# Patient Record
Sex: MALE | Race: WHITE | NOT HISPANIC OR LATINO | Employment: OTHER | URBAN - METROPOLITAN AREA
[De-identification: names, ages, dates, MRNs, and addresses within clinical notes are randomized per-mention and may not be internally consistent; named-entity substitution may affect disease eponyms.]

---

## 2017-03-01 ENCOUNTER — GENERIC CONVERSION - ENCOUNTER (OUTPATIENT)
Dept: OTHER | Facility: OTHER | Age: 72
End: 2017-03-01

## 2017-03-09 ENCOUNTER — ALLSCRIPTS OFFICE VISIT (OUTPATIENT)
Dept: OTHER | Facility: OTHER | Age: 72
End: 2017-03-09

## 2017-03-09 DIAGNOSIS — I10 ESSENTIAL (PRIMARY) HYPERTENSION: ICD-10-CM

## 2017-03-09 DIAGNOSIS — Z12.5 ENCOUNTER FOR SCREENING FOR MALIGNANT NEOPLASM OF PROSTATE: ICD-10-CM

## 2017-04-05 ENCOUNTER — APPOINTMENT (OUTPATIENT)
Dept: LAB | Facility: HOSPITAL | Age: 72
End: 2017-04-05
Attending: INTERNAL MEDICINE
Payer: MEDICARE

## 2017-04-05 ENCOUNTER — TRANSCRIBE ORDERS (OUTPATIENT)
Dept: ADMINISTRATIVE | Facility: HOSPITAL | Age: 72
End: 2017-04-05

## 2017-04-05 ENCOUNTER — GENERIC CONVERSION - ENCOUNTER (OUTPATIENT)
Dept: OTHER | Facility: OTHER | Age: 72
End: 2017-04-05

## 2017-04-05 DIAGNOSIS — I10 ESSENTIAL (PRIMARY) HYPERTENSION: ICD-10-CM

## 2017-04-05 DIAGNOSIS — Z12.5 ENCOUNTER FOR SCREENING FOR MALIGNANT NEOPLASM OF PROSTATE: ICD-10-CM

## 2017-04-05 LAB
ALBUMIN SERPL BCP-MCNC: 3.6 G/DL (ref 3.5–5)
ALP SERPL-CCNC: 80 U/L (ref 46–116)
ALT SERPL W P-5'-P-CCNC: 10 U/L (ref 12–78)
ANION GAP SERPL CALCULATED.3IONS-SCNC: 6 MMOL/L (ref 4–13)
AST SERPL W P-5'-P-CCNC: 21 U/L (ref 5–45)
BILIRUB SERPL-MCNC: 0.8 MG/DL (ref 0.2–1)
BUN SERPL-MCNC: 16 MG/DL (ref 5–25)
CALCIUM SERPL-MCNC: 8.5 MG/DL (ref 8.3–10.1)
CHLORIDE SERPL-SCNC: 107 MMOL/L (ref 100–108)
CO2 SERPL-SCNC: 31 MMOL/L (ref 21–32)
CREAT SERPL-MCNC: 0.93 MG/DL (ref 0.6–1.3)
GFR SERPL CREATININE-BSD FRML MDRD: >60 ML/MIN/1.73SQ M
GLUCOSE P FAST SERPL-MCNC: 105 MG/DL (ref 65–99)
POTASSIUM SERPL-SCNC: 3.7 MMOL/L (ref 3.5–5.3)
PROT SERPL-MCNC: 7 G/DL (ref 6.4–8.2)
PSA SERPL-MCNC: 1.6 NG/ML (ref 0–4)
SODIUM SERPL-SCNC: 144 MMOL/L (ref 136–145)

## 2017-04-05 PROCEDURE — 36415 COLL VENOUS BLD VENIPUNCTURE: CPT

## 2017-04-05 PROCEDURE — 80053 COMPREHEN METABOLIC PANEL: CPT

## 2017-04-05 PROCEDURE — G0103 PSA SCREENING: HCPCS

## 2017-05-22 ENCOUNTER — ALLSCRIPTS OFFICE VISIT (OUTPATIENT)
Dept: OTHER | Facility: OTHER | Age: 72
End: 2017-05-22

## 2017-06-13 ENCOUNTER — ALLSCRIPTS OFFICE VISIT (OUTPATIENT)
Dept: OTHER | Facility: OTHER | Age: 72
End: 2017-06-13

## 2017-07-19 ENCOUNTER — ALLSCRIPTS OFFICE VISIT (OUTPATIENT)
Dept: OTHER | Facility: OTHER | Age: 72
End: 2017-07-19

## 2017-07-19 ENCOUNTER — TRANSCRIBE ORDERS (OUTPATIENT)
Dept: NON INVASIVE DIAGNOSTICS | Facility: CLINIC | Age: 72
End: 2017-07-19

## 2017-07-19 DIAGNOSIS — N20.0 CALCULUS OF KIDNEY: ICD-10-CM

## 2017-07-19 DIAGNOSIS — E78.5 HYPERLIPIDEMIA: ICD-10-CM

## 2017-07-19 DIAGNOSIS — I10 ESSENTIAL (PRIMARY) HYPERTENSION: ICD-10-CM

## 2017-07-24 ENCOUNTER — APPOINTMENT (OUTPATIENT)
Dept: LAB | Facility: HOSPITAL | Age: 72
DRG: 694 | End: 2017-07-24
Attending: INTERNAL MEDICINE
Payer: MEDICARE

## 2017-07-24 ENCOUNTER — TRANSCRIBE ORDERS (OUTPATIENT)
Dept: ADMINISTRATIVE | Facility: HOSPITAL | Age: 72
End: 2017-07-24

## 2017-07-24 DIAGNOSIS — E78.5 HYPERLIPIDEMIA: ICD-10-CM

## 2017-07-24 LAB
ALBUMIN SERPL BCP-MCNC: 3.7 G/DL (ref 3.5–5)
ALP SERPL-CCNC: 78 U/L (ref 46–116)
ALT SERPL W P-5'-P-CCNC: 20 U/L (ref 12–78)
AST SERPL W P-5'-P-CCNC: 28 U/L (ref 5–45)
BILIRUB DIRECT SERPL-MCNC: 0.14 MG/DL (ref 0–0.2)
BILIRUB SERPL-MCNC: 0.7 MG/DL (ref 0.2–1)
CHOLEST SERPL-MCNC: 145 MG/DL (ref 50–200)
HDLC SERPL-MCNC: 49 MG/DL (ref 40–60)
LDLC SERPL CALC-MCNC: 73 MG/DL (ref 0–100)
PROT SERPL-MCNC: 7.2 G/DL (ref 6.4–8.2)
TRIGL SERPL-MCNC: 113 MG/DL

## 2017-07-24 PROCEDURE — 80076 HEPATIC FUNCTION PANEL: CPT

## 2017-07-24 PROCEDURE — 36415 COLL VENOUS BLD VENIPUNCTURE: CPT

## 2017-07-24 PROCEDURE — 80061 LIPID PANEL: CPT

## 2017-07-25 ENCOUNTER — HOSPITAL ENCOUNTER (OUTPATIENT)
Dept: NON INVASIVE DIAGNOSTICS | Facility: CLINIC | Age: 72
Discharge: HOME | DRG: 694 | End: 2017-07-25
Payer: MEDICARE

## 2017-07-25 DIAGNOSIS — E78.5 HYPERLIPIDEMIA: ICD-10-CM

## 2017-07-25 PROCEDURE — 93017 CV STRESS TEST TRACING ONLY: CPT

## 2017-07-26 ENCOUNTER — APPOINTMENT (EMERGENCY)
Dept: CT IMAGING | Facility: HOSPITAL | Age: 72
DRG: 694 | End: 2017-07-26
Payer: MEDICARE

## 2017-07-26 ENCOUNTER — HOSPITAL ENCOUNTER (INPATIENT)
Facility: HOSPITAL | Age: 72
LOS: 2 days | Discharge: HOME HEALTH | DRG: 694 | End: 2017-07-28
Attending: EMERGENCY MEDICINE | Admitting: INTERNAL MEDICINE
Payer: MEDICARE

## 2017-07-26 DIAGNOSIS — G20 PARKINSON'S DISEASE (HCC): ICD-10-CM

## 2017-07-26 DIAGNOSIS — N23 RENAL COLIC ON LEFT SIDE: Primary | ICD-10-CM

## 2017-07-26 DIAGNOSIS — N21.1 URETHRAL STONE: ICD-10-CM

## 2017-07-26 DIAGNOSIS — I25.10 CAD (CORONARY ARTERY DISEASE): ICD-10-CM

## 2017-07-26 PROBLEM — G20.A1 PARKINSON'S DISEASE: Status: ACTIVE | Noted: 2017-07-26

## 2017-07-26 PROBLEM — R10.9 FLANK PAIN: Status: ACTIVE | Noted: 2017-07-26

## 2017-07-26 PROBLEM — I10 HYPERTENSION: Status: ACTIVE | Noted: 2017-07-26

## 2017-07-26 LAB
ALBUMIN SERPL BCP-MCNC: 3.9 G/DL (ref 3.5–5)
ALP SERPL-CCNC: 78 U/L (ref 46–116)
ALT SERPL W P-5'-P-CCNC: 10 U/L (ref 12–78)
ANION GAP SERPL CALCULATED.3IONS-SCNC: 10 MMOL/L (ref 4–13)
AST SERPL W P-5'-P-CCNC: 26 U/L (ref 5–45)
BASOPHILS # BLD AUTO: 0.01 THOUSANDS/ΜL (ref 0–0.1)
BASOPHILS NFR BLD AUTO: 0 % (ref 0–1)
BILIRUB SERPL-MCNC: 0.8 MG/DL (ref 0.2–1)
BILIRUB UR QL STRIP: NEGATIVE
BUN SERPL-MCNC: 22 MG/DL (ref 5–25)
CALCIUM SERPL-MCNC: 8.6 MG/DL (ref 8.3–10.1)
CHLORIDE SERPL-SCNC: 105 MMOL/L (ref 100–108)
CLARITY UR: CLEAR
CO2 SERPL-SCNC: 28 MMOL/L (ref 21–32)
COLOR UR: YELLOW
CREAT SERPL-MCNC: 1.13 MG/DL (ref 0.6–1.3)
EOSINOPHIL # BLD AUTO: 0.08 THOUSAND/ΜL (ref 0–0.61)
EOSINOPHIL NFR BLD AUTO: 1 % (ref 0–6)
ERYTHROCYTE [DISTWIDTH] IN BLOOD BY AUTOMATED COUNT: 13.3 % (ref 11.6–15.1)
GFR SERPL CREATININE-BSD FRML MDRD: 64 ML/MIN/1.73SQ M
GLUCOSE SERPL-MCNC: 132 MG/DL (ref 65–140)
GLUCOSE UR STRIP-MCNC: NEGATIVE MG/DL
HCT VFR BLD AUTO: 45.7 % (ref 36.5–49.3)
HGB BLD-MCNC: 15.6 G/DL (ref 12–17)
HGB UR QL STRIP.AUTO: NEGATIVE
KETONES UR STRIP-MCNC: NEGATIVE MG/DL
LEUKOCYTE ESTERASE UR QL STRIP: NEGATIVE
LYMPHOCYTES # BLD AUTO: 1.39 THOUSANDS/ΜL (ref 0.6–4.47)
LYMPHOCYTES NFR BLD AUTO: 23 % (ref 14–44)
MCH RBC QN AUTO: 31.8 PG (ref 26.8–34.3)
MCHC RBC AUTO-ENTMCNC: 34.1 G/DL (ref 31.4–37.4)
MCV RBC AUTO: 93 FL (ref 82–98)
MONOCYTES # BLD AUTO: 0.66 THOUSAND/ΜL (ref 0.17–1.22)
MONOCYTES NFR BLD AUTO: 11 % (ref 4–12)
NEUTROPHILS # BLD AUTO: 3.98 THOUSANDS/ΜL (ref 1.85–7.62)
NEUTS SEG NFR BLD AUTO: 65 % (ref 43–75)
NITRITE UR QL STRIP: NEGATIVE
PH UR STRIP.AUTO: 5.5 [PH] (ref 4.5–8)
PLATELET # BLD AUTO: 173 THOUSANDS/UL (ref 149–390)
PMV BLD AUTO: 9.5 FL (ref 8.9–12.7)
POTASSIUM SERPL-SCNC: 3.6 MMOL/L (ref 3.5–5.3)
PROT SERPL-MCNC: 7.4 G/DL (ref 6.4–8.2)
PROT UR STRIP-MCNC: NEGATIVE MG/DL
RBC # BLD AUTO: 4.91 MILLION/UL (ref 3.88–5.62)
SODIUM SERPL-SCNC: 143 MMOL/L (ref 136–145)
SP GR UR STRIP.AUTO: 1.02 (ref 1–1.03)
UROBILINOGEN UR QL STRIP.AUTO: 0.2 E.U./DL
WBC # BLD AUTO: 6.12 THOUSAND/UL (ref 4.31–10.16)

## 2017-07-26 PROCEDURE — 96361 HYDRATE IV INFUSION ADD-ON: CPT

## 2017-07-26 PROCEDURE — 80053 COMPREHEN METABOLIC PANEL: CPT | Performed by: EMERGENCY MEDICINE

## 2017-07-26 PROCEDURE — 99285 EMERGENCY DEPT VISIT HI MDM: CPT

## 2017-07-26 PROCEDURE — 96376 TX/PRO/DX INJ SAME DRUG ADON: CPT

## 2017-07-26 PROCEDURE — 96374 THER/PROPH/DIAG INJ IV PUSH: CPT

## 2017-07-26 PROCEDURE — 96375 TX/PRO/DX INJ NEW DRUG ADDON: CPT

## 2017-07-26 PROCEDURE — 85025 COMPLETE CBC W/AUTO DIFF WBC: CPT | Performed by: EMERGENCY MEDICINE

## 2017-07-26 PROCEDURE — 36415 COLL VENOUS BLD VENIPUNCTURE: CPT | Performed by: EMERGENCY MEDICINE

## 2017-07-26 PROCEDURE — 81003 URINALYSIS AUTO W/O SCOPE: CPT | Performed by: EMERGENCY MEDICINE

## 2017-07-26 PROCEDURE — 74176 CT ABD & PELVIS W/O CONTRAST: CPT

## 2017-07-26 RX ORDER — METOCLOPRAMIDE HYDROCHLORIDE 5 MG/ML
10 INJECTION INTRAMUSCULAR; INTRAVENOUS ONCE
Status: COMPLETED | OUTPATIENT
Start: 2017-07-26 | End: 2017-07-26

## 2017-07-26 RX ORDER — ONDANSETRON 2 MG/ML
4 INJECTION INTRAMUSCULAR; INTRAVENOUS ONCE
Status: COMPLETED | OUTPATIENT
Start: 2017-07-26 | End: 2017-07-26

## 2017-07-26 RX ORDER — BENAZEPRIL/HYDROCHLOROTHIAZIDE 20 MG-25MG
1 TABLET ORAL DAILY
COMMUNITY
End: 2018-07-09 | Stop reason: SDUPTHER

## 2017-07-26 RX ORDER — TAMSULOSIN HYDROCHLORIDE 0.4 MG/1
0.4 CAPSULE ORAL
Status: DISCONTINUED | OUTPATIENT
Start: 2017-07-26 | End: 2017-07-28 | Stop reason: HOSPADM

## 2017-07-26 RX ORDER — ONDANSETRON 2 MG/ML
4 INJECTION INTRAMUSCULAR; INTRAVENOUS EVERY 6 HOURS PRN
Status: DISCONTINUED | OUTPATIENT
Start: 2017-07-26 | End: 2017-07-28 | Stop reason: HOSPADM

## 2017-07-26 RX ORDER — METOPROLOL SUCCINATE 25 MG/1
25 TABLET, EXTENDED RELEASE ORAL DAILY
Status: DISCONTINUED | OUTPATIENT
Start: 2017-07-26 | End: 2017-07-28 | Stop reason: HOSPADM

## 2017-07-26 RX ORDER — MORPHINE SULFATE 2 MG/ML
2 INJECTION, SOLUTION INTRAMUSCULAR; INTRAVENOUS EVERY 4 HOURS PRN
Status: DISCONTINUED | OUTPATIENT
Start: 2017-07-26 | End: 2017-07-26

## 2017-07-26 RX ORDER — ZOLPIDEM TARTRATE 5 MG/1
5 TABLET ORAL
Status: DISCONTINUED | OUTPATIENT
Start: 2017-07-26 | End: 2017-07-28 | Stop reason: HOSPADM

## 2017-07-26 RX ORDER — ALLOPURINOL 300 MG/1
300 TABLET ORAL DAILY
Status: DISCONTINUED | OUTPATIENT
Start: 2017-07-26 | End: 2017-07-28 | Stop reason: HOSPADM

## 2017-07-26 RX ORDER — ATORVASTATIN CALCIUM 20 MG/1
20 TABLET, FILM COATED ORAL
Status: DISCONTINUED | OUTPATIENT
Start: 2017-07-26 | End: 2017-07-28 | Stop reason: HOSPADM

## 2017-07-26 RX ORDER — HEPARIN SODIUM 5000 [USP'U]/ML
5000 INJECTION, SOLUTION INTRAVENOUS; SUBCUTANEOUS EVERY 8 HOURS SCHEDULED
Status: DISCONTINUED | OUTPATIENT
Start: 2017-07-26 | End: 2017-07-28 | Stop reason: HOSPADM

## 2017-07-26 RX ORDER — BENAZEPRIL HYDROCHLORIDE 10 MG/1
20 TABLET ORAL DAILY
Status: DISCONTINUED | OUTPATIENT
Start: 2017-07-27 | End: 2017-07-27

## 2017-07-26 RX ORDER — LANOLIN ALCOHOL/MO/W.PET/CERES
1 CREAM (GRAM) TOPICAL DAILY
COMMUNITY

## 2017-07-26 RX ORDER — NITROGLYCERIN 0.4 MG/1
0.4 TABLET SUBLINGUAL
Status: DISCONTINUED | OUTPATIENT
Start: 2017-07-26 | End: 2017-07-28 | Stop reason: HOSPADM

## 2017-07-26 RX ORDER — HYDROCHLOROTHIAZIDE 25 MG/1
25 TABLET ORAL DAILY
Status: DISCONTINUED | OUTPATIENT
Start: 2017-07-27 | End: 2017-07-27

## 2017-07-26 RX ORDER — ACETAMINOPHEN 325 MG/1
650 TABLET ORAL EVERY 6 HOURS PRN
Status: DISCONTINUED | OUTPATIENT
Start: 2017-07-26 | End: 2017-07-28 | Stop reason: HOSPADM

## 2017-07-26 RX ORDER — DEXTROSE AND SODIUM CHLORIDE 5; .45 G/100ML; G/100ML
100 INJECTION, SOLUTION INTRAVENOUS CONTINUOUS
Status: DISCONTINUED | OUTPATIENT
Start: 2017-07-26 | End: 2017-07-28

## 2017-07-26 RX ADMIN — ATORVASTATIN CALCIUM 20 MG: 20 TABLET, FILM COATED ORAL at 16:50

## 2017-07-26 RX ADMIN — CARBIDOPA AND LEVODOPA 2 TABLET: 25; 100 TABLET ORAL at 16:50

## 2017-07-26 RX ADMIN — ONDANSETRON 4 MG: 2 INJECTION INTRAMUSCULAR; INTRAVENOUS at 10:37

## 2017-07-26 RX ADMIN — MORPHINE SULFATE 2 MG: 2 INJECTION, SOLUTION INTRAMUSCULAR; INTRAVENOUS at 20:57

## 2017-07-26 RX ADMIN — SODIUM CHLORIDE 1000 ML: 0.9 INJECTION, SOLUTION INTRAVENOUS at 12:05

## 2017-07-26 RX ADMIN — CEFTRIAXONE SODIUM 1000 MG: 1 INJECTION, POWDER, FOR SOLUTION INTRAMUSCULAR; INTRAVENOUS at 18:33

## 2017-07-26 RX ADMIN — ONDANSETRON 4 MG: 2 INJECTION INTRAMUSCULAR; INTRAVENOUS at 12:48

## 2017-07-26 RX ADMIN — DEXTROSE AND SODIUM CHLORIDE 100 ML/HR: 5; .45 INJECTION, SOLUTION INTRAVENOUS at 16:23

## 2017-07-26 RX ADMIN — METOCLOPRAMIDE 10 MG: 5 INJECTION, SOLUTION INTRAMUSCULAR; INTRAVENOUS at 13:59

## 2017-07-26 RX ADMIN — HYDROMORPHONE HYDROCHLORIDE 1 MG: 1 INJECTION, SOLUTION INTRAMUSCULAR; INTRAVENOUS; SUBCUTANEOUS at 10:31

## 2017-07-26 RX ADMIN — ZOLPIDEM TARTRATE 5 MG: 5 TABLET, FILM COATED ORAL at 22:57

## 2017-07-26 RX ADMIN — HEPARIN SODIUM 5000 UNITS: 5000 INJECTION, SOLUTION INTRAVENOUS; SUBCUTANEOUS at 21:01

## 2017-07-26 RX ADMIN — HYDROMORPHONE HYDROCHLORIDE 1 MG: 1 INJECTION, SOLUTION INTRAMUSCULAR; INTRAVENOUS; SUBCUTANEOUS at 12:07

## 2017-07-26 RX ADMIN — TAMSULOSIN HYDROCHLORIDE 0.4 MG: 0.4 CAPSULE ORAL at 16:50

## 2017-07-27 ENCOUNTER — APPOINTMENT (INPATIENT)
Dept: RADIOLOGY | Facility: HOSPITAL | Age: 72
DRG: 694 | End: 2017-07-27
Payer: MEDICARE

## 2017-07-27 ENCOUNTER — ANESTHESIA EVENT (INPATIENT)
Dept: PERIOP | Facility: HOSPITAL | Age: 72
DRG: 694 | End: 2017-07-27
Payer: MEDICARE

## 2017-07-27 ENCOUNTER — ANESTHESIA (INPATIENT)
Dept: PERIOP | Facility: HOSPITAL | Age: 72
DRG: 694 | End: 2017-07-27
Payer: MEDICARE

## 2017-07-27 PROBLEM — N17.9 AKI (ACUTE KIDNEY INJURY) (HCC): Status: ACTIVE | Noted: 2017-07-27

## 2017-07-27 LAB
ALBUMIN SERPL BCP-MCNC: 3.3 G/DL (ref 3.5–5)
ALP SERPL-CCNC: 74 U/L (ref 46–116)
ALT SERPL W P-5'-P-CCNC: 12 U/L (ref 12–78)
ANION GAP SERPL CALCULATED.3IONS-SCNC: 7 MMOL/L (ref 4–13)
AST SERPL W P-5'-P-CCNC: 24 U/L (ref 5–45)
BASOPHILS # BLD AUTO: 0 THOUSANDS/ΜL (ref 0–0.1)
BASOPHILS NFR BLD AUTO: 0 % (ref 0–1)
BILIRUB SERPL-MCNC: 0.9 MG/DL (ref 0.2–1)
BUN SERPL-MCNC: 22 MG/DL (ref 5–25)
CALCIUM SERPL-MCNC: 8.4 MG/DL (ref 8.3–10.1)
CHLORIDE SERPL-SCNC: 105 MMOL/L (ref 100–108)
CO2 SERPL-SCNC: 29 MMOL/L (ref 21–32)
CREAT SERPL-MCNC: 1.7 MG/DL (ref 0.6–1.3)
EOSINOPHIL # BLD AUTO: 0.03 THOUSAND/ΜL (ref 0–0.61)
EOSINOPHIL NFR BLD AUTO: 0 % (ref 0–6)
ERYTHROCYTE [DISTWIDTH] IN BLOOD BY AUTOMATED COUNT: 13.4 % (ref 11.6–15.1)
GFR SERPL CREATININE-BSD FRML MDRD: 39 ML/MIN/1.73SQ M
GLUCOSE SERPL-MCNC: 124 MG/DL (ref 65–140)
HCT VFR BLD AUTO: 42.3 % (ref 36.5–49.3)
HGB BLD-MCNC: 14.3 G/DL (ref 12–17)
INR PPP: 1.28 (ref 0.86–1.16)
LYMPHOCYTES # BLD AUTO: 0.65 THOUSANDS/ΜL (ref 0.6–4.47)
LYMPHOCYTES NFR BLD AUTO: 8 % (ref 14–44)
MAGNESIUM SERPL-MCNC: 2 MG/DL (ref 1.6–2.6)
MCH RBC QN AUTO: 31.9 PG (ref 26.8–34.3)
MCHC RBC AUTO-ENTMCNC: 33.8 G/DL (ref 31.4–37.4)
MCV RBC AUTO: 94 FL (ref 82–98)
MONOCYTES # BLD AUTO: 1.03 THOUSAND/ΜL (ref 0.17–1.22)
MONOCYTES NFR BLD AUTO: 12 % (ref 4–12)
NEUTROPHILS # BLD AUTO: 6.62 THOUSANDS/ΜL (ref 1.85–7.62)
NEUTS SEG NFR BLD AUTO: 80 % (ref 43–75)
PHOSPHATE SERPL-MCNC: 3.7 MG/DL (ref 2.3–4.1)
PLATELET # BLD AUTO: 141 THOUSANDS/UL (ref 149–390)
PMV BLD AUTO: 9.9 FL (ref 8.9–12.7)
POTASSIUM SERPL-SCNC: 4 MMOL/L (ref 3.5–5.3)
PROT SERPL-MCNC: 6.5 G/DL (ref 6.4–8.2)
PROTHROMBIN TIME: 15.8 SECONDS (ref 12.1–14.4)
RBC # BLD AUTO: 4.48 MILLION/UL (ref 3.88–5.62)
SODIUM SERPL-SCNC: 141 MMOL/L (ref 136–145)
TSH SERPL DL<=0.05 MIU/L-ACNC: 2.27 UIU/ML (ref 0.36–3.74)
WBC # BLD AUTO: 8.33 THOUSAND/UL (ref 4.31–10.16)

## 2017-07-27 PROCEDURE — 84100 ASSAY OF PHOSPHORUS: CPT | Performed by: INTERNAL MEDICINE

## 2017-07-27 PROCEDURE — 0T778DZ DILATION OF LEFT URETER WITH INTRALUMINAL DEVICE, VIA NATURAL OR ARTIFICIAL OPENING ENDOSCOPIC: ICD-10-PCS | Performed by: UROLOGY

## 2017-07-27 PROCEDURE — 85025 COMPLETE CBC W/AUTO DIFF WBC: CPT | Performed by: PHYSICIAN ASSISTANT

## 2017-07-27 PROCEDURE — 84443 ASSAY THYROID STIM HORMONE: CPT | Performed by: INTERNAL MEDICINE

## 2017-07-27 PROCEDURE — C1758 CATHETER, URETERAL: HCPCS | Performed by: UROLOGY

## 2017-07-27 PROCEDURE — C1769 GUIDE WIRE: HCPCS | Performed by: UROLOGY

## 2017-07-27 PROCEDURE — 74450 X-RAY URETHRA/BLADDER: CPT

## 2017-07-27 PROCEDURE — 83735 ASSAY OF MAGNESIUM: CPT | Performed by: INTERNAL MEDICINE

## 2017-07-27 PROCEDURE — 85610 PROTHROMBIN TIME: CPT | Performed by: INTERNAL MEDICINE

## 2017-07-27 PROCEDURE — 87086 URINE CULTURE/COLONY COUNT: CPT | Performed by: NURSE PRACTITIONER

## 2017-07-27 PROCEDURE — 74000 HB X-RAY EXAM OF ABDOMEN (SINGLE ANTEROPOSTERIOR VIEW): CPT

## 2017-07-27 PROCEDURE — C2617 STENT, NON-COR, TEM W/O DEL: HCPCS | Performed by: UROLOGY

## 2017-07-27 PROCEDURE — 80053 COMPREHEN METABOLIC PANEL: CPT | Performed by: INTERNAL MEDICINE

## 2017-07-27 DEVICE — URETERAL STENT 6 FR X 26 CM INLAY: Type: IMPLANTABLE DEVICE | Status: FUNCTIONAL

## 2017-07-27 RX ORDER — FENTANYL CITRATE/PF 50 MCG/ML
25 SYRINGE (ML) INJECTION
Status: DISCONTINUED | OUTPATIENT
Start: 2017-07-27 | End: 2017-07-27 | Stop reason: HOSPADM

## 2017-07-27 RX ORDER — FENTANYL CITRATE 50 UG/ML
INJECTION, SOLUTION INTRAMUSCULAR; INTRAVENOUS AS NEEDED
Status: DISCONTINUED | OUTPATIENT
Start: 2017-07-27 | End: 2017-07-27 | Stop reason: SURG

## 2017-07-27 RX ORDER — CIPROFLOXACIN 500 MG/1
500 TABLET, FILM COATED ORAL 2 TIMES DAILY
Status: DISCONTINUED | OUTPATIENT
Start: 2017-07-27 | End: 2017-07-28 | Stop reason: HOSPADM

## 2017-07-27 RX ORDER — PROPOFOL 10 MG/ML
INJECTION, EMULSION INTRAVENOUS AS NEEDED
Status: DISCONTINUED | OUTPATIENT
Start: 2017-07-27 | End: 2017-07-27 | Stop reason: SURG

## 2017-07-27 RX ORDER — PROMETHAZINE HYDROCHLORIDE 25 MG/ML
25 INJECTION, SOLUTION INTRAMUSCULAR; INTRAVENOUS EVERY 6 HOURS PRN
Status: DISCONTINUED | OUTPATIENT
Start: 2017-07-27 | End: 2017-07-27 | Stop reason: HOSPADM

## 2017-07-27 RX ORDER — METOPROLOL TARTRATE 5 MG/5ML
INJECTION INTRAVENOUS AS NEEDED
Status: DISCONTINUED | OUTPATIENT
Start: 2017-07-27 | End: 2017-07-27 | Stop reason: SURG

## 2017-07-27 RX ORDER — SODIUM CHLORIDE, SODIUM LACTATE, POTASSIUM CHLORIDE, CALCIUM CHLORIDE 600; 310; 30; 20 MG/100ML; MG/100ML; MG/100ML; MG/100ML
INJECTION, SOLUTION INTRAVENOUS CONTINUOUS PRN
Status: DISCONTINUED | OUTPATIENT
Start: 2017-07-27 | End: 2017-07-27 | Stop reason: SURG

## 2017-07-27 RX ORDER — MIDAZOLAM HYDROCHLORIDE 1 MG/ML
INJECTION INTRAMUSCULAR; INTRAVENOUS AS NEEDED
Status: DISCONTINUED | OUTPATIENT
Start: 2017-07-27 | End: 2017-07-27 | Stop reason: SURG

## 2017-07-27 RX ORDER — HYDROCODONE BITARTRATE AND ACETAMINOPHEN 5; 325 MG/1; MG/1
2 TABLET ORAL EVERY 6 HOURS PRN
Qty: 15 TABLET | Refills: 0 | Status: SHIPPED | OUTPATIENT
Start: 2017-07-27 | End: 2017-08-06

## 2017-07-27 RX ORDER — HYDROCODONE BITARTRATE AND ACETAMINOPHEN 5; 325 MG/1; MG/1
2 TABLET ORAL EVERY 4 HOURS PRN
Status: DISCONTINUED | OUTPATIENT
Start: 2017-07-27 | End: 2017-07-28 | Stop reason: HOSPADM

## 2017-07-27 RX ORDER — CIPROFLOXACIN 500 MG/1
500 TABLET, FILM COATED ORAL 2 TIMES DAILY
Qty: 10 TABLET | Refills: 0 | Status: SHIPPED | OUTPATIENT
Start: 2017-07-27 | End: 2017-08-01

## 2017-07-27 RX ORDER — MAGNESIUM HYDROXIDE 1200 MG/15ML
LIQUID ORAL AS NEEDED
Status: DISCONTINUED | OUTPATIENT
Start: 2017-07-27 | End: 2017-07-27 | Stop reason: HOSPADM

## 2017-07-27 RX ADMIN — CEFAZOLIN SODIUM 2000 MG: 2 SOLUTION INTRAVENOUS at 16:17

## 2017-07-27 RX ADMIN — CARBIDOPA AND LEVODOPA 2 TABLET: 25; 100 TABLET ORAL at 17:59

## 2017-07-27 RX ADMIN — CARBIDOPA AND LEVODOPA 2 TABLET: 25; 100 TABLET ORAL at 06:11

## 2017-07-27 RX ADMIN — FENTANYL CITRATE 25 MCG: 50 INJECTION, SOLUTION INTRAMUSCULAR; INTRAVENOUS at 16:51

## 2017-07-27 RX ADMIN — DEXTROSE AND SODIUM CHLORIDE 100 ML/HR: 5; .45 INJECTION, SOLUTION INTRAVENOUS at 03:26

## 2017-07-27 RX ADMIN — FENTANYL CITRATE 50 MCG: 50 INJECTION, SOLUTION INTRAMUSCULAR; INTRAVENOUS at 16:26

## 2017-07-27 RX ADMIN — BENAZEPRIL HYDROCHLORIDE 20 MG: 10 TABLET, FILM COATED ORAL at 08:14

## 2017-07-27 RX ADMIN — HYDROMORPHONE HYDROCHLORIDE 1 MG: 1 INJECTION, SOLUTION INTRAMUSCULAR; INTRAVENOUS; SUBCUTANEOUS at 13:30

## 2017-07-27 RX ADMIN — CIPROFLOXACIN 500 MG: 500 TABLET, FILM COATED ORAL at 17:59

## 2017-07-27 RX ADMIN — HEPARIN SODIUM 5000 UNITS: 5000 INJECTION, SOLUTION INTRAVENOUS; SUBCUTANEOUS at 21:31

## 2017-07-27 RX ADMIN — SODIUM CHLORIDE, SODIUM LACTATE, POTASSIUM CHLORIDE, AND CALCIUM CHLORIDE: .6; .31; .03; .02 INJECTION, SOLUTION INTRAVENOUS at 16:04

## 2017-07-27 RX ADMIN — PROPOFOL 150 MG: 10 INJECTION, EMULSION INTRAVENOUS at 16:13

## 2017-07-27 RX ADMIN — FENTANYL CITRATE 50 MCG: 50 INJECTION, SOLUTION INTRAMUSCULAR; INTRAVENOUS at 16:16

## 2017-07-27 RX ADMIN — ONDANSETRON 4 MG: 2 INJECTION INTRAMUSCULAR; INTRAVENOUS at 16:25

## 2017-07-27 RX ADMIN — HYDROMORPHONE HYDROCHLORIDE 1 MG: 1 INJECTION, SOLUTION INTRAMUSCULAR; INTRAVENOUS; SUBCUTANEOUS at 10:39

## 2017-07-27 RX ADMIN — FENTANYL CITRATE 25 MCG: 50 INJECTION, SOLUTION INTRAMUSCULAR; INTRAVENOUS at 16:47

## 2017-07-27 RX ADMIN — ZOLPIDEM TARTRATE 5 MG: 5 TABLET, FILM COATED ORAL at 23:25

## 2017-07-27 RX ADMIN — DEXAMETHASONE SODIUM PHOSPHATE 4 MG: 10 INJECTION INTRAMUSCULAR; INTRAVENOUS at 16:25

## 2017-07-27 RX ADMIN — ALLOPURINOL 300 MG: 300 TABLET ORAL at 08:14

## 2017-07-27 RX ADMIN — ATORVASTATIN CALCIUM 20 MG: 20 TABLET, FILM COATED ORAL at 17:58

## 2017-07-27 RX ADMIN — MIDAZOLAM HYDROCHLORIDE 2 MG: 1 INJECTION, SOLUTION INTRAMUSCULAR; INTRAVENOUS at 16:08

## 2017-07-27 RX ADMIN — TAMSULOSIN HYDROCHLORIDE 0.4 MG: 0.4 CAPSULE ORAL at 17:59

## 2017-07-27 RX ADMIN — HYDROMORPHONE HYDROCHLORIDE 1 MG: 1 INJECTION, SOLUTION INTRAMUSCULAR; INTRAVENOUS; SUBCUTANEOUS at 03:21

## 2017-07-27 RX ADMIN — HYDROCHLOROTHIAZIDE 25 MG: 25 TABLET ORAL at 08:14

## 2017-07-27 RX ADMIN — METOPROLOL TARTRATE 1 MG: 5 INJECTION INTRAVENOUS at 16:33

## 2017-07-27 RX ADMIN — METOPROLOL SUCCINATE 25 MG: 25 TABLET, EXTENDED RELEASE ORAL at 08:14

## 2017-07-27 RX ADMIN — FENTANYL CITRATE 50 MCG: 50 INJECTION, SOLUTION INTRAMUSCULAR; INTRAVENOUS at 16:30

## 2017-07-28 ENCOUNTER — APPOINTMENT (INPATIENT)
Dept: PHYSICAL THERAPY | Facility: HOSPITAL | Age: 72
DRG: 694 | End: 2017-07-28
Payer: MEDICARE

## 2017-07-28 VITALS
SYSTOLIC BLOOD PRESSURE: 136 MMHG | HEIGHT: 73 IN | OXYGEN SATURATION: 95 % | WEIGHT: 246.91 LBS | RESPIRATION RATE: 18 BRPM | TEMPERATURE: 97.7 F | HEART RATE: 90 BPM | BODY MASS INDEX: 32.72 KG/M2 | DIASTOLIC BLOOD PRESSURE: 75 MMHG

## 2017-07-28 PROBLEM — N21.1 URETHRAL STONE: Status: RESOLVED | Noted: 2017-07-26 | Resolved: 2017-07-28

## 2017-07-28 PROBLEM — R10.9 FLANK PAIN: Status: RESOLVED | Noted: 2017-07-26 | Resolved: 2017-07-28

## 2017-07-28 PROBLEM — N17.9 AKI (ACUTE KIDNEY INJURY) (HCC): Status: RESOLVED | Noted: 2017-07-27 | Resolved: 2017-07-28

## 2017-07-28 PROBLEM — N23 RENAL COLIC ON LEFT SIDE: Status: RESOLVED | Noted: 2017-07-26 | Resolved: 2017-07-28

## 2017-07-28 LAB
ANION GAP SERPL CALCULATED.3IONS-SCNC: 8 MMOL/L (ref 4–13)
BASOPHILS # BLD AUTO: 0 THOUSANDS/ΜL (ref 0–0.1)
BASOPHILS NFR BLD AUTO: 0 % (ref 0–1)
BUN SERPL-MCNC: 21 MG/DL (ref 5–25)
CALCIUM SERPL-MCNC: 8.2 MG/DL (ref 8.3–10.1)
CHLORIDE SERPL-SCNC: 104 MMOL/L (ref 100–108)
CO2 SERPL-SCNC: 28 MMOL/L (ref 21–32)
CREAT SERPL-MCNC: 1.32 MG/DL (ref 0.6–1.3)
EOSINOPHIL # BLD AUTO: 0 THOUSAND/ΜL (ref 0–0.61)
EOSINOPHIL NFR BLD AUTO: 0 % (ref 0–6)
ERYTHROCYTE [DISTWIDTH] IN BLOOD BY AUTOMATED COUNT: 13.1 % (ref 11.6–15.1)
GFR SERPL CREATININE-BSD FRML MDRD: 53 ML/MIN/1.73SQ M
GLUCOSE SERPL-MCNC: 133 MG/DL (ref 65–140)
HCT VFR BLD AUTO: 41.9 % (ref 36.5–49.3)
HGB BLD-MCNC: 14 G/DL (ref 12–17)
LYMPHOCYTES # BLD AUTO: 0.58 THOUSANDS/ΜL (ref 0.6–4.47)
LYMPHOCYTES NFR BLD AUTO: 7 % (ref 14–44)
MCH RBC QN AUTO: 31.6 PG (ref 26.8–34.3)
MCHC RBC AUTO-ENTMCNC: 33.4 G/DL (ref 31.4–37.4)
MCV RBC AUTO: 95 FL (ref 82–98)
MONOCYTES # BLD AUTO: 0.55 THOUSAND/ΜL (ref 0.17–1.22)
MONOCYTES NFR BLD AUTO: 7 % (ref 4–12)
NEUTROPHILS # BLD AUTO: 6.71 THOUSANDS/ΜL (ref 1.85–7.62)
NEUTS SEG NFR BLD AUTO: 86 % (ref 43–75)
PLATELET # BLD AUTO: 146 THOUSANDS/UL (ref 149–390)
PMV BLD AUTO: 10.3 FL (ref 8.9–12.7)
POTASSIUM SERPL-SCNC: 3.7 MMOL/L (ref 3.5–5.3)
RBC # BLD AUTO: 4.43 MILLION/UL (ref 3.88–5.62)
SODIUM SERPL-SCNC: 140 MMOL/L (ref 136–145)
WBC # BLD AUTO: 7.84 THOUSAND/UL (ref 4.31–10.16)

## 2017-07-28 PROCEDURE — G8987 SELF CARE CURRENT STATUS: HCPCS

## 2017-07-28 PROCEDURE — G8989 SELF CARE D/C STATUS: HCPCS

## 2017-07-28 PROCEDURE — G8979 MOBILITY GOAL STATUS: HCPCS

## 2017-07-28 PROCEDURE — G8980 MOBILITY D/C STATUS: HCPCS

## 2017-07-28 PROCEDURE — 97163 PT EVAL HIGH COMPLEX 45 MIN: CPT

## 2017-07-28 PROCEDURE — 85025 COMPLETE CBC W/AUTO DIFF WBC: CPT | Performed by: INTERNAL MEDICINE

## 2017-07-28 PROCEDURE — G8978 MOBILITY CURRENT STATUS: HCPCS

## 2017-07-28 PROCEDURE — G8988 SELF CARE GOAL STATUS: HCPCS

## 2017-07-28 PROCEDURE — 80048 BASIC METABOLIC PNL TOTAL CA: CPT | Performed by: INTERNAL MEDICINE

## 2017-07-28 PROCEDURE — 97165 OT EVAL LOW COMPLEX 30 MIN: CPT

## 2017-07-28 RX ADMIN — METOPROLOL SUCCINATE 25 MG: 25 TABLET, EXTENDED RELEASE ORAL at 09:06

## 2017-07-28 RX ADMIN — CIPROFLOXACIN 500 MG: 500 TABLET, FILM COATED ORAL at 09:06

## 2017-07-28 RX ADMIN — ALLOPURINOL 300 MG: 300 TABLET ORAL at 09:06

## 2017-07-28 RX ADMIN — HEPARIN SODIUM 5000 UNITS: 5000 INJECTION, SOLUTION INTRAVENOUS; SUBCUTANEOUS at 06:54

## 2017-07-28 RX ADMIN — CARBIDOPA AND LEVODOPA 2 TABLET: 25; 100 TABLET ORAL at 06:54

## 2017-07-28 RX ADMIN — DEXTROSE AND SODIUM CHLORIDE 100 ML/HR: 5; .45 INJECTION, SOLUTION INTRAVENOUS at 03:05

## 2017-07-29 LAB — BACTERIA UR CULT: NORMAL

## 2017-08-01 ENCOUNTER — ALLSCRIPTS OFFICE VISIT (OUTPATIENT)
Dept: OTHER | Facility: OTHER | Age: 72
End: 2017-08-01

## 2017-08-01 LAB
CHEST PAIN STATEMENT: NORMAL
MAX DIASTOLIC BP: 71 MMHG
MAX HEART RATE: 137 BPM
MAX PREDICTED HEART RATE: 147 BPM
MAX. SYSTOLIC BP: 192 MMHG
PROTOCOL NAME: NORMAL
REASON FOR TERMINATION: NORMAL
TARGET HR FORMULA: NORMAL
TEST INDICATION: NORMAL
TIME IN EXERCISE PHASE: 330 S

## 2017-08-03 ENCOUNTER — GENERIC CONVERSION - ENCOUNTER (OUTPATIENT)
Dept: OTHER | Facility: OTHER | Age: 72
End: 2017-08-03

## 2017-08-03 ENCOUNTER — APPOINTMENT (OUTPATIENT)
Dept: LAB | Facility: HOSPITAL | Age: 72
End: 2017-08-03
Attending: INTERNAL MEDICINE
Payer: MEDICARE

## 2017-08-03 DIAGNOSIS — I10 ESSENTIAL (PRIMARY) HYPERTENSION: ICD-10-CM

## 2017-08-03 LAB
ANION GAP SERPL CALCULATED.3IONS-SCNC: 10 MMOL/L (ref 4–13)
BUN SERPL-MCNC: 22 MG/DL (ref 5–25)
CALCIUM SERPL-MCNC: 9.1 MG/DL (ref 8.3–10.1)
CHLORIDE SERPL-SCNC: 103 MMOL/L (ref 100–108)
CO2 SERPL-SCNC: 30 MMOL/L (ref 21–32)
CREAT SERPL-MCNC: 1.22 MG/DL (ref 0.6–1.3)
GFR SERPL CREATININE-BSD FRML MDRD: 58 ML/MIN/1.73SQ M
GLUCOSE SERPL-MCNC: 116 MG/DL (ref 65–140)
POTASSIUM SERPL-SCNC: 3.8 MMOL/L (ref 3.5–5.3)
SODIUM SERPL-SCNC: 143 MMOL/L (ref 136–145)

## 2017-08-03 PROCEDURE — 80048 BASIC METABOLIC PNL TOTAL CA: CPT

## 2017-08-03 PROCEDURE — 36415 COLL VENOUS BLD VENIPUNCTURE: CPT

## 2017-08-16 ENCOUNTER — TRANSCRIBE ORDERS (OUTPATIENT)
Dept: ADMINISTRATIVE | Facility: HOSPITAL | Age: 72
End: 2017-08-16

## 2017-08-16 ENCOUNTER — ALLSCRIPTS OFFICE VISIT (OUTPATIENT)
Dept: OTHER | Facility: OTHER | Age: 72
End: 2017-08-16

## 2017-08-16 DIAGNOSIS — N20.0 CALCULUS OF KIDNEY: Primary | ICD-10-CM

## 2017-08-23 ENCOUNTER — GENERIC CONVERSION - ENCOUNTER (OUTPATIENT)
Dept: OTHER | Facility: OTHER | Age: 72
End: 2017-08-23

## 2017-08-24 ENCOUNTER — HOSPITAL ENCOUNTER (OUTPATIENT)
Dept: CT IMAGING | Facility: HOSPITAL | Age: 72
Discharge: HOME | End: 2017-08-24
Attending: UROLOGY
Payer: MEDICARE

## 2017-08-24 DIAGNOSIS — N20.0 CALCULUS OF KIDNEY: ICD-10-CM

## 2017-08-24 PROCEDURE — 74176 CT ABD & PELVIS W/O CONTRAST: CPT

## 2017-09-08 ENCOUNTER — HOSPITAL ENCOUNTER (OUTPATIENT)
Dept: RADIOLOGY | Facility: HOSPITAL | Age: 72
Discharge: HOME | End: 2017-09-08
Attending: UROLOGY
Payer: MEDICARE

## 2017-09-08 ENCOUNTER — APPOINTMENT (OUTPATIENT)
Dept: LAB | Facility: HOSPITAL | Age: 72
End: 2017-09-08
Attending: UROLOGY
Payer: MEDICARE

## 2017-09-08 ENCOUNTER — ALLSCRIPTS OFFICE VISIT (OUTPATIENT)
Dept: OTHER | Facility: OTHER | Age: 72
End: 2017-09-08

## 2017-09-08 ENCOUNTER — APPOINTMENT (OUTPATIENT)
Dept: PREADMISSION TESTING | Facility: HOSPITAL | Age: 72
End: 2017-09-08
Payer: MEDICARE

## 2017-09-08 DIAGNOSIS — N20.0 CALCULUS OF KIDNEY: ICD-10-CM

## 2017-09-08 LAB
ABO GROUP BLD: NORMAL
APTT PPP: 36 SECONDS (ref 23–35)
BASOPHILS # BLD AUTO: 0.02 THOUSANDS/ΜL (ref 0–0.1)
BASOPHILS NFR BLD AUTO: 0 % (ref 0–1)
BLD GP AB SCN SERPL QL: POSITIVE
BLOOD GROUP ANTIBODIES SERPL: NORMAL
E AG RBC QL: NEGATIVE
EOSINOPHIL # BLD AUTO: 0.08 THOUSAND/ΜL (ref 0–0.61)
EOSINOPHIL NFR BLD AUTO: 2 % (ref 0–6)
ERYTHROCYTE [DISTWIDTH] IN BLOOD BY AUTOMATED COUNT: 13 % (ref 11.6–15.1)
HCT VFR BLD AUTO: 43.5 % (ref 36.5–49.3)
HGB BLD-MCNC: 14.8 G/DL (ref 12–17)
INR PPP: 1.13 (ref 0.86–1.16)
LYMPHOCYTES # BLD AUTO: 1.03 THOUSANDS/ΜL (ref 0.6–4.47)
LYMPHOCYTES NFR BLD AUTO: 20 % (ref 14–44)
MCH RBC QN AUTO: 31.5 PG (ref 26.8–34.3)
MCHC RBC AUTO-ENTMCNC: 34 G/DL (ref 31.4–37.4)
MCV RBC AUTO: 93 FL (ref 82–98)
MONOCYTES # BLD AUTO: 0.6 THOUSAND/ΜL (ref 0.17–1.22)
MONOCYTES NFR BLD AUTO: 12 % (ref 4–12)
NEUTROPHILS # BLD AUTO: 3.42 THOUSANDS/ΜL (ref 1.85–7.62)
NEUTS SEG NFR BLD AUTO: 66 % (ref 43–75)
PLATELET # BLD AUTO: 169 THOUSANDS/UL (ref 149–390)
PMV BLD AUTO: 9.9 FL (ref 8.9–12.7)
PROTHROMBIN TIME: 14.3 SECONDS (ref 12.1–14.4)
RBC # BLD AUTO: 4.7 MILLION/UL (ref 3.88–5.62)
RH BLD: POSITIVE
SPECIMEN EXPIRATION DATE: NORMAL
WBC # BLD AUTO: 5.15 THOUSAND/UL (ref 4.31–10.16)

## 2017-09-08 PROCEDURE — 86921 COMPATIBILITY TEST INCUBATE: CPT

## 2017-09-08 PROCEDURE — 86850 RBC ANTIBODY SCREEN: CPT

## 2017-09-08 PROCEDURE — 85025 COMPLETE CBC W/AUTO DIFF WBC: CPT

## 2017-09-08 PROCEDURE — 86900 BLOOD TYPING SEROLOGIC ABO: CPT

## 2017-09-08 PROCEDURE — 85610 PROTHROMBIN TIME: CPT

## 2017-09-08 PROCEDURE — 36415 COLL VENOUS BLD VENIPUNCTURE: CPT

## 2017-09-08 PROCEDURE — 86905 BLOOD TYPING RBC ANTIGENS: CPT

## 2017-09-08 PROCEDURE — 85730 THROMBOPLASTIN TIME PARTIAL: CPT

## 2017-09-08 PROCEDURE — 86922 COMPATIBILITY TEST ANTIGLOB: CPT

## 2017-09-08 PROCEDURE — 86870 RBC ANTIBODY IDENTIFICATION: CPT

## 2017-09-08 PROCEDURE — 87086 URINE CULTURE/COLONY COUNT: CPT

## 2017-09-08 PROCEDURE — 71020 HB CHEST X-RAY 2VW FRONTAL&LATL: CPT

## 2017-09-08 PROCEDURE — 86901 BLOOD TYPING SEROLOGIC RH(D): CPT

## 2017-09-09 LAB — BACTERIA UR CULT: NORMAL

## 2017-09-11 RX ORDER — METOPROLOL SUCCINATE 50 MG/1
50 TABLET, EXTENDED RELEASE ORAL DAILY
COMMUNITY
End: 2018-06-15 | Stop reason: SDUPTHER

## 2017-09-13 ENCOUNTER — GENERIC CONVERSION - ENCOUNTER (OUTPATIENT)
Dept: OTHER | Facility: OTHER | Age: 72
End: 2017-09-13

## 2017-09-14 ENCOUNTER — ANESTHESIA EVENT (OUTPATIENT)
Dept: PERIOP | Facility: HOSPITAL | Age: 72
End: 2017-09-14
Payer: MEDICARE

## 2017-09-15 ENCOUNTER — ANESTHESIA (OUTPATIENT)
Dept: PERIOP | Facility: HOSPITAL | Age: 72
End: 2017-09-15
Payer: MEDICARE

## 2017-09-15 ENCOUNTER — HOSPITAL ENCOUNTER (OUTPATIENT)
Facility: HOSPITAL | Age: 72
Setting detail: OUTPATIENT SURGERY
Discharge: HOME | End: 2017-09-16
Attending: UROLOGY | Admitting: UROLOGY
Payer: MEDICARE

## 2017-09-15 DIAGNOSIS — N21.0 CALCULUS IN BLADDER: ICD-10-CM

## 2017-09-15 DIAGNOSIS — N40.0 ENLARGED PROSTATE WITHOUT LOWER URINARY TRACT SYMPTOMS (LUTS): ICD-10-CM

## 2017-09-15 LAB
ANION GAP SERPL CALCULATED.3IONS-SCNC: 6 MMOL/L (ref 4–13)
BUN SERPL-MCNC: 18 MG/DL (ref 5–25)
CALCIUM SERPL-MCNC: 8.3 MG/DL (ref 8.3–10.1)
CHLORIDE SERPL-SCNC: 108 MMOL/L (ref 100–108)
CO2 SERPL-SCNC: 28 MMOL/L (ref 21–32)
CREAT SERPL-MCNC: 0.91 MG/DL (ref 0.6–1.3)
ERYTHROCYTE [DISTWIDTH] IN BLOOD BY AUTOMATED COUNT: 13.3 % (ref 11.6–15.1)
GFR SERPL CREATININE-BSD FRML MDRD: 83 ML/MIN/1.73SQ M
GLUCOSE P FAST SERPL-MCNC: 105 MG/DL (ref 65–99)
GLUCOSE SERPL-MCNC: 105 MG/DL (ref 65–140)
HCT VFR BLD AUTO: 40.7 % (ref 36.5–49.3)
HGB BLD-MCNC: 13.8 G/DL (ref 12–17)
MCH RBC QN AUTO: 32.4 PG (ref 26.8–34.3)
MCHC RBC AUTO-ENTMCNC: 33.9 G/DL (ref 31.4–37.4)
MCV RBC AUTO: 96 FL (ref 82–98)
PLATELET # BLD AUTO: 151 THOUSANDS/UL (ref 149–390)
PMV BLD AUTO: 10.1 FL (ref 8.9–12.7)
POTASSIUM SERPL-SCNC: 4.5 MMOL/L (ref 3.5–5.3)
RBC # BLD AUTO: 4.26 MILLION/UL (ref 3.88–5.62)
SODIUM SERPL-SCNC: 142 MMOL/L (ref 136–145)
WBC # BLD AUTO: 9.33 THOUSAND/UL (ref 4.31–10.16)

## 2017-09-15 PROCEDURE — 88305 TISSUE EXAM BY PATHOLOGIST: CPT | Performed by: UROLOGY

## 2017-09-15 PROCEDURE — C1769 GUIDE WIRE: HCPCS | Performed by: UROLOGY

## 2017-09-15 PROCEDURE — 88300 SURGICAL PATH GROSS: CPT | Performed by: UROLOGY

## 2017-09-15 PROCEDURE — 80048 BASIC METABOLIC PNL TOTAL CA: CPT | Performed by: UROLOGY

## 2017-09-15 PROCEDURE — 82360 CALCULUS ASSAY QUANT: CPT | Performed by: UROLOGY

## 2017-09-15 PROCEDURE — 94760 N-INVAS EAR/PLS OXIMETRY 1: CPT

## 2017-09-15 PROCEDURE — 85027 COMPLETE CBC AUTOMATED: CPT | Performed by: UROLOGY

## 2017-09-15 RX ORDER — METOPROLOL SUCCINATE 50 MG/1
50 TABLET, EXTENDED RELEASE ORAL DAILY
Status: DISCONTINUED | OUTPATIENT
Start: 2017-09-15 | End: 2017-09-16 | Stop reason: HOSPADM

## 2017-09-15 RX ORDER — ZOLPIDEM TARTRATE 5 MG/1
10 TABLET ORAL
Status: DISCONTINUED | OUTPATIENT
Start: 2017-09-15 | End: 2017-09-16 | Stop reason: HOSPADM

## 2017-09-15 RX ORDER — ALLOPURINOL 300 MG/1
300 TABLET ORAL DAILY
Status: DISCONTINUED | OUTPATIENT
Start: 2017-09-15 | End: 2017-09-16 | Stop reason: HOSPADM

## 2017-09-15 RX ORDER — FENTANYL CITRATE 50 UG/ML
INJECTION, SOLUTION INTRAMUSCULAR; INTRAVENOUS AS NEEDED
Status: DISCONTINUED | OUTPATIENT
Start: 2017-09-15 | End: 2017-09-15 | Stop reason: SURG

## 2017-09-15 RX ORDER — HYDROCHLOROTHIAZIDE 25 MG/1
25 TABLET ORAL DAILY
Status: DISCONTINUED | OUTPATIENT
Start: 2017-09-15 | End: 2017-09-16 | Stop reason: HOSPADM

## 2017-09-15 RX ORDER — FENTANYL CITRATE/PF 50 MCG/ML
50 SYRINGE (ML) INJECTION
Status: DISCONTINUED | OUTPATIENT
Start: 2017-09-15 | End: 2017-09-15 | Stop reason: HOSPADM

## 2017-09-15 RX ORDER — DOCUSATE SODIUM 100 MG/1
100 CAPSULE, LIQUID FILLED ORAL 2 TIMES DAILY
Status: DISCONTINUED | OUTPATIENT
Start: 2017-09-15 | End: 2017-09-16 | Stop reason: HOSPADM

## 2017-09-15 RX ORDER — ATROPA BELLADONNA AND OPIUM 16.2; 6 MG/1; MG/1
1 SUPPOSITORY RECTAL ONCE
Status: COMPLETED | OUTPATIENT
Start: 2017-09-15 | End: 2017-09-15

## 2017-09-15 RX ORDER — MECLIZINE HCL 12.5 MG/1
25 TABLET ORAL 3 TIMES DAILY PRN
Status: DISCONTINUED | OUTPATIENT
Start: 2017-09-15 | End: 2017-09-16 | Stop reason: HOSPADM

## 2017-09-15 RX ORDER — TAMSULOSIN HYDROCHLORIDE 0.4 MG/1
0.4 CAPSULE ORAL
Status: DISCONTINUED | OUTPATIENT
Start: 2017-09-15 | End: 2017-09-16 | Stop reason: HOSPADM

## 2017-09-15 RX ORDER — BENAZEPRIL HYDROCHLORIDE 10 MG/1
10 TABLET ORAL DAILY
Status: DISCONTINUED | OUTPATIENT
Start: 2017-09-15 | End: 2017-09-16 | Stop reason: HOSPADM

## 2017-09-15 RX ORDER — SODIUM CHLORIDE 9 MG/ML
75 INJECTION, SOLUTION INTRAVENOUS CONTINUOUS
Status: DISCONTINUED | OUTPATIENT
Start: 2017-09-15 | End: 2017-09-16 | Stop reason: HOSPADM

## 2017-09-15 RX ORDER — HYDROCODONE BITARTRATE AND ACETAMINOPHEN 5; 325 MG/1; MG/1
1 TABLET ORAL EVERY 4 HOURS PRN
Qty: 30 TABLET | Refills: 0 | Status: SHIPPED | OUTPATIENT
Start: 2017-09-15 | End: 2017-09-25

## 2017-09-15 RX ORDER — ATROPA BELLADONNA AND OPIUM 16.2; 6 MG/1; MG/1
1 SUPPOSITORY RECTAL EVERY 6 HOURS PRN
Status: DISCONTINUED | OUTPATIENT
Start: 2017-09-15 | End: 2017-09-16 | Stop reason: HOSPADM

## 2017-09-15 RX ORDER — OXYBUTYNIN CHLORIDE 5 MG/1
5 TABLET ORAL 3 TIMES DAILY PRN
Status: DISCONTINUED | OUTPATIENT
Start: 2017-09-15 | End: 2017-09-16 | Stop reason: HOSPADM

## 2017-09-15 RX ORDER — SCOLOPAMINE TRANSDERMAL SYSTEM 1 MG/1
1 PATCH, EXTENDED RELEASE TRANSDERMAL
Status: DISCONTINUED | OUTPATIENT
Start: 2017-09-15 | End: 2017-09-15 | Stop reason: HOSPADM

## 2017-09-15 RX ORDER — ONDANSETRON 2 MG/ML
4 INJECTION INTRAMUSCULAR; INTRAVENOUS ONCE AS NEEDED
Status: DISCONTINUED | OUTPATIENT
Start: 2017-09-15 | End: 2017-09-15 | Stop reason: HOSPADM

## 2017-09-15 RX ORDER — BACITRACIN, NEOMYCIN, POLYMYXIN B 400; 3.5; 5 [USP'U]/G; MG/G; [USP'U]/G
1 OINTMENT TOPICAL 2 TIMES DAILY
Status: DISCONTINUED | OUTPATIENT
Start: 2017-09-15 | End: 2017-09-16 | Stop reason: HOSPADM

## 2017-09-15 RX ORDER — MORPHINE SULFATE 2 MG/ML
2 INJECTION, SOLUTION INTRAMUSCULAR; INTRAVENOUS
Status: DISCONTINUED | OUTPATIENT
Start: 2017-09-15 | End: 2017-09-16 | Stop reason: HOSPADM

## 2017-09-15 RX ORDER — ATORVASTATIN CALCIUM 20 MG/1
20 TABLET, FILM COATED ORAL
Status: DISCONTINUED | OUTPATIENT
Start: 2017-09-15 | End: 2017-09-16 | Stop reason: HOSPADM

## 2017-09-15 RX ORDER — CIPROFLOXACIN 500 MG/1
500 TABLET, FILM COATED ORAL 2 TIMES DAILY
Qty: 6 TABLET | Refills: 0 | Status: SHIPPED | OUTPATIENT
Start: 2017-09-15 | End: 2017-09-18

## 2017-09-15 RX ORDER — SODIUM CHLORIDE, SODIUM LACTATE, POTASSIUM CHLORIDE, CALCIUM CHLORIDE 600; 310; 30; 20 MG/100ML; MG/100ML; MG/100ML; MG/100ML
20 INJECTION, SOLUTION INTRAVENOUS CONTINUOUS
Status: DISCONTINUED | OUTPATIENT
Start: 2017-09-15 | End: 2017-09-15

## 2017-09-15 RX ORDER — ONDANSETRON 2 MG/ML
4 INJECTION INTRAMUSCULAR; INTRAVENOUS EVERY 6 HOURS PRN
Status: DISCONTINUED | OUTPATIENT
Start: 2017-09-15 | End: 2017-09-16 | Stop reason: HOSPADM

## 2017-09-15 RX ORDER — MAGNESIUM HYDROXIDE 1200 MG/15ML
LIQUID ORAL AS NEEDED
Status: DISCONTINUED | OUTPATIENT
Start: 2017-09-15 | End: 2017-09-15 | Stop reason: HOSPADM

## 2017-09-15 RX ORDER — MIDAZOLAM HYDROCHLORIDE 1 MG/ML
INJECTION INTRAMUSCULAR; INTRAVENOUS AS NEEDED
Status: DISCONTINUED | OUTPATIENT
Start: 2017-09-15 | End: 2017-09-15 | Stop reason: SURG

## 2017-09-15 RX ORDER — HYDROCODONE BITARTRATE AND ACETAMINOPHEN 5; 325 MG/1; MG/1
1-2 TABLET ORAL EVERY 4 HOURS PRN
Status: DISCONTINUED | OUTPATIENT
Start: 2017-09-15 | End: 2017-09-16 | Stop reason: HOSPADM

## 2017-09-15 RX ORDER — PROPOFOL 10 MG/ML
INJECTION, EMULSION INTRAVENOUS AS NEEDED
Status: DISCONTINUED | OUTPATIENT
Start: 2017-09-15 | End: 2017-09-15 | Stop reason: SURG

## 2017-09-15 RX ADMIN — BENAZEPRIL HYDROCHLORIDE 10 MG: 10 TABLET, FILM COATED ORAL at 16:27

## 2017-09-15 RX ADMIN — FENTANYL CITRATE 25 MCG: 50 INJECTION, SOLUTION INTRAMUSCULAR; INTRAVENOUS at 11:36

## 2017-09-15 RX ADMIN — BACITRACIN, NEOMYCIN, POLYMYXIN B 1 SMALL APPLICATION: 400; 3.5; 5 OINTMENT TOPICAL at 15:32

## 2017-09-15 RX ADMIN — FENTANYL CITRATE 25 MCG: 50 INJECTION, SOLUTION INTRAMUSCULAR; INTRAVENOUS at 12:12

## 2017-09-15 RX ADMIN — ONDANSETRON 4 MG: 2 INJECTION INTRAMUSCULAR; INTRAVENOUS at 12:38

## 2017-09-15 RX ADMIN — PROPOFOL 150 MG: 10 INJECTION, EMULSION INTRAVENOUS at 09:45

## 2017-09-15 RX ADMIN — FENTANYL CITRATE 25 MCG: 50 INJECTION, SOLUTION INTRAMUSCULAR; INTRAVENOUS at 11:10

## 2017-09-15 RX ADMIN — SODIUM CHLORIDE, SODIUM LACTATE, POTASSIUM CHLORIDE, AND CALCIUM CHLORIDE 20 ML/HR: .6; .31; .03; .02 INJECTION, SOLUTION INTRAVENOUS at 09:06

## 2017-09-15 RX ADMIN — FENTANYL CITRATE 25 MCG: 50 INJECTION, SOLUTION INTRAMUSCULAR; INTRAVENOUS at 09:53

## 2017-09-15 RX ADMIN — MIDAZOLAM HYDROCHLORIDE 2 MG: 1 INJECTION, SOLUTION INTRAMUSCULAR; INTRAVENOUS at 09:41

## 2017-09-15 RX ADMIN — CARBIDOPA AND LEVODOPA 2 TABLET: 25; 100 TABLET ORAL at 16:27

## 2017-09-15 RX ADMIN — FENTANYL CITRATE 25 MCG: 50 INJECTION, SOLUTION INTRAMUSCULAR; INTRAVENOUS at 11:48

## 2017-09-15 RX ADMIN — FENTANYL CITRATE 25 MCG: 50 INJECTION, SOLUTION INTRAMUSCULAR; INTRAVENOUS at 11:56

## 2017-09-15 RX ADMIN — CEFAZOLIN SODIUM 2000 MG: 2 SOLUTION INTRAVENOUS at 09:43

## 2017-09-15 RX ADMIN — ALLOPURINOL 300 MG: 300 TABLET ORAL at 15:32

## 2017-09-15 RX ADMIN — FENTANYL CITRATE 25 MCG: 50 INJECTION, SOLUTION INTRAMUSCULAR; INTRAVENOUS at 10:37

## 2017-09-15 RX ADMIN — FENTANYL CITRATE 25 MCG: 50 INJECTION, SOLUTION INTRAMUSCULAR; INTRAVENOUS at 10:01

## 2017-09-15 RX ADMIN — FENTANYL CITRATE 25 MCG: 50 INJECTION, SOLUTION INTRAMUSCULAR; INTRAVENOUS at 10:30

## 2017-09-15 RX ADMIN — DOCUSATE SODIUM 100 MG: 100 CAPSULE, LIQUID FILLED ORAL at 17:12

## 2017-09-15 RX ADMIN — ATORVASTATIN CALCIUM 20 MG: 20 TABLET, FILM COATED ORAL at 15:33

## 2017-09-15 RX ADMIN — HYDROCHLOROTHIAZIDE 25 MG: 25 TABLET ORAL at 15:33

## 2017-09-15 RX ADMIN — FENTANYL CITRATE 25 MCG: 50 INJECTION, SOLUTION INTRAMUSCULAR; INTRAVENOUS at 11:32

## 2017-09-15 RX ADMIN — TAMSULOSIN HYDROCHLORIDE 0.4 MG: 0.4 CAPSULE ORAL at 15:33

## 2017-09-15 RX ADMIN — CEFAZOLIN SODIUM 2000 MG: 2 SOLUTION INTRAVENOUS at 17:11

## 2017-09-15 RX ADMIN — FENTANYL CITRATE 25 MCG: 50 INJECTION, SOLUTION INTRAMUSCULAR; INTRAVENOUS at 12:40

## 2017-09-15 RX ADMIN — FENTANYL CITRATE 25 MCG: 50 INJECTION, SOLUTION INTRAMUSCULAR; INTRAVENOUS at 12:10

## 2017-09-15 RX ADMIN — SCOPALAMINE 1 PATCH: 1 PATCH, EXTENDED RELEASE TRANSDERMAL at 09:23

## 2017-09-15 RX ADMIN — FENTANYL CITRATE 50 MCG: 50 INJECTION, SOLUTION INTRAMUSCULAR; INTRAVENOUS at 09:42

## 2017-09-16 VITALS
WEIGHT: 248.02 LBS | HEART RATE: 88 BPM | HEIGHT: 74 IN | OXYGEN SATURATION: 96 % | BODY MASS INDEX: 31.83 KG/M2 | SYSTOLIC BLOOD PRESSURE: 130 MMHG | TEMPERATURE: 98.7 F | RESPIRATION RATE: 18 BRPM | DIASTOLIC BLOOD PRESSURE: 68 MMHG

## 2017-09-16 LAB
ANION GAP SERPL CALCULATED.3IONS-SCNC: 8 MMOL/L (ref 4–13)
BUN SERPL-MCNC: 15 MG/DL (ref 5–25)
CALCIUM SERPL-MCNC: 8.1 MG/DL (ref 8.3–10.1)
CHLORIDE SERPL-SCNC: 107 MMOL/L (ref 100–108)
CO2 SERPL-SCNC: 29 MMOL/L (ref 21–32)
CREAT SERPL-MCNC: 0.98 MG/DL (ref 0.6–1.3)
ERYTHROCYTE [DISTWIDTH] IN BLOOD BY AUTOMATED COUNT: 13.2 % (ref 11.6–15.1)
GFR SERPL CREATININE-BSD FRML MDRD: 76 ML/MIN/1.73SQ M
GLUCOSE P FAST SERPL-MCNC: 105 MG/DL (ref 65–99)
GLUCOSE SERPL-MCNC: 105 MG/DL (ref 65–140)
HCT VFR BLD AUTO: 39.9 % (ref 36.5–49.3)
HGB BLD-MCNC: 13.2 G/DL (ref 12–17)
MCH RBC QN AUTO: 31.7 PG (ref 26.8–34.3)
MCHC RBC AUTO-ENTMCNC: 33.1 G/DL (ref 31.4–37.4)
MCV RBC AUTO: 96 FL (ref 82–98)
PLATELET # BLD AUTO: 146 THOUSANDS/UL (ref 149–390)
PMV BLD AUTO: 10.3 FL (ref 8.9–12.7)
POTASSIUM SERPL-SCNC: 3.6 MMOL/L (ref 3.5–5.3)
RBC # BLD AUTO: 4.17 MILLION/UL (ref 3.88–5.62)
SODIUM SERPL-SCNC: 144 MMOL/L (ref 136–145)
WBC # BLD AUTO: 8.57 THOUSAND/UL (ref 4.31–10.16)

## 2017-09-16 PROCEDURE — 80048 BASIC METABOLIC PNL TOTAL CA: CPT | Performed by: UROLOGY

## 2017-09-16 PROCEDURE — 85027 COMPLETE CBC AUTOMATED: CPT | Performed by: UROLOGY

## 2017-09-16 RX ADMIN — DOCUSATE SODIUM 100 MG: 100 CAPSULE, LIQUID FILLED ORAL at 08:31

## 2017-09-16 RX ADMIN — HYDROCHLOROTHIAZIDE 25 MG: 25 TABLET ORAL at 08:31

## 2017-09-16 RX ADMIN — METOPROLOL SUCCINATE 50 MG: 50 TABLET, EXTENDED RELEASE ORAL at 08:31

## 2017-09-16 RX ADMIN — SODIUM CHLORIDE 75 ML/HR: 0.9 INJECTION, SOLUTION INTRAVENOUS at 05:04

## 2017-09-16 RX ADMIN — MORPHINE SULFATE 2 MG: 2 INJECTION, SOLUTION INTRAMUSCULAR; INTRAVENOUS at 13:51

## 2017-09-16 RX ADMIN — CARBIDOPA AND LEVODOPA 2 TABLET: 25; 100 TABLET ORAL at 07:39

## 2017-09-16 RX ADMIN — ALLOPURINOL 300 MG: 300 TABLET ORAL at 08:31

## 2017-09-16 RX ADMIN — BENAZEPRIL HYDROCHLORIDE 10 MG: 10 TABLET, FILM COATED ORAL at 08:31

## 2017-09-17 LAB
ABO GROUP BLD BPU: NORMAL
ABO GROUP BLD BPU: NORMAL
BPU ID: NORMAL
BPU ID: NORMAL
CROSSMATCH: NORMAL
CROSSMATCH: NORMAL
UNIT DISPENSE STATUS: NORMAL
UNIT DISPENSE STATUS: NORMAL
UNIT PRODUCT CODE: NORMAL
UNIT PRODUCT CODE: NORMAL
UNIT RH: NORMAL
UNIT RH: NORMAL

## 2017-09-18 ENCOUNTER — GENERIC CONVERSION - ENCOUNTER (OUTPATIENT)
Dept: OTHER | Facility: OTHER | Age: 72
End: 2017-09-18

## 2017-09-29 LAB
CA PHOS MFR STONE: 5 %
COLOR STONE: NORMAL
COM MFR STONE: 95 %
COMMENT-STONE3: NORMAL
COMPOSITION: NORMAL
LABORATORY COMMENT REPORT: NORMAL
NIDUS STONE QL: NORMAL
PHOTO: NORMAL
SIZE STONE: NORMAL MM
STONE ANALYSIS-IMP: NORMAL
STONE ANALYSIS-IMP: NORMAL
WT STONE: NORMAL MG

## 2017-10-03 ENCOUNTER — ALLSCRIPTS OFFICE VISIT (OUTPATIENT)
Dept: OTHER | Facility: OTHER | Age: 72
End: 2017-10-03

## 2017-10-03 LAB
CLARITY UR: NORMAL
COLOR UR: YELLOW
GLUCOSE (HISTORICAL): NORMAL
HGB UR QL STRIP.AUTO: NORMAL
KETONES UR STRIP-MCNC: NORMAL MG/DL
LEUKOCYTE ESTERASE UR QL STRIP: NORMAL
PH UR STRIP.AUTO: 6 [PH]
PROT UR STRIP-MCNC: 100 MG/DL
SP GR UR STRIP.AUTO: 1.02

## 2017-10-19 ENCOUNTER — ALLSCRIPTS OFFICE VISIT (OUTPATIENT)
Dept: OTHER | Facility: OTHER | Age: 72
End: 2017-10-19

## 2017-10-20 NOTE — PROGRESS NOTES
Assessment  1  Needs flu shot (V04 81) (Z23)   2  Parkinson's disease (332 0) (G20)   3  BPH (benign prostatic hyperplasia) (600 00) (N40 0)    Plan  Benign essential hypertension    · Carbidopa-Levodopa  MG Oral Tablet; take 2 tablet twice daily  Needs flu shot    · Fluzone High-Dose 0 5 ML Intramuscular Suspension Prefilled Syringe    Chief Complaint  Chief Complaint Free Text Note Form: pt here today for 1 m check up      History of Present Illness  Parkinsons Disease (Brief): The patient is being seen for a routine clinic follow-up of Parkinson's disease  Current treatment includes levodopa-carbidopa (Sinemet)  By report, there is good compliance with treatment, good tolerance of treatment and good symptom control  Benign Prostatic Hyperplasia (Brief): The patient is being seen for a routine clinic follow-up of and I feel 98% better benign prostatic hyperplasia  Symptoms:  no sensation of incomplete bladder emptying,-- no urinary frequency,-- no post-void dribbling-- and-- no suprapubic pain--    The patient presents with complaints of nocturia (once a night)  Associated symptoms:  no abdominal pain  The patient is not currently being treated for this problem  (flomax is d/c-d)      Review of Systems  Complete-Male:   Constitutional: no fever  Cardiovascular: no chest pain  Respiratory: no shortness of breath-- and-- no cough  Gastrointestinal: no abdominal pain-- and-- no constipation  Genitourinary: as noted in HPI  Preventive Quality 65 Older:   Preventive Quality 65 and Older: The patient currently has no urinary incontinence symptoms  Active Problems  1  Active advance directive (V49 89) (Z78 9)   2  Benign essential hypertension (401 1) (I10)   3  BPH (benign prostatic hyperplasia) (600 00) (N40 0)   4  CAD S/P percutaneous coronary angioplasty (414 01,V45 82) (I25 10,Z98 61)   5  Calculus of bladder (594 1) (N21 0)   6   Degeneration, intervertebral disc, lumbosacral (722 52) (M51 37)   7  GERD (gastroesophageal reflux disease) (530 81) (K21 9)   8  Gout (274 9) (M10 9)   9  Hyperlipidemia (272 4) (E78 5)   10  Idiopathic peripheral neuropathy (356 9) (G60 9)   11  Lumbar canal stenosis (724 02) (M48 061)   12  Nephrolithiasis (592 0) (N20 0)   13  Osteoarthritis of knee (715 36) (M17 10)   14  Parkinson's disease (332 0) (G20)   15  S/P TURP (V45 89) (Z90 79)    Past Medical History  1  History of Benign familial tremor (333 1) (G25 0)   2  History of Calculus of kidney (592 0) (N20 0)   3  History of Cataract (366 9) (H26 9)   4  History of Coronary arteriosclerosis in native artery (414 01) (I25 10)   5  History of Coronary Artery Disease (V12 59)   6  History of Dizziness and giddiness (780 4) (R42)   7  History of chest pain (V13 89) (Z87 898)   8  History of hypercholesterolemia (V12 29) (Z86 39)   9  History of hypertension (V12 59) (Z86 79)   10  History of renal calculi (V13 01) (Z87 442)   11  History of Impaired fasting glucose (790 21) (R73 01)   12  History of Malignant Melanoma Of The Skin (V10 82)   13  History of Meniscal injury (959 7) (S83 8X9A)   14  History of Nephrolithiasis (V13 01)   15  Pre-op examination (V72 84) (Z01 818)   16  History of Reported Prior Kidney Disease (V13 00)   17  Tinnitus (388 30) (H93 19)    Surgical History  1  History of Amputation Of Finger, With Neurectomy (Each)   2  History of Amputation Of Middle Finger, With Neurectomy (Each)   3  History of Cath Stent Placement   4  History of Hand Incision Tendon Sheath Of A Finger   5  History of Neuroplasty Decompression Median Nerve At Carpal Tunnel   6  History of Removal Of Lesion   7  History of Tonsillectomy   8  History of Total Knee Arthroplasty   9  History of Transurethral Resection Of Prostate (TURP)  Surgical History Reviewed: The surgical history was reviewed and updated today  Family History  Mother    1  Family history of Breast CA  Father    2   Family history of Colon cancer  Family History    3  Family history of Back Pain   4  Family history of Breast Cancer (V16 3)   5  Family history of Heart Disease (V17 49)   6  Family history of Hypertension (V17 49)   7  Family history of Stroke Syndrome (V17 1)    Social History   · Active advance directive (V49 89) (Z78 9)   · Alcohol Use (History)   · Caregiver: Spouse   · Cultural background   · Exercise habits   · Exercises daily (V49 89) (Z78 9)   · Former smoker (V15 82) (Z87 891)   · Good dental hygiene   · Lives with spouse   · Living Situation: Supportive and safe   ·    · Primary language is English   · Racial background   · Denied: History of Tobacco Use    Current Meds   1  Allopurinol 300 MG Oral Tablet; take one tablet by mouth one time daily; Therapy: 96AUA2577 to (Evaluate:08Jun2018)  Requested for: 96BPE2788; Last Rx:13Jun2017   Ordered   2  Aspirin EC 81 MG Oral Tablet Delayed Release; TAKE 1 TABLET DAILY AS DIRECTED; Therapy: 78TMC8598 to (97 018147) Recorded   3  Atorvastatin Calcium 20 MG Oral Tablet; TAKE ONE TABLET BY MOUTH ONCE DAILY; Therapy: 87FCR2994 to (Tl Bautista)  Requested for: 75Kji8783; Last Rx:76Mem1413;   Status: [address] to Pharmacy - Awaiting Verification Ordered   4  Benazepril-Hydrochlorothiazide 20-25 MG Oral Tablet; TAKE ONE TABLET BY MOUTH ONCE DAILY; Therapy: 70YMW5381 to (Evaluate:73Dfc7639)  Requested for: 31EHY1666; Last Rx:33Buj6117; Status:   ACTIVE - Transmit to Pharmacy - Awaiting Verification Ordered   5  Calcium 600+D 600-400 MG-UNIT Oral Tablet; take 1 tab daily; Therapy: (Recorded:24Lvi8444) to Recorded   6  Carbidopa-Levodopa  MG Oral Tablet; take 2 tablet twice daily; Therapy: 15QYC5009 to (Evaluate:06Apr2015); Last Rx:08Oct2014 Ordered   7  Glucosamine Chondr Complex 500-400 MG Oral Capsule; take 1 tab daily; Therapy: 84GPO5715 to Recorded   8  Metoprolol Succinate ER 50 MG Oral Tablet Extended Release 24 Hour;    Therapy: 51PEK9358 to Recorded   9  Multivitamins Oral Capsule; take 1 tab daily; Therapy: (Recorded:49Bck2646) to Recorded   10  Nitrostat 0 4 MG Sublingual Tablet Sublingual; TAKE AS DIRECTED; Therapy: (Recorded:79Sus5994) to Recorded   11  Vitamin D3 1000 UNIT Oral Capsule; take 1 capsule daily; Therapy: (Recorded:70Npr9928) to Recorded   12  Zolpidem Tartrate 10 MG Oral Tablet; TAKE 1 TABLET AT BEDTIME AS NEEDED; Therapy: 28UWH8872 to (Evaluate:72Bwx1541); Last Rx:13Jun2017 Ordered  Medication List Reviewed: The medication list was reviewed and updated today  Allergies  1  No Known Drug Allergies  2  No Known Environmental Allergies   3  No Known Food Allergies    Vitals  Vital Signs    Recorded: 28PJP5518 08:26AM   Temperature 96 9 F, Tympanic   Heart Rate 70   Respiration 18   Systolic 197, LUE, Sitting   Diastolic 80, LUE, Sitting   Height 6 ft 1 in   Weight 244 lb 5 oz   BMI Calculated 32 23   BSA Calculated 2 34     Physical Exam    Constitutional   General appearance: No acute distress, well appearing and well nourished  Pulmonary   Auscultation of lungs: Clear to auscultation, equal breath sounds bilaterally, no wheezes, no rales, no rhonci  Cardiovascular   Auscultation of heart: Normal rate and rhythm, normal S1 and S2, without murmurs  Abdomen   Abdomen: Non-tender, no masses  Health Management  History of Colon cancer screening   COLONOSCOPY; every 5 years; Last 52MUV0386; Next Due: 20UAR3815;  Active    Future Appointments    Date/Time Provider Specialty Site   01/03/2018 01:00 PM [facility] Luc[facility] Kaiden Mack     Signatures   Electronically signed by : MARIANA Pearce ; Oct 19 2017  9:01AM EST                       (Author)

## 2017-10-27 NOTE — PROGRESS NOTES
Assessment  1  S/P TURP (V45 89) (Z90 79)    Plan  BPH (benign prostatic hyperplasia)    · Stop: Tamsulosin HCl - 0 4 MG Oral Capsule  Rx By: Jeffrey Marroquin; Dispense: 90 Days ; #:90 Capsule; Refill: 3;For: BPH (benign   prostatic hyperplasia); QUINTON = N; Sent To: [employer] [id number]; Last Updated   By: Octaviano Lama; 10/3/2017 1:32:47 PM    Discussion/Summary  Discussion Summary:   50-year-old male status post cystolitholapaxy and TURP  reviewed the stone analysis and pathology with the patient  He is making calcium oxalate stones  We discussed that this is likely secondary to his incomplete bladder emptying and hopefully he will not have any more bladder calculi  He does have punctate stones in both kidneys but no surgical treatment is needed at this time given their small size  We will continue to monitor these yearly  Pathology showed that we removed over 30 g of benign tissue  We discussed that at his age he does not need any further prostate cancer screening  He will follow up in 3 months for a PVR check and then yearly thereafter to keep an eye on his stones and urinary symptoms  Chief Complaint  Chief Complaint Free Text Note Form: Patient presents for BPH; S/P TURP      History of Present Illness  HPI: 50-year-old male presents for follow-up after cystolitholapaxy and TURP  He has been doing very well  He has had some frequency, urinary urgency and mild urge incontinence but this has been steadily improving  He has also had some hematuria that has resolved in the past few days  Review of Systems  Complete-Male Urology:   Genitourinary: wears depends as precautionary,-- Empty sensation,-- feelings of urinary urgency-- and-- stream quality good, but-- no dysuria,-- no urinary hesitancy,-- no hematuria-- and-- no incontinence--    The patient presents with complaints of nocturia (1x)  Active Problems  1  Active advance directive (Z85 89) (Z78 9)  2   Benign essential hypertension (401 1) (I10)  3  BPH (benign prostatic hyperplasia) (600 00) (N40 0)  4  CAD S/P percutaneous coronary angioplasty (414 01,V45 82) (I25 10,Z98 61)  5  Calculus of bladder (594 1) (N21 0)  6  Degeneration, intervertebral disc, lumbosacral (722 52) (M51 37)  7  GERD (gastroesophageal reflux disease) (530 81) (K21 9)  8  Gout (274 9) (M10 9)  9  Hyperlipidemia (272 4) (E78 5)  10  Idiopathic peripheral neuropathy (356 9) (G60 9)  11  Lumbar canal stenosis (724 02) (M48 061)  12  Nephrolithiasis (592 0) (N20 0)  13  Osteoarthritis of knee (715 36) (M17 10)  14  Parkinson's disease (332 0) (G20)  15  S/P TURP (V45 89) (Z90 79)    Past Medical History  1  History of Benign familial tremor (333 1) (G25 0)  2  History of Calculus of kidney (592 0) (N20 0)  3  History of Cataract (366 9) (H26 9)  4  History of Coronary arteriosclerosis in native artery (414 01) (I25 10)  5  History of Coronary Artery Disease (V12 59)  6  History of Dizziness and giddiness (780 4) (R42)  7  History of chest pain (V13 89) (Z87 898)  8  History of hypercholesterolemia (V12 29) (Z86 39)  9  History of hypertension (V12 59) (Z86 79)  10  History of renal calculi (V13 01) (Z87 442)  11  History of Impaired fasting glucose (790 21) (R73 01)  12  History of Malignant Melanoma Of The Skin (V10 82)  13  History of Meniscal injury (959 7) (S83 8X9A)  14  History of Nephrolithiasis (V13 01)  15  Pre-op examination (V72 84) (Z01 818)  16  History of Reported Prior Kidney Disease (V13 00)  17  Tinnitus (388 30) (H93 19)    Surgical History  1  History of Amputation Of Finger, With Neurectomy (Each)  2  History of Amputation Of Middle Finger, With Neurectomy (Each)  3  History of Cath Stent Placement  4  History of Hand Incision Tendon Sheath Of A Finger  5  History of Neuroplasty Decompression Median Nerve At Carpal Tunnel  6  History of Removal Of Lesion  7  History of Tonsillectomy  8  History of Total Knee Arthroplasty  9   History of Transurethral Resection Of Prostate (TURP)    Family History  Mother   1  Family history of Breast CA  Father   2  Family history of Colon cancer  Family History   3  Family history of Back Pain  4  Family history of Breast Cancer (V16 3)  5  Family history of Heart Disease (V17 49)  6  Family history of Hypertension (V17 49)  7  Family history of Stroke Syndrome (V17 1)    Social History   · Active advance directive (V49 89) (Z78 9)   · Alcohol Use (History)   · Caregiver: Spouse   · Cultural background   · Exercise habits   · Exercises daily (V49 89) (Z78 9)   · Former smoker (V15 82) (Z87 891)   · Good dental hygiene   · Lives with spouse   · Living Situation: Supportive and safe   ·    · Primary language is English   · Racial background   · Denied: History of Tobacco Use    Current Meds  1  Allopurinol 300 MG Oral Tablet; take one tablet by mouth one time daily; Therapy: 49YIC4647 to (Evaluate:08Jun2018)  Requested for: 91JIM0608; Last   Rx:13Jun2017 Ordered  2  Aspirin EC 81 MG Oral Tablet Delayed Release; TAKE 1 TABLET DAILY AS DIRECTED; Therapy: 65GDZ7872 to () Recorded  3  Atorvastatin Calcium 20 MG Oral Tablet; TAKE ONE TABLET BY MOUTH ONCE DAILY; Therapy: 45GYE6341 to (Yaniv Alicea)  Requested for: 68Atc0342; Last   Rx:25Rzz3285; Status: [address] to Pharmacy - Awaiting Verification Ordered  4  Benazepril-Hydrochlorothiazide 20-25 MG Oral Tablet; TAKE ONE TABLET BY MOUTH   ONCE DAILY; Therapy: 80OXC6546 to (Evaluate:23Xbj5774)  Requested for: 54DDC7092; Last   Rx:29Dvs8040; Status: ACTIVE - Transmit to Pharmacy - Awaiting Verification Ordered  5  Calcium 600+D 600-400 MG-UNIT Oral Tablet; take 1 tab daily; Therapy: (Recorded:37Zpm6685) to Recorded  6  Carbidopa-Levodopa  MG Oral Tablet; take 2 tablet twice daily; Therapy: 42GQR3542 to (Evaluate:06Apr2015); Last Rx:08Oct2014 Ordered  7  Glucosamine Chondr Complex 500-400 MG Oral Capsule; take 1 tab daily;    Therapy: 98BPS5269 to Recorded  8  Metoprolol Succinate ER 50 MG Oral Tablet Extended Release 24 Hour; Therapy: 53KSQ0861 to Recorded  9  Multivitamins Oral Capsule; take 1 tab daily; Therapy: (Recorded:90Xvr5476) to Recorded  10  Nitrostat 0 4 MG Sublingual Tablet Sublingual; TAKE AS DIRECTED; Therapy: (Recorded:37Wdy9980) to Recorded  11  Tamsulosin HCl - 0 4 MG Oral Capsule; TAKE ONE CAPSULE BY MOUTH ONE TIME    DAILY; Therapy: 19JWZ7176 to (Evaluate:15Mar2018)  Requested for: 20Mar2017; Last    Rx:20Mar2017 Ordered  12  Vitamin D3 1000 UNIT Oral Capsule; take 1 capsule daily; Therapy: (Recorded:15Ucd6881) to Recorded  13  Zolpidem Tartrate 10 MG Oral Tablet; TAKE 1 TABLET AT BEDTIME AS NEEDED; Therapy: 80UCM6660 to (Evaluate:42Ynb3110); Last Rx:13Jun2017 Ordered    Allergies  1  No Known Drug Allergies  2  No Known Environmental Allergies  3  No Known Food Allergies    Vitals  Vital Signs    Recorded: 35UMP4076 01:06PM   Heart Rate 84   Systolic 005   Diastolic 72   Height 6 ft 1 in   Weight 243 lb 2 oz   BMI Calculated 32 08   BSA Calculated 2 34     Physical Exam    Constitutional   General appearance: No acute distress, well appearing and well nourished  Pulmonary   Respiratory effort: No increased work of breathing or signs of respiratory distress  Auscultation of lungs: Clear to auscultation  Cardiovascular   Auscultation of heart: Normal rate and rhythm, normal S1 and S2, without murmurs  Examination of extremities for edema and/or varicosities: Normal     Abdomen   Abdomen: Non-tender, no masses  Liver and spleen: No hepatomegaly or splenomegaly  Skin   Skin and subcutaneous tissue: Normal without rashes or lesions      Additional Exam:  Neuro exam nonfocal       Results/Data  Urine Dip Non-Automated- POC 41OTL4065 01:06PM [employer]     Test Name Result Flag Reference   Color Yellow     Clarity Hazy     Leukocytes small     Blood large     Protein 100     Ph 6 0     Specific Gravity 1 020     Ketone -     Glucose -       (1) CALCULI, RENAL 56Gwt6994 02:24PM Rose Oconnell     Test Name Result Flag Reference   COLOR Brown     SIZE Comment mm     Specimens received as a mixture of whole stones and fragments  STONE WEIGHT 98759  mg     COMPOSITION Comment     Percentage (Represents the % composition)   CA OXALATE,MONOHYDR  95 %     CALCIUM PHOSPHATE 05 %     NIDUS No Nidus visualized     COMMENT-STONE2 Note:     Please do not submit specimens on Q-Tips, in tape, on filters, or in  liquids such as blood, urine or formalin  This may cause unnecessary  biohazards, erroneous results and/or delay in the processing of the  specimen  PLEASE NOTE Comment     Calculi report with photograph will follow via computer, mail or   delivery  COMMENT-STONE3 Comment     Physician questions regarding Calculi Analysis contact [facility] at:  [phone]  PHOTO Comment     Photograph will follow under separate cover  Performed at:  [address]  [id number]  : Celeste Martin MD, Phone:  [phone]   RENAL STONE DISCLAIMER Comment     This test was developed and its performance characteristics  determined by [facility]  It has not been cleared or approved  by the Food and Drug Administration  (1) TISSUE EXAM 10Ukm7383 12:43PM Rose Oconnell     Test Name Result Flag Reference   LAB AP CASE REPORT (Report)     Surgical Pathology Report             Case: [id number]                   Authorizing Provider: Saritha Mercado MD     Collected:      09/15/2017 1243        Ordering Location:   [facility]    Received:      09/15/2017 [facility]                           Pathologist:      Keenan Baer MD                                 Specimens:  A) - Urinary Bladder                                          B) - Prostate   LAB AP FINAL DIAGNOSIS (Report)     A   Bladder calculi (removal):    - Calculi fragments forwarded to [address] [place] [address] for chemical analysis  - Gross examination only at this institution  B  Prostate chips (transurethral resection):    - Prostate tissue with small focus of high-grade prostatic   intraepithelial neoplasia (HG-PIN)  - Background benign prostatic hyperplasia  - No invasive carcinoma identified  Electronically signed by Edgar Varner MD on 9/19/2017 at 11:31 AM   LAB AP NOTE      Interpretation performed at [facility], [address] [facility], [address] [address]  LAB AP SURGICAL ADDITIONAL INFORMATION (Report)     All controls performed with the immunohistochemical stains reported above   reacted appropriately  These tests were developed and their performance   characteristics determined by [facility] or   [facility]  They may not be cleared or approved by the [place]  Food and Drug Administration  The FDA has determined that such clearance   or approval is not necessary  These tests are used for clinical purposes  They should not be regarded as investigational or for research  This   laboratory has been approved by [place] [address], designated as a high-complexity   laboratory and is qualified to perform these tests  LAB AP GROSS DESCRIPTION (Report)     A  The specimen is received fresh, labeled with the patient's name and   hospital number, and is designated Urinary bladder calculi  The   specimen consists of multiple brown stony fragments measuring in loose   aggregate 4 7 x 4 5 x 1 5 cm  The specimen is sent out for chemical   analysis to:    [facility], [address] [place] [address]    B  The specimen is received in formalin, labeled with the patient's name   and hospital number, and is designated Prostate chips (see protocol)  The specimen consists of multiple tan-pink irregularly shaped tissue   fragments measuring in loose aggregate 9 0 x 6 5 x 3 0 cm and collectively   weighing 30 g   Representative sections are submitted in 15 cassettes  Note: The estimated total formalin fixation time based upon information   provided by the submitting clinician and the standard processing schedule   is less than 72 hours  AEK   LAB AP CLINICAL INFORMATION      Calculus in bladder  Enlarged prostate without lower urinary tract symptoms (luts)  Procedure    Procedure:1  Bladder Ultrasound Post Void Residual1   Equipment And Procedure: The patient  voided1  1   U/S Findings:1  PVR 86 mL1         1 Amended By: Catheline Soulier; Oct 03 2017 1:43 PM EST    Future Appointments    Date/Time Provider Specialty Site   10/19/2017 08:45 AM MARIANA Brown   Internal Medicine [address]     Signatures   Electronically signed by : MARIANA Kumar ; Oct  3 2017  1:42PM EST                       (Author)    Electronically signed by : MARIANA Kumar ; Oct  3 2017  1:44PM EST                       (Author)

## 2018-01-03 ENCOUNTER — ALLSCRIPTS OFFICE VISIT (OUTPATIENT)
Dept: OTHER | Facility: OTHER | Age: 73
End: 2018-01-03

## 2018-01-06 NOTE — PROGRESS NOTES
Assessment   1  BPH (benign prostatic hyperplasia) (600 00) (N40 0)   2  Nephrolithiasis (592 0) (N20 0)    Plan   BPH (benign prostatic hyperplasia)    · Follow-up visit in 6 months Evaluation and Treatment  Follow-up  Status: Hold For -    Scheduling  Requested for: 60VMV7456   Ordered; For: BPH (benign prostatic hyperplasia); Ordered By: Mikayla Estes Performed:  Due: 32PCY4320   · Measure Post Void Residual - POC; Status:Active - Perform Order; Requested    AGF:21UNZ7157; Perform: In Office; HBA:47GHS5456;BWZMFES; For:BPH (benign prostatic hyperplasia); Ordered By:Deny Vazquez;  Nephrolithiasis    · * XR ABDOMEN 1 VIEW KUB; Status:Hold For - Exact Date; Requested for:Approx    [id number]; Perform:[facility]; BBE:18VQK5835;IXUMNMO;THU:ZIKSVFTCOBMZCDU; Ordered By:Deny Vazquez; Discussion/Summary   Discussion Summary:    BPH, bladder stones s/p TURP and cystolitholapaxy (9/2017) and nephrolithiasis   is a 68 y/o male being managed by Dr Vasquez Manzanares  He is doing well and is pleased with the improvement of urinary symptoms  A bladder ultrasound was obtained to the office and PVR was 37 mL  He will return in 6 months for follow-up with PVR  KUB will be obtained prior to evaluate his stone burden  He was instructed to call with any issues  All questions were answered  Chief Complaint   Chief Complaint Free Text Note Form: Patient presents for PVR; BPH s/p TURP (9/15/17)      History of Present Illness   HPI: 76-year-old male with BPH and bladder stones status post TURP and cystolitholapaxy ( 09/15/2017) presents today for follow up  He denies any lower urinary tract symptoms except for occasional urinary urgency  He states he has a strong stream and complete bladder emptying  He is very pleased with the improvement of urinary symptoms since surgery  He denies any issues at this time and is doing well        Review of Systems   Complete-Male Urology:      Constitutional: No fever or chills, feels well, no tiredness, no recent weight gain or weight loss  Respiratory: No complaints of shortness of breath, no wheezing, no cough, no SOB on exertion, no orthopnea or PND  Cardiovascular: No complaints of slow heart rate, no fast heart rate, no chest pain, no palpitations, no leg claudication, no lower extremity  Gastrointestinal: No complaints of abdominal pain, no constipation, no nausea or vomiting, no diarrhea or bloody stools  Genitourinary: Empty sensation,-- incontinence-- (occasional ),-- feelings of urinary urgency-- (occasional )-- and-- stream quality good, but-- no dysuria,-- no urinary hesitancy,-- no hematuria-- and-- no nocturia  Musculoskeletal: No complaints of arthralgia, no myalgias, no joint swelling or stiffness, no limb pain or swelling  Integumentary: No complaints of skin rash or skin lesions, no itching, no skin wound, no dry skin  Hematologic/Lymphatic: No complaints of swollen glands, no swollen glands in the neck, does not bleed easily, no easy bruising  Neurological: No compliants of headache, no confusion, no convulsions, no numbness or tingling, no dizziness or fainting, no limb weakness, no difficulty walking  ROS Reviewed:    ROS reviewed  Active Problems   1  Active advance directive (V49 89) (Z78 9)   2  Benign essential hypertension (401 1) (I10)   3  BPH (benign prostatic hyperplasia) (600 00) (N40 0)   4  CAD S/P percutaneous coronary angioplasty (414 01,V45 82) (I25 10,Z98 61)   5  Calculus of bladder (594 1) (N21 0)   6  Degeneration, intervertebral disc, lumbosacral (722 52) (M51 37)   7  GERD (gastroesophageal reflux disease) (530 81) (K21 9)   8  Gout (274 9) (M10 9)   9  Hyperlipidemia (272 4) (E78 5)   10  Idiopathic peripheral neuropathy (356 9) (G60 9)   11  Lumbar canal stenosis (724 02) (M48 061)   12  Needs flu shot (V04 81) (Z23)   13  Nephrolithiasis (592 0) (N20 0)   14   Osteoarthritis of knee (715 36) (M17 10) 15  Parkinson's disease (332 0) (Melissa Harris)   16  S/P TURP (V45 89) (Z90 79)    Past Medical History   1  History of Benign familial tremor (333 1) (G25 0)   2  History of Calculus of kidney (592 0) (N20 0)   3  History of Cataract (366 9) (H26 9)   4  History of Coronary arteriosclerosis in native artery (414 01) (I25 10)   5  History of Coronary Artery Disease (V12 59)   6  History of Dizziness and giddiness (780 4) (R42)   7  History of chest pain (V13 89) (Z87 898)   8  History of hypercholesterolemia (V12 29) (Z86 39)   9  History of hypertension (V12 59) (Z86 79)   10  History of renal calculi (V13 01) (Z87 442)   11  History of Impaired fasting glucose (790 21) (R73 01)   12  History of Malignant Melanoma Of The Skin (V10 82)   13  History of Meniscal injury (959 7) (S83 8X9A)   14  History of Nephrolithiasis (V13 01)   15  Pre-op examination (V72 84) (Z01 818)   16  History of Reported Prior Kidney Disease (V13 00)   17  Tinnitus (388 30) (H93 19)  Active Problems And Past Medical History Reviewed: The active problems and past medical history were reviewed and updated today  Surgical History   1  History of Amputation Of Finger, With Neurectomy (Each)   2  History of Amputation Of Middle Finger, With Neurectomy (Each)   3  History of Cath Stent Placement   4  History of Hand Incision Tendon Sheath Of A Finger   5  History of Neuroplasty Decompression Median Nerve At Carpal Tunnel   6  History of Removal Of Lesion   7  History of Tonsillectomy   8  History of Total Knee Arthroplasty   9  History of Transurethral Resection Of Prostate (TURP)  Surgical History Reviewed: The surgical history was reviewed and updated today  Family History   Mother    1  Family history of Breast CA  Father    2  Family history of Colon cancer  Family History    3  Family history of Back Pain   4  Family history of Breast Cancer (V16 3)   5  Family history of Heart Disease (V17 49)   6   Family history of Hypertension (V17 49)   7  Family history of Stroke Syndrome (V17 1)  Family History Reviewed: The family history was reviewed and updated today  Social History    · Active advance directive (V49 89) (Z78 9)   · Alcohol Use (History)   · Caregiver: Spouse   · Cultural background   · Exercise habits   · Exercises daily (V49 89) (Z78 9)   · Former smoker (V15 82) (Z87 891)   · Good dental hygiene   · Lives with spouse   · Living Situation: Supportive and safe   ·    · Primary language is English   · Racial background   · Denied: History of Tobacco Use  Social History Reviewed: The social history was reviewed and updated today  The social history was reviewed and is unchanged  Current Meds    1  Allopurinol 300 MG Oral Tablet; take one tablet by mouth one time daily; Therapy: 03MFP8900 to (Evaluate:08Jun2018)  Requested for: 32FZP0302; Last     Rx:13Jun2017 Ordered   2  Aspirin EC 81 MG Oral Tablet Delayed Release; TAKE 1 TABLET DAILY AS DIRECTED; Therapy: 20ZVJ6012 to [phone]) Recorded   3  Atorvastatin Calcium 20 MG Oral Tablet; TAKE ONE TABLET BY MOUTH ONCE DAILY; Therapy: 47PIS5345 to (Evaluate:55Eke4144)  Requested for: 09IIR1981; Last     Rx:20Nov2017 Ordered   4  Benazepril-Hydrochlorothiazide 20-25 MG Oral Tablet; TAKE ONE TABLET BY MOUTH     ONCE DAILY; Therapy: 57ILU7390 to (Evaluate:83Qbp9172)  Requested for: 69MSJ9962; Last     Rx:77Rru2567; Status: ACTIVE - Transmit to Pharmacy - Awaiting Verification Ordered   5  Calcium 600+D 600-400 MG-UNIT Oral Tablet; take 1 tab daily; Therapy: (Recorded:47Bgg3604) to Recorded   6  Carbidopa-Levodopa  MG Oral Tablet; take 2 tablet twice daily; Therapy: 71TUN8065 to (Evaluate:60Yub9666); Last Rx:55Vua6104 Ordered   7  Clindamycin HCl - 150 MG Oral Capsule; Therapy: 34MMI3878 to Recorded   8  Glucosamine Chondr Complex 500-400 MG Oral Capsule; take 1 tab daily; Therapy: 14LRL0716 to Recorded   9   Metoprolol Succinate ER 50 MG Oral Tablet Extended Release 24 Hour; Therapy: 52PAL0281 to Recorded   10  Multivitamins Oral Capsule; take 1 tab daily; Therapy: (Recorded:07Cin7383) to Recorded   11  Nitrostat 0 4 MG Sublingual Tablet Sublingual; TAKE AS DIRECTED; Therapy: (Recorded:62Luq9044) to Recorded   12  Vitamin D3 1000 UNIT Oral Capsule; take 1 capsule daily; Therapy: (Recorded:30Srz5237) to Recorded   13  Zolpidem Tartrate 10 MG Oral Tablet; take one tablet by mouth at bedtime as needed; Therapy: 42LBT8352 to (Evaluate:48Bxp0881)  Requested for: 44MMV2675; Last      Rx:13Zpb8414 Ordered  Medication List Reviewed: The medication list was reviewed and updated today  Allergies   1  No Known Drug Allergies  2  No Known Environmental Allergies   3  No Known Food Allergies    Vitals   Vital Signs    Recorded: 14FXR8344 12:53PM   Heart Rate 68   Systolic 499   Diastolic 72   Height 6 ft 1 in   Weight 248 lb    BMI Calculated 32 72   BSA Calculated 2 36     Physical Exam        Constitutional      General appearance: No acute distress, well appearing and well nourished  Pulmonary      Respiratory effort: No increased work of breathing or signs of respiratory distress  Cardiovascular      Palpation of heart: Normal PMI, no thrills  Examination of extremities for edema and/or varicosities: Normal        Abdomen      Abdomen: Non-tender, no masses  Musculoskeletal      Gait and station: Normal        Skin      Skin and subcutaneous tissue: Normal without rashes or lesions         Results/Data   AUA Symptom Score 00XHS0652 12:55PM User, Ahs      Test Name Result Flag Reference   AUA Symptom Score (for prostate disease) 2     Incomplete emptying: Not at all (0)     Frequency: Not at all (0)     Intermittency: Not at all (0)     Urgency: Less than 1 time in 5 (1)     Weak-stream: Not at all (0)     Straining: Not at all (0)     Nocturia: Less than 1 time in 5 (1)   AUA Symptom Score (for prostate disease) - Quality of Life Due to Urinary Symptoms Pleased     AUA Symptom Score (for prostate disease) - Score Category Mild          Procedure      Procedure:    Equipment And Procedure:      U/S Findings: Bladder Scan PVR = 37 ml        Future Appointments      Date/Time Provider Specialty Site   07/03/2018 09:00 AM [facility] [address]     Signatures    Electronically signed by : Twila Ramsey, [address] [place] [address]; [place]  3 2018  1:12PM EST                       (Author)     Electronically signed by : MARIANA Emmanuel ; Jan 5 2018  4:24PM EST

## 2018-01-12 VITALS
RESPIRATION RATE: 16 BRPM | DIASTOLIC BLOOD PRESSURE: 72 MMHG | BODY MASS INDEX: 31.81 KG/M2 | WEIGHT: 240 LBS | TEMPERATURE: 98.2 F | SYSTOLIC BLOOD PRESSURE: 138 MMHG | HEIGHT: 73 IN

## 2018-01-13 VITALS
BODY MASS INDEX: 32.38 KG/M2 | TEMPERATURE: 96.9 F | RESPIRATION RATE: 18 BRPM | SYSTOLIC BLOOD PRESSURE: 122 MMHG | HEIGHT: 73 IN | DIASTOLIC BLOOD PRESSURE: 80 MMHG | HEART RATE: 70 BPM | WEIGHT: 244.31 LBS

## 2018-01-13 VITALS
HEIGHT: 73 IN | DIASTOLIC BLOOD PRESSURE: 72 MMHG | SYSTOLIC BLOOD PRESSURE: 138 MMHG | WEIGHT: 243.13 LBS | BODY MASS INDEX: 32.22 KG/M2 | HEART RATE: 84 BPM

## 2018-01-13 NOTE — PROGRESS NOTES
Chief Complaint  morrow cath removal and stent removal      Active Problems    1  Active advance directive (V49 89) (Z78 9)   2  Benign essential hypertension (401 1) (I10)   3  BPH (benign prostatic hyperplasia) (600 00) (N40 0)   4  CAD S/P percutaneous coronary angioplasty (414 01,V45 82) (I25 10,Z98 61)   5  Colon cancer screening (V76 51) (Z12 11)   6  Degeneration, intervertebral disc, lumbosacral (722 52) (M51 37)   7  GERD (gastroesophageal reflux disease) (530 81) (K21 9)   8  Gout (274 9) (M10 9)   9  Hyperlipidemia (272 4) (E78 5)   10  Idiopathic peripheral neuropathy (356 9) (G60 9)   11  Lipoma of abdominal wall (214 1) (D17 1)   12  Lumbar canal stenosis (724 02) (M48 06)   13  Need for immunization against influenza (V04 81) (Z23)   14  Need for pneumococcal vaccination (V03 82) (Z23)   15  Need for shingles vaccine (V04 89) (Z23)   16  Nephrolithiasis (592 0) (N20 0)   17  Osteoarthritis of knee (715 36) (M17 10)   18  Other insomnia (780 52) (G47 09)   19  Parkinson's disease (332 0) (G20)   20  Prostate cancer screening (V76 44) (Z12 5)   21  Screening for genitourinary condition (V81 6) (Z13 89)   22  Urinary retention (788 20) (R33 9)    Current Meds   1  Allopurinol 300 MG Oral Tablet; take one tablet by mouth one time daily; Therapy: 27SOI3888 to (Evaluate:08Jun2018)  Requested for: 06XEW2378; Last   Rx:13Jun2017 Ordered   2  Aspirin EC 81 MG Oral Tablet Delayed Release; TAKE 1 TABLET DAILY AS DIRECTED; Therapy: 04WQR6951 to (([phone]) Recorded   3  Atorvastatin Calcium 20 MG Oral Tablet; TAKE ONE TABLET BY MOUTH ONCE DAILY; Therapy: 58YRY4507 to (Evaluate:17Aug2017)  Requested for: 54WAM5196; Last   Rx:28Bxe4268 Ordered   4  Benazepril-Hydrochlorothiazide 20-25 MG Oral Tablet; TAKE ONE TABLET BY MOUTH   ONCE DAILY;    Therapy: 43TEC8752 to (Evaluate:84Icb6775)  Requested for: 48DQM9931; Last   Rx:20Aup5994; Status: 15[address] Dr edouard Mccarthy Ordered   5  Calcium 600+D 600-400 MG-UNIT Oral Tablet; take 1 tab daily; Therapy: (Recorded:16Nfe6198) to Recorded   6  Carbidopa-Levodopa  MG Oral Tablet; take 2 tablet twice daily; Therapy: 15SMN7055 to (Evaluate:06Apr2015); Last Rx:08Oct2014 Ordered   7  Glucosamine Chondr Complex 500-400 MG Oral Capsule; take 1 tab daily; Therapy: 08ALW6996 to Recorded   8  Meclizine HCl - 25 MG Oral Tablet; TAKE 1 TABLET 3 TIMES DAILY AS NEEDED; Therapy: 22UKL6485 to (Evaluate:37Zdy0111) Recorded   9  Metoprolol Succinate ER 25 MG Oral Tablet Extended Release 24 Hour; Take 1 tablet   daily; Therapy: 34FGI6534 to  Requested for: 49Npi8537 Recorded   10  Multivitamins Oral Capsule; take 1 tab daily; Therapy: (Recorded:25Ico5723) to Recorded   11  Nitrostat 0 4 MG Sublingual Tablet Sublingual; TAKE AS DIRECTED; Therapy: (Recorded:71Isy1089) to Recorded   12  Tamsulosin HCl - 0 4 MG Oral Capsule; TAKE ONE CAPSULE BY MOUTH ONE TIME    DAILY; Therapy: 68JTF8358 to (Evaluate:15Mar2018)  Requested for: 20Mar2017; Last    Rx:20Mar2017 Ordered   13  Vitamin D3 1000 UNIT Oral Capsule; take 1 capsule daily; Therapy: (Recorded:24Xaw3089) to Recorded   14  Zolpidem Tartrate 10 MG Oral Tablet; TAKE 1 TABLET AT BEDTIME AS NEEDED; Therapy: 06GKM5645 to (Evaluate:33Gic1013); Last Rx:13Jun2017 Ordered    Allergies    1  No Known Drug Allergies    2  No Known Environmental Allergies   3  No Known Food Allergies    Procedure    Procedure:   Equipment And Procedure:   U/S Findings: pvr= 38 61ml  Pt come in today for have morrow cath removed and stent with string removed  Morrow and stent both removed without problem  Pt come back later and has been able to void without problem  He was having some leakage  Assessment    1  Nephrolithiasis (592 0) (N20 0)   2  Urinary retention (788 20) (R33 9)    Future Appointments    Date/Time Provider Specialty Site   09/08/2017 09:00 AM MARIANA Ocasio   Internal Medicine [facility] INTERNAL MED   08/03/2017 09:00 AM [facility]   08/16/2017 10:00 AM MARIANA Cr  Urology [facility] FOR UROLOG[facility]     Signatures   Electronically signed by : Serge Pate, ; Aug  1 2017  2:40PM EST                       (Author)    Electronically signed by : Adamaris Deras, [facility];  Aug  2 2017  8:08AM EST                       (Author)    Electronically signed by : MARIANA Newton ; Aug  2 2017  4:31PM EST

## 2018-01-14 VITALS
RESPIRATION RATE: 16 BRPM | WEIGHT: 243 LBS | DIASTOLIC BLOOD PRESSURE: 68 MMHG | SYSTOLIC BLOOD PRESSURE: 110 MMHG | BODY MASS INDEX: 32.06 KG/M2 | HEART RATE: 70 BPM

## 2018-01-14 VITALS
HEART RATE: 86 BPM | SYSTOLIC BLOOD PRESSURE: 110 MMHG | DIASTOLIC BLOOD PRESSURE: 70 MMHG | WEIGHT: 247 LBS | BODY MASS INDEX: 32.74 KG/M2 | HEIGHT: 73 IN

## 2018-01-14 VITALS
SYSTOLIC BLOOD PRESSURE: 114 MMHG | DIASTOLIC BLOOD PRESSURE: 76 MMHG | HEIGHT: 73 IN | WEIGHT: 242 LBS | HEART RATE: 88 BPM | BODY MASS INDEX: 32.07 KG/M2

## 2018-01-14 VITALS
HEIGHT: 73 IN | DIASTOLIC BLOOD PRESSURE: 68 MMHG | BODY MASS INDEX: 32.34 KG/M2 | WEIGHT: 244 LBS | SYSTOLIC BLOOD PRESSURE: 116 MMHG | HEART RATE: 78 BPM

## 2018-01-14 NOTE — PROGRESS NOTES
Assessment    1  Benign essential hypertension (401 1) (I10)   2  Encounter for preventive health examination (V70 0) (Z00 00)   3  Prostate cancer screening (V76 44) (Z12 5)   4  Parkinson's disease (332 0) (G20)    Plan  Benign essential hypertension    · (1) COMPREHENSIVE METABOLIC PANEL; Status:Active; Requested for:09Mar2017;   Prostate cancer screening    · (1) PSA (SCREEN) (Dx V76 44 Screen for Prostate Cancer); Status:Active; Requested  for:09Mar2017;     Discussion/Summary  Impression: Initial Annual Wellness Visit, with preventive exam as well as age and risk appropriate counseling completed  Cardiovascular screening and counseling: the risks and benefits of screening were discussed, screening is current and counseling was given on maintaining a healthy diet  Diabetes screening and counseling: the risks and benefits of screening were discussed, counseling was given on maintaining a healthy diet and due for blood glucose  Colorectal cancer screening and counseling: the risks and benefits of screening were discussed and screening is current  Prostate cancer screening and counseling: the risks and benefits of screening were discussed and due for PSA  Osteoporosis screening and counseling: screening not indicated  Abdominal aortic aneurysm screening and counseling: screening not indicated  Glaucoma screening and counseling: the risks and benefits of screening were discussed and screening is current  HIV screening and counseling: screening not indicated  Immunizations: influenza vaccine is up to date this year and Zostavax vaccination up to date  Advance Directive Planning: complete and up to date  Patient Discussion: plan discussed with the patient, follow-up visit needed in one year  History of Present Illness  Welcome to Medicare and Wellness Visits: The patient is being seen for the initial annual wellness visit     Medicare Screening and Risk Factors   Hospitalizations: he has been previously hospitalizied and he has been hospitalized 1 times  Medicare Screening Tests Risk Questions   Abdominal aortic aneurysm risk assessment: over 72years of age  Osteoporosis risk assessment: over 48years of age  HIV risk assessment: none indicated  Drug and Alcohol Use: The patient is a former cigarette smoker  The patient reports never drinking alcohol  He has never used illicit drugs  Diet and Physical Activity: Current diet includes well balanced meals  He exercises daily  Exercise: walking  Mood Disorder and Cognitive Impairment Screening: He denies feeling down, depressed, or hopeless over the past two weeks  He denies feeling little interest or pleasure in doing things over the past two weeks  Functional Ability/Level of Safety: Hearing is slightly decreased  The patient is currently able to do activities of daily living without limitations  Activities of daily living details: does not need help managing medications and does not need help managing money  Advance Directives: Advance directives: living will, advance directives and level 3 DNR  end of life decisions were reviewed with the patient and I agree with the patient's decisions  Co-Managers and Medical Equipment/Suppliers: See Patient Care Team      Patient Care Team    Care Team Member Role Specialty Office Number   Lukas Solorio DO  Pain Management [phone]   Anastacio Eduardo MD  Orthopedic Surgery [phone]   Anup Ch MD  Neurology [phone]   [facility]  Cardiology [phone]   Emily Gupta  Pain Management [phone]   Kelly TEJEDA  Attending Internal Medicine [phone]   Stefani Martinez   [phone]     Active Problems    1  Active advance directive (V49 89) (Z78 9)   2  Benign essential hypertension (401 1) (I10)   3  BPH (benign prostatic hyperplasia) (600 00) (N40 0)   4  CAD S/P percutaneous coronary angioplasty (414 01,V45 82) (I25 10,Z98 61)   5   Colon cancer screening (V76 51) (Z12 11)   6  Degeneration, intervertebral disc, lumbosacral (722 52) (M51 37)   7  GERD (gastroesophageal reflux disease) (530 81) (K21 9)   8  Gout (274 9) (M10 9)   9  Hyperlipidemia (272 4) (E78 5)   10  Idiopathic peripheral neuropathy (356 9) (G60 9)   11  Lipoma of abdominal wall (214 1) (D17 1)   12  Lumbar canal stenosis (724 02) (M48 06)   13  Need for immunization against influenza (V04 81) (Z23)   14  Need for pneumococcal vaccination (V03 82) (Z23)   15  Need for shingles vaccine (V04 89) (Z23)   16  Osteoarthritis of knee (715 36) (M17 9)   17  Other insomnia (780 52) (G47 09)   18  Parkinson's disease (332 0) (G20)   19  Prostate cancer screening (V76 44) (Z12 5)   20   Screening for genitourinary condition (V81 6) (Z13 89)    Past Medical History    · History of Benign familial tremor (333 1) (G25 0)   · History of Calculus of bladder (594 1) (N21 0)   · History of Calculus of kidney (592 0) (N20 0)   · History of Cataract (366 9) (H26 9)   · History of Coronary arteriosclerosis in native artery (414 01) (I25 10)   · History of Coronary Artery Disease (V12 59)   · History of Dizziness and giddiness (780 4) (R42)   · History of chest pain (V13 89) (R08 911)   · History of hypercholesterolemia (V12 29) (Z86 39)   · History of hypertension (V12 59) (Z86 79)   · History of Impaired fasting glucose (790 21) (R73 01)   · History of Malignant Melanoma Of The Skin (V10 82)   · History of Meniscal injury (959 7) (S83 90XA)   · History of Nephrolithiasis (V13 01)   · History of Reported Prior Kidney Disease (V13 00)   · Tinnitus (388 30) (H93 19)    Surgical History    · History of Amputation Of Finger, With Neurectomy (Each)   · History of Amputation Of Middle Finger, With Neurectomy (Each)   · History of Cath Stent Placement   · History of Hand Incision Tendon Sheath Of A Finger   · History of Neuroplasty Decompression Median Nerve At Carpal Tunnel   · History of Removal Of Lesion · History of Tonsillectomy   · History of Total Knee Arthroplasty    Family History  Mother    · Family history of Breast CA  Father    · Family history of Colon cancer  Family History    · Family history of Back Pain   · Family history of Breast Cancer (V16 3)   · Family history of Heart Disease (V17 49)   · Family history of Hypertension (V17 49)   · Family history of Stroke Syndrome (V17 1)    Social History    · Active advance directive (V49 89) (Z78 9)   · Alcohol Use (History)   · Caregiver: Spouse   · Cultural background   · non-   · Exercise habits   · Exercises daily (V49 89) (Z78 9)   · Former smoker (V15 82) (Z87 891)   · Good dental hygiene   · Lives with spouse   · Living Situation: Supportive and safe   ·    · Primary language is English   · Racial background   · white   · Denied: History of Tobacco Use    Current Meds   1  Allopurinol 300 MG Oral Tablet; take one tablet by mouth one time daily; Therapy: 88EWW9581 to (Evaluate:08Jun2017)  Requested for: 95EBV8760; Last   Rx:13Jun2016 Ordered   2  Aspirin 81 MG CAPS; Take 1 daily; Therapy: (Recorded:01Apr2015) to Recorded   3  Atorvastatin Calcium 20 MG Oral Tablet (Lipitor); take one tablet by mouth one time   daily; Therapy: 60SDQ9456 to (Evaluate:29Gqv9843)  Requested for: 25ADE8999; Last   Rx:17Nov2016 Ordered   4  Benazepril-Hydrochlorothiazide 20-25 MG Oral Tablet; TAKE 1 TABLET DAILY; Therapy: 78SPP1514 to (Evaluate:30Jun2017)  Requested for: 52PSF0804; Last   Rx:03Oct2016 Ordered   5  Caltrate 600+D 600-400 MG-UNIT TABS; Take 1 tablet daily; Therapy: (51 196 19 99) to Recorded   6  Carbidopa-Levodopa  MG Oral Tablet; take 2 tablet twice daily; Therapy: 68SZY1130 to (Evaluate:06Apr2015); Last Rx:08Oct2014 Ordered   7  Fish Oil 1000 MG Oral Capsule; take 1 capsule daily; Last Rx:08Oct2014 Ordered   8  Metoprolol Succinate ER 50 MG Oral Tablet Extended Release 24 Hour; take 1 tablet   every day;    Therapy: 63LED4947 to (Evaluate:13Mar2017)  Requested for: 44Ssy9701; Last   Rx:39Stf0341 Ordered   9  Multivitamins Oral Capsule; Therapy: (Ayan Collins) to Recorded   10  Nitrostat 0 4 MG Sublingual Tablet Sublingual (Nitroglycerin); TAKE AS DIRECTED; Therapy: (Recorded:46Lrl3693) to Recorded   11  Prevnar 13 Intramuscular Suspension (Prevnar 13 Intramuscular Suspension); INJECT    0 5 ML Once one IM x 1; Therapy: 97UNH7075 to (Last Rx:30Umc7674)  Requested for: 08ZZT1201 Ordered   12  Tamsulosin HCl - 0 4 MG Oral Capsule; TAKE ONE CAPSULE BY MOUTH ONE TIME    DAILY; Therapy: 10VWG6551 to (Evaluate:80Jkg2835)  Requested for: 95AQF8687; Last    Rx:82Hzu7887 Ordered   13  Vitamin D3 1000 UNIT Oral Capsule; Therapy: (Ayan Collins) to Recorded   14  Zolpidem Tartrate 10 MG Oral Tablet; TAKE 1 TABLET AT BEDTIME AS NEEDED; Therapy: 49KNW0146 to (Evaluate:38Bur6973); Last Rx:38Syq0431 Ordered    Allergies    1  No Known Drug Allergies    2  No Known Environmental Allergies   3  No Known Food Allergies    Immunizations   1 2 3    Influenza  03-Oct-2013 28-Sep-2015 14-Sep-2016    PCV  08-Oct-2015      PPSV  13-Aug-2012       Vitals  Signs    Heart Rate: 82  Respiration: 16  Systolic: 587  Diastolic: 72  Height: 6 ft 1 in  Weight: 242 lb   BMI Calculated: 31 93  BSA Calculated: 2 33    Health Management  Colon cancer screening   COLONOSCOPY; every 5 years; Last 64VOO0145; Next Due: 89KZO2558;  Active    Signatures   Electronically signed by : MARIANA Aguillon ; Mar  9 2017 11:28AM EST                       (Author)

## 2018-01-14 NOTE — PROGRESS NOTES
Assessment    1  Benign essential hypertension (401 1) (I10)    Plan  Benign essential hypertension    · (1) COMPREHENSIVE METABOLIC PANEL; Status:Active; Requested for:09Mar2017;     Discussion/Summary  Self Referrals:   Self Referrals: No      Chief Complaint  Chief Complaint Free Text Note Form: Pt here for f/u today  No refills needed  No recent labs  dk    nsr since last here  History of Present Illness  Hypertension (Follow-Up): The patient presents for follow-up of essential hypertension  Symptoms: denies dyspnea and denies chest pain  Medications: the patient is adherent with his medication regimen  He denies medication side effects  Review of Systems  Complete-Male:   Constitutional: no fever and no chills  Eyes: no eyesight problems  ENT: hearing loss  Cardiovascular: as noted in HPI  Respiratory: as noted in HPI  Gastrointestinal: no abdominal pain and no constipation  Genitourinary: no urinary retention and no incomplete emptying of bladder  Musculoskeletal: no myalgias  Integumentary: no rashes  Neurological: parkinson's is stable on sinement, but no headache  Psychiatric: no anxiety and no depression  Active Problems    1  Active advance directive (V49 89) (Z78 9)   2  Benign essential hypertension (401 1) (I10)   3  BPH (benign prostatic hyperplasia) (600 00) (N40 0)   4  CAD S/P percutaneous coronary angioplasty (414 01,V45 82) (I25 10,Z98 61)   5  Colon cancer screening (V76 51) (Z12 11)   6  Degeneration, intervertebral disc, lumbosacral (722 52) (M51 37)   7  GERD (gastroesophageal reflux disease) (530 81) (K21 9)   8  Gout (274 9) (M10 9)   9  Hyperlipidemia (272 4) (E78 5)   10  Idiopathic peripheral neuropathy (356 9) (G60 9)   11  Lipoma of abdominal wall (214 1) (D17 1)   12  Lumbar canal stenosis (724 02) (M48 06)   13  Need for immunization against influenza (V04 81) (Z23)   14  Need for pneumococcal vaccination (V03 82) (Z23)   15   Need for shingles vaccine (V04 89) (Z23)   16  Osteoarthritis of knee (715 36) (M17 9)   17  Other insomnia (780 52) (G47 09)   18  Parkinson's disease (332 0) (G20)   19  Prostate cancer screening (V76 44) (Z12 5)   20  Screening for genitourinary condition (V81 6) (Z13 89)    Past Medical History    1  History of Benign familial tremor (333 1) (G25 0)   2  History of Calculus of bladder (594 1) (N21 0)   3  History of Calculus of kidney (592 0) (N20 0)   4  History of Cataract (366 9) (H26 9)   5  History of Coronary arteriosclerosis in native artery (414 01) (I25 10)   6  History of Coronary Artery Disease (V12 59)   7  History of Dizziness and giddiness (780 4) (R42)   8  History of chest pain (V13 89) (Z87 898)   9  History of hypercholesterolemia (V12 29) (Z86 39)   10  History of hypertension (V12 59) (Z86 79)   11  History of Impaired fasting glucose (790 21) (R73 01)   12  History of Malignant Melanoma Of The Skin (V10 82)   13  History of Meniscal injury (959 7) (S83 90XA)   14  History of Nephrolithiasis (V13 01)   15  History of Reported Prior Kidney Disease (V13 00)   16  Tinnitus (388 30) (H93 19)  Active Problems And Past Medical History Reviewed: The active problems and past medical history were reviewed and updated today  Surgical History    1  History of Amputation Of Finger, With Neurectomy (Each)   2  History of Amputation Of Middle Finger, With Neurectomy (Each)   3  History of Cath Stent Placement   4  History of Hand Incision Tendon Sheath Of A Finger   5  History of Neuroplasty Decompression Median Nerve At Carpal Tunnel   6  History of Removal Of Lesion   7  History of Tonsillectomy   8  History of Total Knee Arthroplasty  Surgical History Reviewed: The surgical history was reviewed and updated today  Family History  Mother    1  Family history of Breast CA  Father    2  Family history of Colon cancer  Family History    3  Family history of Back Pain   4   Family history of Breast Cancer (V16 3)   5  Family history of Heart Disease (V17 49)   6  Family history of Hypertension (V17 49)   7  Family history of Stroke Syndrome (V17 1)  Family History Reviewed: The family history was reviewed and updated today  Social History    · Active advance directive (V49 89) (Z78 9)   · Alcohol Use (History)   · Caregiver: Spouse   · Cultural background   · Exercise habits   · Exercises daily (V49 89) (Z78 9)   · Former smoker (V15 82) (Z87 891)   · Good dental hygiene   · Lives with spouse   · Living Situation: Supportive and safe   ·    · Primary language is English   · Racial background   · Denied: History of Tobacco Use  Social History Reviewed: The social history was reviewed and updated today  Current Meds   1  Allopurinol 300 MG Oral Tablet; take one tablet by mouth one time daily; Therapy: 27PBN0030 to (Evaluate:08Jun2017)  Requested for: 74OAW9528; Last Rx:13Jun2016   Ordered   2  Aspirin 81 MG CAPS; Take 1 daily; Therapy: (Recorded:01Apr2015) to Recorded   3  Atorvastatin Calcium 20 MG Oral Tablet; take one tablet by mouth one time daily; Therapy: 30GHO1999 to (Evaluate:87Jgr4729)  Requested for: 39XJE5637; Last Rx:17Nov2016   Ordered   4  Benazepril-Hydrochlorothiazide 20-25 MG Oral Tablet; TAKE 1 TABLET DAILY; Therapy: 01EDZ1014 to (Evaluate:30Jun2017)  Requested for: 24DHG5690; Last Rx:03Oct2016   Ordered   5  Caltrate 600+D 600-400 MG-UNIT TABS; Take 1 tablet daily; Therapy: (51 196 19 99) to Recorded   6  Carbidopa-Levodopa  MG Oral Tablet; take 2 tablet twice daily; Therapy: 39KWW2218 to (Evaluate:06Apr2015); Last Rx:08Oct2014 Ordered   7  Fish Oil 1000 MG Oral Capsule; take 1 capsule daily; Last Rx:08Oct2014 Ordered   8  Metoprolol Succinate ER 50 MG Oral Tablet Extended Release 24 Hour; take 1 tablet every day; Therapy: 82ULG6840 to (Evaluate:13Mar2017)  Requested for: 57Jww3958; Last Rx:10Onh9300   Ordered   9  Multivitamins Oral Capsule;    Therapy: ([id number]) to Recorded   10  Nitrostat 0 4 MG Sublingual Tablet Sublingual; TAKE AS DIRECTED; Therapy: (Recorded:72Hdn3035) to Recorded   11  Prevnar 13 Intramuscular Suspension; INJECT 0 5 ML Once one IM x 1; Therapy: 47AKE3639 to (Last Rx:62Mmp5756)  Requested for: 40RSQ0559 Ordered   12  Tamsulosin HCl - 0 4 MG Oral Capsule; TAKE ONE CAPSULE BY MOUTH ONE TIME DAILY; Therapy: 85AZK5713 to (Evaluate:81Mbh7653)  Requested for: 77KOV0705; Last Rx:75Szd9808    Ordered   13  Vitamin D3 1000 UNIT Oral Capsule; Therapy: (Katelynn Rollins) to Recorded   14  Zolpidem Tartrate 10 MG Oral Tablet; TAKE 1 TABLET AT BEDTIME AS NEEDED; Therapy: 95KYK7415 to (Evaluate:98Jkx8194); Last Rx:69Srq4611 Ordered  Medication List Reviewed: The medication list was reviewed and updated today  Allergies    1  No Known Drug Allergies    2  No Known Environmental Allergies   3  No Known Food Allergies    Vitals  Vital Signs    Recorded: 59NME4432 10:53AM   Heart Rate 82   Respiration 16   Systolic 079   Diastolic 72   Height 6 ft 1 in   Weight 242 lb    BMI Calculated 31 93   BSA Calculated 2 33     Physical Exam    Constitutional   General appearance: No acute distress, well appearing and well nourished  Ears, Nose, Mouth, and Throat   Otoscopic examination: Tympanic membrance translucent with normal light reflex  Canals patent without erythema  Oropharynx: Normal with no erythema, edema, exudate or lesions  Pulmonary   Auscultation of lungs: Clear to auscultation, equal breath sounds bilaterally, no wheezes, no rales, no rhonci  Cardiovascular   Auscultation of heart: Normal rate and rhythm, normal S1 and S2, without murmurs  Abdomen   Abdomen: Non-tender, no masses  Neurologic   Reflexes: Abnormal   resting tremor of left hand     Psychiatric   Orientation to person, place and time: Normal     Mood and affect: Normal          Health Management  Colon cancer screening   COLONOSCOPY; every 5 years; Last 74KLI8045; Next Due: 32MNI8249;  Active    Signatures   Electronically signed by : MARIANA Wallace ; Mar  9 2017 11:22AM EST                       (Author)

## 2018-01-18 NOTE — RESULT NOTES
Verified Results  (1) COMPREHENSIVE METABOLIC PANEL [id number] 71:11NY Rodrigo Daub   TW Order Number: [de-identified]     Test Name Result Flag Reference   SODIUM 144 mmol/L  136-145   POTASSIUM 3 7 mmol/L  3 5-5 3   CHLORIDE 107 mmol/L  100-108   CARBON DIOXIDE 31 mmol/L  21-32   ANION GAP (CALC) 6 mmol/L  4-13   BLOOD UREA NITROGEN 16 mg/dL  5-25   CREATININE 0 93 mg/dL  0 60-1 30   Standardized to IDMS reference method   CALCIUM 8 5 mg/dL  8 3-10 1   BILI, TOTAL 0 80 mg/dL  0 20-1 00   ALK PHOSPHATAS 80 U/L     ALT (SGPT) 10 U/L L 12-78   AST(SGOT) 21 U/L  5-45   ALBUMIN 3 6 g/dL  3 5-5 0   TOTAL PROTEIN 7 0 g/dL  6 4-8 2   eGFR Non-African American      >60 0 ml/min/1 73sq [facility] Disease Education Program recommendations are as follows:  GFR calculation is accurate only with a steady state creatinine  Chronic Kidney disease less than 60 ml/min/1 73 sq  meters  Kidney failure less than 15 ml/min/1 73 sq  meters  GLUCOSE FASTING 105 mg/dL H 65-99     (1) PSA (SCREEN) (Dx V76 44 Screen for Prostate Cancer) 05Apr2017 07:50AM Rodrigo Joseph   TW Order Number: [de-identified]     Test Name Result Flag Reference   PROSTATE SPECIFIC ANTIGEN 1 6 ng/mL  0 0-4 0   American Urological Association Guidelines define biochemical recurrence of prostate cancer as a detectable or rising PSA value post-radical prostatectomy that is greater than or equal to 0 2 ng/mL with a second confirmatory level of greater than or equal to 0 2 ng/mL  - Patient Instructions: This test is non-fasting  Please drink two glasses of water morning of bloodwork  - Patient Instructions: This test is non-fasting  Please drink two glasses of water morning of bloodwork

## 2018-01-18 NOTE — RESULT NOTES
Verified Results  (1) COMPREHENSIVE METABOLIC PANEL [id number] 33:93OZ Usman Mckinnon Order Number: [de-identified]     Test Name Result Flag Reference   GLUCOSE,RANDM 102 mg/dL     If the patient is fasting, the ADA then defines impaired fasting glucose as > 100 mg/dL and diabetes as > or equal to 123 mg/dL  SODIUM 144 mmol/L  136-145   POTASSIUM 4 1 mmol/L  3 5-5 3   CHLORIDE 105 mmol/L  100-108   CARBON DIOXIDE 30 mmol/L  21-32   ANION GAP (CALC) 9 mmol/L  4-13   BLOOD UREA NITROGEN 19 mg/dL  5-25   CREATININE 0 88 mg/dL  0 60-1 30   Standardized to IDMS reference method   CALCIUM 8 6 mg/dL  8 3-10 1   BILI, TOTAL 1 10 mg/dL H 0 20-1 00   ALK PHOSPHATAS 96 U/L     ALT (SGPT) 24 U/L  12-78   AST(SGOT) 24 U/L  5-45   ALBUMIN 3 7 g/dL  3 5-5 0   TOTAL PROTEIN 7 3 g/dL  6 4-8 2   eGFR Non-African American      >60 0 ml/min/1 73sq m   - Patient Instructions: This is a fasting blood test  Water,black tea or black  coffee only after 9:00pm the night before test Drink 2 glasses of water the morning of test - Patient Instructions: This bloodwork is non-fasting  Please drink two glasses of   water morning of bloodwork  National Kidney Disease Education Program recommendations are as follows:  GFR calculation is accurate only with a steady state creatinine  Chronic Kidney disease less than 60 ml/min/1 73 sq  meters  Kidney failure less than 15 ml/min/1 73 sq  meters  (1) LIPID PANEL, FASTING [id number] 06:48AM Usman Mckinnon Order Number: [de-identified]     Test Name Result Flag Reference   CHOLESTEROL 128 mg/dL     HDL,DIRECT 48 mg/dL  40-60   Specimen collection should occur prior to Metamizole administration due to the potential for falsely depressed results  LDL CHOLESTEROL CALCULATED 66 mg/dL  0-100   - Patient Instructions:  This is a fasting blood test  Water,black tea or black  coffee only after 9:00pm the night before test   Drink 2 glasses of water the morning of test     - Patient Instructions: This is a fasting blood test  Water,black tea or black  coffee only after 9:00pm the night before test Drink 2 glasses of water the morning of test - Patient Instructions: This bloodwork is non-fasting  Please drink two glasses of   water morning of bloodwork  Triglyceride:         Normal              <150 mg/dl       Borderline High    150-199 mg/dl       High               200-499 mg/dl       Very High          >499 mg/dl  Cholesterol:         Desirable        <200 mg/dl      Borderline High  200-239 mg/dl      High             >239 mg/dl  HDL Cholesterol:        High    >59 mg/dL      Low     <41 mg/dL  LDL CALCULATED:    This screening LDL is a calculated result  It does not have the accuracy of the Direct Measured LDL in the monitoring of patients with hyperlipidemia and/or statin therapy  Direct Measure LDL (SJH326) must be ordered separately in these patients  TRIGLYCERIDES 71 mg/dL  <=150   Specimen collection should occur prior to N-Acetylcysteine or Metamizole administration due to the potential for falsely depressed results  (1) CBC/ PLT (NO DIFF) [id number] 06:48AM Sherren Dates Order Number: [de-identified]     Test Name Result Flag Reference   HEMATOCRIT 46 9 %  36 5-49 3   HEMOGLOBIN 16 0 g/dL  12 0-17 0   MCHC 34 1 g/dL  31 4-37 4   MCH 31 6 pg  26 8-34 3   MCV 93 fL  82-98   PLATELET COUNT 195 Thousands/uL  149-390   RBC COUNT 5 07 Million/uL  3 88-5 62   RDW 13 1 %  11 6-15 1   WBC COUNT 5 39 Thousand/uL  4 31-10 16   MPV 10 3 fL  8 9-12 7     (1) TSH 39Hqo6612 06:48AM Sherren Dates Order Number: [de-identified]     Test Name Result Flag Reference   TSH 2 439 uIU/mL  0 358-3 740   - Patient Instructions: This bloodwork is non-fasting  Please drink two glasses of water morning of bloodwork  - Patient Instructions:  This is a fasting blood test  Water,black tea or black  coffee only after 9:00pm the night before test Drink 2 glasses of water the morning of test - Patient Instructions: This bloodwork is non-fasting  Please drink two glasses of   water morning of bloodwork  Patients undergoing fluorescein dye angiography may retain small amounts of fluorescein in the body for 48-72 hours post procedure  Samples containing fluorescein can produce falsely depressed TSH values  If the patient had this procedure,a specimen should be resubmitted post fluorescein clearance

## 2018-01-22 VITALS
HEIGHT: 73 IN | SYSTOLIC BLOOD PRESSURE: 122 MMHG | HEART RATE: 82 BPM | WEIGHT: 242 LBS | BODY MASS INDEX: 32.07 KG/M2 | RESPIRATION RATE: 16 BRPM | DIASTOLIC BLOOD PRESSURE: 72 MMHG

## 2018-01-22 VITALS
WEIGHT: 243 LBS | HEIGHT: 73 IN | DIASTOLIC BLOOD PRESSURE: 71 MMHG | SYSTOLIC BLOOD PRESSURE: 130 MMHG | BODY MASS INDEX: 32.2 KG/M2

## 2018-01-22 VITALS
BODY MASS INDEX: 31.41 KG/M2 | SYSTOLIC BLOOD PRESSURE: 110 MMHG | WEIGHT: 237 LBS | DIASTOLIC BLOOD PRESSURE: 60 MMHG | HEART RATE: 70 BPM | TEMPERATURE: 98 F | HEIGHT: 73 IN

## 2018-01-22 VITALS
BODY MASS INDEX: 32.87 KG/M2 | HEIGHT: 73 IN | HEART RATE: 68 BPM | WEIGHT: 248 LBS | SYSTOLIC BLOOD PRESSURE: 124 MMHG | DIASTOLIC BLOOD PRESSURE: 72 MMHG

## 2018-02-20 PROBLEM — N20.0 NEPHROLITHIASIS: Status: ACTIVE | Noted: 2017-08-03

## 2018-02-21 ENCOUNTER — OFFICE VISIT (OUTPATIENT)
Dept: FAMILY MEDICINE CLINIC | Facility: HOSPITAL | Age: 73
End: 2018-02-21
Payer: MEDICARE

## 2018-02-21 VITALS
WEIGHT: 240 LBS | SYSTOLIC BLOOD PRESSURE: 106 MMHG | BODY MASS INDEX: 30.8 KG/M2 | HEART RATE: 81 BPM | RESPIRATION RATE: 16 BRPM | HEIGHT: 74 IN | DIASTOLIC BLOOD PRESSURE: 60 MMHG

## 2018-02-21 DIAGNOSIS — K80.20 CALCULUS OF GALLBLADDER WITHOUT CHOLECYSTITIS WITHOUT OBSTRUCTION: Primary | ICD-10-CM

## 2018-02-21 PROCEDURE — 99214 OFFICE O/P EST MOD 30 MIN: CPT | Performed by: INTERNAL MEDICINE

## 2018-02-21 NOTE — PROGRESS NOTES
Assessment/Plan:    No problem-specific Assessment & Plan notes found for this encounter  Diagnoses and all orders for this visit:    Calculus of gallbladder without cholecystitis without obstruction  -     Ambulatory referral to General Surgery; Future          Subjective:      Patient ID: Elizabeth Vázquez is a 75 y o  male  Abdominal Pain   This is a recurrent problem  The current episode started 1 to 4 weeks ago  The onset quality is sudden  The problem occurs intermittently  The problem has been gradually worsening  The pain is located in the RUQ  The pain is severe  The quality of the pain is sharp  The abdominal pain radiates to the back  Pertinent negatives include no diarrhea, fever, hematuria, nausea or vomiting  The pain is aggravated by eating  The pain is relieved by recumbency  The following portions of the patient's history were reviewed and updated as appropriate: current medications, past family history, past medical history, past social history, past surgical history and problem list     Review of Systems   Constitutional: Negative for fever  Respiratory: Negative for cough and shortness of breath  Cardiovascular: Negative for chest pain  Gastrointestinal: Positive for abdominal pain  Negative for diarrhea, nausea and vomiting  Genitourinary: Negative for difficulty urinating and hematuria  Objective:    /60   Pulse 81   Resp 16   Ht 6' 2" (1 88 m)   Wt 109 kg (240 lb)   BMI 30 81 kg/m²      Physical Exam   Constitutional: He appears well-developed  No distress  HENT:   Head: Normocephalic  Eyes: Conjunctivae are normal    Neck: Neck supple  Cardiovascular: Normal rate  Pulmonary/Chest: Effort normal  No respiratory distress  He has no wheezes  He has no rales  Abdominal: Soft  Bowel sounds are normal  He exhibits no distension  There is no tenderness  Musculoskeletal: He exhibits no tenderness  Neurological: He is alert     Left hand resting tremor   Skin: Skin is warm and dry  No rash noted  Psychiatric: He has a normal mood and affect             Lucía Rachel MD

## 2018-02-23 ENCOUNTER — OFFICE VISIT (OUTPATIENT)
Dept: SURGERY | Facility: HOSPITAL | Age: 73
End: 2018-02-23
Payer: MEDICARE

## 2018-02-23 VITALS
DIASTOLIC BLOOD PRESSURE: 82 MMHG | HEIGHT: 74 IN | SYSTOLIC BLOOD PRESSURE: 110 MMHG | HEART RATE: 80 BPM | WEIGHT: 242 LBS | BODY MASS INDEX: 31.06 KG/M2 | RESPIRATION RATE: 16 BRPM

## 2018-02-23 DIAGNOSIS — E66.9 OBESITY (BMI 30-39.9): ICD-10-CM

## 2018-02-23 DIAGNOSIS — L21.9 SEBORRHEA: ICD-10-CM

## 2018-02-23 DIAGNOSIS — D18.01 HEMANGIOMA OF SKIN: ICD-10-CM

## 2018-02-23 DIAGNOSIS — M62.08 RECTUS DIASTASIS: ICD-10-CM

## 2018-02-23 DIAGNOSIS — K80.10 CALCULUS OF GALLBLADDER WITH CHRONIC CHOLECYSTITIS WITHOUT OBSTRUCTION: Primary | ICD-10-CM

## 2018-02-23 DIAGNOSIS — D22.9 MULTIPLE PIGMENTED NEVI: ICD-10-CM

## 2018-02-23 DIAGNOSIS — K80.20 CALCULUS OF GALLBLADDER WITHOUT CHOLECYSTITIS WITHOUT OBSTRUCTION: ICD-10-CM

## 2018-02-23 PROCEDURE — 99204 OFFICE O/P NEW MOD 45 MIN: CPT | Performed by: SURGERY

## 2018-02-23 NOTE — PROGRESS NOTES
Assessment/Plan: Nupur Reyes is a 75year old male who presents today, per referral by Oswaldo Gabriel, for consultation regarding his gallbladder  Physical exam revealed the abdomen is non-tender at this time  Explained the anatomy of the gallbladder, as well as the etiology and usual symptoms of cholecystitis  His symptoms indicate that he would benefit from laparoscopic cholecystectomy  Explained the surgery, as well as pre- and post-operative protocol, diet, and restrictions  No heavy lifting greater than 15 pounds for weeks 1-2 with no strenuous activity, and no heavy lifting greater than 25 pounds for weeks 3-4 with light cardio permissible  He will schedule surgery for his earliest convenience  He knows to call if any questions or concerns arise  Rectus diastasis- Explained the etiology  He understands this is not a hernia  Skin- Encouraged him to have his seborrhea of face, multiple pigmented nevi, scattered hemangiomas, seborrhea of the back and abdomen monitored by a dermatologist or his PCP  Obesity- Diet and exercise discussed in the context of weight loss  No problem-specific Assessment & Plan notes found for this encounter  Diagnoses and all orders for this visit:    Calculus of gallbladder without cholecystitis without obstruction  -     Ambulatory referral to General Surgery          Subjective:      Patient ID: Rafa Reynoso is a 75 y o  male  Nupur Reyes is a 75year old male who presents today, per referral by Oswaldo Gabriel, for consultation regarding his gallbladder  About 4-5 months ago he experienced pain underneath the ribs "in a band"  He thought he had pulled something  He associated it with sitting for a long time playing cards with friends and eating greasy potato chips  Then the pain wrapped around his back, as well as in the lower abdomen  He felt a lot of pain in the RUQ that radiates to the back   When he was in [place] he ate fried calamari, cheese pizza, and iqra  He then experienced "crippling" abdominal pain for which he was went to the Emergency Room  Kidney stones- He has had kidney stones blasted in the last year  He currently feels a pain in the same area he is used to feeling pain from stones  He is not sure if it is stones or if he pulled a muscle  He is obese with a BMI of 31 5  The following portions of the patient's history were reviewed and updated as appropriate: allergies, current medications, past family history, past medical history, past social history, past surgical history and problem list     Review of Systems   Constitutional: Negative  HENT: Negative  Eyes: Negative  Respiratory: Negative  Cardiovascular: Negative  Gastrointestinal: Positive for abdominal pain and constipation  Endocrine: Negative  Genitourinary: Negative  Musculoskeletal: Negative  Skin: Negative  Allergic/Immunologic: Negative  Neurological: Negative  Hematological: Negative  Psychiatric/Behavioral: Negative  All other systems reviewed and are negative  Objective:      /82 (BP Location: Left arm, Patient Position: Sitting, Cuff Size: Standard)   Pulse 80   Resp 16   Ht 6' 1 5" (1 867 m)   Wt 110 kg (242 lb)   BMI 31 50 kg/m²          Physical Exam   Constitutional: He is oriented to person, place, and time  He appears well-developed and well-nourished  No distress  HENT:   Head: Normocephalic and atraumatic  Right Ear: External ear normal    Left Ear: External ear normal    Nose: Nose normal    Mouth/Throat: Oropharynx is clear and moist  No oropharyngeal exudate  Eyes: Conjunctivae and EOM are normal  Right eye exhibits no discharge  Left eye exhibits no discharge  No scleral icterus  Neck: Normal range of motion  Neck supple  No JVD present  No tracheal deviation present  No thyromegaly present  Cardiovascular: Normal rate, regular rhythm, normal heart sounds and intact distal pulses    Exam reveals no gallop and no friction rub  No murmur heard  Pulmonary/Chest: Effort normal and breath sounds normal  No stridor  No respiratory distress  He has no wheezes  He has no rales  He exhibits no tenderness  Abdominal: Soft  Bowel sounds are normal  He exhibits no distension and no mass  There is no tenderness  There is no rebound and no guarding  Rectus diastasis  Musculoskeletal: Normal range of motion  He exhibits no edema, tenderness or deformity  Lymphadenopathy:     He has no cervical adenopathy  Neurological: He is alert and oriented to person, place, and time  No cranial nerve deficit  Coordination normal    Skin: Skin is dry  No rash noted  He is not diaphoretic  No erythema  No pallor  Seborrhea of face, multiple pigmented nevi, scattered hemangiomas, seborrhea of the back and abdomen  Psychiatric: He has a normal mood and affect  His behavior is normal  Thought content normal    Nursing note and vitals reviewed  By signing my name below, Rosetta Smith, attest that this documentation has been prepared under the direction and in the presence of Dr Maikol Bravo MD  Electronically signed: Dominique Barker Medical Scribe  2/23/18  10:57  Juju Leyva, personally performed the services described in this documentation  All medical record entries made by the scribe were at my direction and in my presence  I have reviewed the chart and agree that the record reflects my personal performance and is accurate and complete  Dr Maikol Bravo MD  2/23/18  10:57

## 2018-02-24 DIAGNOSIS — I25.10 CORONARY ARTERY DISEASE INVOLVING NATIVE CORONARY ARTERY OF NATIVE HEART WITHOUT ANGINA PECTORIS: Primary | ICD-10-CM

## 2018-02-25 RX ORDER — ATORVASTATIN CALCIUM 20 MG/1
TABLET, FILM COATED ORAL
Qty: 90 TABLET | Refills: 0 | Status: SHIPPED | OUTPATIENT
Start: 2018-02-25 | End: 2018-05-13 | Stop reason: SDUPTHER

## 2018-02-26 PROBLEM — K80.20 CALCULUS OF GALLBLADDER WITHOUT CHOLECYSTITIS WITHOUT OBSTRUCTION: Status: ACTIVE | Noted: 2018-02-26

## 2018-02-26 RX ORDER — SODIUM CHLORIDE, SODIUM LACTATE, POTASSIUM CHLORIDE, CALCIUM CHLORIDE 600; 310; 30; 20 MG/100ML; MG/100ML; MG/100ML; MG/100ML
125 INJECTION, SOLUTION INTRAVENOUS CONTINUOUS
Status: CANCELLED | OUTPATIENT
Start: 2018-02-28

## 2018-02-26 NOTE — PRE-PROCEDURE INSTRUCTIONS
Pre-Surgery Instructions:   Medication Instructions    allopurinol (ZYLOPRIM) 300 mg tablet Instructed patient per Anesthesia Guidelines   atorvastatin (LIPITOR) 20 mg tablet Instructed patient per Anesthesia Guidelines   benazepril-hydrochlorthiazide (LOTENSIN HCT) 20-25 MG per tablet Instructed patient per Anesthesia Guidelines   Calcium Carbonate-Vitamin D (CALTRATE 600+D) 600-400 MG-UNIT per tablet Instructed patient per Anesthesia Guidelines   carbidopa-levodopa (SINEMET)  mg per tablet Instructed patient per Anesthesia Guidelines   Cholecalciferol (VITAMIN D-3) 1000 UNITS CAPS Instructed patient per Anesthesia Guidelines   glucosamine-chondroitin 500-400 MG tablet Instructed patient per Anesthesia Guidelines   metoprolol succinate (TOPROL-XL) 50 mg 24 hr tablet Instructed patient per Anesthesia Guidelines   Multiple Vitamin (MULTIVITAMINS PO) Instructed patient per Anesthesia Guidelines   nitroglycerin (NITROSTAT) 0 4 mg SL tablet Instructed patient per Anesthesia Guidelines   zolpidem (AMBIEN) 10 mg tablet Instructed patient per Anesthesia Guidelines  Before your operation, you play an important role in decreasing your risk for infection by washing with special antiseptic soap  This is an effective way to reduce bacteria on the skin which may help to prevent infections at the surgical site  Please read the following directions in advance  1  In the week before your operation purchase a 4 ounce bottle of antiseptic soap containing chlorhexidine gluconate 4%  Some brand names include: Aplicare, Endure, and Hibiclens  The cost is usually less than $5 00  · For your convenience, the [address] [place] [address] carries the soap  · It may also be available at your doctor's office or pre-admission testing center, and at most retail pharmacies    · If you are allergic or sensitive to soaps containing chlorhexidine gluconate (CHG), please let your doctor know so another antiseptic soap can be suggested  · CHG antiseptic soap is for external use only  2      The day before your operation follow these directions carefully to get ready  · Place clean lines (sheets) on your bed; you should sleep on clean sheets after your evening shower  · Get clean towels and washcloths ready - you need enough for 2 showers  · Set aside clean underwear, pajamas, and clothing to wear after the shower  Reminders:  · DO NOT use any other soap or body rinse on your skin during or after the antiseptic showers  · DO NOT use lotion , powder, deodorant, or perfume/aftershave of any kind on your skin after your antiseptic shower  · DO NOT shave any body parts in the 24 hours/the day before your operation  · DO NOT get the antiseptic soap in your eyes, ears, nose, mouth, or vaginal area  3      You will need to shower the night before AND the morning of your Surgery  Shower 1:  · The evening before your operation, take the fist shower  · First, shampoo your hair with regular shampoo and rinse it completely before you use the anitseptic soap  After washing and rinsing your hair, rinse your body  · Next, use a clean wash cloth to apply the antiseptic soap and wash your body from the neck down to your toes using 1/2 bottle of the antiseptic soap  You will use the other 1/2 bottle for the second shower  · Clean the area where your incision will be; later this area well for about 2 minutes  · If you ar having head or neck surgery, wash areas with the antiseptic soap  · Rinse yourself completely with running water  · Use a clean towel to dry off  · Wear clean underwear and clothing/pajamas  Shower 2:  · The Morning of your operation, take the second shower following the same steps as Shower 1 using the second 1/2 of the bottle of antiseptic soap  · Use clean cloths and towels to was and dry yourself off  · Wear clean underwear and clothing

## 2018-02-28 ENCOUNTER — ANESTHESIA (OUTPATIENT)
Dept: PERIOP | Facility: HOSPITAL | Age: 73
End: 2018-02-28
Payer: MEDICARE

## 2018-02-28 ENCOUNTER — ANESTHESIA EVENT (OUTPATIENT)
Dept: PERIOP | Facility: HOSPITAL | Age: 73
End: 2018-02-28
Payer: MEDICARE

## 2018-02-28 ENCOUNTER — HOSPITAL ENCOUNTER (OUTPATIENT)
Facility: HOSPITAL | Age: 73
Setting detail: OUTPATIENT SURGERY
Discharge: HOME | End: 2018-02-28
Attending: SURGERY | Admitting: SURGERY
Payer: MEDICARE

## 2018-02-28 VITALS
HEART RATE: 82 BPM | TEMPERATURE: 98.4 F | DIASTOLIC BLOOD PRESSURE: 59 MMHG | HEIGHT: 74 IN | OXYGEN SATURATION: 96 % | RESPIRATION RATE: 19 BRPM | WEIGHT: 240 LBS | SYSTOLIC BLOOD PRESSURE: 131 MMHG | BODY MASS INDEX: 30.8 KG/M2

## 2018-02-28 DIAGNOSIS — K80.20 CALCULUS OF GALLBLADDER WITHOUT CHOLECYSTITIS WITHOUT OBSTRUCTION: ICD-10-CM

## 2018-02-28 DIAGNOSIS — Z90.49 S/P LAPAROSCOPIC CHOLECYSTECTOMY: Primary | ICD-10-CM

## 2018-02-28 PROCEDURE — 47562 LAPAROSCOPIC CHOLECYSTECTOMY: CPT | Performed by: PHYSICIAN ASSISTANT

## 2018-02-28 PROCEDURE — 88304 TISSUE EXAM BY PATHOLOGIST: CPT | Performed by: SURGERY

## 2018-02-28 PROCEDURE — 47562 LAPAROSCOPIC CHOLECYSTECTOMY: CPT | Performed by: SURGERY

## 2018-02-28 PROCEDURE — 88304 TISSUE EXAM BY PATHOLOGIST: CPT | Performed by: PATHOLOGY

## 2018-02-28 RX ORDER — FENTANYL CITRATE/PF 50 MCG/ML
50 SYRINGE (ML) INJECTION
Status: DISCONTINUED | OUTPATIENT
Start: 2018-02-28 | End: 2018-02-28 | Stop reason: HOSPADM

## 2018-02-28 RX ORDER — MAGNESIUM HYDROXIDE 1200 MG/15ML
LIQUID ORAL AS NEEDED
Status: DISCONTINUED | OUTPATIENT
Start: 2018-02-28 | End: 2018-02-28 | Stop reason: HOSPADM

## 2018-02-28 RX ORDER — ONDANSETRON 2 MG/ML
INJECTION INTRAMUSCULAR; INTRAVENOUS AS NEEDED
Status: DISCONTINUED | OUTPATIENT
Start: 2018-02-28 | End: 2018-02-28 | Stop reason: SURG

## 2018-02-28 RX ORDER — FENTANYL CITRATE 50 UG/ML
INJECTION, SOLUTION INTRAMUSCULAR; INTRAVENOUS AS NEEDED
Status: DISCONTINUED | OUTPATIENT
Start: 2018-02-28 | End: 2018-02-28 | Stop reason: SURG

## 2018-02-28 RX ORDER — ACETAMINOPHEN 325 MG/1
650 TABLET ORAL EVERY 6 HOURS PRN
Status: DISCONTINUED | OUTPATIENT
Start: 2018-02-28 | End: 2018-02-28 | Stop reason: HOSPADM

## 2018-02-28 RX ORDER — PROPOFOL 10 MG/ML
INJECTION, EMULSION INTRAVENOUS AS NEEDED
Status: DISCONTINUED | OUTPATIENT
Start: 2018-02-28 | End: 2018-02-28 | Stop reason: SURG

## 2018-02-28 RX ORDER — HYDROCODONE BITARTRATE AND ACETAMINOPHEN 5; 325 MG/1; MG/1
2 TABLET ORAL EVERY 4 HOURS PRN
Status: DISCONTINUED | OUTPATIENT
Start: 2018-02-28 | End: 2018-02-28 | Stop reason: HOSPADM

## 2018-02-28 RX ORDER — ONDANSETRON 2 MG/ML
4 INJECTION INTRAMUSCULAR; INTRAVENOUS EVERY 6 HOURS PRN
Status: DISCONTINUED | OUTPATIENT
Start: 2018-02-28 | End: 2018-02-28 | Stop reason: HOSPADM

## 2018-02-28 RX ORDER — SCOLOPAMINE TRANSDERMAL SYSTEM 1 MG/1
1 PATCH, EXTENDED RELEASE TRANSDERMAL
Status: DISCONTINUED | OUTPATIENT
Start: 2018-02-28 | End: 2018-02-28 | Stop reason: HOSPADM

## 2018-02-28 RX ORDER — MIDAZOLAM HYDROCHLORIDE 1 MG/ML
INJECTION INTRAMUSCULAR; INTRAVENOUS AS NEEDED
Status: DISCONTINUED | OUTPATIENT
Start: 2018-02-28 | End: 2018-02-28 | Stop reason: SURG

## 2018-02-28 RX ORDER — ONDANSETRON 2 MG/ML
4 INJECTION INTRAMUSCULAR; INTRAVENOUS ONCE AS NEEDED
Status: DISCONTINUED | OUTPATIENT
Start: 2018-02-28 | End: 2018-02-28 | Stop reason: HOSPADM

## 2018-02-28 RX ORDER — GLYCOPYRROLATE 0.2 MG/ML
INJECTION INTRAMUSCULAR; INTRAVENOUS AS NEEDED
Status: DISCONTINUED | OUTPATIENT
Start: 2018-02-28 | End: 2018-02-28 | Stop reason: SURG

## 2018-02-28 RX ORDER — KETOROLAC TROMETHAMINE 30 MG/ML
INJECTION, SOLUTION INTRAMUSCULAR; INTRAVENOUS AS NEEDED
Status: DISCONTINUED | OUTPATIENT
Start: 2018-02-28 | End: 2018-02-28 | Stop reason: SURG

## 2018-02-28 RX ORDER — ROCURONIUM BROMIDE 10 MG/ML
INJECTION, SOLUTION INTRAVENOUS AS NEEDED
Status: DISCONTINUED | OUTPATIENT
Start: 2018-02-28 | End: 2018-02-28 | Stop reason: SURG

## 2018-02-28 RX ORDER — SODIUM CHLORIDE, SODIUM LACTATE, POTASSIUM CHLORIDE, CALCIUM CHLORIDE 600; 310; 30; 20 MG/100ML; MG/100ML; MG/100ML; MG/100ML
125 INJECTION, SOLUTION INTRAVENOUS CONTINUOUS
Status: DISCONTINUED | OUTPATIENT
Start: 2018-02-28 | End: 2018-02-28 | Stop reason: HOSPADM

## 2018-02-28 RX ORDER — HYDROCODONE BITARTRATE AND ACETAMINOPHEN 5; 325 MG/1; MG/1
1-2 TABLET ORAL EVERY 4 HOURS PRN
Qty: 30 TABLET | Refills: 0 | Status: SHIPPED | OUTPATIENT
Start: 2018-02-28 | End: 2018-03-10

## 2018-02-28 RX ADMIN — FENTANYL CITRATE 50 MCG: 50 INJECTION, SOLUTION INTRAMUSCULAR; INTRAVENOUS at 07:59

## 2018-02-28 RX ADMIN — GLYCOPYRROLATE 0.6 MG: 0.2 INJECTION, SOLUTION INTRAMUSCULAR; INTRAVENOUS at 08:17

## 2018-02-28 RX ADMIN — FENTANYL CITRATE 50 MCG: 50 INJECTION, SOLUTION INTRAMUSCULAR; INTRAVENOUS at 08:01

## 2018-02-28 RX ADMIN — SCOPALAMINE 1 PATCH: 1 PATCH, EXTENDED RELEASE TRANSDERMAL at 07:19

## 2018-02-28 RX ADMIN — MIDAZOLAM HYDROCHLORIDE 2 MG: 1 INJECTION, SOLUTION INTRAMUSCULAR; INTRAVENOUS at 07:31

## 2018-02-28 RX ADMIN — CEFAZOLIN SODIUM 2000 MG: 2 SOLUTION INTRAVENOUS at 07:50

## 2018-02-28 RX ADMIN — SODIUM CHLORIDE, SODIUM LACTATE, POTASSIUM CHLORIDE, AND CALCIUM CHLORIDE 125 ML/HR: .6; .31; .03; .02 INJECTION, SOLUTION INTRAVENOUS at 06:41

## 2018-02-28 RX ADMIN — FENTANYL CITRATE 50 MCG: 50 INJECTION, SOLUTION INTRAMUSCULAR; INTRAVENOUS at 07:55

## 2018-02-28 RX ADMIN — PROPOFOL 150 MG: 10 INJECTION, EMULSION INTRAVENOUS at 07:38

## 2018-02-28 RX ADMIN — DEXAMETHASONE SODIUM PHOSPHATE 4 MG: 10 INJECTION INTRAMUSCULAR; INTRAVENOUS at 07:50

## 2018-02-28 RX ADMIN — ROCURONIUM BROMIDE 40 MG: 10 INJECTION INTRAVENOUS at 07:38

## 2018-02-28 RX ADMIN — NEOSTIGMINE METHYLSULFATE 3 MG: 1 INJECTION, SOLUTION INTRAMUSCULAR; INTRAVENOUS; SUBCUTANEOUS at 08:17

## 2018-02-28 RX ADMIN — KETOROLAC TROMETHAMINE 15 MG: 30 INJECTION, SOLUTION INTRAMUSCULAR at 08:11

## 2018-02-28 RX ADMIN — ONDANSETRON 4 MG: 2 INJECTION INTRAMUSCULAR; INTRAVENOUS at 08:12

## 2018-02-28 RX ADMIN — METRONIDAZOLE 500 MG: 500 INJECTION, SOLUTION INTRAVENOUS at 07:40

## 2018-02-28 NOTE — DISCHARGE INSTRUCTIONS
Prakash Gallardo Instructions      Dr Randa TEJEDA     1  General:  Asa will feel pulling sensations around the wound or funny aches and pains around the incisions  This is normal  Even minor surgery is a change in your body and this is your bodys way of reaction to it  If you have had abdominal surgery, it may help to support the incision with a small pillow or blanket for comfort when moving or coughing  2  Wound care:     Glue - Leave glue alone, it will fall off on its own, no need for an additional dressings    3  Water: You may shower over the wound, unless there are drain tubes left in place  Do not bathe or use a pool or hot tub until cleared by the physician  You may shower right over the staples, glue or Steri-Strips and rinse wound with soapy water but do not scrub incision pat dry when you are done  4  Activity: You may go up and down stairs, walk as much as you are comfortable, but walk at least 3 times each day  If you have had abdominal surgery, do not lift anything heavier than 15 pounds for at least 2 weeks, unless cleared by the doctor  5  Diet: You may resume a regular diet  If you had a same-day surgery or overnight stay surgery, you may wish to eat lightly for a few days: soups, crackers, and sandwiches  You may resume a regular diet when ready  6  Medications: Resume all of your previous medications, unless told otherwise by the doctor  Avoid aspirin or ibuprofen (Advil, Motrin, etc ) products for 2-3 days after the date of surgery  You may, at that time, began to take them again  Tylenol is always fine, unless you are taking any narcotic pain medication containing Tylenol (such as Percocet, Darvocet, Vicodin, or anything containing acetaminophen)  Do not take Tylenol if you're taking these medications  You do not need to take the narcotic pain medications unless you are having significant pain and discomfort      7  Driving: He will need someone to drive you home on the day of surgery  Do not drive or make any important decisions while on narcotic pain medication or 24 hours and after anesthesia or sedation for surgery  Generally, you may drive when your off all narcotic pain medications  8  Upset Stomach: You may take Maalox, Tums, or similar items for an upset stomach  If your narcotic pain medication causes an upset stomach, do not take it on an empty stomach  Try taking it with at least some crackers or toast      9  Constipation: Patients often experienced constipation after surgery  You may take over-the-counter medication for this, such as Metamucil, Senokot, Dulcolax, milk of magnesia, etc  You may take a suppository unless you have had anorectal surgery such as a procedure on your hemorrhoids  If you experience significant nausea or vomiting after abdominal surgery, call the office before trying any of these medications  10  Call the office: If you are experiencing any of the following, fevers above 101 5°, significant nausea or vomiting, if the wound develops drainage and/or is excessive redness around the wound, or if you have significant diarrhea or other worsening symptoms  11  Pain: You may be given a prescription for pain  This will be given to the hospital, the day of surgery  12  Sexual Activity: You may resume sexual activity when you feel ready and comfortable and your incision is sealed and healed without apparent infection risk  13  Follow-up in 2 weeks       [facility]  Phone: [phone]

## 2018-02-28 NOTE — DISCHARGE SUMMARY
Discharge Summary - Salas Cortes 75 y o  male MRN: [id number]    Unit/Bed#: OR [place] Encounter: [id number]      Pre-Op Diagnosis Codes:     * Calculus of gallbladder without cholecystitis without obstruction [K80 20]  Post-Op Diagnosis Codes:     * Calculus of gallbladder without cholecystitis without obstruction [K80 20]      Procedures Performed: Lap les        See H and P for full details of admission and op note  Patient recovered well and was discharged to home  Patient was seen and examined prior to discharge  Provisions for Follow-Up Care:  See after visit summary for information related to follow-up care and any pertinent home health orders  Disposition: Home, in stable condition  Planned Readmission: No    Discharge Medications:  See after visit summary for reconciled discharge medications provided to patient and family  Post op instructions reviewed with patient and/or family  Rx given for discharge  Pt  discharge in improved and good condition      Signature:   Patricia Sethi PA-C  Date: 2/28/2018 Time: 8:30 AM

## 2018-02-28 NOTE — OP NOTE
CHOLECYSTECTOMY LAPAROSCOPIC  Postoperative Note  PATIENT NAME: Sarah Contreras  : 1945  MRN: [id number]  [facility] OR ROOM 02    Surgery Date: 2018    Pre-op Dx:  Calculus of gallbladder without cholecystitis without obstruction [K80 20]    Post-Op Diagnosis Codes:     * Calculus of gallbladder without cholecystitis without obstruction [K80 20]    Procedure(s):  CHOLECYSTECTOMY LAPAROSCOPIC    Surgeon(s) and Role:     * Polo Astudillo MD - Primary    Assistant  Jose Antonio Blanc PA-C    The Physician Assistant was medically necessary for surgical safety the case including suturing, retraction, and hemostasis  Specimens:  ID Type Source Tests Collected by Time Destination   1 :  Tissue Gallbladder TISSUE EXAM Amber Castro RN 2018 [id number]        Estimated Blood Loss:   Minimal    Anesthesia Type:   General     Procedure: The patient was seen again in the Holding Room  The risks, benefits, complications, treatment options, and expected outcomes were discussed with the patient  The possibilities of reaction to medication, pulmonary aspiration, perforation of viscus, bleeding, recurrent infection, finding a normal gallbladder, the need for additional procedures, failure to diagnose a condition, the possible need to convert to an open procedure, and creating a complication requiring transfusion or operation were discussed with the patient  The patient and/or family concurred with the proposed plan, giving informed consent  The site of surgery properly noted/marked  The patient was taken to Operating Room, identified as Sarah Contreras  and the procedure verified as Laparoscopic Cholecystectomy with possible Intraoperative Cholangiogram  A Time Out was held after prepping and draping in sterile fashion  The above information was confirmed  Prior to the induction of general anesthesia, antibiotic prophylaxis was administered  General endotracheal anesthesia was then administered and tolerated well  After the induction, the abdomen was prepped in the usual sterile fashion  The patient was positioned in the supine position, along with some reverse Trendelenburg  Local anesthetic agent was injected into the skin near the umbilicus and an incision made  The midline fascia was incised and the open technique was used to introduce a  port under direct vision  Pneumoperitoneum was then created with CO2 and tolerated well without any adverse changes in the patient's vital signs  Additional trocars were introduced under direct vision  All skin incisions were infiltrated with a local anesthetic agent before making the incision and placing the trocars  The patient was placed in the head up, left side down position  The gallbladder was identified, the fundus grasped and retracted cephalad and laterally  Adhesions were lysed bluntly and with the electrocautery or harmonic scapel  where indicated, taking care not to injure any adjacent organs or viscus  The infundibulum was grasped and retracted laterally, exposing the peritoneum overlying the triangle of Calot  This was then dissected anteriorily and posteriorly from the gallbladder,  and exposed in a blunt fashion or using cautery or harmonic scapel where appropriate  The cystic duct was clearly identified and  dissected circumferentially, as was the cystic artery, as the only two tubular structures leading into the gallbladder   The critical view of the [address] was identified and opened  The posterior aspect of the gallbladder was lifted off the cystic plate, to insure that there were no posterior structres behind the gallbladder or leading into the liver  Once this was all clearly identified, the cystic duct was then doubly ligated with surgical clips and/or Endoloop suture on the patient side and singly clipped on the gallbladder side and divided  The cystic artery was re-identified,  ligated with clips and divided as well   If necessary, the cystic duct was "miked" back of any stones felt in the cystic duct, prior to clipping the patient side of the duct  The gallbladder was dissected from the liver bed in retrograde fashion with the electrocautery and/or harmonic scalpel where appropriate  The gallbladder was removed, via an endopouch, through the umbilical port  The liver bed was irrigated and inspected  Hemostasis was achieved with the electrocautery  Copious irrigation was utilized and was repeatedly aspirated until clear  Pneumoperitoneum was completely reduced after viewing removal of the trocars under direct vision  The wound was thoroughly irrigated and any fascia defect was then closed with a figure of eight suture 0-vicryl; the skin was then closed with 4-0 monocryl and a sterile dressings were applied  Instrument, sponge, and needle counts were correct at closure and at the conclusion of the case  This text is generated with voice recognition software  There may be translation, syntax,  or grammatical errors  If you have any questions, please contact the dictating provider       Complications: None    Condition: Stable to PACU    SIGNATURE: Randa Zapata MD   DATE: February 28, 2018   TIME: 8:15 AM

## 2018-02-28 NOTE — INTERIM OP NOTE
CHOLECYSTECTOMY LAPAROSCOPIC  Postoperative Note  PATIENT NAME: Salas Cortes  : 1945  MRN: [id number]  [facility] OR ROOM 02    Surgery Date: 2018    Preop Diagnosis:  Calculus of gallbladder without cholecystitis without obstruction [K80 20]    Post-Op Diagnosis Codes:     * Calculus of gallbladder without cholecystitis without obstruction [K80 20]    Procedure(s) (LRB):  CHOLECYSTECTOMY LAPAROSCOPIC (N/A)    Surgeon(s) and Role:     * Thai Gaytan MD - Primary    Specimens:  ID Type Source Tests Collected by Time Destination   1 :  Tissue Gallbladder TISSUE EXAM Adriel Chacon RN 2018 [id number]        Estimated Blood Loss:   Minimal    Anesthesia Type:   General ETA    Findings:    as above  Duct of [place]    Complications:   None    SIGNATURE: Patricia Sethi PA-C   DATE: 2018   TIME: 8:20 AM

## 2018-02-28 NOTE — ANESTHESIA PREPROCEDURE EVALUATION
Review of Systems/Medical History      History of anesthetic complications PONV    Cardiovascular  Hypertension controlled, CAD, ,    Pulmonary  Smoker ex-smoker  ,        GI/Hepatic    GERD well controlled,        Kidney stones,        Endo/Other  Negative endo/other ROS      GYN  Negative gynecology ROS          Hematology  Negative hematology ROS      Musculoskeletal    Arthritis     Neurology    Neuromuscular disease ,    Psychology   Negative psychology ROS              Physical Exam    Airway    Mallampati score: II  TM Distance: >3 FB  Neck ROM: limited     Dental       Cardiovascular  Cardiovascular exam normal    Pulmonary  Pulmonary exam normal     Other Findings        Anesthesia Plan  ASA Score- 3     Anesthesia Type- general with ASA Monitors  Additional Monitors:   Airway Plan: ETT  Plan Factors-    Induction- intravenous  Postoperative Plan- Plan for postoperative opioid use  Informed Consent- Anesthetic plan and risks discussed with patient

## 2018-02-28 NOTE — ANESTHESIA POSTPROCEDURE EVALUATION
Post-Op Assessment Note      CV Status:  Stable    Mental Status:  Alert and awake    Hydration Status:  Euvolemic    PONV Controlled:  Controlled    Airway Patency:  Patent    Post Op Vitals Reviewed: Yes          Staff: Anesthesiologist, CRNA           BP     Temp (P) 98 5 °F (36 9 °C) (02/28/18 0834)    Pulse (P) 93 (02/28/18 0834)   Resp (P) 18 (02/28/18 0834)    SpO2 (P) 97 % (02/28/18 0834)

## 2018-02-28 NOTE — H&P (VIEW-ONLY)
Assessment/Plan: Carolina Arrieta is a 75year old male who presents today, per referral by Sakshi Eugene, for consultation regarding his gallbladder  Physical exam revealed the abdomen is non-tender at this time  Explained the anatomy of the gallbladder, as well as the etiology and usual symptoms of cholecystitis  His symptoms indicate that he would benefit from laparoscopic cholecystectomy  Explained the surgery, as well as pre- and post-operative protocol, diet, and restrictions  No heavy lifting greater than 15 pounds for weeks 1-2 with no strenuous activity, and no heavy lifting greater than 25 pounds for weeks 3-4 with light cardio permissible  He will schedule surgery for his earliest convenience  He knows to call if any questions or concerns arise  Rectus diastasis- Explained the etiology  He understands this is not a hernia  Skin- Encouraged him to have his seborrhea of face, multiple pigmented nevi, scattered hemangiomas, seborrhea of the back and abdomen monitored by a dermatologist or his PCP  Obesity- Diet and exercise discussed in the context of weight loss  No problem-specific Assessment & Plan notes found for this encounter  Diagnoses and all orders for this visit:    Calculus of gallbladder without cholecystitis without obstruction  -     Ambulatory referral to General Surgery          Subjective:      Patient ID: Alissa Danielle is a 75 y o  male  Carolina Arrieta is a 75year old male who presents today, per referral by Sakshi Eugene, for consultation regarding his gallbladder  About 4-5 months ago he experienced pain underneath the ribs "in a band"  He thought he had pulled something  He associated it with sitting for a long time playing cards with friends and eating greasy potato chips  Then the pain wrapped around his back, as well as in the lower abdomen  He felt a lot of pain in the RUQ that radiates to the back   When he was in [place] he ate fried calamari, cheese pizza, and iqra  He then experienced "crippling" abdominal pain for which he was went to the Emergency Room  Kidney stones- He has had kidney stones blasted in the last year  He currently feels a pain in the same area he is used to feeling pain from stones  He is not sure if it is stones or if he pulled a muscle  He is obese with a BMI of 31 5  The following portions of the patient's history were reviewed and updated as appropriate: allergies, current medications, past family history, past medical history, past social history, past surgical history and problem list     Review of Systems   Constitutional: Negative  HENT: Negative  Eyes: Negative  Respiratory: Negative  Cardiovascular: Negative  Gastrointestinal: Positive for abdominal pain and constipation  Endocrine: Negative  Genitourinary: Negative  Musculoskeletal: Negative  Skin: Negative  Allergic/Immunologic: Negative  Neurological: Negative  Hematological: Negative  Psychiatric/Behavioral: Negative  All other systems reviewed and are negative  Objective:      /82 (BP Location: Left arm, Patient Position: Sitting, Cuff Size: Standard)   Pulse 80   Resp 16   Ht 6' 1 5" (1 867 m)   Wt 110 kg (242 lb)   BMI 31 50 kg/m²          Physical Exam   Constitutional: He is oriented to person, place, and time  He appears well-developed and well-nourished  No distress  HENT:   Head: Normocephalic and atraumatic  Right Ear: External ear normal    Left Ear: External ear normal    Nose: Nose normal    Mouth/Throat: Oropharynx is clear and moist  No oropharyngeal exudate  Eyes: Conjunctivae and EOM are normal  Right eye exhibits no discharge  Left eye exhibits no discharge  No scleral icterus  Neck: Normal range of motion  Neck supple  No JVD present  No tracheal deviation present  No thyromegaly present  Cardiovascular: Normal rate, regular rhythm, normal heart sounds and intact distal pulses    Exam reveals no gallop and no friction rub  No murmur heard  Pulmonary/Chest: Effort normal and breath sounds normal  No stridor  No respiratory distress  He has no wheezes  He has no rales  He exhibits no tenderness  Abdominal: Soft  Bowel sounds are normal  He exhibits no distension and no mass  There is no tenderness  There is no rebound and no guarding  Rectus diastasis  Musculoskeletal: Normal range of motion  He exhibits no edema, tenderness or deformity  Lymphadenopathy:     He has no cervical adenopathy  Neurological: He is alert and oriented to person, place, and time  No cranial nerve deficit  Coordination normal    Skin: Skin is dry  No rash noted  He is not diaphoretic  No erythema  No pallor  Seborrhea of face, multiple pigmented nevi, scattered hemangiomas, seborrhea of the back and abdomen  Psychiatric: He has a normal mood and affect  His behavior is normal  Thought content normal    Nursing note and vitals reviewed  By signing my name below, Quincy Pruitt, attest that this documentation has been prepared under the direction and in the presence of Dr Ze Shane MD  Electronically signed: Gustavo Santacruz Medical Scribe  2/23/18  10:57  Eneida Norton, personally performed the services described in this documentation  All medical record entries made by the scribe were at my direction and in my presence  I have reviewed the chart and agree that the record reflects my personal performance and is accurate and complete  Dr Ze Shane MD  2/23/18  10:57

## 2018-02-28 NOTE — ANESTHESIA POSTPROCEDURE EVALUATION
Post-Op Assessment Note      CV Status:  Stable    Mental Status:  Alert and awake    Hydration Status:  Euvolemic    PONV Controlled:  Controlled    Airway Patency:  Patent    Post Op Vitals Reviewed: Yes          Staff: CRNA, Anesthesiologist       Comments: Awake on 3L Nc to PACU          BP     Temp (P) 98 5 °F (36 9 °C) (02/28/18 0834)    Pulse (P) 93 (02/28/18 0834)   Resp (P) 18 (02/28/18 0834)    SpO2 (P) 97 % (02/28/18 0834)

## 2018-03-15 ENCOUNTER — OFFICE VISIT (OUTPATIENT)
Dept: SURGERY | Facility: HOSPITAL | Age: 73
End: 2018-03-15

## 2018-03-15 VITALS — WEIGHT: 239.6 LBS | HEIGHT: 74 IN | BODY MASS INDEX: 30.75 KG/M2 | TEMPERATURE: 99.7 F

## 2018-03-15 DIAGNOSIS — Z09 POSTOP CHECK: Primary | ICD-10-CM

## 2018-03-15 PROCEDURE — 99024 POSTOP FOLLOW-UP VISIT: CPT | Performed by: SURGERY

## 2018-03-15 NOTE — PROGRESS NOTES
Assessment/Plan: Nimisha Rocha is a 75year old male who presents today in post-operative state status post laparoscoic cholecystectomy done on 2/28/18  Physical exam revealed his incisions are healing well  Lifting restrictions will remain in place until a full month after surgery  He may follow up as needed  He knows to call if any questions or concerns arise  Obesity- Diet and exercise were discussed in the context of weight loss  No problem-specific Assessment & Plan notes found for this encounter  There are no diagnoses linked to this encounter  Subjective:      Patient ID: Claudetta Duel is a 75 y o  male  Nimisha Rocha is a 75year old male who presents today in post-operative state status post laparoscoic cholecystectomy done on 2/28/18  He reports he is doing very well  His appetite is good  Normal bathroom and bowel habits  He is obese with a BMI of 31 18  The following portions of the patient's history were reviewed and updated as appropriate: allergies, current medications, past family history, past medical history, past social history, past surgical history and problem list     Review of Systems   Constitutional: Negative  HENT: Negative  Eyes: Negative  Respiratory: Negative  Cardiovascular: Negative  Gastrointestinal: Negative  Endocrine: Negative  Genitourinary: Negative  Musculoskeletal: Negative  Skin: Negative  Allergic/Immunologic: Negative  Neurological: Negative  Hematological: Negative  Psychiatric/Behavioral: Negative  All other systems reviewed and are negative  Objective:      Temp 99 7 °F (37 6 °C) (Tympanic)   Ht 6' 1 5" (1 867 m)   Wt 109 kg (239 lb 9 6 oz)   BMI 31 18 kg/m²          Physical Exam   Constitutional: He is oriented to person, place, and time  He appears well-developed and well-nourished  No distress  Obese  HENT:   Head: Normocephalic and atraumatic     Right Ear: External ear normal    Left Ear: External ear normal    Nose: Nose normal    Mouth/Throat: Oropharynx is clear and moist  No oropharyngeal exudate  Eyes: Conjunctivae and EOM are normal  Right eye exhibits no discharge  Left eye exhibits no discharge  No scleral icterus  Neck: Normal range of motion  Neck supple  No JVD present  No tracheal deviation present  No thyromegaly present  Cardiovascular: Normal rate, regular rhythm, normal heart sounds and intact distal pulses  Exam reveals no gallop and no friction rub  No murmur heard  Pulmonary/Chest: Effort normal and breath sounds normal  No stridor  No respiratory distress  He has no wheezes  He has no rales  He exhibits no tenderness  Abdominal: Soft  Bowel sounds are normal  He exhibits no distension and no mass  There is no tenderness  There is no rebound and no guarding  Incisions are healing well  Musculoskeletal: Normal range of motion  He exhibits no edema, tenderness or deformity  Lymphadenopathy:     He has no cervical adenopathy  Neurological: He is alert and oriented to person, place, and time  No cranial nerve deficit  Coordination normal    Skin: Skin is dry  No rash noted  He is not diaphoretic  No erythema  No pallor  Psychiatric: He has a normal mood and affect  His behavior is normal  Thought content normal    Nursing note and vitals reviewed  By signing my name below, Quincy Pruitt, attest that this documentation has been prepared under the direction and in the presence of Dr Ze Shane MD  Electronically signed: Gustavo Santacruz Medical Scribe  2/15/18  9:22  Eneida Norton, personally performed the services described in this documentation  All medical record entries made by the scribe were at my direction and in my presence  I have reviewed the chart and agree that the record reflects my personal performance and is accurate and complete  Dr Ze Shane MD  3/15/18  9:22

## 2018-03-18 NOTE — PROGRESS NOTES
I have reviewed the notes, assessments, and/or procedures , I CONCUR with her/his documentation of Marcelino Woodall

## 2018-05-13 DIAGNOSIS — I25.10 CORONARY ARTERY DISEASE INVOLVING NATIVE CORONARY ARTERY OF NATIVE HEART WITHOUT ANGINA PECTORIS: ICD-10-CM

## 2018-05-13 RX ORDER — ATORVASTATIN CALCIUM 20 MG/1
TABLET, FILM COATED ORAL
Qty: 90 TABLET | Refills: 0 | Status: SHIPPED | OUTPATIENT
Start: 2018-05-13 | End: 2018-05-21 | Stop reason: SDUPTHER

## 2018-05-16 DIAGNOSIS — I25.10 CORONARY ARTERY DISEASE INVOLVING NATIVE CORONARY ARTERY OF NATIVE HEART WITHOUT ANGINA PECTORIS: ICD-10-CM

## 2018-05-16 RX ORDER — ATORVASTATIN CALCIUM 20 MG/1
TABLET, FILM COATED ORAL
Qty: 90 TABLET | Refills: 0 | Status: SHIPPED | OUTPATIENT
Start: 2018-05-16 | End: 2018-05-21 | Stop reason: SDUPTHER

## 2018-05-18 DIAGNOSIS — I25.10 CORONARY ARTERY DISEASE INVOLVING NATIVE CORONARY ARTERY OF NATIVE HEART WITHOUT ANGINA PECTORIS: ICD-10-CM

## 2018-05-18 RX ORDER — ATORVASTATIN CALCIUM 20 MG/1
20 TABLET, FILM COATED ORAL DAILY
Qty: 90 TABLET | Refills: 0 | Status: CANCELLED | OUTPATIENT
Start: 2018-05-18

## 2018-05-21 ENCOUNTER — TRANSITIONAL CARE MANAGEMENT (OUTPATIENT)
Dept: FAMILY MEDICINE CLINIC | Facility: HOSPITAL | Age: 73
End: 2018-05-21

## 2018-05-21 DIAGNOSIS — I25.10 CORONARY ARTERY DISEASE INVOLVING NATIVE CORONARY ARTERY OF NATIVE HEART WITHOUT ANGINA PECTORIS: ICD-10-CM

## 2018-05-21 RX ORDER — ATORVASTATIN CALCIUM 20 MG/1
20 TABLET, FILM COATED ORAL DAILY
Qty: 90 TABLET | Refills: 2 | Status: SHIPPED | OUTPATIENT
Start: 2018-05-21 | End: 2018-06-20 | Stop reason: SDUPTHER

## 2018-05-21 RX ORDER — ATORVASTATIN CALCIUM 20 MG/1
20 TABLET, FILM COATED ORAL DAILY
Qty: 90 TABLET | Refills: 1 | Status: SHIPPED | OUTPATIENT
Start: 2018-05-21 | End: 2019-05-10 | Stop reason: SDUPTHER

## 2018-06-12 DIAGNOSIS — M1A.9XX0 CHRONIC GOUT WITHOUT TOPHUS, UNSPECIFIED CAUSE, UNSPECIFIED SITE: Primary | ICD-10-CM

## 2018-06-12 RX ORDER — ALLOPURINOL 300 MG/1
TABLET ORAL
Qty: 90 TABLET | Refills: 3 | Status: SHIPPED | OUTPATIENT
Start: 2018-06-12 | End: 2019-05-19 | Stop reason: SDUPTHER

## 2018-06-15 DIAGNOSIS — I10 ESSENTIAL HYPERTENSION: Primary | ICD-10-CM

## 2018-06-15 RX ORDER — METOPROLOL SUCCINATE 50 MG/1
TABLET, EXTENDED RELEASE ORAL
Qty: 90 TABLET | Refills: 3 | Status: SHIPPED | OUTPATIENT
Start: 2018-06-15 | End: 2019-05-28 | Stop reason: SDUPTHER

## 2018-06-19 ENCOUNTER — HOSPITAL ENCOUNTER (OUTPATIENT)
Dept: RADIOLOGY | Facility: HOSPITAL | Age: 73
Discharge: HOME | End: 2018-06-19
Payer: MEDICARE

## 2018-06-19 DIAGNOSIS — N20.0 CALCULUS OF KIDNEY: ICD-10-CM

## 2018-06-19 PROCEDURE — 74018 RADEX ABDOMEN 1 VIEW: CPT

## 2018-06-20 ENCOUNTER — OFFICE VISIT (OUTPATIENT)
Dept: CARDIOLOGY CLINIC | Facility: CLINIC | Age: 73
End: 2018-06-20
Payer: MEDICARE

## 2018-06-20 VITALS
DIASTOLIC BLOOD PRESSURE: 70 MMHG | WEIGHT: 241 LBS | BODY MASS INDEX: 31.94 KG/M2 | HEIGHT: 73 IN | HEART RATE: 88 BPM | SYSTOLIC BLOOD PRESSURE: 110 MMHG

## 2018-06-20 DIAGNOSIS — E78.00 HYPERCHOLESTEROLEMIA: ICD-10-CM

## 2018-06-20 DIAGNOSIS — I10 ESSENTIAL HYPERTENSION: Primary | ICD-10-CM

## 2018-06-20 DIAGNOSIS — I25.10 CORONARY ARTERY DISEASE INVOLVING NATIVE CORONARY ARTERY OF NATIVE HEART WITHOUT ANGINA PECTORIS: ICD-10-CM

## 2018-06-20 PROCEDURE — 99214 OFFICE O/P EST MOD 30 MIN: CPT | Performed by: INTERNAL MEDICINE

## 2018-06-20 PROCEDURE — 93000 ELECTROCARDIOGRAM COMPLETE: CPT | Performed by: INTERNAL MEDICINE

## 2018-06-20 NOTE — PROGRESS NOTES
Cardiology Follow Up    Baylee Gonzalez  1945  [id number]  HEART & VASCULAR  [facility] Anita Blade  [address]  [address] [address] [address]    1  Essential hypertension  POCT ECG   2  Coronary artery disease involving native coronary artery of native heart without angina pectoris  POCT ECG   3  Hypercholesterolemia         Interval History:    Cardiology follow-up  Patient continues to do well  Denies any chest pain or dyspnea, he is walking regularly and vigorously  No exertional symptoms  Compliant with low-cholesterol diet  His last LDL was 66 on medium intensity statin therapy  Compliant with low-sodium diet  Blood pressures been well control as well  Patient Active Problem List   Diagnosis    Essential hypertension    Parkinson's disease ([address] )    Coronary artery disease involving native coronary artery of native heart without angina pectoris    Benign prostatic hyperplasia without lower urinary tract symptoms    Degeneration, intervertebral disc, lumbosacral    Gastroesophageal reflux disease without esophagitis    Gout    Idiopathic peripheral neuropathy    Nephrolithiasis    Osteoarthritis of knee    Hypercholesterolemia     Past Medical History:   Diagnosis Date    Arthritis     Benign familial tremor     Cardiac disease     two cardiac stents    Chest pain     Coronary artery disease     Hyperlipidemia     Hypertension     Kidney disease     Kidney stone     Malignant melanoma of skin of chest ([address] )     Removed 2 years ago   Meniscal injury     Nephrolithiasis     Parkinson's disease ([address] )     PONV (postoperative nausea and vomiting)     Tinnitus     [id number] RESOLVED     Social History     Social History    Marital status: /Civil Union     Spouse name: N/A    Number of children: N/A    Years of education: N/A     Occupational History    Not on file       Social History Main Topics    Smoking status: Former Smoker     Types: Cigarettes    Smokeless tobacco: Never Used      Comment: back in college in 1966    Alcohol use 2 4 oz/week     4 Cans of beer per week    Drug use: No    Sexual activity: Not on file     Other Topics Concern    Not on file     Social History Narrative    Lives with wife  Sees dentist reg  Has living will  Feels safe at home  No mental or sub in self  ACTIVE ADVANCE DIRECTIVE    CAREGIVER:  SPOUSE    EXERCISE HABITS  -  DAILY    GOOD DENTAL HYGIENE      Family History   Problem Relation Age of Onset    Heart disease Mother     Hypertension Mother     Stroke Mother     Breast cancer Mother     Heart disease Father     Hypertension Father     Stroke Father     Colon cancer Father     Diabetes Sister     Heart disease Brother     Other Family         BACK PAIN    Breast cancer Family     Heart disease Family     Hypertension Family     Stroke Family     Mental illness Neg Hx     Substance Abuse Neg Hx      Past Surgical History:   Procedure Laterality Date    AMPUTATION Left     AMPUTATION OF FINGER WITH NEURECTOMY(EACH) DISTAL LONG  RING AND SMALL FINGER    CARPAL TUNNEL RELEASE Right     CATARACT EXTRACTION      CORONARY STENT PLACEMENT      two cardiac stents    FINGER AMPUTATION      AMPUTATION OF MIDDLE FINGER WITH NEURECTOMY(EACH)    HAND SURGERY Left     Traumatic amputation of fingers left hand      JOINT REPLACEMENT Bilateral     knees    KNEE ARTHROPLASTY      TOTAL    MALIGNANT SKIN LESION EXCISION      ANTERIOR CHEST FOR MELAMONIA    DE CYSTO/URETERO W/LITHOTRIPSY &INDWELL STENT INSRT Left 7/27/2017    Procedure: CYSTOSCOPY URETEROSCOPY , RETROGRADE PYELOGRAM AND INSERTION STENT URETERAL;  Surgeon: Lindell Pallas, MD;  [facility];  Service: Urology    DE LAP,CHOLECYSTECTOMY N/A 2/28/2018    Procedure: Anthony Henriquez;  Surgeon: Eric Alvarez MD;  [facility];  Service: General    DE REMOVE BLADDER STONE,<2 5 CM N/A 9/15/2017    Procedure: Jenita Shallow;  Surgeon: Manoj Durham MD;  [facility];  Service: Urology    NC TRANSURETHRAL ELEC-SURG PROSTATECTOM N/A 9/15/2017    Procedure: BIPOLAR TRANSURETHRAL RESECTION OF PROSTATE (TURP); Surgeon: Manoj Durham MD;  [facility];  Service: Urology    TONSILLECTOMY      TRIGGER FINGER RELEASE Right     TUMOR REMOVAL Left     Left foot 20 years ago   WISDOM TOOTH EXTRACTION Bilateral        Current Outpatient Prescriptions:     allopurinol (ZYLOPRIM) 300 mg tablet, TAKE ONE TABLET BY MOUTH ONCE DAILY, Disp: 90 tablet, Rfl: 3    atorvastatin (LIPITOR) 20 mg tablet, Take 1 tablet (20 mg total) by mouth daily, Disp: 90 tablet, Rfl: 1    benazepril-hydrochlorthiazide (LOTENSIN HCT) 20-25 MG per tablet, Take 1 tablet by mouth daily, Disp: , Rfl:     Calcium Carbonate-Vitamin D (CALTRATE 600+D) 600-400 MG-UNIT per tablet, Caltrate 600+D 600-400 MG-UNIT TABS Take 1 tablet daily  Refills: 0   , M D ; Active, Disp: , Rfl:     carbidopa-levodopa (SINEMET)  mg per tablet, Carbidopa-Levodopa  MG Oral Tablet  take 2 tablets two times a day  Quantity: 120;  Refills: 5    Ramsey Doyne M D ;  Started 7-Nov-2013 Active, Disp: , Rfl:     Cholecalciferol (VITAMIN D-3) 1000 UNITS CAPS, Vitamin D3 1000 UNIT Oral Capsule  once a day Refills: 0   , M D ; Active, Disp: , Rfl:     glucosamine-chondroitin 500-400 MG tablet, Take 1 tablet by mouth daily, Disp: , Rfl:     metoprolol succinate (TOPROL-XL) 50 mg 24 hr tablet, TAKE ONE TABLET BY MOUTH ONCE DAILY, Disp: 90 tablet, Rfl: 3    Multiple Vitamin (MULTIVITAMINS PO), Multivitamins Oral once a day Capsule  Refills: 0   , M D ; Active, Disp: , Rfl:     zolpidem (AMBIEN) 10 mg tablet, Zolpidem Tartrate 10 MG Oral Tablet TAKE 1 TABLET AT BEDTIME AS NEEDED    Quantity: 30;  Refills: 1    Ramsey Doyne M D ;  Started 7-Oct-2015 Active, Disp: , Rfl:   No Known Allergies    Labs:  Admission on 02/28/2018, Discharged on 02/28/2018   Component Date Value    Case Report 02/28/2018                      Value:Surgical Pathology Report                         Case: [id number]                                   Authorizing Provider:  Radames Gibbons MD       Collected:           02/28/2018 0757              Ordering Location:     [facility]        Received:            02/28/2018 [address] Operating Room                                                    Pathologist:           Carlos Zimmer MD                                                        Specimen:    Gallbladder                                                                                Final Diagnosis 02/28/2018                      Value: This result contains rich text formatting which cannot be displayed here   Additional Information 02/28/2018                      Value: This result contains rich text formatting which cannot be displayed here  Xiomara Sicks Gross Description 02/28/2018                      Value: This result contains rich text formatting which cannot be displayed here  Imaging: No results found  Review of Systems:  Review of Systems   Constitutional: Negative for activity change, fatigue and unexpected weight change  HENT: Negative for nosebleeds  Eyes: Negative for visual disturbance  Respiratory: Negative for shortness of breath, wheezing and stridor  Cardiovascular: Negative for chest pain, palpitations and leg swelling  Gastrointestinal: Negative for abdominal pain and blood in stool  Endocrine: Negative for cold intolerance  Genitourinary: Negative for hematuria  Musculoskeletal: Negative for arthralgias, gait problem and myalgias  Skin: Negative for pallor and rash  Allergic/Immunologic: Negative for immunocompromised state  Neurological: Positive for tremors  Negative for dizziness, syncope and weakness  Hematological: Does not bruise/bleed easily  Psychiatric/Behavioral: Negative for confusion and sleep disturbance  Physical Exam:  Physical Exam   Constitutional: He is oriented to person, place, and time  He appears well-developed  No distress  Eyes: No scleral icterus  Neck: No JVD present  Cardiovascular: Normal rate and intact distal pulses  Exam reveals no gallop and no friction rub  No murmur heard  Pulmonary/Chest: Effort normal and breath sounds normal  No respiratory distress  He has no wheezes  He has no rales  He exhibits no tenderness  Neurological: He is alert and oriented to person, place, and time  Skin: Skin is warm  He is not diaphoretic  Psychiatric: He has a normal mood and affect  Discussion/Summary:  Coronary artery disease cardiac catheterization 2009 revealed chronically occluded LAD, underwent PTCA/drug stent  Echocardiogram 2016 revealed normal left systolic function with mild tricuspid insufficiency and estimated normal pulmonary artery pressures suggested by Doppler criteria  Top-normal ascending aorta at 39 mm  Stress test that year also revealed no ischemia he did 5 min on a Devon protocol  Continue clinical regimen

## 2018-07-02 NOTE — PROGRESS NOTES
7/3/2018      Chief Complaint   Patient presents with    Nephrolithiasis    Benign Prostatic Hypertrophy       Assessment and Plan    75 y o  male managed by Dr Cody Modi    1  BPH s/p TURP 9/2017  - PVR 0mL  - no LUTs    2  Nephrolithiasis   - No current stone burden   - Stressed proper hydration with 60 oz of water daily  - Due for KUB and U/S in 1 year    3  History of bladder stones s/p cystolitholapaxy (9/2017)       The patient will follow up in 1 year with a KUB and U/S prior to his visit and a PVR at his visit      History of Present Illness  Julia Couch is a 75 y o  male here for follow up evaluation of BPH and bladder stones s/p TURP and cystolitholapaxy (9/15/2017) as well as nephrolithiasis  Had a KUB obtained prior to his visit which revealed no stone burden  Is doing well from a lower urinary tract symptom and denies any urinary complaints at his visit today  Has been doing very well since the time of his surgery  Review of Systems   Constitutional: Negative for activity change, chills and fever  Gastrointestinal: Negative for abdominal distention and abdominal pain  Musculoskeletal: Negative for back pain and gait problem  Psychiatric/Behavioral: Negative for behavioral problems and confusion  Urinary Incontinence Screening      Most Recent Value   Urinary Incontinence   Urinary Incontinence? No   Incomplete emptying? No   Urinary frequency? No   Urinary urgency? No   Urinary hesitancy? No   Dysuria (painful difficult urination)? No   Nocturia (waking up to use the bathroom)? Yes [1 times]   Straining (having to push to go)? No   Weak stream?  No   Intermittent stream?  No   Post void dribbling? No        AUA SYMPTOM SCORE      Most Recent Value   AUA SYMPTOM SCORE   How often have you had a sensation of not emptying your bladder completely after you finished urinating? 0   How often have you had to urinate again less than two hours after you finished urinating?   1 How often have you found you stopped and started again several times when you urinate?  0   How often have you found it difficult to postpone urination? 0   How often have you had a weak urinary stream?  1   How often have you had to push or strain to begin urination? 0   How many times did you most typically get up to urinate from the time you went to bed at night until the time you got up in the morning? 1   Quality of Life: If you were to spend the rest of your life with your urinary condition just the way it is now, how would you feel about that?  0   AUA SYMPTOM SCORE  3          Past Medical History  Past Medical History:   Diagnosis Date    Arthritis     Benign familial tremor     Cardiac disease     two cardiac stents    Chest pain     Coronary artery disease     Hyperlipidemia     Hypertension     Kidney disease     Kidney stone     Malignant melanoma of skin of chest ([address] )     Removed 2 years ago   Meniscal injury     Nephrolithiasis     Parkinson's disease ([address] )     PONV (postoperative nausea and vomiting)     Tinnitus     [id number] RESOLVED       Past Social History  Past Surgical History:   Procedure Laterality Date    AMPUTATION Left     AMPUTATION OF FINGER WITH NEURECTOMY(EACH) DISTAL LONG  RING AND SMALL FINGER    CARPAL TUNNEL RELEASE Right     CATARACT EXTRACTION      CORONARY STENT PLACEMENT      two cardiac stents    FINGER AMPUTATION      AMPUTATION OF MIDDLE FINGER WITH NEURECTOMY(EACH)    HAND SURGERY Left     Traumatic amputation of fingers left hand      JOINT REPLACEMENT Bilateral     knees    KNEE ARTHROPLASTY      TOTAL    MALIGNANT SKIN LESION EXCISION      ANTERIOR CHEST FOR MELAMONIA    WI CYSTO/URETERO W/LITHOTRIPSY &INDWELL STENT INSRT Left 7/27/2017    Procedure: CYSTOSCOPY URETEROSCOPY , RETROGRADE PYELOGRAM AND INSERTION STENT URETERAL;  Surgeon: Thao Montano MD;  [facility];  Service: Urology    WI LAP,CHOLECYSTECTOMY N/A 2/28/2018 Procedure: CHOLECYSTECTOMY LAPAROSCOPIC;  Surgeon: Eric Alvarez MD;  [facility];  Service: General    MA REMOVE BLADDER STONE,<2 5 CM N/A 9/15/2017    Procedure: Ethel Fieldsnegro;  Surgeon: Cory Cortes MD;  [facility];  Service: Urology    MA TRANSURETHRAL ELEC-SURG PROSTATECTOM N/A 9/15/2017    Procedure: BIPOLAR TRANSURETHRAL RESECTION OF PROSTATE (TURP); Surgeon: Cory Cortes MD;  [facility];  Service: Urology    TONSILLECTOMY      TRIGGER FINGER RELEASE Right     TUMOR REMOVAL Left     Left foot 20 years ago   WISDOM TOOTH EXTRACTION Bilateral      History   Smoking Status    Former Smoker    Types: Cigarettes   Smokeless Tobacco    Never Used     Comment: back in college in 1966       Past Family History  Family History   Problem Relation Age of Onset    Heart disease Mother     Hypertension Mother     Stroke Mother     Breast cancer Mother     Heart disease Father     Hypertension Father     Stroke Father     Colon cancer Father     Diabetes Sister     Heart disease Brother     Other Family         BACK PAIN    Breast cancer Family     Heart disease Family     Hypertension Family     Stroke Family     Mental illness Neg Hx     Substance Abuse Neg Hx        Past Social history  Social History     Social History    Marital status: /Civil Union     Spouse name: N/A    Number of children: N/A    Years of education: N/A     Occupational History    Not on file  Social History Main Topics    Smoking status: Former Smoker     Types: Cigarettes    Smokeless tobacco: Never Used      Comment: back in college in 1966    Alcohol use 2 4 oz/week     4 Cans of beer per week    Drug use: No    Sexual activity: Not on file     Other Topics Concern    Not on file     Social History Narrative    Lives with wife  Sees dentist reg  Has living will  Feels safe at home  No mental or sub in self       ACTIVE ADVANCE DIRECTIVE    CAREGIVER:  SPOUSE    EXERCISE HABITS  -  DAILY    GOOD DENTAL HYGIENE       Current Medications  Current Outpatient Prescriptions   Medication Sig Dispense Refill    allopurinol (ZYLOPRIM) 300 mg tablet TAKE ONE TABLET BY MOUTH ONCE DAILY 90 tablet 3    atorvastatin (LIPITOR) 20 mg tablet Take 1 tablet (20 mg total) by mouth daily 90 tablet 1    benazepril-hydrochlorthiazide (LOTENSIN HCT) 20-25 MG per tablet Take 1 tablet by mouth daily      Calcium Carbonate-Vitamin D (CALTRATE 600+D) 600-400 MG-UNIT per tablet Caltrate 600+D 600-400 MG-UNIT TABS  Take 1 tablet daily   Refills: 0      , M D ; Active      carbidopa-levodopa (SINEMET)  mg per tablet Carbidopa-Levodopa  MG Oral Tablet   take 2 tablets two times a day   Quantity: 120;  Refills: 5       Tonie Ribeiro M D ;  Started 7-Nov-2013  Active      Cholecalciferol (VITAMIN D-3) 1000 UNITS CAPS Vitamin D3 1000 UNIT Oral Capsule   once a day Refills: 0      , M D ; Active      glucosamine-chondroitin 500-400 MG tablet Take 1 tablet by mouth daily      metoprolol succinate (TOPROL-XL) 50 mg 24 hr tablet TAKE ONE TABLET BY MOUTH ONCE DAILY 90 tablet 3    Multiple Vitamin (MULTIVITAMINS PO) Multivitamins Oral once a day Capsule   Refills: 0      , M D ; Active      zolpidem (AMBIEN) 10 mg tablet Zolpidem Tartrate 10 MG Oral Tablet  TAKE 1 TABLET AT BEDTIME AS NEEDED  Quantity: 30;  Refills: 1       Tonie Ribeiro M D ;  Started 7-Oct-2015  Active       No current facility-administered medications for this visit          Allergies  No Known Allergies      The following portions of the patient's history were reviewed and updated as appropriate: allergies, current medications, past medical history, past social history, past surgical history and problem list       Vitals  Vitals:    07/03/18 0856   BP: 130/80   Pulse: 88   Weight: 110 kg (242 lb)   Height: 6' 1" (1 854 m)           Physical Exam  Constitutional   General appearance: Patient is seated and in no acute distress, well appearing and well nourished  Head and Face   Head and face: Normal     Eyes   Conjunctiva and lids: No erythema, swelling or discharge  Ears, Nose, Mouth, and Throat   Hearing: Normal     Pulmonary   Respiratory effort: No increased work of breathing or signs of respiratory distress  Cardiovascular   Examination of extremities for edema and/or varicosities: Normal     Abdomen   Abdomen: Non-tender, no masses  Musculoskeletal   Gait and station: Shuffling gait  Skin   Skin and subcutaneous tissue: Warm, dry, and intact  No visible lesions or rashes  Psychiatric   Judgment and insight: Normal  Recent and remote memory:  Normal  Mood and affect: Normal        Results  No results found for this or any previous visit (from the past 1 hour(s)) ]  Lab Results   Component Value Date    PSA 1 6 04/05/2017    PSA 1 4 10/06/2015     Lab Results   Component Value Date    GLUCOSE 105 09/16/2017    CALCIUM 8 1 (L) 09/16/2017     09/16/2017    K 3 6 09/16/2017    CO2 29 09/16/2017     09/16/2017    BUN 15 09/16/2017    CREATININE 0 98 09/16/2017     Lab Results   Component Value Date    WBC 8 57 09/16/2017    HGB 13 2 09/16/2017    HCT 39 9 09/16/2017    MCV 96 09/16/2017     (L) 09/16/2017       KUB ABDOMEN  INDICATION:   N20 0: Calculus of right kidneyand bladder  COMPARISON:  CT scan 8/24/2017  VIEWS:  AP supine     FINDINGS:     No radiopaque renal calculi seen      No radiopaque ureteral calculi identified      Nonobstructive bowel gas pattern      Cholecystectomy clips      No acute osseous abnormality is seen      IMPRESSION:  1  No radiopaque urinary tract calculi        Orders  Orders Placed This Encounter   Procedures    POCT Measure PVR

## 2018-07-03 ENCOUNTER — OFFICE VISIT (OUTPATIENT)
Dept: UROLOGY | Facility: HOSPITAL | Age: 73
End: 2018-07-03
Payer: MEDICARE

## 2018-07-03 VITALS
BODY MASS INDEX: 32.07 KG/M2 | HEART RATE: 88 BPM | DIASTOLIC BLOOD PRESSURE: 80 MMHG | WEIGHT: 242 LBS | HEIGHT: 73 IN | SYSTOLIC BLOOD PRESSURE: 130 MMHG

## 2018-07-03 DIAGNOSIS — N20.0 NEPHROLITHIASIS: Primary | ICD-10-CM

## 2018-07-03 DIAGNOSIS — N20.0 CALCULUS OF KIDNEY: ICD-10-CM

## 2018-07-03 DIAGNOSIS — N40.0 BPH WITHOUT OBSTRUCTION/LOWER URINARY TRACT SYMPTOMS: ICD-10-CM

## 2018-07-03 LAB — POST-VOID RESIDUAL VOLUME, ML POC: 0 ML

## 2018-07-03 PROCEDURE — 51798 US URINE CAPACITY MEASURE: CPT | Performed by: PHYSICIAN ASSISTANT

## 2018-07-03 PROCEDURE — 99213 OFFICE O/P EST LOW 20 MIN: CPT | Performed by: PHYSICIAN ASSISTANT

## 2018-07-09 DIAGNOSIS — I10 ESSENTIAL HYPERTENSION: Primary | ICD-10-CM

## 2018-07-10 RX ORDER — BENAZEPRIL/HYDROCHLOROTHIAZIDE 20 MG-25MG
TABLET ORAL
Qty: 90 TABLET | Refills: 3 | Status: SHIPPED | OUTPATIENT
Start: 2018-07-10 | End: 2019-04-03 | Stop reason: SDUPTHER

## 2018-07-26 DIAGNOSIS — G47.09 OTHER INSOMNIA: Primary | ICD-10-CM

## 2018-07-26 RX ORDER — ZOLPIDEM TARTRATE 10 MG/1
TABLET ORAL
Qty: 30 TABLET | Refills: 1 | Status: SHIPPED | OUTPATIENT
Start: 2018-07-26 | End: 2019-04-24 | Stop reason: SDUPTHER

## 2018-09-28 ENCOUNTER — TRANSCRIBE ORDERS (OUTPATIENT)
Dept: ADMINISTRATIVE | Facility: HOSPITAL | Age: 73
End: 2018-09-28

## 2018-09-28 DIAGNOSIS — F06.0 PSYCHOTIC DISORDER WITH HALLUCINATIONS DUE TO KNOWN PHYSIOLOGICAL CONDITION: Primary | ICD-10-CM

## 2018-10-01 ENCOUNTER — LAB (OUTPATIENT)
Dept: LAB | Facility: HOSPITAL | Age: 73
End: 2018-10-01
Payer: MEDICARE

## 2018-10-01 ENCOUNTER — TRANSCRIBE ORDERS (OUTPATIENT)
Dept: ADMINISTRATIVE | Facility: HOSPITAL | Age: 73
End: 2018-10-01

## 2018-10-01 DIAGNOSIS — E87.6 LOW BLOOD POTASSIUM: Primary | ICD-10-CM

## 2018-10-01 DIAGNOSIS — F06.0 ORGANIC HALLUCINOSIS SYNDROME: Primary | ICD-10-CM

## 2018-10-01 DIAGNOSIS — F06.0 ORGANIC HALLUCINOSIS SYNDROME: ICD-10-CM

## 2018-10-01 LAB
ANION GAP SERPL CALCULATED.3IONS-SCNC: 8 MMOL/L (ref 4–13)
BUN SERPL-MCNC: 23 MG/DL (ref 5–25)
CALCIUM SERPL-MCNC: 8.6 MG/DL (ref 8.3–10.1)
CHLORIDE SERPL-SCNC: 106 MMOL/L (ref 100–108)
CO2 SERPL-SCNC: 30 MMOL/L (ref 21–32)
CREAT SERPL-MCNC: 1.08 MG/DL (ref 0.6–1.3)
GFR SERPL CREATININE-BSD FRML MDRD: 67 ML/MIN/1.73SQ M
GLUCOSE P FAST SERPL-MCNC: 95 MG/DL (ref 65–99)
POTASSIUM SERPL-SCNC: 3.3 MMOL/L (ref 3.5–5.3)
SODIUM SERPL-SCNC: 144 MMOL/L (ref 136–145)

## 2018-10-01 PROCEDURE — 36415 COLL VENOUS BLD VENIPUNCTURE: CPT

## 2018-10-01 PROCEDURE — 80048 BASIC METABOLIC PNL TOTAL CA: CPT

## 2018-10-01 NOTE — PROGRESS NOTES
Call patient: Labs Show  Potassium of 3 3 which is slightly low    He would benefit from taking potassium chloride 10 mEq daily for 3 days

## 2018-10-02 RX ORDER — POTASSIUM CHLORIDE 750 MG/1
10 CAPSULE, EXTENDED RELEASE ORAL DAILY
Qty: 3 CAPSULE | Refills: 0 | Status: SHIPPED | OUTPATIENT
Start: 2018-10-02 | End: 2019-03-05 | Stop reason: ALTCHOICE

## 2018-10-10 ENCOUNTER — APPOINTMENT (OUTPATIENT)
Dept: RADIOLOGY | Facility: CLINIC | Age: 73
End: 2018-10-10
Payer: MEDICARE

## 2018-10-10 ENCOUNTER — CONSULT (OUTPATIENT)
Dept: PAIN MEDICINE | Facility: CLINIC | Age: 73
End: 2018-10-10
Payer: MEDICARE

## 2018-10-10 VITALS
WEIGHT: 247 LBS | BODY MASS INDEX: 32.74 KG/M2 | HEIGHT: 73 IN | SYSTOLIC BLOOD PRESSURE: 110 MMHG | DIASTOLIC BLOOD PRESSURE: 68 MMHG | HEART RATE: 82 BPM

## 2018-10-10 DIAGNOSIS — M54.5 ACUTE BILATERAL LOW BACK PAIN, WITH SCIATICA PRESENCE UNSPECIFIED: ICD-10-CM

## 2018-10-10 DIAGNOSIS — M54.5 ACUTE BILATERAL LOW BACK PAIN, WITH SCIATICA PRESENCE UNSPECIFIED: Primary | ICD-10-CM

## 2018-10-10 PROCEDURE — 99204 OFFICE O/P NEW MOD 45 MIN: CPT | Performed by: ANESTHESIOLOGY

## 2018-10-10 PROCEDURE — 72110 X-RAY EXAM L-2 SPINE 4/>VWS: CPT

## 2018-10-10 RX ORDER — A/SINGAPORE/GP1908/2015 IVR-180 (H1N1) (AN A/MICHIGAN/45/2015 (H1N1)PDM09-LIKE VIRUS), A/HONG KONG/4801/2014, NYMC X-263B (H3N2) (AN A/HONG KONG/4801/2014-LIKE VIRUS), AND B/BRISBANE/60/2008, WILD TYPE (A B/BRISBANE/60/2008-LIKE VIRUS) 15; 15; 15 UG/.5ML; UG/.5ML; UG/.5ML
INJECTION, SUSPENSION INTRAMUSCULAR
Refills: 0 | COMMUNITY
Start: 2018-09-13 | End: 2019-09-05 | Stop reason: ALTCHOICE

## 2018-10-10 RX ORDER — METHYLPREDNISOLONE 4 MG/1
TABLET ORAL
Qty: 21 TABLET | Refills: 0 | Status: SHIPPED | OUTPATIENT
Start: 2018-10-10 | End: 2019-03-05 | Stop reason: ALTCHOICE

## 2018-10-10 NOTE — PROGRESS NOTES
Assessment:  1  Acute bilateral low back pain, with sciatica presence unspecified        Plan: At this point the patient's pain persists despite time, relative rest, activity modification, and nonsteroidal anti-inflammatories  His pain is significantly interfering with his daily living activities  I believe is appropriate to order a radiograph of the lumbar spine to rule out any significant etiology of his symptoms  I will start him on a titrating dose of oral methylprednisolone to address any inflammatory component of the patient's pain  He understands he should not take nonsteroidal anti-inflammatories until he is finished with this steroid  If he has any problems or questions he will give us a call  Will also start him on a course of physical therapy, secondary to his acute pain starting with ultrasound and TENS  Will follow up in 1 week's time, we will re-evaluate depending on the results of the radiographs, physical therapy and oral steroids    My impressions and treatment recommendations were discussed in detail with the patient who verbalized understanding and had no further questions  Discharge instructions were provided  I personally saw and examined the patient and I agree with the above discussed plan of care  Orders Placed This Encounter   Procedures    X-ray lumbar spine complete 4+ views     Standing Status:   Future     Standing Expiration Date:   10/10/2022     Scheduling Instructions:      Bring along any outside films relating to this procedure             Order Specific Question:   Reason for Exam:     Answer:   acute low back pain    Ambulatory referral to Physical Therapy     Standing Status:   Future     Standing Expiration Date:   4/10/2019     Referral Priority:   Routine     Referral Type:   Physical Therapy     Referral Reason:   Specialty Services Required     Requested Specialty:   Physical Therapy     Number of Visits Requested:   1     Expiration Date:   10/10/2019 New Medications Ordered This Visit   Medications    FLUAD 0 5 ML ISHA     Sig: inject 0 5 milliliter intramuscularly     Refill:  0    Methylprednisolone 4 MG TBPK     Sig: Use as directed on package     Dispense:  21 tablet     Refill:  0     Self referred       History of Present Illness:    Julia Olivier is a 75 y o  male who presents with approximately 1 and half week history of acute low back pain  He is unaware of any clear precipitating event denies any trauma or injury however this has occurred over the years but usually resolves on its own  Currently describes pain as moderate to severe at times 10/10 on the visual analog scale completely interfering with daily living activities  His pain is intermittent but worse in the afternoon describes shooting and sharp  He denies weakness of upper lower limbs  Lying down, sitting, relaxation decreases symptoms will standing, bending, walking, exercise all aggravate it  Exercise heat nice have provided minimal relief  Has been taking Advil as well as Tylenol which provides minimal relief  I have personally reviewed and/or updated the patient's past medical history, past surgical history, family history, social history, current medications, allergies, and vital signs today  Review of Systems:    Review of Systems   Constitutional: Negative for fever and unexpected weight change  HENT: Negative for trouble swallowing  Eyes: Negative for visual disturbance  Respiratory: Negative for shortness of breath and wheezing  Cardiovascular: Negative for chest pain and palpitations  Gastrointestinal: Negative for constipation, diarrhea, nausea and vomiting  Endocrine: Negative for cold intolerance, heat intolerance and polydipsia  Genitourinary: Negative for difficulty urinating and frequency  Musculoskeletal: Positive for gait problem (Decreased ROM)  Negative for arthralgias, joint swelling and myalgias  Skin: Negative for rash  Neurological: Negative for dizziness, seizures, syncope, weakness and headaches  Hematological: Does not bruise/bleed easily  Psychiatric/Behavioral: Negative for dysphoric mood  All other systems reviewed and are negative  Patient Active Problem List   Diagnosis    Essential hypertension    Parkinson's disease ([address] )    Coronary artery disease involving native coronary artery of native heart without angina pectoris    BPH without obstruction/lower urinary tract symptoms    Degeneration, intervertebral disc, lumbosacral    Gastroesophageal reflux disease without esophagitis    Gout    Idiopathic peripheral neuropathy    Nephrolithiasis    Osteoarthritis of knee    Hypercholesterolemia       Past Medical History:   Diagnosis Date    Arthritis     Benign familial tremor     Cardiac disease     two cardiac stents    Chest pain     Coronary artery disease     Hyperlipidemia     Hypertension     Kidney disease     Kidney stone     Malignant melanoma of skin of chest ([address] )     Removed 2 years ago   Meniscal injury     Nephrolithiasis     Parkinson's disease ([address] )     PONV (postoperative nausea and vomiting)     Tinnitus     [id number] RESOLVED       Past Surgical History:   Procedure Laterality Date    AMPUTATION Left     AMPUTATION OF FINGER WITH NEURECTOMY(EACH) DISTAL LONG  RING AND SMALL FINGER    CARPAL TUNNEL RELEASE Right     CATARACT EXTRACTION      CORONARY STENT PLACEMENT      two cardiac stents    FINGER AMPUTATION      AMPUTATION OF MIDDLE FINGER WITH NEURECTOMY(EACH)    HAND SURGERY Left     Traumatic amputation of fingers left hand      JOINT REPLACEMENT Bilateral     knees    KNEE ARTHROPLASTY      TOTAL    MALIGNANT SKIN LESION EXCISION      ANTERIOR CHEST FOR MELAMONIA    AK CYSTO/URETERO W/LITHOTRIPSY &INDWELL STENT INSRT Left 7/27/2017    Procedure: CYSTOSCOPY URETEROSCOPY , RETROGRADE PYELOGRAM AND INSERTION STENT URETERAL;  Surgeon: Donald Roach MD; Location: [facility];  Service: Urology    WI LAP,CHOLECYSTECTOMY N/A 2/28/2018    Procedure: CHOLECYSTECTOMY LAPAROSCOPIC;  Surgeon: Nunu Jiménez MD;  [facility];  Service: General    WI REMOVE BLADDER STONE,<2 5 CM N/A 9/15/2017    Procedure: Matt Artist;  Surgeon: Geeta Gama MD;  [facility];  Service: Urology    WI TRANSURETHRAL ELEC-SURG PROSTATECTOM N/A 9/15/2017    Procedure: BIPOLAR TRANSURETHRAL RESECTION OF PROSTATE (TURP); Surgeon: Geeta Gama MD;  [facility];  Service: Urology    TONSILLECTOMY      TRIGGER FINGER RELEASE Right     TUMOR REMOVAL Left     Left foot 20 years ago   WISDOM TOOTH EXTRACTION Bilateral        Family History   Problem Relation Age of Onset    Heart disease Mother     Hypertension Mother     Stroke Mother     Breast cancer Mother     Heart disease Father     Hypertension Father     Stroke Father     Colon cancer Father     Diabetes Sister     Heart disease Brother     Other Family         BACK PAIN    Breast cancer Family     Heart disease Family     Hypertension Family     Stroke Family     Mental illness Neg Hx     Substance Abuse Neg Hx        Social History     Occupational History    Not on file       Social History Main Topics    Smoking status: Former Smoker     Types: Cigarettes    Smokeless tobacco: Never Used      Comment: back in college in 1966    Alcohol use 2 4 oz/week     4 Cans of beer per week    Drug use: No    Sexual activity: Not on file       Current Outpatient Prescriptions on File Prior to Visit   Medication Sig    allopurinol (ZYLOPRIM) 300 mg tablet TAKE ONE TABLET BY MOUTH ONCE DAILY    atorvastatin (LIPITOR) 20 mg tablet Take 1 tablet (20 mg total) by mouth daily    benazepril-hydrochlorthiazide (LOTENSIN HCT) 20-25 MG per tablet TAKE ONE TABLET BY MOUTH ONCE DAILY    Calcium Carbonate-Vitamin D (CALTRATE 600+D) 600-400 MG-UNIT per tablet Caltrate 600+D 600-400 MG-UNIT TABS  Take 1 tablet daily   Refills: 0      , M D ; Active    carbidopa-levodopa (SINEMET)  mg per tablet Carbidopa-Levodopa  MG Oral Tablet   take 2 tablets two times a day   Quantity: 120;  Refills: 5       Alidae Pert M D ;  Started 7-Nov-2013  Active    Cholecalciferol (VITAMIN D-3) 1000 UNITS CAPS Vitamin D3 1000 UNIT Oral Capsule   once a day Refills: 0      , M D ; Active    glucosamine-chondroitin 500-400 MG tablet Take 1 tablet by mouth daily    metoprolol succinate (TOPROL-XL) 50 mg 24 hr tablet TAKE ONE TABLET BY MOUTH ONCE DAILY    Multiple Vitamin (MULTIVITAMINS PO) Multivitamins Oral once a day Capsule   Refills: 0      , M D ; Active    potassium chloride (MICRO-K) 10 MEQ CR capsule Take 1 capsule (10 mEq total) by mouth daily    zolpidem (AMBIEN) 10 mg tablet TAKE ONE TABLET BY MOUTH ONCE DAILY AT BEDTIME AS NEEDED     No current facility-administered medications on file prior to visit  No Known Allergies    Physical Exam:    /68   Pulse 82   Ht 6' 1" (1 854 m)   Wt 112 kg (247 lb)   BMI 32 59 kg/m²     Constitutional: normal, well developed, well nourished, alert, in no distress and non-toxic and no overt pain behavior   and overweight  Eyes: anicteric  HEENT: grossly intact  Neck: supple, symmetric, trachea midline and no masses   Pulmonary:even and unlabored  Cardiovascular:No edema or pitting edema present  Skin:Normal without rashes or lesions and well hydrated  Psychiatric:Mood and affect appropriate  Neurologic:Cranial Nerves II-XII grossly intact  Musculoskeletal:  Dyskinesia of the right upper and lower limbs, no tenderness to palpation lumbar sacral spine spinous process or sacroiliac joint difficult to assess but could not appreciate any focal motor deficits, no sensory deficits in the lower limbs deep tendon reflexes diminished but symmetrical bilateral lower limbs, and straight leg raising reproduces back pain without radicular symptoms negative Russell's maneuver

## 2018-10-11 ENCOUNTER — TELEPHONE (OUTPATIENT)
Dept: PAIN MEDICINE | Facility: CLINIC | Age: 73
End: 2018-10-11

## 2018-10-11 ENCOUNTER — EVALUATION (OUTPATIENT)
Dept: PHYSICAL THERAPY | Facility: CLINIC | Age: 73
End: 2018-10-11
Payer: MEDICARE

## 2018-10-11 DIAGNOSIS — M54.5 ACUTE BILATERAL LOW BACK PAIN, WITH SCIATICA PRESENCE UNSPECIFIED: Primary | ICD-10-CM

## 2018-10-11 PROCEDURE — G8979 MOBILITY GOAL STATUS: HCPCS | Performed by: PHYSICAL THERAPIST

## 2018-10-11 PROCEDURE — G8978 MOBILITY CURRENT STATUS: HCPCS | Performed by: PHYSICAL THERAPIST

## 2018-10-11 PROCEDURE — 97162 PT EVAL MOD COMPLEX 30 MIN: CPT | Performed by: PHYSICAL THERAPIST

## 2018-10-11 NOTE — TELEPHONE ENCOUNTER
I called and LMOM to cb in 1 week to give Dr Minh Yoder an update on how the pt is feeling  I also left in the message that we will be canceling his 10/17/18 appt  with Dr Minh Yoder

## 2018-10-11 NOTE — PROGRESS NOTES
PT Evaluation     Today's date: 10/11/2018  Patient name: Zofia Lopez  : 1945  MRN: [id number]  Referring provider: Lili Fields DO  Dx:   Encounter Diagnosis     ICD-10-CM    1  Acute bilateral low back pain, with sciatica presence unspecified M54 5 Ambulatory referral to Physical Therapy                  Assessment  Impairments: abnormal coordination, abnormal gait, abnormal or restricted ROM, abnormal movement, activity intolerance, impaired physical strength, lacks appropriate home exercise program, pain with function, weight-bearing intolerance and poor posture     Assessment details: Pt is a 75year old male presenting to outpatient Physical Therapy with primary complaints of low back pain  Pt presents with decreased lumbar AROM in all planes of motion, with pain going into right side bending and left rotation, hypomobility of T12-L5 with pain at L4, visible guarding of the shoulders and spine resulting in high levels of trunk stiffness, core weakness, and overall decreased functional mobility  These physical deficits are preventing the patient from participating in usual activities such as putting on his shoes and socks, going on walks for exercise, and getting out of bed pain free  Pt would benefit from skilled PT services in order to address these deficits and reach maximum level of function  Thank you kindly for the referral!  Barriers to therapy: None  Understanding of Dx/Px/POC: good   Prognosis: good    Goals  ST  The patient will be fully compliant with HEP within two weeks    2  Pt will report decrease in pain on VAS at worst by at least two points in three weeks  3  Pt will increase L/S extension AROM by at least 10 degrees in order to demonstrate decreased L/S stiffness and increased functional mobility in three weeks  LT  The patient will increase FOTO score to 71 within eight weeks  2  The patient will report full return to taking a mile walk 6 days a week indicating return to functional baseline within 8 weeks  3 Pt will be able to demonstrate good posture and form when performing a functional squat to lift a heavy object off the floor in order to prevent future exacerbations of L/S pain within eight weeks  Plan  Patient would benefit from: skilled physical therapy  Planned modality interventions: thermotherapy: hydrocollator packs, cryotherapy, ultrasound and traction  Planned therapy interventions: therapeutic activities, therapeutic exercise, home exercise program, manual therapy, joint mobilization, balance, patient education, neuromuscular re-education, strengthening, stretching, graded exercise, gait training, functional ROM exercises and flexibility  Frequency: 2x week  Duration in weeks: 8  Treatment plan discussed with: patient        Subjective Evaluation    History of Present Illness  Date of onset: 10/1/2018  Mechanism of injury: Pt reports that he has experienced "attacks" of low back pain from time to time ever since he "popped a disc" 50 years ago  He notes that it usually starts with a mild ache in his low back, and then it spikes up  The spike is usually caused by a twist, bend, or quick movement  He typically treats it with Advil and heat on his back  When he has flare-ups he is tentative of his movements and afraid to make any big movements  Bending over to dry his left foot after a shower is often the cause of an increase in pain  His last "attack" was 5 years ago for which he received an injection  He reports the injection to be helpful in abolishing his pain  Recurrent probem    Pain  Current pain ratin  At best pain ratin  At worst pain rating: 10  Location: Localized to central L/S  Quality: radiating, dull ache and sharp  Relieving factors: medications and heat (Wearing a "lifting belt" from [address])  Exacerbated by: Bending, twisting, quick movements    Progression: worsening    Treatments  Treatments tried: Double knees to chest exercise  Current treatment: medication  Patient Goals  Patient goals for therapy: decreased pain and increased motion  Patient goal: to be able to dry himself off after a shower, to put on shoes and socks independently, to get out of bed without pain, to return to walking one hour per day  Objective     Static Posture     Head  Forward  Shoulders  Rounded  Knee   Genu varus  Knee (Left): Flexed  Knee (Right): Flexed  Postural Observations  Seated posture: fair  Standing posture: fair        Tenderness     Lumbar Spine  No tenderness in the spinous process  Neurological Testing     Sensation     Lumbar   Left   Intact: light touch    Right   Intact: light touch    Reflexes   Left   Patellar (L4): trace (1+)    Right   Patellar (L4): trace (1+)    Active Range of Motion     Lumbar   Flexion: 20 degrees with pain  Extension: 7 degrees   Left lateral flexion: 10 degrees   Right lateral flexion: 15 degrees with pain  Left rotation: 10 degrees with pain  Right rotation: Zeer    Joint Play   Comments: Pt demonstrates hypomobility of T1-L5 vertebral joints with pain at the L4 level  Strength/Myotome Testing     Left Hip   Planes of Motion   Flexion: 5    Right Hip   Planes of Motion   Flexion: 5  Abduction: 5    Left Knee   Flexion: 5  Extension: 4+    Right Knee   Flexion: 5  Extension: 4+    Left Ankle/Foot   Dorsiflexion: 4+  Plantar flexion: 4+    Right Ankle/Foot   Dorsiflexion: 4+  Plantar flexion: 4+    Tests     Lumbar   Negative repeated extension  Ambulation     Observational Gait   Decreased walking speed and stride length     Base of support: decreased          Precautions: Parkinson's Disease, CAD, Hypertension, B TKA, peripheral neuropathies     Daily Treatment Diary     Manual              PA Grade III-IV mobilizations T1-L5                                                                     Exercise Diary              Treadmill             Lat Stretch             Scapular Squeezes             Cervical Retraction in standing with Half Foam             Seated Thoracic Extension             Prone on Elbows             Abdominal Bracing             Clamshells             Standing Hip 4-way             Standing Abdominal Bracing with TB             Sitting Marches on PB                                                                                                                                      Modalities              HP

## 2018-10-11 NOTE — TELEPHONE ENCOUNTER
Calling pt to let him know that Dr Josselin Xiong would like for him to call in 1 week for an update and let us know how he is feeling after medication and if not better we would send him for an MRI

## 2018-10-15 ENCOUNTER — OFFICE VISIT (OUTPATIENT)
Dept: PHYSICAL THERAPY | Facility: CLINIC | Age: 73
End: 2018-10-15
Payer: MEDICARE

## 2018-10-15 DIAGNOSIS — M54.5 ACUTE BILATERAL LOW BACK PAIN, WITH SCIATICA PRESENCE UNSPECIFIED: Primary | ICD-10-CM

## 2018-10-15 PROCEDURE — 97110 THERAPEUTIC EXERCISES: CPT | Performed by: PHYSICAL THERAPIST

## 2018-10-15 PROCEDURE — 97140 MANUAL THERAPY 1/> REGIONS: CPT | Performed by: PHYSICAL THERAPIST

## 2018-10-15 NOTE — PROGRESS NOTES
Daily Note     Today's date: 10/15/2018  Patient name: Paige Bergeron  : 1945  MRN: [id number]  Referring provider: Winsome Dhillon DO  Dx:   Encounter Diagnosis     ICD-10-CM    1  Acute bilateral low back pain, with sciatica presence unspecified M54 5                   Subjective: Pt reports that he felt good on Saturday, but starting yesterday his low back pain seems to have increased, and he notes that it seems to go across his low back and up the spine a bit higher than before  Objective: See treatment diary below      Assessment: Tolerated treatment fair  Patient was unable to full retract his C/S during TE with half foam in standing, secondary to moderate AROM restrictions and cervial musculature weakness  He was able to better perform cervical retractions in supine with pressure cuff as a visual cue  Additionally, pt as unable to complete T/S extensions in sitting secondary to L/S pain upon sitting back up  He was able to perform seated T/S rotation without pain and demonstrated increased T/S active mobility following the exercise  Pt demonstrated high levels of hypomobility of T1-L5 joints, which all improved mildly following manual PA  Pt demonstrated improved standing posture and reported a decreased in L/S pain following manual therapy  Pt would benefit from continued skilled therapy to progress spinal mobility, motion, core strength, and posture  Plan: Continue per plan of care           Precautions: Parkinson's Disease, CAD, Hypertension, B TKA, peripheral neuropathies     Daily Treatment Diary     Manual  10/15            PA Grade III-IV mobilizations T1-L5 8'                                                                    Exercise Diary  10/15            Stationary Bike 7'            Treadmill             Lat Stretch 3 x30"            Scapular Squeezes 3" x20            Cervical Retraction in standing with Half Foam x15            Seated Thoracic Rotation x15            Seated Thoracic Extension x5            Cervical Retraction in Supine with Pressure Cuff  3" x15            Bridges x10            Prone on Elbows             Abdominal Bracing 5" x10            Clamshells             Standing Hip 4-way             Standing Abdominal Bracing with TB             Sitting Marches on PB                                                                                                                                      Modalities              HP

## 2018-10-17 ENCOUNTER — TELEPHONE (OUTPATIENT)
Dept: OBGYN CLINIC | Facility: HOSPITAL | Age: 73
End: 2018-10-17

## 2018-10-17 NOTE — TELEPHONE ENCOUNTER
Patient sees Dr Larisa Jamison     [phone]    Patient was asked by Dr Ida Hernandez to call us with an update of how he is doing  Patient is stating that he is doing very much better  Patient is stating that he is feeling almost back to normal   Patient completed the medication a couple days ago  He is also stating that physical therapy seems to be making a positive difference

## 2018-10-18 ENCOUNTER — OFFICE VISIT (OUTPATIENT)
Dept: PHYSICAL THERAPY | Facility: CLINIC | Age: 73
End: 2018-10-18
Payer: MEDICARE

## 2018-10-18 DIAGNOSIS — M54.5 ACUTE BILATERAL LOW BACK PAIN, WITH SCIATICA PRESENCE UNSPECIFIED: Primary | ICD-10-CM

## 2018-10-18 PROCEDURE — 97110 THERAPEUTIC EXERCISES: CPT | Performed by: PHYSICAL THERAPIST

## 2018-10-18 PROCEDURE — 97140 MANUAL THERAPY 1/> REGIONS: CPT | Performed by: PHYSICAL THERAPIST

## 2018-10-18 NOTE — PROGRESS NOTES
Daily Note     Today's date: 10/18/2018  Patient name: Sarah Contreras  : 1945  MRN: [id number]  Referring provider: Juanita Zimmerman DO  Dx:   Encounter Diagnosis     ICD-10-CM    1  Acute bilateral low back pain, with sciatica presence unspecified M54 5                   Subjective: Pt reports that his L/S back pain was abolished for the rest of the day following his last therapy session  He believes that the mobilizations in particular in relieving him of symptoms  He notes that after a day of getting in and out of his car often yesterday, the pain returned mildly to the low back, but is no longer sharp  He also returned to walking up to a mile for exercise yesterday and that it went well  Objective: See treatment diary below      Assessment: Tolerated treatment well  Patient continues to demonstrate high levels of stiffness in hs T/S and L/S that is likely contributing to his low back pain  His T/S demonstrates greater hypomobility than the L/S, but each improved mildly following manual PA, with pt reporting feeling "more limber" afterwards  Pt was given thoracic rotations and scapular squeezes to perform at home to further progress mobility and strength between PT sessions  Plan: Continue per plan of care         Precautions: Parkinson's Disease, CAD, Hypertension, B TKA, peripheral neuropathies     Daily Treatment Diary     Manual  10/15 10/18           PA Grade III-IV mobilizations T1-L5 8'                                                                    Exercise Diary  10/15 10/18           Stationary Bike 7' 7'           Treadmill             Lat Stretch 3 x30" 3 x30"           Scapular Squeezes 3" x20 3" x20           Cervical Retraction in standing with Half Foam x15 When ready           Seated Thoracic Rotation x15 x20           Seated Thoracic Extension x5 When ready           Cervical Retraction in Supine with Pressure Cuff  3" x15 3" x20           Bridges x10 x20           Prone on Elbows             Abdominal Bracing 5" x10 With Ball squeeze  3" x20           Clamshells             Standing Hip 4-way             Standing Abdominal Bracing with TB             Sitting Marches on PB                                                                                                                                      Modalities              HP

## 2018-10-22 ENCOUNTER — OFFICE VISIT (OUTPATIENT)
Dept: PHYSICAL THERAPY | Facility: CLINIC | Age: 73
End: 2018-10-22
Payer: MEDICARE

## 2018-10-22 DIAGNOSIS — M54.5 ACUTE BILATERAL LOW BACK PAIN, WITH SCIATICA PRESENCE UNSPECIFIED: Primary | ICD-10-CM

## 2018-10-22 PROCEDURE — 97140 MANUAL THERAPY 1/> REGIONS: CPT | Performed by: PHYSICAL THERAPIST

## 2018-10-22 PROCEDURE — 97110 THERAPEUTIC EXERCISES: CPT | Performed by: PHYSICAL THERAPIST

## 2018-10-22 NOTE — PROGRESS NOTES
Daily Note     Today's date: 10/22/2018  Patient name: Salas Cortes  : 1945  MRN: [id number]  Referring provider: Tamanna Louis DO  Dx:   Encounter Diagnosis     ICD-10-CM    1  Acute bilateral low back pain, with sciatica presence unspecified M54 5                   Subjective: Pt reports that over all this low back is much improved, but he continues to feel a mild pain right in the center of his L/S  He notes that he continues to be cautious with quick movements and turning his body  Objective: See treatment diary below      Assessment:Pt is demonstrating improved scapular strength and posture in sitting and standing  He tolerated additional hip and abdominal TE well, and was given bridges and clamshells to take home as part of HEP in order to continue to build strength  Pt continues to demonstrate high levels of hypomobility in his T/S and L/S that improves mildly following manual therapy, with pt reports of abolished pain  Pt would benefit from skilled therapy to progress mobility, flexibility, core strength, and posture  Plan: Continue per plan of care           Precautions: Parkinson's Disease, CAD, Hypertension, B TKA, peripheral neuropathies     Daily Treatment Diary     Manual  10/15 10/18 10/22          PA Grade III-IV mobilizations T1-L5 8'  10'                                                                  Exercise Diary  10/15 10/18 10/22          Stationary Bike 7' 7' 7'          Treadmill             Lat Stretch 3 x30" 3 x30" 3 x30"          Scapular Squeezes 3" x20 3" x20 3" x20          Cervical Extension   3" x10          Cervical Retraction in standing with Half Foam x15 When ready x10          Seated Thoracic Rotation x15 x20 3" x20          Seated Thoracic Extension x5 When ready --          Cervical Retraction in Supine with Pressure Cuff  3" x15 3" x20           Bridges x10 x20 x20          Prone on Elbows             Abdominal Bracing 5" x10 With Ball squeeze  3" x20 With Ball squeeze  3" x20          Clamshells   Green TB  x20          Standing Hip 4-way             Standing Abdominal Bracing with TB             Sitting Marches on PB                                                                                                                                      Modalities              HP                                         HEP: Lat stretch, prone one elbows, standing L/S extension, T/S rotation, scapular squeezes, bridges, and clamshells with TB

## 2018-10-23 ENCOUNTER — HOSPITAL ENCOUNTER (OUTPATIENT)
Dept: NEUROLOGY | Facility: HOSPITAL | Age: 73
Discharge: HOME | End: 2018-10-23
Payer: MEDICARE

## 2018-10-23 DIAGNOSIS — F06.0 PSYCHOTIC DISORDER WITH HALLUCINATIONS DUE TO KNOWN PHYSIOLOGICAL CONDITION: ICD-10-CM

## 2018-10-23 PROCEDURE — 95816 EEG AWAKE AND DROWSY: CPT | Performed by: PSYCHIATRY & NEUROLOGY

## 2018-10-23 PROCEDURE — 95816 EEG AWAKE AND DROWSY: CPT

## 2018-10-25 ENCOUNTER — OFFICE VISIT (OUTPATIENT)
Dept: PHYSICAL THERAPY | Facility: CLINIC | Age: 73
End: 2018-10-25
Payer: MEDICARE

## 2018-10-25 DIAGNOSIS — M54.5 ACUTE BILATERAL LOW BACK PAIN, WITH SCIATICA PRESENCE UNSPECIFIED: Primary | ICD-10-CM

## 2018-10-25 PROCEDURE — 97110 THERAPEUTIC EXERCISES: CPT | Performed by: PHYSICAL THERAPIST

## 2018-10-25 PROCEDURE — 97140 MANUAL THERAPY 1/> REGIONS: CPT | Performed by: PHYSICAL THERAPIST

## 2018-10-25 NOTE — PROGRESS NOTES
Daily Note     Today's date: 10/25/2018  Patient name: Elizabeth Vázquez  : 1945  MRN: [id number]  Referring provider: Leonardo Thakkar DO  Dx:   Encounter Diagnosis     ICD-10-CM    1  Acute bilateral low back pain, with sciatica presence unspecified M54 5                   Subjective: Pt reports that he feels that we are slowly chipping away at his low back pain  He notes that there  Is still a small dull ache in the center of his low back  He also says that he is feeling more limber, which hr attributes to feeling less afraid of moving and "tweaking" his back  He performs his HEP twice a day on days when he is not in therapy  Objective: See treatment diary below      Assessment: Tolerated treatment well  Patient continues to demonstrate increased trunk mobility into rotation and extension  He tolerated additional abdominal TE well, demonstrating fatigue at the end of each sets  Plan: Continue per plan of care           Precautions: Parkinson's Disease, CAD, Hypertension, B TKA, peripheral neuropathies     Daily Treatment Diary     Manual  10/15 10/18 10/22 10/25         PA Grade III-IV mobilizations T1-L5 8'  10' 8'                                                                 Exercise Diary  10/15 10/18 10/22 10/25         Stationary Bike 7' 7' 7' 7'         Treadmill             Lat Stretch 3 x30" 3 x30" 3 x30" 3 x30"         Scapular Squeezes 3" x20 3" x20 3" x20 HEP         Shoulder extension TB    Blue  x20         Abdominal Bracing with TB    Plum  2x15         Abdominal twists with TB    Blue  2 x10         Cervical Extension   3" x10 NP         Cervical Retraction in standing with Half Foam x15 When ready x10 NP         Seated Thoracic Rotation x15 x20 3" x20 3" x20         Seated Thoracic Extension x5 When ready -- --         Cervical Retraction in Supine with Pressure Cuff  3" x15 3" x20 D/C --         Bridges x10 x20 x20 x20         Prone on Elbows             Abdominal Bracing 5" x10 With [place] Belts squeeze  3" x20 With [place] Belts squeeze  3" x20 With [place] Belts squeeze  3" x20         Clamshells   Green TB  x20 Green  x20         Standing Hip 4-way             Standing Abdominal Bracing with TB             Sitting Marches on PB                                                                                                                                      Modalities              HP                                         HEP: Lat stretch, prone one elbows, standing L/S extension, T/S rotation, scapular squeezes, bridges, and clamshells with TB

## 2018-10-29 ENCOUNTER — OFFICE VISIT (OUTPATIENT)
Dept: PHYSICAL THERAPY | Facility: CLINIC | Age: 73
End: 2018-10-29
Payer: MEDICARE

## 2018-10-29 DIAGNOSIS — M54.5 ACUTE BILATERAL LOW BACK PAIN, WITH SCIATICA PRESENCE UNSPECIFIED: Primary | ICD-10-CM

## 2018-10-29 PROCEDURE — 97110 THERAPEUTIC EXERCISES: CPT | Performed by: PHYSICAL THERAPIST

## 2018-10-29 PROCEDURE — 97140 MANUAL THERAPY 1/> REGIONS: CPT | Performed by: PHYSICAL THERAPIST

## 2018-10-29 NOTE — PROGRESS NOTES
Daily Note     Today's date: 10/29/2018  Patient name: Damian Negro  : 1945  MRN: [id number]  Referring provider: Nima Dumont DO  Dx:   Encounter Diagnosis     ICD-10-CM    1  Acute bilateral low back pain, with sciatica presence unspecified M54 5                   Subjective: Pt reports that he continues to have a mild ache at the base of his low back, but her no longer has a sharp pain  He notes that the lat stretch and bridging seem to be his most challenging exercises in his HEP  Objective: See treatment diary below      Assessment: Tolerated treatment well  Patient was able to tolerate seated T/S extension, demonstrating an increase in mobility and activity tolerance  He demonstrated moderate fatigue during SLR, indicating hip flexor and abdominal weakness  He was unable to progress Prone on elbows to prone-press ups due to hypomobility of L/S  Pt was encouraged to continue to perform standing L/S extension in order to progress his mobility  Plan: Progress treatment as tolerated          Precautions: Parkinson's Disease, CAD, Hypertension, B TKA, peripheral neuropathies     Daily Treatment Diary     Manual  10/15 10/18 10/22 10/25 10/29        PA Grade III-IV mobilizations T1-L5 8'  10' 8' 8'                                                                Exercise Diary  10/15 10/18 10/22 10/25 10/29        Stationary Bike 7' 7' 7' 7' 6'        Treadmill             Lat Stretch 3 x30" 3 x30" 3 x30" 3 x30" 3 x30"        Scapular Squeezes 3" x20 3" x20 3" x20 HEP --        Shoulder extension TB    Blue  x20 Blue  x20        Abdominal Bracing with TB    Plum  2x15 Blue  2 x15        Abdominal twists with TB    Blue  2 x10 Blue 2x15        Cervical Extension   3" x10 NP NP        Cervical Retraction in standing with Half Foam x15 When ready x10 NP NP        Seated Thoracic Rotation x15 x20 3" x20 3" x20 HEP        Seated Thoracic Extension x5 When ready -- -- x10        Bridges x10 x20 x20 x20 HEP        Prone on Elbows     x10        Abdominal Bracing 5" x10 With Ball squeeze  3" x20 With Ball squeeze  3" x20 With [employer] squeeze  3" x20 With [employer] squeeze  3" x20        Clamshells   Green TB  x20 Green  x20 HEP        SLR: flexion     2 x10        Sitting Marches on PB                                                                                                                                      Modalities              HP                                         HEP: Lat stretch, prone one elbows, standing L/S extension, T/S rotation, scapular squeezes, bridges, and clamshells with TB

## 2018-11-01 ENCOUNTER — OFFICE VISIT (OUTPATIENT)
Dept: PHYSICAL THERAPY | Facility: CLINIC | Age: 73
End: 2018-11-01
Payer: MEDICARE

## 2018-11-01 DIAGNOSIS — M54.5 ACUTE BILATERAL LOW BACK PAIN, WITH SCIATICA PRESENCE UNSPECIFIED: Primary | ICD-10-CM

## 2018-11-01 PROCEDURE — 97140 MANUAL THERAPY 1/> REGIONS: CPT | Performed by: PHYSICAL THERAPIST

## 2018-11-01 PROCEDURE — 97110 THERAPEUTIC EXERCISES: CPT | Performed by: PHYSICAL THERAPIST

## 2018-11-01 NOTE — PROGRESS NOTES
Daily Note     Today's date: 2018  Patient name: Claudetta Duel  : 1945  MRN: [id number]  Referring provider: Andrea Julio DO  Dx:   Encounter Diagnosis     ICD-10-CM    1  Acute bilateral low back pain, with sciatica presence unspecified M54 5                   Subjective: Pt reports that he continues to notice an improvement in his back motion  He says that he is now able to bend over to put his shoes and socks on  He notes that he is still unable to perform prone press-ups all the way, but that he noticed an increase in movement with prone on elbows  Objective: See treatment diary below      Assessment: Pt continues to demonstrate moderate T/S hypomobility during manual PA mobilizations, especially at the T10 level  He also dmeonstrated T/S hypomobility as he was unable to fully turn during supine T/S rotation  Pt was given a Plum TB and additional HEP per his request in order to progress strength and mobility at home  Pt would benefit from continued skilled therapy in order to progress spine mobility and core strength  Plan: Progress treatment as tolerated          Precautions: Parkinson's Disease, CAD, Hypertension, B TKA, peripheral neuropathies     Daily Treatment Diary     Manual  10/15 10/18 10/22 10/25 10/29 11/1       PA Grade III-IV mobilizations T1-L5 8'  10' 8' 8' 12'                                                    12'           Exercise Diary  10/15 10/18 10/22 10/25 10/29 11/1       Stationary Bike 7' 7' 7' 7' L5  6' L6  6'       Lat Stretch 3 x30" 3 x30" 3 x30" 3 x30" 3 x30" 3 x30"       Shoulder extension TB    Blue  x20 Blue  x20 Plum  x20       Abdominal Bracing with TB    Plum  2x15 Blue  2 x15 Plum  2 x15       Abdominal twists with TB    Blue  2 x10 Blue 2x15 Blue  2 x15       Cervical Extension   3" x10 NP NP NP       Cervical Retraction in standing with Half Foam x15 When ready x10 NP NP NP       Seated Thoracic Rotation x15 x20 3" x20 3" x20 HEP --       Seated Thoracic Extension x5 When ready -- -- x10 Over Wedg ein long sit  x10       Bridges x10 x20 x20 x20 HEP --       Prone on Elbows     x10 HEP       Abdominal Bracing 5" x10 With Ball squeeze  3" x20 With [place] squeeze  3" x20 With [place] squeeze  3" x20 With [place] squeeze  3" x20 HEP       Clamshells   Green TB  x20 Green  x20 HEP --       SLR: flexion     2 x10 2 x10       Thoracic Rotation in Supine      2 x5                                                                                                                                Modalities              HP                                         HEP: Lat stretch, prone one elbows, standing L/S extension, Seated T/S rotation, scapular squeezes, bridges, and clamshells with TB, SLR B, Shoulder rows with TB, and supine T/S rotation

## 2018-11-05 ENCOUNTER — OFFICE VISIT (OUTPATIENT)
Dept: PHYSICAL THERAPY | Facility: CLINIC | Age: 73
End: 2018-11-05
Payer: MEDICARE

## 2018-11-05 DIAGNOSIS — M54.5 ACUTE BILATERAL LOW BACK PAIN, WITH SCIATICA PRESENCE UNSPECIFIED: Primary | ICD-10-CM

## 2018-11-05 PROCEDURE — 97112 NEUROMUSCULAR REEDUCATION: CPT | Performed by: PHYSICAL THERAPIST

## 2018-11-05 PROCEDURE — 97110 THERAPEUTIC EXERCISES: CPT | Performed by: PHYSICAL THERAPIST

## 2018-11-05 NOTE — PROGRESS NOTES
Daily Note     Today's date: 2018  Patient name: Salas Cortes  : 1945  MRN: [id number]  Referring provider: Tamanna Louis DO  Dx:   Encounter Diagnosis     ICD-10-CM    1  Acute bilateral low back pain, with sciatica presence unspecified M54 5                   Subjective: Pt says that he is feeling much better over all, that he is now able to twist from side to side further than before his back pain started, and is able to bend down and tie his shoes  He no longer has any pain in his L/S  Objective: See treatment diary below      Assessment: Pt is demonstrating increase lumbar AROM into B lateral flexion, rotation, and extension  He demonstrated good form during squats with flexed knees and hips, but an upright trunk  Pt is highly compliant with HEP and may be appropriate to discharge next visit in the event that his LBP continues to remain under control  Plan: Potential discharge next visit          Precautions: Parkinson's Disease, CAD, Hypertension, B TKA, peripheral neuropathies     Daily Treatment Diary     Manual  10/15 10/18 10/22 10/25 10/29 11/1 11/5      PA Grade III-IV mobilizations T1-L5 8'  10' 8' 8' 12'                                                    12'           Exercise Diary  10/15 10/18 10/22 10/25 10/29 11/1 11      Stationary Bike 7' 7' 7' 7' L5  6' L6  6' L6  7'      Lat Stretch 3 x30" 3 x30" 3 x30" 3 x30" 3 x30" 3 x30" 2 x30"      Shoulder extension TB    Blue  x20 Blue  x20 Plum  x20 Plum  x20      Abdominal Bracing with TB    Plum  2x15 Blue  2 x15 Plum  2 x15 Plum  2 x15      Abdominal twists with TB    Blue  2 x10 Blue 2x15 Blue  2 x15 Plum  2 x15      Seated Thoracic Extension x5 When ready -- -- x10 Over Wedg ein long sit  x10 On Chair  x15      SLR: flexion     2 x10 2 x10 2 x10      Thoracic Rotation in Supine      2 x5 x20 each      Squats       x12 Modalities              HP                                         HEP: Lat stretch, prone one elbows, standing L/S extension, Seated T/S rotation, scapular squeezes, bridges, and clamshells with TB, SLR B, Shoulder rows with TB, and supine T/S rotation

## 2018-11-08 ENCOUNTER — OFFICE VISIT (OUTPATIENT)
Dept: PHYSICAL THERAPY | Facility: CLINIC | Age: 73
End: 2018-11-08
Payer: MEDICARE

## 2018-11-08 DIAGNOSIS — M54.5 ACUTE BILATERAL LOW BACK PAIN, WITH SCIATICA PRESENCE UNSPECIFIED: Primary | ICD-10-CM

## 2018-11-08 PROCEDURE — G8979 MOBILITY GOAL STATUS: HCPCS | Performed by: PHYSICAL THERAPIST

## 2018-11-08 PROCEDURE — 97110 THERAPEUTIC EXERCISES: CPT | Performed by: PHYSICAL THERAPIST

## 2018-11-08 PROCEDURE — G8978 MOBILITY CURRENT STATUS: HCPCS | Performed by: PHYSICAL THERAPIST

## 2018-11-08 NOTE — PROGRESS NOTES
Daily Note/Dischagre Summary     Today's date: 2018  Patient name: Denzel Pascal  : 1945  MRN: [id number]  Referring provider: Asad Arechiga DO  Dx:   Encounter Diagnosis     ICD-10-CM    1  Acute bilateral low back pain, with sciatica presence unspecified M54 5                   Subjective: Pt reports that his back feels very good and that he has been highly compliant with his HEP  He feels ready to make today his last visit and to continue on independently with his HEP  He notes that he can now put on his shoes and socks independently, get out of bed without pain, and has returned to walking for exercise every day  Objective: See treatment diary below      Assessment: Pt has been attending outpatient PT for low back pain  Pt has made steady progress in PT and has met all impairment and functional goals at this time  Pt is independent with HEP  Pt is D/C from PT to HEP continue to progress towards personal goals  Please feel free to call PT in the event of a change in status  Thank you!       Plan: Discharge       Precautions: Parkinson's Disease, CAD, Hypertension, B TKA, peripheral neuropathies     Daily Treatment Diary     Manual  10/15 10/18 10/22 10/25 10/29 11/1 11/5      PA Grade III-IV mobilizations T1-L5 8'  10' 8' 8' 12'                                                    12'           Exercise Diary  10/15 10/18 10/22 10/25 10/29 11/1 11/5 11/8     Stationary Bike 7' 7' 7' 7' L5  6' L6  6' L6  7' NP     Treadmill         5'     Lat Stretch 3 x30" 3 x30" 3 x30" 3 x30" 3 x30" 3 x30" 2 x30" HEP     Shoulder extension TB    Blue  x20 Blue  x20 Plum  x20 Plum  x20 Plum   x20     Abdominal Bracing with TB    Plum  2x15 Blue  2 x15 Plum  2 x15 Plum  2 x15 Plum x20     Abdominal twists with TB    Blue  2 x10 Blue 2x15 Blue  2 x15 Plum  2 x15 Plum  2 x15     Seated Thoracic Extension x5 When ready -- -- x10 Over Wedg ein long sit  x10 On Chair  x15 On Chair  x15     SLR: flexion     2 x10 2 x10 2 x10 2 x10     Thoracic Rotation in Supine      2 x5 x20 each HEP     Squats       x12 x10                                                                                                                 Modalities              HP                                         HEP: Lat stretch, prone one elbows, standing L/S extension, Seated T/S rotation, scapular squeezes, bridges, and clamshells with TB, SLR B, Shoulder rows with TB, and supine T/S rotation, SLR,T/S extension, mini squats

## 2019-03-05 ENCOUNTER — OFFICE VISIT (OUTPATIENT)
Dept: FAMILY MEDICINE CLINIC | Facility: HOSPITAL | Age: 74
End: 2019-03-05
Payer: MEDICARE

## 2019-03-05 VITALS
BODY MASS INDEX: 32.34 KG/M2 | WEIGHT: 244 LBS | DIASTOLIC BLOOD PRESSURE: 74 MMHG | SYSTOLIC BLOOD PRESSURE: 122 MMHG | HEART RATE: 83 BPM | HEIGHT: 73 IN | RESPIRATION RATE: 16 BRPM

## 2019-03-05 DIAGNOSIS — I10 ESSENTIAL HYPERTENSION: Primary | ICD-10-CM

## 2019-03-05 DIAGNOSIS — G20 PARKINSON'S DISEASE (HCC): ICD-10-CM

## 2019-03-05 DIAGNOSIS — E66.9 OBESITY (BMI 30.0-34.9): ICD-10-CM

## 2019-03-05 DIAGNOSIS — Z13.1 SCREENING FOR DIABETES MELLITUS: ICD-10-CM

## 2019-03-05 DIAGNOSIS — Z00.00 MEDICARE ANNUAL WELLNESS VISIT, SUBSEQUENT: ICD-10-CM

## 2019-03-05 DIAGNOSIS — Z13.6 SCREENING FOR CARDIOVASCULAR CONDITION: ICD-10-CM

## 2019-03-05 PROCEDURE — 99213 OFFICE O/P EST LOW 20 MIN: CPT | Performed by: INTERNAL MEDICINE

## 2019-03-05 PROCEDURE — G0439 PPPS, SUBSEQ VISIT: HCPCS | Performed by: INTERNAL MEDICINE

## 2019-03-05 NOTE — ASSESSMENT & PLAN NOTE
Pt gets coughing episodes at times when eating and makes pt gag  Pt gets coughing episodes every day now and first noticed it maybe a year ago  Will refer to Speech therapy    Sinemet was increased to 6 pills a day to better control tremors  Has some gait dysfunction, no falls, is considering getting a cane  Walks a mile a day!   Denies constipation

## 2019-03-05 NOTE — PATIENT INSTRUCTIONS
Check with your insurance company whether shigrix is covered if given by your PCP! Obesity   AMBULATORY CARE:   Obesity  is when your body mass index (BMI) is greater than 30  Your healthcare provider will use your height and weight to measure your BMI  The risks of obesity include  many health problems, such as injuries or physical disability  You may need tests to check for the following:  · Diabetes     · High blood pressure or high cholesterol     · Heart disease     · Gallbladder or liver disease     · Cancer of the colon, breast, prostate, liver, or kidney     · Sleep apnea     · Arthritis or gout  Seek care immediately if:   · You have a severe headache, confusion, or difficulty speaking  · You have weakness on one side of your body  · You have chest pain, sweating, or shortness of breath  Contact your healthcare provider if:   · You have symptoms of gallbladder or liver disease, such as pain in your upper abdomen  · You have knee or hip pain and discomfort while walking  · You have symptoms of diabetes, such as intense hunger and thirst, and frequent urination  · You have symptoms of sleep apnea, such as snoring or daytime sleepiness  · You have questions or concerns about your condition or care  Treatment for obesity  focuses on helping you lose weight to improve your health  Even a small decrease in BMI can reduce the risk for many health problems  Your healthcare provider will help you set a weight-loss goal   · Lifestyle changes  are the first step in treating obesity  These include making healthy food choices and getting regular physical activity  Your healthcare provider may suggest a weight-loss program that involves coaching, education, and therapy  · Medicine  may help you lose weight when it is used with a healthy diet and physical activity  · Surgery  can help you lose weight if you are very obese and have other health problems   There are several types of weight-loss surgery  Ask your healthcare provider for more information  Be successful losing weight:   · Set small, realistic goals  An example of a small goal is to walk for 20 minutes 5 days a week  Anther goal is to lose 5% of your body weight  · Tell friends, family members, and coworkers about your goals  and ask for their support  Ask a friend to lose weight with you, or join a weight-loss support group  · Identify foods or triggers that may cause you to overeat , and find ways to avoid them  Remove tempting high-calorie foods from your home and workplace  Place a bowl of fresh fruit on your kitchen counter  If stress causes you to eat, then find other ways to cope with stress  · Keep a diary to track what you eat and drink  Also write down how many minutes of physical activity you do each day  Weigh yourself once a week and record it in your diary  Eating changes: You will need to eat 500 to 1,000 fewer calories each day than you currently eat to lose 1 to 2 pounds a week  The following changes will help you cut calories:  · Eat smaller portions  Use small plates, no larger than 9 inches in diameter  Fill your plate half full of fruits and vegetables  Measure your food using measuring cups until you know what a serving size looks like  · Eat 3 meals and 1 or 2 snacks each day  Plan your meals in advance  Charles Reyna and eat at home most of the time  Eat slowly  · Eat fruits and vegetables at every meal   They are low in calories and high in fiber, which makes you feel full  Do not add butter, margarine, or cream sauce to vegetables  Use herbs to season steamed vegetables  · Eat less fat and fewer fried foods  Eat more baked or grilled chicken and fish  These protein sources are lower in calories and fat than red meat  Limit fast food  Dress your salads with olive oil and vinegar instead of bottled dressing  · Limit the amount of sugar you eat  Do not drink sugary beverages   Limit alcohol  Activity changes:  Physical activity is good for your body in many ways  It helps you burn calories and build strong muscles  It decreases stress and depression, and improves your mood  It can also help you sleep better  Talk to your healthcare provider before you begin an exercise program   · Exercise for at least 30 minutes 5 days a week  Start slowly  Set aside time each day for physical activity that you enjoy and that is convenient for you  It is best to do both weight training and an activity that increases your heart rate, such as walking, bicycling, or swimming  · Find ways to be more active  Do yard work and housecleaning  Walk up the stairs instead of using elevators  Spend your leisure time going to events that require walking, such as outdoor festivals or fairs  This extra physical activity can help you lose weight and keep it off  Follow up with your healthcare provider as directed: You may need to meet with a dietitian  Write down your questions so you remember to ask them during your visits  © 2017 [address] Information is for End User's use only and may not be sold, redistributed or otherwise used for commercial purposes  All illustrations and images included in CareNotes® are the copyrighted property of [employer]  or Paulie Person  The above information is an  only  It is not intended as medical advice for individual conditions or treatments  Talk to your doctor, nurse or pharmacist before following any medical regimen to see if it is safe and effective for you

## 2019-03-05 NOTE — PROGRESS NOTES
Assessment and Plan:  Problem List Items Addressed This Visit     None      Visit Diagnoses     Medicare annual wellness visit, subsequent        Screening for cardiovascular condition        Relevant Orders    Lipid panel    Screening for diabetes mellitus            Health Maintenance Due   Topic Date Due    Medicare Annual Wellness Visit (AWV)  1945    BMI: Followup Plan  01/22/1962    DTaP,Tdap,and Td Vaccines (1 - Tdap) 01/22/1965    CRC Screening: Colonoscopy  04/23/2018    PT PLAN OF CARE  11/10/2018         HPI:  Patient Active Problem List   Diagnosis    Essential hypertension    Parkinson's disease ([address] )    Coronary artery disease involving native coronary artery of native heart without angina pectoris    BPH without obstruction/lower urinary tract symptoms    Degeneration, intervertebral disc, lumbosacral    Gastroesophageal reflux disease without esophagitis    Gout    Idiopathic peripheral neuropathy    Nephrolithiasis    Osteoarthritis of knee    Hypercholesterolemia    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     Past Medical History:   Diagnosis Date    Arthritis     Benign familial tremor     Cardiac disease     two cardiac stents    Chest pain     Coronary artery disease     Hyperlipidemia     Hypertension     Kidney disease     Kidney stone     Malignant melanoma of skin of chest [facility]     Removed 2 years ago      Meniscal injury     Nephrolithiasis     Parkinson's disease ([address] )     PONV (postoperative nausea and vomiting)     Tinnitus     [id number] RESOLVED     Past Surgical History:   Procedure Laterality Date    AMPUTATION Left     AMPUTATION OF FINGER WITH NEURECTOMY(EACH) DISTAL LONG  RING AND SMALL FINGER    CARPAL TUNNEL RELEASE Right     CATARACT EXTRACTION      CORONARY STENT PLACEMENT      two cardiac stents    FINGER AMPUTATION      AMPUTATION OF MIDDLE FINGER WITH NEURECTOMY(EACH)    HAND SURGERY Left Traumatic amputation of fingers left hand   JOINT REPLACEMENT Bilateral     knees    KNEE ARTHROPLASTY      TOTAL    MALIGNANT SKIN LESION EXCISION      ANTERIOR CHEST FOR MELAMONIA    RI CYSTO/URETERO W/LITHOTRIPSY &INDWELL STENT INSRT Left 7/27/2017    Procedure: CYSTOSCOPY URETEROSCOPY , RETROGRADE PYELOGRAM AND INSERTION STENT URETERAL;  Surgeon: Tom Iverson MD;  [facility];  Service: Urology    RI LAP,CHOLECYSTECTOMY N/A 2/28/2018    Procedure: CHOLECYSTECTOMY LAPAROSCOPIC;  Surgeon: Blayne Vergara MD;  [facility];  Service: General    RI REMOVE BLADDER STONE,<2 5 CM N/A 9/15/2017    Procedure: Jag Michelle;  Surgeon: Grey Cuenca MD;  [facility];  Service: Urology    RI TRANSURETHRAL ELEC-SURG PROSTATECTOM N/A 9/15/2017    Procedure: BIPOLAR TRANSURETHRAL RESECTION OF PROSTATE (TURP); Surgeon: Grey Cuenca MD;  [facility];  Service: Urology    TONSILLECTOMY      TRIGGER FINGER RELEASE Right     TUMOR REMOVAL Left     Left foot 20 years ago      WISDOM TOOTH EXTRACTION Bilateral      Family History   Problem Relation Age of Onset    Heart disease Mother     Hypertension Mother     Stroke Mother     Breast cancer Mother     Heart disease Father     Hypertension Father     Stroke Father     Colon cancer Father     Diabetes Sister     Heart disease Brother     Other Family         BACK PAIN    Breast cancer Family     Heart disease Family     Hypertension Family     Stroke Family     Mental illness Neg Hx     Substance Abuse Neg Hx      Social History     Tobacco Use   Smoking Status Former Smoker    Types: Cigarettes   Smokeless Tobacco Never Used   Tobacco Comment    back in college in 1966     Social History     Substance and Sexual Activity   Alcohol Use Yes    Alcohol/week: 2 4 oz    Types: 4 Cans of beer per week      Social History     Substance and Sexual Activity   Drug Use No         Current Outpatient Medications   Medication Sig Dispense Refill    allopurinol (ZYLOPRIM) 300 mg tablet TAKE ONE TABLET BY MOUTH ONCE DAILY 90 tablet 3    atorvastatin (LIPITOR) 20 mg tablet Take 1 tablet (20 mg total) by mouth daily 90 tablet 1    benazepril-hydrochlorthiazide (LOTENSIN HCT) 20-25 MG per tablet TAKE ONE TABLET BY MOUTH ONCE DAILY 90 tablet 3    Calcium Carbonate-Vitamin D (CALTRATE 600+D) 600-400 MG-UNIT per tablet Caltrate 600+D 600-400 MG-UNIT TABS  Take 1 tablet daily   Refills: 0      , M D ; Active      carbidopa-levodopa (SINEMET)  mg per tablet 2 tabs---three times daily      Cholecalciferol (VITAMIN D-3) 1000 UNITS CAPS Vitamin D3 1000 UNIT Oral Capsule   once a day Refills: 0      , M D ; Active      FLUAD 0 5 ML ISHA inject 0 5 milliliter intramuscularly  0    glucosamine-chondroitin 500-400 MG tablet Take 1 tablet by mouth daily      metoprolol succinate (TOPROL-XL) 50 mg 24 hr tablet TAKE ONE TABLET BY MOUTH ONCE DAILY 90 tablet 3    Multiple Vitamin (MULTIVITAMINS PO) Multivitamins Oral once a day Capsule   Refills: 0      , M D ; Active      zolpidem (AMBIEN) 10 mg tablet TAKE ONE TABLET BY MOUTH ONCE DAILY AT BEDTIME AS NEEDED 30 tablet 1     No current facility-administered medications for this visit  No Known Allergies  Immunization History   Administered Date(s) Administered    Influenza Split High Dose Preservative Free IM 09/28/2015, 09/14/2016, 10/19/2017    Influenza TIV (IM) 10/03/2013    Pneumococcal Conjugate PCV 7 10/08/2015    Pneumococcal Polysaccharide PPV23 08/13/2012       Patient Care Team:  Jonna White MD as PCP - MD Alfredito Kevin MD  [employer], [place]  MD Sari Wallace CRNP    Medicare Screening Tests and Risk Assessments:  Khushi Rubio is here for his Subsequent Wellness visit  Health Risk Assessment:  Patient rates overall health as fair  Patient feels that their physical health rating is Slightly worse   Eyesight was rated as Slightly worse  Hearing was rated as Slightly worse  Patient feels that their emotional and mental health rating is Same  Pain experienced by patient in the last 7 days has been Some  Patient's pain rating has been 3/10  Emotional/Mental Health:  Patient has been feeling nervous/anxious  PHQ-9 Depression Screening:    Frequency of the following problems over the past two weeks:      1  Little interest or pleasure in doing things: 0 - not at all      2  Feeling down, depressed, or hopeless: 0 - not at all  PHQ-2 Score: 0          Broken Bones/Falls: Fall Risk Assessment:    In the past year, patient has experienced: History of falling in past year     Number of falls: 1  visual disturbance    Bladder/Bowel:  Patient has not leaked urine accidently in the last six months  Patient reports no loss of bowel control  Immunizations:  Patient has had a flu vaccination within the last year  Patient has received a pneumonia shot  Patient has received a shingles shot  Home Safety:  Patient does not have trouble with stairs inside or outside of their home  Patient currently reports that there are no safety hazards present in home, working smoke alarms, working carbon monoxide detectors  Preventative Screenings:   no prostate cancer screen performed, no colon cancer screen completed, cholesterol screen completed, glaucoma eye exam completed,     Nutrition:  Current diet: Regular and Limited junk food with servings of the following:    Medications:  Patient is currently taking over-the-counter supplements  Patient is able to manage medications  Lifestyle Choices:  Patient reports no tobacco use  Patient has smoked or used tobacco in the past   Patient has stopped his tobacco use  Patient reports alcohol use  Alcohol use per week: 2-3 per week  Patient drives a vehicle  Patient wears seat belt      Current level of exercise of physical activity described by patient as: walks 1 mile --5 days a week  Activities of Daily Living:  Can get out of bed by his or her self, able to dress self, able to make own meals, able to do own shopping, able to bathe self, can do own laundry/housekeeping, can manage own money, pay bills and track expenses    Previous Hospitalizations:  Hospitalization or ED visit in past 12 months  Number of hospitalizations within the last year: 3-4  Additional Comments: Kidney stone surg, bladder surg, gallbladder surg    Advanced Directives:  Patient has decided on a power of   Patient has spoken to designated power of   Patient has completed advanced directive  Preventative Screening/Counseling:      Cardiovascular:      General: Risks and Benefits Discussed      Counseling: Healthy Diet     Due for Labs/Analytes/Optional EKG: Lipid Panel          Diabetes:      General: Risks and Benefits Discussed      Counseling: Healthy Diet      Due for labs: Blood Glucose          Colorectal Cancer:      General: Risks and Benefits Discussed and Screening Current          Prostate Cancer:      General: Risks and Benefits Discussed and Screening Not Indicated          Osteoporosis:      General: Screening Not Indicated          AAA:      General: Screening Not Indicated          Glaucoma:      General: Risks and Benefits Discussed and Screening Current          HIV:      General: Screening Not Indicated          Hepatitis C:      General: Screening Not Indicated        Advanced Directives:   Patient has living will for healthcare, Information on ACP and/or AD provided  End of life assessment reviewed with patient  Provider agrees with end of life decisions    Additional Comments: Level 3 dnr, doesn't want to be resuscitated    Immunizations:  Patient reviewed and up to date      Influenza: Risks & Benefits Discussed and Influenza UTD This Year      Pneumococcal: Risks & Benefits Discussed and Lifetime Vaccine Completed      Shingrix: Risks & Benefits Discussed      Other Preventative Counseling (Non-Medicare): Fall Prevention          No exam data present    Physical Exam:  Review of Systems   Constitutional: Negative for fever  HENT: Negative for congestion  Eyes: Negative for visual disturbance  Respiratory: Positive for cough  Negative for shortness of breath  Cardiovascular: Negative for chest pain, palpitations and leg swelling  Gastrointestinal: Negative for abdominal pain, blood in stool, bowel incontinence and diarrhea  Endocrine: Negative for polydipsia and polyphagia  Genitourinary: Negative for difficulty urinating and dysuria  Musculoskeletal: Negative for joint swelling, myalgias and neck stiffness  Skin: Negative for rash  Neurological: Positive for tremors  Negative for weakness, numbness and headaches  Hematological: Negative for adenopathy  Psychiatric/Behavioral: Negative for dysphoric mood  The patient is not nervous/anxious  All other systems reviewed and are negative  Vitals:    03/05/19 0953   BP: 122/74   Pulse: 83   Resp: 16   Weight: 111 kg (244 lb)   Height: 6' 1" (1 854 m)   Body mass index is 32 19 kg/m²  Physical Exam   Constitutional: He is oriented to person, place, and time  He appears well-developed and well-nourished  HENT:   Head: Normocephalic  Right Ear: External ear normal    Left Ear: External ear normal    Eyes: Conjunctivae are normal    Neck: Neck supple  Cardiovascular: Normal rate and regular rhythm  Pulmonary/Chest: No respiratory distress  He has no wheezes  He has no rales  Abdominal: There is no tenderness  Musculoskeletal: He exhibits no tenderness  Lymphadenopathy:     He has no cervical adenopathy  Neurological: He is alert and oriented to person, place, and time  No cranial nerve deficit  Left hand resting tremor   Skin: Skin is warm and dry  No erythema  Psychiatric: He has a normal mood and affect

## 2019-03-05 NOTE — PROGRESS NOTES
Assessment/Plan:    Park[facility]'s [facility] ([facility] )  Pt gets coughing episodes at times when eating and makes pt gag  Pt gets coughing episodes every day now and first noticed it maybe a year ago  Will refer to Speech therapy    Sinemet was increased to 6 pills a day to better control tremors  Has some gait dysfunction, no falls, is considering getting a cane  Walks a mile a day! Denies constipation      Essential hypertension  BP is well controlled, continue benazepril       Diagnoses and all orders for this visit:    Essential hypertension  -     CBC; Future  -     Comprehensive metabolic panel; Future  -     TSH, 3rd generation with Free T4 reflex; Future    Medicare annual wellness visit, subsequent    Screening for cardiovascular condition  -     Lipid panel    Screening for diabetes mellitus    Parkinson's disease ([facility] )  -     Ambulatory referral to Speech Therapy; Future          Subjective:      Patient ID: Paige Bergeron is a 75 y o  male  Hypertension   This is a chronic problem  The current episode started more than 1 year ago  The problem is unchanged  The problem is controlled  Pertinent negatives include no chest pain, headaches, palpitations or shortness of breath  The following portions of the patient's history were reviewed and updated as appropriate: current medications, past family history, past medical history, past social history, past surgical history and problem list     Review of Systems   Constitutional: Negative for fever  HENT: Negative for congestion  Eyes: Negative for visual disturbance  Respiratory: Positive for cough  Negative for shortness of breath  Cardiovascular: Negative for chest pain, palpitations and leg swelling  Gastrointestinal: Negative for abdominal pain, blood in stool and diarrhea  Endocrine: Negative for polydipsia and polyphagia  Genitourinary: Negative for difficulty urinating and dysuria     Musculoskeletal: Negative for joint swelling, myalgias and neck stiffness  Skin: Negative for rash  Neurological: Positive for tremors  Negative for weakness, numbness and headaches  Hematological: Negative for adenopathy  Psychiatric/Behavioral: Negative for dysphoric mood  All other systems reviewed and are negative  Objective:    /74   Pulse 83   Resp 16   Ht 6' 1" (1 854 m)   Wt 111 kg (244 lb)   BMI 32 19 kg/m²      Physical Exam   Constitutional: He is oriented to person, place, and time  He appears well-developed  No distress  HENT:   Head: Normocephalic  Mouth/Throat: Oropharynx is clear and moist    Eyes: Conjunctivae are normal    Neck: Neck supple  Cardiovascular: Normal rate and regular rhythm  Pulmonary/Chest: Effort normal  No respiratory distress  He has no wheezes  He has no rales  Abdominal: Soft  Bowel sounds are normal  He exhibits no distension  There is no tenderness  Musculoskeletal: He exhibits no tenderness  Lymphadenopathy:     He has no cervical adenopathy  Neurological: He is alert and oriented to person, place, and time  No cranial nerve deficit  Left hand resting tremor     Skin: Skin is warm and dry  No rash noted  Psychiatric: He has a normal mood and affect  Vitals reviewed  Jamey Olmedo MD  BMI Counseling: Body mass index is 32 19 kg/m²  Discussed the patient's BMI with him  The BMI is above average  BMI counseling and education was provided to the patient  Nutrition recommendations include reducing portion sizes and decreasing overall calorie intake

## 2019-03-08 ENCOUNTER — LAB (OUTPATIENT)
Dept: LAB | Facility: HOSPITAL | Age: 74
End: 2019-03-08
Attending: INTERNAL MEDICINE
Payer: MEDICARE

## 2019-03-08 DIAGNOSIS — I10 ESSENTIAL HYPERTENSION: ICD-10-CM

## 2019-03-08 LAB
ALBUMIN SERPL BCP-MCNC: 4 G/DL (ref 3.5–5)
ALP SERPL-CCNC: 88 U/L (ref 46–116)
ALT SERPL W P-5'-P-CCNC: 9 U/L (ref 12–78)
ANION GAP SERPL CALCULATED.3IONS-SCNC: 7 MMOL/L (ref 4–13)
AST SERPL W P-5'-P-CCNC: 22 U/L (ref 5–45)
BILIRUB SERPL-MCNC: 1 MG/DL (ref 0.2–1)
BUN SERPL-MCNC: 18 MG/DL (ref 5–25)
CALCIUM SERPL-MCNC: 8.4 MG/DL (ref 8.3–10.1)
CHLORIDE SERPL-SCNC: 106 MMOL/L (ref 100–108)
CHOLEST SERPL-MCNC: 115 MG/DL (ref 50–200)
CO2 SERPL-SCNC: 30 MMOL/L (ref 21–32)
CREAT SERPL-MCNC: 1 MG/DL (ref 0.6–1.3)
ERYTHROCYTE [DISTWIDTH] IN BLOOD BY AUTOMATED COUNT: 12.2 % (ref 11.6–15.1)
GFR SERPL CREATININE-BSD FRML MDRD: 73 ML/MIN/1.73SQ M
GLUCOSE P FAST SERPL-MCNC: 102 MG/DL (ref 65–99)
HCT VFR BLD AUTO: 46.5 % (ref 36.5–49.3)
HDLC SERPL-MCNC: 46 MG/DL (ref 40–60)
HGB BLD-MCNC: 15.7 G/DL (ref 12–17)
LDLC SERPL CALC-MCNC: 50 MG/DL (ref 0–100)
MCH RBC QN AUTO: 32.3 PG (ref 26.8–34.3)
MCHC RBC AUTO-ENTMCNC: 33.8 G/DL (ref 31.4–37.4)
MCV RBC AUTO: 96 FL (ref 82–98)
NONHDLC SERPL-MCNC: 69 MG/DL
PLATELET # BLD AUTO: 165 THOUSANDS/UL (ref 149–390)
PMV BLD AUTO: 10 FL (ref 8.9–12.7)
POTASSIUM SERPL-SCNC: 3.7 MMOL/L (ref 3.5–5.3)
PROT SERPL-MCNC: 7.3 G/DL (ref 6.4–8.2)
RBC # BLD AUTO: 4.86 MILLION/UL (ref 3.88–5.62)
SODIUM SERPL-SCNC: 143 MMOL/L (ref 136–145)
TRIGL SERPL-MCNC: 93 MG/DL
TSH SERPL DL<=0.05 MIU/L-ACNC: 1.76 UIU/ML (ref 0.36–3.74)
WBC # BLD AUTO: 5.88 THOUSAND/UL (ref 4.31–10.16)

## 2019-03-08 PROCEDURE — 84443 ASSAY THYROID STIM HORMONE: CPT

## 2019-03-08 PROCEDURE — 85027 COMPLETE CBC AUTOMATED: CPT

## 2019-03-08 PROCEDURE — 80061 LIPID PANEL: CPT | Performed by: INTERNAL MEDICINE

## 2019-03-08 PROCEDURE — 36415 COLL VENOUS BLD VENIPUNCTURE: CPT

## 2019-03-08 PROCEDURE — 80053 COMPREHEN METABOLIC PANEL: CPT

## 2019-03-11 NOTE — PROGRESS NOTES
Speech-Language Pathology Initial Evaluation    Today's date: 3/11/2019  Patients name: Francis Briones  : 1945  MRN: [id number]  Safety measures: Parkinsons disease  Referring provider: James Monge MD    Subjective comments:   "Nice to meet you "  -Pt arrived to facility independently  How did the patient hear about us? Physician    Patient's goal(s): "I don't want to gag while I'm eating "    Reason for referral: Difficulty swallowing solids or liquids  Prior functional status: Swallowing WNL  Clinically complex situations: N/A    History: Mr Nikunj Santos is a 74 y/o male who was referred for a dysphagia evaluation secondary to difficulty swallowing  Per MD report, "Pt gets coughing episodes at times when eating and makes pt gag  Pt gets coughing episodes every day now and first noticed it maybe a year ago " Patient MH is significant for Parkinson's disease (diagnosed 6 years ago), Gastroesophageal reflux disease without esophagitis, Coronary artery disease, BPH without obstruction/lower urinary tract symptoms, Degeneration, intervertebral disc, Gout, Idiopathic peripheral neuropathy, Nephrolithiasis, Nephrolithiasis, Osteoarthritis of knee, Hypercholesterolemia and 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  Patient reports  experiencing gagging which turns into coughing spells with food  He reports no specific food triggers his gag, it happens with food and even with saliva  He does not experience coughing with a particular food stating "it happens with all foods, sometimes when the timing of breathing and swallowing seem to conflict I'll start gagging and then cough " He stated gagging and coughing with food began 6-9 months ago  He denies feeling food sticking in throat but indicated he feels as if phlegm or something small is always in his throat  He denies recent pna and/or weight loss   His typical breakfast consist of cereal or oatmeal and juice, Lunch: sandwich and water and eats a wide variety of food for dinner  He reports he does not experience difficulty swallowing during breakfast time or lunch, coughing usually happens during the evening hours while eating dinner  He communicated his gag reflex is triggered by his own saliva throughout the day (ie , while sitting back in his couch while watching tv stating "My own saliva makes me gag and I start to cough  ") He denies any difficulty swallowing liquids or pills  He reports he used to be a fast eater but now "runs out of breath and needs to take a break" when eating and drinking  He reports he does not run out of breath when speaking  Clinician tested patient's MPT as well as digits per breath, results were WNL (17-20 seconds MPT with normal loudness level, little raspiness in voice; counted 1-25 in one breath) pt did not express concerns re voice  His goal is to eat without gagging and coughing  Hearing: Hearing is deteriorating, he is not interested in seeing an audiologist yet  Vision: [facility] with glassess, had cataract surgery in the past, had myopia     Home environment/lifestyle: Pt lives at home with his wife  Highest level of education: Masters degree  Vocational status: , management, ran a large [employer]    Mental status: Alert  Behavior status: Cooperative  Communication modalities: Verbal      Assessments    DYSPHAGIA EVALUATION:    -Reason for referral: Signs/symptoms of dysphagia    -Subjective report of swallowing difficulty: Coughing and Globus sensation     -Difficulty swallowing: Solids and Liquids    -Current diet (solids): Regular  -Current diet (liquids): Thin      -Facial appearance Symmetrical   -Dentition Adequate   -Labial function WFL   -Lingual function WFL   -Velar function Symmetrical       LIQUID CONSISTENCY TESTIN   Liquid Consistency (Thin)   Administered by: Cup   Strategies, attempts, and responses: None    CLINICAL FINDINGS:   Oral phase impairments: WFL   Pharyngeal Phase Impairments: WFL    *Laryngeal excursion upon palpation: Appeared WFL      SOLID CONSISTENCY TESTIN  Solid Consistency (Regular, Mechanical Soft, Mixed and Puree, chewy bar, nutrition bar, fruit with liquid, apple sauce)    Administered by: Silvia Cobian and Self-fed   Strategies, attempts, and responses: Chin Tuck with mechanical soft (nutrition bar) due to complaints of food sticking in throat; was not effective, pt then took sips of water and was able to clear/wash down  CLINICAL FINDINGS:   Oral phase impairments: Stasis/coating/ pocketing with mechanical soft consistency, pt able to clear with tongue   Pharyngeal Phase Impairments: Throat clear and Other (feeling of food sticking in throat with mechanical soft consistency, patient was able to clear with sips of water )    *Laryngeal excursion upon palpation: Appeared [facility]      SWALLOWING DIAGNOSTIC IMPRESSION:  -Swallowing diagnosis/severity: Mild suspected pharyngeal phase dysphagia    -Factors affecting performance: None    -Safety concerns: Risk for aspiration and Risk for choking    -Risk factors: Progressive neurological disease    SAFETY PRECAUTIONS:  -Supervision: Independent     -Strategies: Small sips and bites when eating, Slow rate, swallow between bites, Alternate liquids and solids and Clear pocketing     -Positioning: Upright position at least 30 minutes after meal, Upright position during meals and Chin tuck    -Compensatory strategies: Effortful swallow    -Recommend (solids): Regular    -Recommend (liquids): Thin    -Recommend (medications): take whole with water    REFERRALS: Videofluroscopic Swallow Study      Goals  Short-term goals:  1  Further goals TBD based on results of VFSS  Long-term goals:  1  Patient will receive a videofluoroscopic swallow study (VFSS) to fully assess physiology and anatomy of the swallow and to determine the appropriate diet and/or rehabilitation exercises  Impressions/Recommendations    Impressions: Patient presents with suspected mild pharyngeal dysphagia characterized by food sticking in throat, coughing and choking events  Coughing/choking events were not observed during today's evaluation however, pt reports it happens occasionally during meals at home  It is recommended pt participate in videofluoroscopic swallow study (VFSS) to fully assess physiology and anatomy of the swallow to determine the appropriate diet and/or rehabilitation exercises  Further goals to be determined following results of VFSS  Recommendations:  -Patient would benefit from outpatient skilled Speech Therapy services : VFSS      -Intervention certification from: 3/82/9827  -Intervention certification to: 4/00/0492        Visit Tracking:  -Referring provider: Epic  -Billing guidelines: CMS  -Visit #1/10   -Medicare  -RE due 4/11/2019

## 2019-03-13 ENCOUNTER — EVALUATION (OUTPATIENT)
Dept: SPEECH THERAPY | Facility: REHABILITATION | Age: 74
End: 2019-03-13
Payer: MEDICARE

## 2019-03-13 ENCOUNTER — TRANSCRIBE ORDERS (OUTPATIENT)
Dept: ADMINISTRATIVE | Facility: HOSPITAL | Age: 74
End: 2019-03-13

## 2019-03-13 DIAGNOSIS — K21.9 GASTROESOPHAGEAL REFLUX DISEASE WITHOUT ESOPHAGITIS: ICD-10-CM

## 2019-03-13 DIAGNOSIS — G20 PARKINSON'S DISEASE (HCC): ICD-10-CM

## 2019-03-13 DIAGNOSIS — R13.10 DYSPHAGIA, UNSPECIFIED TYPE: Primary | ICD-10-CM

## 2019-03-13 PROCEDURE — 92610 EVALUATE SWALLOWING FUNCTION: CPT

## 2019-03-20 ENCOUNTER — HOSPITAL ENCOUNTER (OUTPATIENT)
Dept: RADIOLOGY | Facility: HOSPITAL | Age: 74
Discharge: HOME | End: 2019-03-20
Attending: INTERNAL MEDICINE
Payer: MEDICARE

## 2019-03-20 DIAGNOSIS — R13.10 DYSPHAGIA, UNSPECIFIED TYPE: ICD-10-CM

## 2019-03-20 DIAGNOSIS — K21.9 GASTROESOPHAGEAL REFLUX DISEASE WITHOUT ESOPHAGITIS: ICD-10-CM

## 2019-03-20 DIAGNOSIS — G20 PARKINSON'S DISEASE (HCC): ICD-10-CM

## 2019-03-20 PROCEDURE — 92611 MOTION FLUOROSCOPY/SWALLOW: CPT

## 2019-03-20 PROCEDURE — 74230 X-RAY XM SWLNG FUNCJ C+: CPT

## 2019-03-20 NOTE — PROCEDURES
Video Swallow Study      Patient Name: Reji Elias  [facility] Date: 3/20/2019        Past Medical History  Past Medical History:   Diagnosis Date    Arthritis     Benign familial tremor     Cardiac disease     two cardiac stents    Chest pain     Coronary artery disease     Hyperlipidemia     Hypertension     Kidney disease     Kidney stone     Malignant melanoma of skin of chest ([address] )     Removed 2 years ago   Meniscal injury     Nephrolithiasis     Parkinson's disease ([address] )     PONV (postoperative nausea and vomiting)     Tinnitus     [id number] RESOLVED        Past Surgical History  Past Surgical History:   Procedure Laterality Date    AMPUTATION Left     AMPUTATION OF FINGER WITH NEURECTOMY(EACH) DISTAL LONG  RING AND SMALL FINGER    CARPAL TUNNEL RELEASE Right     CATARACT EXTRACTION      CORONARY STENT PLACEMENT      two cardiac stents    FINGER AMPUTATION      AMPUTATION OF MIDDLE FINGER WITH NEURECTOMY(EACH)    HAND SURGERY Left     Traumatic amputation of fingers left hand   JOINT REPLACEMENT Bilateral     knees    KNEE ARTHROPLASTY      TOTAL    MALIGNANT SKIN LESION EXCISION      ANTERIOR CHEST FOR MELAMONIA    PA CYSTO/URETERO W/LITHOTRIPSY &INDWELL STENT INSRT Left 7/27/2017    Procedure: CYSTOSCOPY URETEROSCOPY , RETROGRADE PYELOGRAM AND INSERTION STENT URETERAL;  Surgeon: Kosta Birmingham MD;  [facility];  Service: Urology    PA LAP,CHOLECYSTECTOMY N/A 2/28/2018    Procedure: CHOLECYSTECTOMY LAPAROSCOPIC;  Surgeon: Sagar Peralta MD;  [facility];  Service: General    PA REMOVE BLADDER STONE,<2 5 CM N/A 9/15/2017    Procedure: Douglas Si;  Surgeon: Michael Martino MD;  [facility];  Service: Urology    PA TRANSURETHRAL ELEC-SURG PROSTATECTOM N/A 9/15/2017    Procedure: BIPOLAR TRANSURETHRAL RESECTION OF PROSTATE (TURP);   Surgeon: Michael Martino MD;  [facility];  Service: Urology    TONSILLECTOMY      TRIGGER FINGER RELEASE Right     TUMOR REMOVAL Left     Left foot 20 years ago   WISDOM TOOTH EXTRACTION Bilateral        Video Barium Swallow Study    Summary: Pt presents w functional oral, & mild pharyngeal dysphagia characterized by mildly decreased epiglottic inversion, mildly reduced superior laryngeal rise, and mildly reduced pharyngeal constriction  Pt has Parkinson's disease and GERD, primarily c/o gagging w both PO and saliva, often leading to coughing spells  Pt had transient penetration w nectar and thin, did not aspirate during the study  Osteophyte noted at C4-C5, pt had mild regurgitation and small amt of retention noted w all consistencies  Retention of solids near the osteophyte & in CP at times was regurgitated towards the PS  Brief screening of the esophagus was completed, pt had moderate dysmotility and mild reflux  Recommendations:  Diet: Regular  Liquids: Thin  Meds: as tolerated  Strategies: mult swallows,   Upright position  F/u ST tx:  F/u for education as able, diet recs, support  Therapy Prognosis: favorable  Prognosis considerations: pt w/ adequate support system, understanding of results  Intermittent Supervision  Aspiration Precautions  Reflux Precautions    Results reviewed with: pt    *If a dedicated assessment of the esophagus is desired, consider esophagram/barium swallow or EGD  Goals:  Pt will tolerate least restrictive diet w/out s/s aspiration or oral/pharyngeal difficulties  Pt is a 76 y/o male who was referred for a dysphagia evaluation secondary to difficulty swallowing  Per MD report, "Pt gets coughing episodes at times when eating and makes pt gag   Pt gets coughing episodes every day now and first noticed it maybe a year ago " Patient MH is significant for Parkinson's disease (diagnosed 6 years ago), Gastroesophageal reflux disease without esophagitis, Coronary artery disease, BPH without obstruction/lower urinary tract symptoms, Degeneration, intervertebral disc, Gout, Idiopathic peripheral neuropathy, Nephrolithiasis, Nephrolithiasis, Osteoarthritis of knee, Hypercholesterolemia and 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  Patient reports  experiencing gagging which turns into coughing spells with food  He reports no specific food triggers his gag, it happens with food and even with saliva  He does not experience coughing with a particular food stating "it happens with all foods, sometimes when the timing of breathing and swallowing seem to conflict I'll start gagging and then cough " He stated gagging and coughing with food began 6-9 months ago  He denies feeling food sticking in throat but indicated he feels as if phlegm or something small is always in his throat  He denies recent pna and/or weight loss  His typical breakfast consist of cereal or oatmeal and juice, Lunch: sandwich and water and eats a wide variety of food for dinner  He reports he does not experience difficulty swallowing during breakfast time or lunch, coughing usually happens during the evening hours while eating dinner  He communicated his gag reflex is triggered by his own saliva throughout the day (ie , while sitting back in his couch while watching tv stating "My own saliva makes me gag and I start to cough  ") He denies any difficulty swallowing liquids or pills  He reports he used to be a fast eater but now "runs out of breath and needs to take a break" when eating and drinking  He reports he does not run out of breath when speaking  Clinician tested patient's MPT as well as digits per breath, results were WNL (17-20 seconds MPT with normal loudness level, little raspiness in voice; counted 1-25 in one breath) pt did not express concerns re voice  His goal is to eat without gagging and coughing      PMH: see above      Previous VBS:  -  Current Diet:  Regular, thin   Premorbid diet:  Regular, thin  Dentition:  Natural  O2 requirement:  RA  Oral mech:  Strength and ROM: WFL    Vocal Quality/Speech:  Clear, intelligible - pt notices voice change sometimes during the day, noted hoarse-ilnda  Cognitive status:  Alert, oriented    Consistencies administered: Barium laden applesauce, banana, chicken salad, cookie, bagel, rice krispie, honey thick, nectar thick, thin liquids, 13mm barium pill  Liquids were administered by tsp, cup and straw  Pt was seated laterally at 90 degrees  Pt viewed in AP position    Oral stage:  WNL  Lip closure: wfl  Mastication: adequate  Bolus formation: mild piecemeal w chicken salad, pt clears; otherwise wfl  Bolus control: wfl  Transfer: timely  Residue: none    Pharyngeal stage:  Mild impairment  Swallow promptness: prompt w all consistencies  Spill to valleculae: w/ chicken salad, cookie  Spill to pyriforms: none  Epiglottic inversion: mild reduced  Laryngeal rise: mild decreased superiorly  Pharyngeal constriction: mild reduced  Vallecular retention: trace to mild retention noted w all consistencies  Pyriform retention: small retention noted at osteophyte w all consistencies, mild pooling noted  Pt needs 2* swallow to clear  PPW coating: -   Osteophytes: yes, C4-C5  CP prominence: reduced relaxation in the UES/CP area with bolus compression & some regurgitation w/ the solids      Transient penetration: w straw sip nectar during the swallow, trace w thin during the swallow  Epiglottic undercoat: -   Penetration: only transient w straw sip nectar, thin  Aspiration: none    Screening of Esophageal stage:  Mild-mod impairment  Dysmotility: mod dysmotility  Retropulsion: noted w all consistencies  Tertiary contractions: -  Reflux: mild reflux  Retention: mild-mod  Stricture: -  LES: -

## 2019-04-03 DIAGNOSIS — I10 ESSENTIAL HYPERTENSION: ICD-10-CM

## 2019-04-03 RX ORDER — BENAZEPRIL/HYDROCHLOROTHIAZIDE 20 MG-25MG
TABLET ORAL
Qty: 90 TABLET | Refills: 1 | Status: SHIPPED | OUTPATIENT
Start: 2019-04-03 | End: 2019-12-16 | Stop reason: SDUPTHER

## 2019-04-17 ENCOUNTER — OFFICE VISIT (OUTPATIENT)
Dept: SPEECH THERAPY | Facility: REHABILITATION | Age: 74
End: 2019-04-17
Payer: MEDICARE

## 2019-04-17 DIAGNOSIS — R13.11 DYSPHAGIA, ORAL PHASE: ICD-10-CM

## 2019-04-17 DIAGNOSIS — R13.12 DYSPHAGIA, OROPHARYNGEAL PHASE: Primary | ICD-10-CM

## 2019-04-17 DIAGNOSIS — G20 PARKINSON'S DISEASE (HCC): ICD-10-CM

## 2019-04-17 PROCEDURE — 92507 TX SP LANG VOICE COMM INDIV: CPT

## 2019-04-24 ENCOUNTER — APPOINTMENT (OUTPATIENT)
Dept: SPEECH THERAPY | Facility: REHABILITATION | Age: 74
End: 2019-04-24
Payer: MEDICARE

## 2019-04-24 DIAGNOSIS — G47.09 OTHER INSOMNIA: ICD-10-CM

## 2019-04-24 RX ORDER — ZOLPIDEM TARTRATE 10 MG/1
10 TABLET ORAL
Qty: 30 TABLET | Refills: 0 | Status: SHIPPED | OUTPATIENT
Start: 2019-04-24 | End: 2020-02-24

## 2019-04-26 ENCOUNTER — OFFICE VISIT (OUTPATIENT)
Dept: SPEECH THERAPY | Facility: REHABILITATION | Age: 74
End: 2019-04-26
Payer: MEDICARE

## 2019-04-26 DIAGNOSIS — R13.11 DYSPHAGIA, ORAL PHASE: ICD-10-CM

## 2019-04-26 DIAGNOSIS — G20 PARKINSON'S DISEASE (HCC): ICD-10-CM

## 2019-04-26 DIAGNOSIS — R13.12 DYSPHAGIA, OROPHARYNGEAL PHASE: Primary | ICD-10-CM

## 2019-04-26 PROCEDURE — 92526 ORAL FUNCTION THERAPY: CPT

## 2019-05-10 DIAGNOSIS — I25.10 CORONARY ARTERY DISEASE INVOLVING NATIVE CORONARY ARTERY OF NATIVE HEART WITHOUT ANGINA PECTORIS: ICD-10-CM

## 2019-05-10 RX ORDER — ATORVASTATIN CALCIUM 20 MG/1
TABLET, FILM COATED ORAL
Qty: 90 TABLET | Refills: 0 | Status: SHIPPED | OUTPATIENT
Start: 2019-05-10 | End: 2019-08-13 | Stop reason: SDUPTHER

## 2019-05-19 DIAGNOSIS — M1A.9XX0 CHRONIC GOUT WITHOUT TOPHUS, UNSPECIFIED CAUSE, UNSPECIFIED SITE: ICD-10-CM

## 2019-05-19 RX ORDER — ALLOPURINOL 300 MG/1
TABLET ORAL
Qty: 90 TABLET | Refills: 0 | Status: SHIPPED | OUTPATIENT
Start: 2019-05-19 | End: 2019-08-13 | Stop reason: SDUPTHER

## 2019-05-28 DIAGNOSIS — I10 ESSENTIAL HYPERTENSION: ICD-10-CM

## 2019-05-28 RX ORDER — METOPROLOL SUCCINATE 50 MG/1
TABLET, EXTENDED RELEASE ORAL
Qty: 90 TABLET | Refills: 0 | Status: SHIPPED | OUTPATIENT
Start: 2019-05-28 | End: 2019-08-24 | Stop reason: SDUPTHER

## 2019-06-12 ENCOUNTER — OFFICE VISIT (OUTPATIENT)
Dept: CARDIOLOGY CLINIC | Facility: CLINIC | Age: 74
End: 2019-06-12
Payer: MEDICARE

## 2019-06-12 VITALS
BODY MASS INDEX: 32.47 KG/M2 | HEIGHT: 73 IN | HEART RATE: 80 BPM | WEIGHT: 245 LBS | DIASTOLIC BLOOD PRESSURE: 60 MMHG | SYSTOLIC BLOOD PRESSURE: 112 MMHG

## 2019-06-12 DIAGNOSIS — I25.10 CORONARY ARTERY DISEASE INVOLVING NATIVE CORONARY ARTERY OF NATIVE HEART WITHOUT ANGINA PECTORIS: Primary | ICD-10-CM

## 2019-06-12 DIAGNOSIS — E78.00 HYPERCHOLESTEROLEMIA: ICD-10-CM

## 2019-06-12 DIAGNOSIS — I10 ESSENTIAL HYPERTENSION: ICD-10-CM

## 2019-06-12 PROCEDURE — 93000 ELECTROCARDIOGRAM COMPLETE: CPT | Performed by: INTERNAL MEDICINE

## 2019-06-12 PROCEDURE — 99214 OFFICE O/P EST MOD 30 MIN: CPT | Performed by: INTERNAL MEDICINE

## 2019-06-12 RX ORDER — NITROGLYCERIN 0.4 MG/1
TABLET SUBLINGUAL
COMMUNITY

## 2019-07-03 ENCOUNTER — HOSPITAL ENCOUNTER (OUTPATIENT)
Dept: RADIOLOGY | Facility: HOSPITAL | Age: 74
Discharge: HOME | End: 2019-07-03
Payer: MEDICARE

## 2019-07-03 ENCOUNTER — HOSPITAL ENCOUNTER (OUTPATIENT)
Dept: ULTRASOUND IMAGING | Facility: HOSPITAL | Age: 74
Discharge: HOME | End: 2019-07-03
Payer: MEDICARE

## 2019-07-03 ENCOUNTER — TELEPHONE (OUTPATIENT)
Dept: UROLOGY | Facility: MEDICAL CENTER | Age: 74
End: 2019-07-03

## 2019-07-03 DIAGNOSIS — N20.0 NEPHROLITHIASIS: ICD-10-CM

## 2019-07-03 PROCEDURE — 74018 RADEX ABDOMEN 1 VIEW: CPT

## 2019-07-03 PROCEDURE — 76770 US EXAM ABDO BACK WALL COMP: CPT

## 2019-07-03 NOTE — TELEPHONE ENCOUNTER
Patient last seen by Bubba Drake managed by Dr Phillip Gonzalez in [facility]  Returning call to reschedule Follow up Appointment with [facility]      Can be reached at [phone]

## 2019-07-03 NOTE — TELEPHONE ENCOUNTER
Returned Justen's call  He is going for u/s and KUB today  Scheduled next Wednesday with Niharika Zheng  He states that besides his stones he is having some urologic problems also

## 2019-07-10 ENCOUNTER — OFFICE VISIT (OUTPATIENT)
Dept: UROLOGY | Facility: HOSPITAL | Age: 74
End: 2019-07-10
Payer: MEDICARE

## 2019-07-10 VITALS
HEIGHT: 73 IN | BODY MASS INDEX: 32.74 KG/M2 | WEIGHT: 247 LBS | DIASTOLIC BLOOD PRESSURE: 82 MMHG | SYSTOLIC BLOOD PRESSURE: 152 MMHG | HEART RATE: 82 BPM

## 2019-07-10 DIAGNOSIS — N40.0 BPH WITHOUT OBSTRUCTION/LOWER URINARY TRACT SYMPTOMS: Primary | ICD-10-CM

## 2019-07-10 DIAGNOSIS — N20.0 NEPHROLITHIASIS: ICD-10-CM

## 2019-07-10 LAB
SL AMB  POCT GLUCOSE, UA: NORMAL
SL AMB LEUKOCYTE ESTERASE,UA: NORMAL
SL AMB POCT BILIRUBIN,UA: NORMAL
SL AMB POCT BLOOD,UA: NORMAL
SL AMB POCT CLARITY,UA: CLEAR
SL AMB POCT COLOR,UA: YELLOW
SL AMB POCT KETONES,UA: NORMAL
SL AMB POCT NITRITE,UA: NORMAL
SL AMB POCT PH,UA: 5
SL AMB POCT SPECIFIC GRAVITY,UA: 1.01
SL AMB POCT URINE PROTEIN: NORMAL
SL AMB POCT UROBILINOGEN: 0.2

## 2019-07-10 PROCEDURE — 81002 URINALYSIS NONAUTO W/O SCOPE: CPT | Performed by: NURSE PRACTITIONER

## 2019-07-10 PROCEDURE — 99213 OFFICE O/P EST LOW 20 MIN: CPT | Performed by: NURSE PRACTITIONER

## 2019-07-10 NOTE — PROGRESS NOTES
7/10/2019    Mary Todd  1945  [id number]        Assessment  BPH and bladder stones s/p TURP and cystolitholapaxy (9/15/2017)  Nephrolithiasis    Discussion  Marcelino Clinton is a 75 y o  male being managed by Bhavya Mckeon  He is doing well with no urinary complaints or recent stone episodes  His KUB was reviewed and no stones were visualized  His renal ultrasound was also reviewed and no stones were visualized  However, there were renal cyst which compared to prior imaging remains stable  Results were reviewed with the patient  We discussed that his lower back pain is most likely musculoskeletal in nature and he should follow up with his primary care physician  He will return in 1 year for follow-up with KUB and renal ultrasound  He was instructed to call with any issues  All questions were answered  History of Present Illness  75 y o  male with a history of BPH and bladder stones s/p TURP and cystolitholapaxy (9/15/2017 performed by Dr Mata Ruiz) and nephrolithiasis  presents today for 1 year follow up  He denies any lower urinary tract symptoms  He has a good stream   He denies any recent stone episodes  However he does note that about 6 weeks ago he developed right-sided lower back pain  This develops overnight while he is sleeping and is worse in the morning  He states the pain waxes and wanes  Has not progressively worsened since initiated  He denies any nausea or vomiting  He denies any gross hematuria  He has not undergone any evaluation of this pain except for recent KUB and renal ultrasound for today's visit  He denies any changes in his overall health except slow progression of his Parkinson's disease  Review of Systems  Review of Systems   Constitutional: Negative  HENT: Negative  Respiratory: Negative  Cardiovascular: Negative  Gastrointestinal: Negative  Genitourinary:        As per HPI   Musculoskeletal: Negative  Skin: Negative  Neurological: Negative  Hematological: Negative  AUA SYMPTOM SCORE      Most Recent Value   AUA SYMPTOM SCORE   How often have you had a sensation of not emptying your bladder completely after you finished urinating?  --   How often have you had to urinate again less than two hours after you finished urinating? 0   How often have you found you stopped and started again several times when you urinate?  0   How often have you found it difficult to postpone urination? 0   How often have you had a weak urinary stream?  0   How often have you had to push or strain to begin urination? 0   How many times did you most typically get up to urinate from the time you went to bed at night until the time you got up in the morning? 1   Quality of Life: If you were to spend the rest of your life with your urinary condition just the way it is now, how would you feel about that?  0   AUA SYMPTOM SCORE  --        Urinary Incontinence Screening      Most Recent Value   Urinary Incontinence   Urinary Incontinence? No   Incomplete emptying? No   Urinary frequency? No   Urinary urgency? No   Urinary hesitancy? No   Dysuria (painful difficult urination)? No   Nocturia (waking up to use the bathroom)? No   Straining (having to push to go)? No   Weak stream?  No   Intermittent stream?  No   Post void dribbling? No            Past Medical History  Past Medical History:   Diagnosis Date    Arthritis     Benign familial tremor     Cardiac disease     two cardiac stents    Chest pain     Coronary artery disease     Hyperlipidemia     Hypertension     Kidney disease     Kidney stone     Malignant melanoma of skin of [address] [address]     Removed 2 years ago      Meniscal injury     Nephrolithiasis     Parkinson's disease ([facility]     PONV (postoperative nausea and vomiting)     Tinnitus     [id number] RESOLVED       Past Surgical History  Past Surgical History:   Procedure Laterality Date    AMPUTATION Left     AMPUTATION OF FINGER WITH NEURECTOMY(EACH) DISTAL LONG  RING AND SMALL FINGER    CARPAL TUNNEL RELEASE Right     CATARACT EXTRACTION      CORONARY STENT PLACEMENT      two cardiac stents    FINGER AMPUTATION      AMPUTATION OF MIDDLE FINGER WITH NEURECTOMY(EACH)    HAND SURGERY Left     Traumatic amputation of fingers left hand   JOINT REPLACEMENT Bilateral     knees    KNEE ARTHROPLASTY      TOTAL    MALIGNANT SKIN LESION EXCISION      ANTERIOR CHEST FOR MELAMONIA    IA CYSTO/URETERO W/LITHOTRIPSY &INDWELL STENT INSRT Left 7/27/2017    Procedure: CYSTOSCOPY URETEROSCOPY , RETROGRADE PYELOGRAM AND INSERTION STENT URETERAL;  Surgeon: Vidhi Lepe MD;  [facility];  Service: Urology    IA LAP,CHOLECYSTECTOMY N/A 2/28/2018    Procedure: CHOLECYSTECTOMY LAPAROSCOPIC;  Surgeon: Bruce Campuzano MD;  [facility];  Service: General    IA REMOVE BLADDER STONE,<2 5 CM N/A 9/15/2017    Procedure: Jenita Shallow;  Surgeon: Manoj Durham MD;  [facility];  Service: Urology    IA TRANSURETHRAL ELEC-SURG PROSTATECTOM N/A 9/15/2017    Procedure: BIPOLAR TRANSURETHRAL RESECTION OF PROSTATE (TURP); Surgeon: Manoj Durham MD;  [facility];  Service: Urology    TONSILLECTOMY      TRIGGER FINGER RELEASE Right     TUMOR REMOVAL Left     Left foot 20 years ago      WISDOM TOOTH EXTRACTION Bilateral         Past Family History  Family History   Problem Relation Age of Onset    Heart disease Mother     Hypertension Mother     Stroke Mother     Breast cancer Mother     Heart disease Father     Hypertension Father     Stroke Father     Colon cancer Father     Diabetes Sister     Heart disease Brother     Other Family         BACK PAIN    Breast cancer Family     Heart disease Family     Hypertension Family     Stroke Family     Mental illness Neg Hx     Substance Abuse Neg Hx        Past Social history  Social History     Socioeconomic History    Marital status: /Civil [employer]     Spouse name: Not on file    Number of children: Not on file    Years of education: Not on file    Highest education level: Not on file   Occupational History    Not on file   Social Needs    Financial resource strain: Not on file    Food insecurity:     Worry: Not on file     Inability: Not on file    Transportation needs:     Medical: Not on file     Non-medical: Not on file   Tobacco Use    Smoking status: Former Smoker     Types: Cigarettes    Smokeless tobacco: Never Used    Tobacco comment: back in college in 1966   Substance and Sexual Activity    Alcohol use: Yes     Alcohol/week: 4 0 standard drinks     Types: 4 Cans of beer per week    Drug use: No    Sexual activity: Not on file   Lifestyle    Physical activity:     Days per week: Not on file     Minutes per session: Not on file    Stress: Not on file   Relationships    Social connections:     Talks on phone: Not on file     Gets together: Not on file     Attends Jehovah's witness service: Not on file     Active member of club or organization: Not on file     Attends meetings of clubs or organizations: Not on file     Relationship status: Not on file    Intimate partner violence:     Fear of current or ex partner: Not on file     Emotionally abused: Not on file     Physically abused: Not on file     Forced sexual activity: Not on file   Other Topics Concern    Not on file   Social History Narrative    Lives with wife  Sees dentist reg  Has living will  Feels safe at home  No mental or sub in self       ACTIVE ADVANCE DIRECTIVE    CAREGIVER:  SPOUSE    EXERCISE HABITS  -  DAILY    GOOD DENTAL HYGIENE       Current Medications  Current Outpatient Medications   Medication Sig Dispense Refill    allopurinol (ZYLOPRIM) 300 mg tablet TAKE 1 TABLET EVERY DAY 90 tablet 0    aspirin (ASPIRIN LOW DOSE) 81 MG tablet Take by mouth      atorvastatin (LIPITOR) 20 mg tablet TAKE 1 TABLET EVERY DAY 90 tablet 0    benazepril-hydrochlorthiazide (LOTENSIN HCT) 20-25 MG per tablet TAKE 1 TABLET BY MOUTH EVERY DAY 90 tablet 1    Calcium Carbonate-Vitamin D (CALTRATE 600+D) 600-400 MG-UNIT per tablet Caltrate 600+D 600-400 MG-UNIT TABS  Take 1 tablet daily   Refills: 0      , M D ; Active      carbidopa-levodopa (SINEMET)  mg per tablet 2 tabs---three times daily      Cholecalciferol (VITAMIN D-3) 1000 UNITS CAPS Vitamin D3 1000 UNIT Oral Capsule   once a day Refills: 0      , M D ; Active      glucosamine-chondroitin 500-400 MG tablet Take 1 tablet by mouth daily      metoprolol succinate (TOPROL-XL) 50 mg 24 hr tablet TAKE 1 TABLET BY MOUTH EVERY DAY 90 tablet 0    Multiple Vitamin (MULTIVITAMINS PO) Multivitamins Oral once a day Capsule   Refills: 0      , M D ; Active      nitroglycerin (NITROSTAT) 0 4 mg SL tablet Place under the tongue      zolpidem (AMBIEN) 10 mg tablet Take 1 tablet (10 mg total) by mouth daily at bedtime as needed for sleep 30 tablet 0    FLUAD 0 5 ML ISHA inject 0 5 milliliter intramuscularly  0     No current facility-administered medications for this visit  Allergies  No Known Allergies    Past Medical History, Social History, Family History, medications and allergies were reviewed and updated as appropriate  Vitals  Vitals:    07/10/19 1135   BP: 152/82   BP Location: Left arm   Patient Position: Sitting   Cuff Size: Adult   Pulse: 82   Weight: 112 kg (247 lb)   Height: 6' 1" (1 854 m)       Physical Exam  Skin: warm, dry, intact  Pulmonary: Non-labored breathing  Abdomen: Soft, non-tender, non-distended  Musculoskeletal: AROM with no joint deformity or tenderness  Neurology: Alert and oriented  Genitourinary: Negative CVA tenderness, negative suprapubic tenderness              Results  Lab Results   Component Value Date    PSA 1 6 04/05/2017    PSA 1 4 10/06/2015     Lab Results   Component Value Date    GLUCOSE 95 10/06/2015    CALCIUM 8 4 03/08/2019     10/06/2015 K 3 7 03/08/2019    CO2 30 03/08/2019     03/08/2019    BUN 18 03/08/2019    CREATININE 1 00 03/08/2019     Lab Results   Component Value Date    WBC 5 88 03/08/2019    HGB 15 7 03/08/2019    HCT 46 5 03/08/2019    MCV 96 03/08/2019     03/08/2019       Recent Results (from the past 1 hour(s))   POCT urine dip    Collection Time: 07/10/19 11:44 AM   Result Value Ref Range    LEUKOCYTE ESTERASE,UA -     NITRITE,UA -     SL AMB POCT UROBILINOGEN 0 2     POCT URINE PROTEIN trace      PH,UA 5 0     BLOOD,UA -     SPECIFIC GRAVITY,UA 1 015     KETONES,UA -     BILIRUBIN,UA -     GLUCOSE, UA -      COLOR,UA yellow     CLARITY,UA clear    ]  FINDINGS:     KIDNEYS:  Symmetric and normal size  Right kidney:  12 4 cm  Left kidney:  14 2 cm      Right kidney  Normal echogenicity and contour  No suspicious masses detected  Tiny subcentimeter cortical renal cysts noted  No hydronephrosis  No shadowing calculi  No perinephric fluid collections      Left kidney  Normal echogenicity and contour  No suspicious masses detected  An exophytic 6 9 cm cyst at the upper pole the left kidney appears to be simple  Simple appearing 4 6 cm cortical cyst is noted at the upper pole the left kidney  No hydronephrosis  No shadowing calculi  No perinephric fluid collections      URETERS:  Nonvisualized      BLADDER:   Patient is unable to hold urine and therefore the bladder was incompletely distended  Within the limitations of incomplete distention, no focal bladder mass is identified         IMPRESSION:     No urinary tract calculi appreciated on this examination  Specifically, stones at the lower pole the right kidney seen as recently as August 24, 2017 are not detectable  No nephrosis      Left renal cysts are reidentified      Incomplete distention of the urinary bladder without evidence of detectable bladder mass or bladder calculus      FINDINGS:     No radiopaque renal calculi seen      No radiopaque ureteral calculi identified      Nonobstructive bowel gas pattern      No acute osseous abnormality is seen      IMPRESSION:     No radiographically detectable radiopaque urinary tract calculi

## 2019-08-13 DIAGNOSIS — I25.10 CORONARY ARTERY DISEASE INVOLVING NATIVE CORONARY ARTERY OF NATIVE HEART WITHOUT ANGINA PECTORIS: ICD-10-CM

## 2019-08-13 DIAGNOSIS — M1A.9XX0 CHRONIC GOUT WITHOUT TOPHUS, UNSPECIFIED CAUSE, UNSPECIFIED SITE: ICD-10-CM

## 2019-08-13 RX ORDER — ATORVASTATIN CALCIUM 20 MG/1
TABLET, FILM COATED ORAL
Qty: 90 TABLET | Refills: 0 | Status: SHIPPED | OUTPATIENT
Start: 2019-08-13 | End: 2019-11-07 | Stop reason: SDUPTHER

## 2019-08-14 RX ORDER — ALLOPURINOL 300 MG/1
TABLET ORAL
Qty: 90 TABLET | Refills: 0 | Status: SHIPPED | OUTPATIENT
Start: 2019-08-14 | End: 2019-11-11 | Stop reason: SDUPTHER

## 2019-08-24 DIAGNOSIS — I10 ESSENTIAL HYPERTENSION: ICD-10-CM

## 2019-08-25 RX ORDER — METOPROLOL SUCCINATE 50 MG/1
TABLET, EXTENDED RELEASE ORAL
Qty: 90 TABLET | Refills: 0 | Status: SHIPPED | OUTPATIENT
Start: 2019-08-25 | End: 2019-11-17 | Stop reason: SDUPTHER

## 2019-09-05 ENCOUNTER — OFFICE VISIT (OUTPATIENT)
Dept: FAMILY MEDICINE CLINIC | Facility: HOSPITAL | Age: 74
End: 2019-09-05
Payer: MEDICARE

## 2019-09-05 VITALS
HEIGHT: 73 IN | BODY MASS INDEX: 32.31 KG/M2 | HEART RATE: 85 BPM | DIASTOLIC BLOOD PRESSURE: 60 MMHG | SYSTOLIC BLOOD PRESSURE: 112 MMHG | WEIGHT: 243.8 LBS

## 2019-09-05 DIAGNOSIS — R13.13 PHARYNGEAL DYSPHAGIA: ICD-10-CM

## 2019-09-05 DIAGNOSIS — I10 ESSENTIAL HYPERTENSION: ICD-10-CM

## 2019-09-05 DIAGNOSIS — G20 PRIMARY PARKINSONISM (HCC): ICD-10-CM

## 2019-09-05 DIAGNOSIS — Z23 NEED FOR VACCINATION: ICD-10-CM

## 2019-09-05 DIAGNOSIS — R10.10 PAIN OF UPPER ABDOMEN: Primary | ICD-10-CM

## 2019-09-05 PROBLEM — G47.09 OTHER INSOMNIA: Status: ACTIVE | Noted: 2019-09-05

## 2019-09-05 PROBLEM — G20.C PRIMARY PARKINSONISM: Status: ACTIVE | Noted: 2017-07-26

## 2019-09-05 PROCEDURE — G0008 ADMIN INFLUENZA VIRUS VAC: HCPCS

## 2019-09-05 PROCEDURE — 99214 OFFICE O/P EST MOD 30 MIN: CPT | Performed by: INTERNAL MEDICINE

## 2019-09-05 PROCEDURE — 90662 IIV NO PRSV INCREASED AG IM: CPT

## 2019-09-05 NOTE — PATIENT INSTRUCTIONS
Research www  Sleepeducation  org for recommendations on healthy sleep habits! Try increasing your fiber intake (fruits/vegetables) with every meal    Try taking docusate 100mg twice a day to make your bowel movements more frequent! If blood tests are abnormal such as they show anemia or your pain gets worse will get CT of your abdomen!

## 2019-09-05 NOTE — PROGRESS NOTES
Assessment/Plan:    Pharyngeal dysphagia  Improved since implementing recommendations from speech    Primary Parkinsonism (HCC)  Takes sinemet 6 pills a day  I explained that constipation could be due to Parkinson's  Will increase fiber intake and try colace first    Other insomnia  I advised pt not to use ambien as it may impair his gait and cause falls  Essential hypertension  BP is controlled, continue same meds, will check labs       Diagnoses and all orders for this visit:    Pain of upper abdomen  -     CBC and differential; Future  -     Comprehensive metabolic panel; Future  -     Cancel: TSH, 3rd generation with Free T4 reflex; Future    Pharyngeal dysphagia    Primary Parkinsonism ([address] )    Essential hypertension  -     TSH, 3rd generation with Free T4 reflex; Future      Will get ct abd if pain worsens or labs are abnormal to evaluate for retroperitoneal mass, pt wishes conservative evaluation first and giving diet and stool softeners a chance to work first    Subjective:      Patient ID: Navarro Rizo is a 75 y o  male  Pt's swallowing is improved since eating smaller, frequent meals and swallowing multiple times  Pt fell couple of months ago as he was hurrying into his house in the rain crossing a mulch bed, sustained no injuries  Pt had colonoscopy in 2016    He had retroperitoneal US in July that showed no hydronephrosis or nephrolithiasis        Abdominal Pain   This is a chronic problem  Episode onset: several month ago  The onset quality is gradual  The problem occurs intermittently (9/10 days, variable during the day)  The problem has been unchanged  Pain location: Right CVA with radiation to the right groin  The pain is moderate  The quality of the pain is dull (pressure)  The abdominal pain radiates to the RLQ  Associated symptoms include constipation  Pertinent negatives include no diarrhea, dysuria, fever, headaches, hematuria, melena or myalgias   Associated symptoms comments: Increased flatus  Exacerbated by: constipation, sitting, getting out of bed  Relieved by: moving gas, defecation  The following portions of the patient's history were reviewed and updated as appropriate: current medications, past family history, past medical history, past social history, past surgical history and problem list     Review of Systems   Constitutional: Negative for chills, fever and unexpected weight change  HENT: Negative for congestion  Eyes: Negative for visual disturbance  Respiratory: Negative for cough and shortness of breath  Cardiovascular: Negative for chest pain, palpitations and leg swelling  Gastrointestinal: Positive for abdominal pain and constipation  Negative for blood in stool, diarrhea and melena  Endocrine: Negative for polydipsia and polyphagia  Genitourinary: Negative for difficulty urinating, dysuria and hematuria  Musculoskeletal: Negative for joint swelling and myalgias  Skin: Negative for rash  Neurological: Negative for headaches  Psychiatric/Behavioral: Negative for dysphoric mood  All other systems reviewed and are negative  Objective:    /60 (BP Location: Left arm, Patient Position: Sitting, Cuff Size: Standard)   Pulse 85   Ht 6' 1" (1 854 m)   Wt 111 kg (243 lb 12 8 oz)   BMI 32 17 kg/m²      Physical Exam   Constitutional: He is oriented to person, place, and time  He appears well-developed  No distress  HENT:   Head: Normocephalic  Mouth/Throat: Oropharynx is clear and moist    Eyes: Conjunctivae are normal    Neck: Neck supple  Cardiovascular: Normal rate and regular rhythm  Pulmonary/Chest: Effort normal  No respiratory distress  He has no wheezes  He has no rales  Abdominal: Soft  Bowel sounds are normal  He exhibits no distension  There is no tenderness  Musculoskeletal: He exhibits no tenderness  Lymphadenopathy:     He has no cervical adenopathy     Neurological: He is alert and oriented to person, place, and time  No cranial nerve deficit  Left hand resting tremor, motionless face   Skin: Skin is warm and dry  No rash noted  Psychiatric: He has a normal mood and affect  Vitals reviewed            Román Brooks MD

## 2019-09-05 NOTE — ASSESSMENT & PLAN NOTE
Takes sinemet 6 pills a day  I explained that constipation could be due to Parkinson's  Will increase fiber intake and try colace first

## 2019-09-06 ENCOUNTER — LAB (OUTPATIENT)
Dept: LAB | Facility: HOSPITAL | Age: 74
End: 2019-09-06
Attending: INTERNAL MEDICINE
Payer: MEDICARE

## 2019-09-06 DIAGNOSIS — R10.10 PAIN OF UPPER ABDOMEN: ICD-10-CM

## 2019-09-06 DIAGNOSIS — I10 ESSENTIAL HYPERTENSION: ICD-10-CM

## 2019-09-06 LAB
ALBUMIN SERPL BCP-MCNC: 3.7 G/DL (ref 3.5–5)
ALP SERPL-CCNC: 94 U/L (ref 46–116)
ALT SERPL W P-5'-P-CCNC: 8 U/L (ref 12–78)
ANION GAP SERPL CALCULATED.3IONS-SCNC: 10 MMOL/L (ref 4–13)
AST SERPL W P-5'-P-CCNC: 22 U/L (ref 5–45)
BASOPHILS # BLD AUTO: 0.02 THOUSANDS/ΜL (ref 0–0.1)
BASOPHILS NFR BLD AUTO: 0 % (ref 0–1)
BILIRUB SERPL-MCNC: 0.6 MG/DL (ref 0.2–1)
BUN SERPL-MCNC: 18 MG/DL (ref 5–25)
CALCIUM SERPL-MCNC: 8.7 MG/DL (ref 8.3–10.1)
CHLORIDE SERPL-SCNC: 107 MMOL/L (ref 100–108)
CO2 SERPL-SCNC: 26 MMOL/L (ref 21–32)
CREAT SERPL-MCNC: 0.98 MG/DL (ref 0.6–1.3)
EOSINOPHIL # BLD AUTO: 0.17 THOUSAND/ΜL (ref 0–0.61)
EOSINOPHIL NFR BLD AUTO: 3 % (ref 0–6)
ERYTHROCYTE [DISTWIDTH] IN BLOOD BY AUTOMATED COUNT: 12.6 % (ref 11.6–15.1)
GFR SERPL CREATININE-BSD FRML MDRD: 75 ML/MIN/1.73SQ M
GLUCOSE P FAST SERPL-MCNC: 98 MG/DL (ref 65–99)
HCT VFR BLD AUTO: 45.4 % (ref 36.5–49.3)
HGB BLD-MCNC: 15.1 G/DL (ref 12–17)
IMM GRANULOCYTES # BLD AUTO: 0.02 THOUSAND/UL (ref 0–0.2)
IMM GRANULOCYTES NFR BLD AUTO: 0 % (ref 0–2)
LYMPHOCYTES # BLD AUTO: 0.9 THOUSANDS/ΜL (ref 0.6–4.47)
LYMPHOCYTES NFR BLD AUTO: 17 % (ref 14–44)
MCH RBC QN AUTO: 31.9 PG (ref 26.8–34.3)
MCHC RBC AUTO-ENTMCNC: 33.3 G/DL (ref 31.4–37.4)
MCV RBC AUTO: 96 FL (ref 82–98)
MONOCYTES # BLD AUTO: 0.6 THOUSAND/ΜL (ref 0.17–1.22)
MONOCYTES NFR BLD AUTO: 11 % (ref 4–12)
NEUTROPHILS # BLD AUTO: 3.75 THOUSANDS/ΜL (ref 1.85–7.62)
NEUTS SEG NFR BLD AUTO: 69 % (ref 43–75)
NRBC BLD AUTO-RTO: 0 /100 WBCS
PLATELET # BLD AUTO: 155 THOUSANDS/UL (ref 149–390)
PMV BLD AUTO: 10.6 FL (ref 8.9–12.7)
POTASSIUM SERPL-SCNC: 3.9 MMOL/L (ref 3.5–5.3)
PROT SERPL-MCNC: 7 G/DL (ref 6.4–8.2)
RBC # BLD AUTO: 4.73 MILLION/UL (ref 3.88–5.62)
SODIUM SERPL-SCNC: 143 MMOL/L (ref 136–145)
TSH SERPL DL<=0.05 MIU/L-ACNC: 1.95 UIU/ML (ref 0.36–3.74)
WBC # BLD AUTO: 5.46 THOUSAND/UL (ref 4.31–10.16)

## 2019-09-06 PROCEDURE — 80053 COMPREHEN METABOLIC PANEL: CPT

## 2019-09-06 PROCEDURE — 85025 COMPLETE CBC W/AUTO DIFF WBC: CPT

## 2019-09-06 PROCEDURE — 84443 ASSAY THYROID STIM HORMONE: CPT

## 2019-09-06 PROCEDURE — 36415 COLL VENOUS BLD VENIPUNCTURE: CPT

## 2019-11-07 DIAGNOSIS — I25.10 CORONARY ARTERY DISEASE INVOLVING NATIVE CORONARY ARTERY OF NATIVE HEART WITHOUT ANGINA PECTORIS: ICD-10-CM

## 2019-11-07 RX ORDER — ATORVASTATIN CALCIUM 20 MG/1
TABLET, FILM COATED ORAL
Qty: 90 TABLET | Refills: 0 | Status: SHIPPED | OUTPATIENT
Start: 2019-11-07 | End: 2020-01-29

## 2019-11-11 DIAGNOSIS — M1A.9XX0 CHRONIC GOUT WITHOUT TOPHUS, UNSPECIFIED CAUSE, UNSPECIFIED SITE: ICD-10-CM

## 2019-11-11 RX ORDER — ALLOPURINOL 300 MG/1
TABLET ORAL
Qty: 90 TABLET | Refills: 0 | Status: SHIPPED | OUTPATIENT
Start: 2019-11-11 | End: 2020-02-03

## 2019-11-17 DIAGNOSIS — I10 ESSENTIAL HYPERTENSION: ICD-10-CM

## 2019-11-19 RX ORDER — METOPROLOL SUCCINATE 50 MG/1
TABLET, EXTENDED RELEASE ORAL
Qty: 90 TABLET | Refills: 0 | Status: SHIPPED | OUTPATIENT
Start: 2019-11-19 | End: 2020-02-11

## 2019-12-12 ENCOUNTER — OFFICE VISIT (OUTPATIENT)
Dept: FAMILY MEDICINE CLINIC | Facility: HOSPITAL | Age: 74
End: 2019-12-12
Payer: MEDICARE

## 2019-12-12 VITALS
OXYGEN SATURATION: 96 % | RESPIRATION RATE: 15 BRPM | DIASTOLIC BLOOD PRESSURE: 60 MMHG | HEIGHT: 73 IN | HEART RATE: 80 BPM | SYSTOLIC BLOOD PRESSURE: 100 MMHG | WEIGHT: 247.4 LBS | BODY MASS INDEX: 32.79 KG/M2

## 2019-12-12 DIAGNOSIS — G20 PRIMARY PARKINSONISM (HCC): ICD-10-CM

## 2019-12-12 DIAGNOSIS — G60.9 IDIOPATHIC PERIPHERAL NEUROPATHY: Primary | ICD-10-CM

## 2019-12-12 DIAGNOSIS — I10 ESSENTIAL HYPERTENSION: ICD-10-CM

## 2019-12-12 DIAGNOSIS — I25.10 CORONARY ARTERY DISEASE INVOLVING NATIVE CORONARY ARTERY OF NATIVE HEART WITHOUT ANGINA PECTORIS: ICD-10-CM

## 2019-12-12 PROCEDURE — 99214 OFFICE O/P EST MOD 30 MIN: CPT | Performed by: INTERNAL MEDICINE

## 2019-12-12 NOTE — ASSESSMENT & PLAN NOTE
Will prescribe cane to help with gait and prevent falls,  LE neuropathy has been chronic    FBS was 98, normal in 9/2019

## 2019-12-12 NOTE — PROGRESS NOTES
Assessment/Plan:    Primary Parkinsonism ([facility] )  Will prescribe a cane to make him feel safer walking  Tremors are controlled on sinemet      Coronary artery disease involving native coronary artery of native heart without angina pectoris  Denies chest pain or shortness of breath  Continue aspirin and atorvastatin    Essential hypertension  BP is well controlled, denies dizziness, lightheadedness  Continue toprol, benazepril/hctz      Idiopathic peripheral neuropathy  Will prescribe cane to help with gait and prevent falls,  LE neuropathy has been chronic    FBS was 98, normal in 9/2019         Diagnoses and all orders for this visit:    Idiopathic peripheral neuropathy  -     Cane  -     TSH, 3rd generation with Free T4 reflex; Future  -     Vitamin B12; Future    Primary Parkinsonism ([facility] )    Coronary artery disease involving native coronary artery of native heart without angina pectoris    Essential hypertension  -     Comprehensive metabolic panel; Future  -     CBC; Future  -     Magnesium; Future          Subjective:      Patient ID: All No is a 75 y o  male  Pt c/o hoarseness getting worse over months, Neurology recommended ENT consult and gave referral     Pt feels less stable on his feet:  He feels like he walks on water balloons due to his b/l neuropathy  Pt had one fall 3 months ago when he tripped in the mulch in his front yard after jogging in the neighborhood  Pt also reports lapses in memory: forgetting names of people at times but then he recalls the names at a later time            The following portions of the patient's history were reviewed and updated as appropriate: current medications, past family history, past medical history, past social history, past surgical history and problem list     Review of Systems   Constitutional: Negative for fever  HENT: Positive for voice change  Respiratory: Negative for shortness of breath      Cardiovascular: Negative for chest pain, palpitations and leg swelling  Gastrointestinal: Negative for abdominal pain and constipation  Genitourinary: Negative for difficulty urinating and hematuria  Musculoskeletal: Positive for gait problem  Objective:    /60   Pulse 80   Resp 15   Ht 6' 1" (1 854 m)   Wt 112 kg (247 lb 6 4 oz)   SpO2 96%   BMI 32 64 kg/m²      Physical Exam   Constitutional: He is oriented to person, place, and time  He appears well-developed  HENT:   Head: Normocephalic  Mouth/Throat: No oropharyngeal exudate  Eyes: Conjunctivae are normal    Cardiovascular: Normal rate and regular rhythm  No murmur heard  Pulmonary/Chest: Effort normal and breath sounds normal  He has no wheezes  He has no rales  Abdominal: Soft  Bowel sounds are normal  There is no tenderness  Neurological: He is alert and oriented to person, place, and time  Left hand resting tremor     Skin: Skin is warm and dry             Tl Plasencia MD

## 2019-12-16 DIAGNOSIS — I10 ESSENTIAL HYPERTENSION: ICD-10-CM

## 2019-12-16 RX ORDER — BENAZEPRIL/HYDROCHLOROTHIAZIDE 20 MG-25MG
TABLET ORAL
Qty: 90 TABLET | Refills: 1 | Status: SHIPPED | OUTPATIENT
Start: 2019-12-16 | End: 2020-06-08

## 2020-01-29 DIAGNOSIS — I25.10 CORONARY ARTERY DISEASE INVOLVING NATIVE CORONARY ARTERY OF NATIVE HEART WITHOUT ANGINA PECTORIS: ICD-10-CM

## 2020-01-29 RX ORDER — ATORVASTATIN CALCIUM 20 MG/1
TABLET, FILM COATED ORAL
Qty: 90 TABLET | Refills: 0 | Status: SHIPPED | OUTPATIENT
Start: 2020-01-29 | End: 2020-04-27

## 2020-02-03 DIAGNOSIS — M1A.9XX0 CHRONIC GOUT WITHOUT TOPHUS, UNSPECIFIED CAUSE, UNSPECIFIED SITE: ICD-10-CM

## 2020-02-03 RX ORDER — ALLOPURINOL 300 MG/1
TABLET ORAL
Qty: 90 TABLET | Refills: 0 | Status: SHIPPED | OUTPATIENT
Start: 2020-02-03 | End: 2020-04-29

## 2020-02-05 DIAGNOSIS — Z20.828 EXPOSURE TO THE FLU: Primary | ICD-10-CM

## 2020-02-05 RX ORDER — OSELTAMIVIR PHOSPHATE 75 MG/1
75 CAPSULE ORAL EVERY 12 HOURS SCHEDULED
Qty: 10 CAPSULE | Refills: 0 | Status: SHIPPED | OUTPATIENT
Start: 2020-02-05 | End: 2020-02-10

## 2020-02-05 NOTE — PROGRESS NOTES
Telephone Call Documentation    Carlota Lr       1945            I  Wife is positve for influenza a- I have sent in rx for tamiflu

## 2020-02-11 DIAGNOSIS — I10 ESSENTIAL HYPERTENSION: ICD-10-CM

## 2020-02-11 RX ORDER — METOPROLOL SUCCINATE 50 MG/1
TABLET, EXTENDED RELEASE ORAL
Qty: 90 TABLET | Refills: 0 | Status: SHIPPED | OUTPATIENT
Start: 2020-02-11 | End: 2020-02-17

## 2020-02-17 DIAGNOSIS — I10 ESSENTIAL HYPERTENSION: ICD-10-CM

## 2020-02-17 RX ORDER — METOPROLOL SUCCINATE 50 MG/1
TABLET, EXTENDED RELEASE ORAL
Qty: 90 TABLET | Refills: 0 | Status: SHIPPED | OUTPATIENT
Start: 2020-02-17 | End: 2020-07-26

## 2020-02-24 DIAGNOSIS — G47.09 OTHER INSOMNIA: ICD-10-CM

## 2020-02-24 RX ORDER — ZOLPIDEM TARTRATE 10 MG/1
TABLET ORAL
Qty: 30 TABLET | Refills: 3 | Status: SHIPPED | OUTPATIENT
Start: 2020-02-24 | End: 2022-01-13 | Stop reason: ALTCHOICE

## 2020-03-10 ENCOUNTER — LAB (OUTPATIENT)
Dept: LAB | Facility: HOSPITAL | Age: 75
End: 2020-03-10
Attending: INTERNAL MEDICINE
Payer: MEDICARE

## 2020-03-10 DIAGNOSIS — G60.9 IDIOPATHIC PERIPHERAL NEUROPATHY: ICD-10-CM

## 2020-03-10 DIAGNOSIS — I10 ESSENTIAL HYPERTENSION: ICD-10-CM

## 2020-03-10 LAB
ALBUMIN SERPL BCP-MCNC: 4 G/DL (ref 3.5–5)
ALP SERPL-CCNC: 93 U/L (ref 46–116)
ALT SERPL W P-5'-P-CCNC: 14 U/L (ref 12–78)
ANION GAP SERPL CALCULATED.3IONS-SCNC: 7 MMOL/L (ref 4–13)
AST SERPL W P-5'-P-CCNC: 21 U/L (ref 5–45)
BILIRUB SERPL-MCNC: 1.1 MG/DL (ref 0.2–1)
BUN SERPL-MCNC: 18 MG/DL (ref 5–25)
CALCIUM SERPL-MCNC: 8.9 MG/DL (ref 8.3–10.1)
CHLORIDE SERPL-SCNC: 108 MMOL/L (ref 100–108)
CO2 SERPL-SCNC: 28 MMOL/L (ref 21–32)
CREAT SERPL-MCNC: 1.1 MG/DL (ref 0.6–1.3)
ERYTHROCYTE [DISTWIDTH] IN BLOOD BY AUTOMATED COUNT: 12.2 % (ref 11.6–15.1)
GFR SERPL CREATININE-BSD FRML MDRD: 65 ML/MIN/1.73SQ M
GLUCOSE P FAST SERPL-MCNC: 115 MG/DL (ref 65–99)
HCT VFR BLD AUTO: 46.2 % (ref 36.5–49.3)
HGB BLD-MCNC: 15.5 G/DL (ref 12–17)
MAGNESIUM SERPL-MCNC: 2.3 MG/DL (ref 1.6–2.6)
MCH RBC QN AUTO: 31.8 PG (ref 26.8–34.3)
MCHC RBC AUTO-ENTMCNC: 33.5 G/DL (ref 31.4–37.4)
MCV RBC AUTO: 95 FL (ref 82–98)
PLATELET # BLD AUTO: 176 THOUSANDS/UL (ref 149–390)
PMV BLD AUTO: 10.8 FL (ref 8.9–12.7)
POTASSIUM SERPL-SCNC: 3.4 MMOL/L (ref 3.5–5.3)
PROT SERPL-MCNC: 7.3 G/DL (ref 6.4–8.2)
RBC # BLD AUTO: 4.87 MILLION/UL (ref 3.88–5.62)
SODIUM SERPL-SCNC: 143 MMOL/L (ref 136–145)
TSH SERPL DL<=0.05 MIU/L-ACNC: 2.32 UIU/ML (ref 0.36–3.74)
VIT B12 SERPL-MCNC: 886 PG/ML (ref 100–900)
WBC # BLD AUTO: 6.52 THOUSAND/UL (ref 4.31–10.16)

## 2020-03-10 PROCEDURE — 83735 ASSAY OF MAGNESIUM: CPT

## 2020-03-10 PROCEDURE — 36415 COLL VENOUS BLD VENIPUNCTURE: CPT

## 2020-03-10 PROCEDURE — 84443 ASSAY THYROID STIM HORMONE: CPT

## 2020-03-10 PROCEDURE — 82607 VITAMIN B-12: CPT

## 2020-03-10 PROCEDURE — 85027 COMPLETE CBC AUTOMATED: CPT

## 2020-03-10 PROCEDURE — 80053 COMPREHEN METABOLIC PANEL: CPT

## 2020-03-10 NOTE — RESULT ENCOUNTER NOTE
Call patient: Laura Blinks minimally decreased potassium: try to eat more fruits like bananas, otherwise labs are unremarkable

## 2020-03-17 ENCOUNTER — OFFICE VISIT (OUTPATIENT)
Dept: FAMILY MEDICINE CLINIC | Facility: HOSPITAL | Age: 75
End: 2020-03-17
Payer: MEDICARE

## 2020-03-17 VITALS
HEART RATE: 79 BPM | HEIGHT: 73 IN | SYSTOLIC BLOOD PRESSURE: 110 MMHG | DIASTOLIC BLOOD PRESSURE: 66 MMHG | RESPIRATION RATE: 16 BRPM | WEIGHT: 240 LBS | BODY MASS INDEX: 31.81 KG/M2

## 2020-03-17 DIAGNOSIS — Z13.1 SCREENING FOR DIABETES MELLITUS: ICD-10-CM

## 2020-03-17 DIAGNOSIS — Z23 ENCOUNTER FOR IMMUNIZATION: ICD-10-CM

## 2020-03-17 DIAGNOSIS — Z00.00 MEDICARE ANNUAL WELLNESS VISIT, SUBSEQUENT: Primary | ICD-10-CM

## 2020-03-17 DIAGNOSIS — E66.9 OBESITY (BMI 30.0-34.9): ICD-10-CM

## 2020-03-17 PROCEDURE — 3074F SYST BP LT 130 MM HG: CPT | Performed by: INTERNAL MEDICINE

## 2020-03-17 PROCEDURE — G0439 PPPS, SUBSEQ VISIT: HCPCS | Performed by: INTERNAL MEDICINE

## 2020-03-17 PROCEDURE — 4040F PNEUMOC VAC/ADMIN/RCVD: CPT | Performed by: INTERNAL MEDICINE

## 2020-03-17 PROCEDURE — 3008F BODY MASS INDEX DOCD: CPT | Performed by: INTERNAL MEDICINE

## 2020-03-17 PROCEDURE — 1125F AMNT PAIN NOTED PAIN PRSNT: CPT | Performed by: INTERNAL MEDICINE

## 2020-03-17 PROCEDURE — 3078F DIAST BP <80 MM HG: CPT | Performed by: INTERNAL MEDICINE

## 2020-03-17 PROCEDURE — 1160F RVW MEDS BY RX/DR IN RCRD: CPT | Performed by: INTERNAL MEDICINE

## 2020-03-17 PROCEDURE — 1123F ACP DISCUSS/DSCN MKR DOCD: CPT | Performed by: INTERNAL MEDICINE

## 2020-03-17 PROCEDURE — 1036F TOBACCO NON-USER: CPT | Performed by: INTERNAL MEDICINE

## 2020-03-17 PROCEDURE — 1170F FXNL STATUS ASSESSED: CPT | Performed by: INTERNAL MEDICINE

## 2020-03-17 NOTE — PROGRESS NOTES
Assessment and Plan:     Problem List Items Addressed This Visit     None      Visit Diagnoses     Medicare annual wellness visit, subsequent    -  Primary    Screening for diabetes mellitus        Relevant Orders    Basic metabolic panel    Encounter for immunization        Relevant Medications    Zoster Vac Recomb Adjuvanted (Shingrix) 50 MCG/0 5ML SUSR    Obesity (BMI 30 0-34  9)               Preventive health issues were discussed with patient, and age appropriate screening tests were ordered as noted in patient's After Visit Summary  Personalized health advice and appropriate referrals for health education or preventive services given if needed, as noted in patient's After Visit Summary  History of Present Illness:     Patient presents for Welcome to Medicare visit  Patient Care Team:  Evans Bliss MD as PCP - MD Juan Brand MD Shan Moons, DO Rollie Gray, MD Purcell Pesa, CRNP     Review of Systems:     Review of Systems   Constitutional: Negative for fever  HENT: Negative for congestion  Eyes: Negative for visual disturbance  Respiratory: Negative for cough and shortness of breath  Cardiovascular: Negative for chest pain, palpitations and leg swelling  Gastrointestinal: Negative for abdominal pain, blood in stool and diarrhea  Endocrine: Negative for polydipsia and polyphagia  Genitourinary: Negative for difficulty urinating and dysuria  Musculoskeletal: Negative for joint swelling, myalgias and neck stiffness  Skin: Negative for rash  Neurological: Negative for weakness, numbness and headaches  Hematological: Negative for adenopathy  Psychiatric/Behavioral: Negative for dysphoric mood  All other systems reviewed and are negative       Problem List:     Patient Active Problem List   Diagnosis    Essential hypertension    Primary Parkinsonism ([address] )    Coronary artery disease involving native coronary artery of native heart without angina pectoris    BPH without obstruction/lower urinary tract symptoms    Degeneration, intervertebral disc, lumbosacral    Gastroesophageal reflux disease without esophagitis    Gout    Idiopathic peripheral neuropathy    Nephrolithiasis    Osteoarthritis of knee    Hypercholesterolemia    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    Pharyngeal dysphagia    Other insomnia      Past Medical and Surgical History:     Past Medical History:   Diagnosis Date    Arthritis     Benign familial tremor     Cardiac disease     two cardiac stents    Chest pain     Coronary artery disease     Hyperlipidemia     Hypertension     Kidney disease     Kidney stone     Malignant melanoma of skin of chest ([address] )     Removed 2 years ago   Meniscal injury     Nephrolithiasis     Parkinson's disease ([address] )     PONV (postoperative nausea and vomiting)     Tinnitus     [id number] RESOLVED     Past Surgical History:   Procedure Laterality Date    AMPUTATION Left     AMPUTATION OF FINGER WITH NEURECTOMY(EACH) DISTAL LONG  RING AND SMALL FINGER    CARPAL TUNNEL RELEASE Right     CATARACT EXTRACTION      CORONARY STENT PLACEMENT      two cardiac stents    FINGER AMPUTATION      AMPUTATION OF MIDDLE FINGER WITH NEURECTOMY(EACH)    HAND SURGERY Left     Traumatic amputation of fingers left hand      JOINT REPLACEMENT Bilateral     knees    KNEE ARTHROPLASTY      TOTAL    MALIGNANT SKIN LESION EXCISION      ANTERIOR CHEST FOR MELAMONIA    MN CYSTO/URETERO W/LITHOTRIPSY &INDWELL STENT INSRT Left 7/27/2017    Procedure: CYSTOSCOPY URETEROSCOPY , RETROGRADE PYELOGRAM AND INSERTION STENT URETERAL;  Surgeon: Latoya Wilson MD;  Location:  MAIN OR;  Service: Urology    MN LAP,CHOLECYSTECTOMY N/A 2/28/2018    Procedure: Carolin Dinero;  Surgeon: Sony Frederick MD;  Location:  MAIN OR;  Service: General    MN REMOVE BLADDER STONE,<2 5 CM N/A 9/15/2017    Procedure: CYSTOLITHOLOPAXY HOLMIUM LASER;  Surgeon: Adeel Mills MD;  Location: [facility];  Service: Urology    DC TRANSURETHRAL ELEC-SURG PROSTATECTOM N/A 9/15/2017    Procedure: BIPOLAR TRANSURETHRAL RESECTION OF PROSTATE (TURP); Surgeon: Adeel Mills MD;  [facility];  Service: Urology    TONSILLECTOMY      TRIGGER FINGER RELEASE Right     TUMOR REMOVAL Left     Left foot 20 years ago   WISDOM TOOTH EXTRACTION Bilateral       Family History:     Family History   Problem Relation Age of Onset    Heart disease Mother     Hypertension Mother     Stroke Mother     Breast cancer Mother     Heart disease Father     Hypertension Father     Stroke Father     Colon cancer Father     Diabetes Sister     Heart disease Brother     Other Family         BACK PAIN    Breast cancer Family     Heart disease Family     Hypertension Family     Stroke Family     Mental illness Neg Hx     Substance Abuse Neg Hx       Social History:        Social History     Socioeconomic History    Marital status: /Civil Union     Spouse name: None    Number of children: None    Years of education: None    Highest education level: None   Occupational History    None   Social Needs    Financial resource strain: Not hard at all   [place] insecurity:     Worry: Never true     Inability: Never true    Transportation needs:     Medical: No     Non-medical: No   Tobacco Use    Smoking status: Former Smoker     Types: Cigarettes    Smokeless tobacco: Never Used    Tobacco comment: back in college in 1966   Substance and Sexual Activity    Alcohol use:  Yes     Alcohol/week: 4 0 standard drinks     Types: 4 Cans of beer per week     Frequency: 2-4 times a month     Drinks per session: 1 or 2     Binge frequency: Never    Drug use: No    Sexual activity: None   Lifestyle    Physical activity:     Days per week: 0 days     Minutes per session: 0 min    Stress: Not at all   Relationships    Social connections: Talks on phone: None     Gets together: None     Attends [employer] service: None     Active member of club or organization: None     Attends meetings of clubs or organizations: None     Relationship status: None    Intimate partner violence:     Fear of current or ex partner: No     Emotionally abused: No     Physically abused: No     Forced sexual activity: No   Other Topics Concern    None   Social History Narrative    Lives with wife  Sees dentist reg  Has living will  Feels safe at home  No mental or sub in self       ACTIVE ADVANCE DIRECTIVE    CAREGIVER:  SPOUSE    EXERCISE HABITS  -  DAILY    GOOD DENTAL HYGIENE      Medications and Allergies:     Current Outpatient Medications   Medication Sig Dispense Refill    allopurinol (ZYLOPRIM) 300 mg tablet TAKE 1 TABLET EVERY DAY 90 tablet 0    aspirin (ASPIRIN LOW DOSE) 81 MG tablet Take by mouth      atorvastatin (LIPITOR) 20 mg tablet TAKE 1 TABLET EVERY DAY 90 tablet 0    benazepril-hydrochlorthiazide (LOTENSIN HCT) 20-25 MG per tablet TAKE 1 TABLET BY MOUTH EVERY DAY 90 tablet 1    Calcium Carbonate-Vitamin D (CALTRATE 600+D) 600-400 MG-UNIT per tablet Caltrate 600+D 600-400 MG-UNIT TABS  Take 1 tablet daily   Refills: 0      , M D ; Active      carbidopa-levodopa (SINEMET)  mg per tablet 2 tabs---three times daily      Cholecalciferol (VITAMIN D-3) 1000 UNITS CAPS Vitamin D3 1000 UNIT Oral Capsule   once a day Refills: 0      , M D ; Active      glucosamine-chondroitin 500-400 MG tablet Take 1 tablet by mouth daily      metoprolol succinate (TOPROL-XL) 50 mg 24 hr tablet TAKE 1 TABLET BY MOUTH EVERY DAY 90 tablet 0    Multiple Vitamin (MULTIVITAMINS PO) Multivitamins Oral once a day Capsule   Refills: 0      , M D ; Active      nitroglycerin (NITROSTAT) 0 4 mg SL tablet Place under the tongue      zolpidem (AMBIEN) 10 mg tablet TAKE 1 TABLET BY MOUTH DAILY AT BEDTIME 30 tablet 3    Zoster Vac Recomb Adjuvanted (Shingrix) 50 MCG/0 5ML SUSR 0 5mL IM for one dose, followed by 0 5mL IM 2-6 months after first dose 1 each 1     No current facility-administered medications for this visit  No Known Allergies   Immunizations:     Immunization History   Administered Date(s) Administered    Influenza Split High Dose Preservative Free IM 09/28/2015, 09/14/2016, 10/19/2017    Influenza TIV (IM) 10/03/2013    Influenza, high dose seasonal 0 5 mL 09/05/2019    Pneumococcal Conjugate 13-Valent 10/08/2015    Pneumococcal Conjugate PCV 7 10/08/2015    Pneumococcal Polysaccharide PPV23 08/13/2012      Health Maintenance:         Topic Date Due    CRC Screening: Colonoscopy  05/03/2021         Topic Date Due    DTaP,Tdap,and Td Vaccines (1 - Tdap) 01/22/1956    Pneumococcal Vaccine: 65+ Years (2 of 2 - PCV13) 10/08/2016      Medicare Screening Tests and Risk Assessments:     Christine Rojas is here for his Subsequent Wellness visit  Health Risk Assessment:   Patient rates overall health as good  Patient feels that their physical health rating is same  Eyesight was rated as same  Hearing was rated as same  Patient feels that their emotional and mental health rating is same  Pain experienced in the last 7 days has been none  Patient states that he has experienced no weight loss or gain in last 6 months  Depression Screening:   PHQ-2 Score: 0      Fall Risk Screening: In the past year, patient has experienced: history of falling in past year    Number of falls: 1  Injured during fall?: No    Feels unsteady when standing or walking?: Yes    Worried about falling?: Yes      Home Safety:  Patient does not have trouble with stairs inside or outside of their home  Patient has working smoke alarms and has working carbon monoxide detector  Home safety hazards include: loose rugs on the floor  Nutrition:   Current diet is Regular and Low Saturated Fat  Pt eats what he wants at times, but tries to watch    States he eats a lot of chicken and fish     Medications:   Patient is currently taking over-the-counter supplements  OTC medications include: see medication list  Patient is able to manage medications  Activities of Daily Living (ADLs)/Instrumental Activities of Daily Living (IADLs):   Walk and transfer into and out of bed and chair?: Yes  Dress and groom yourself?: Yes    Bathe or shower yourself?: Yes    Feed yourself? Yes  Do your laundry/housekeeping?: Yes  Manage your money, pay your bills and track your expenses?: Yes  Make your own meals?: Yes    Do your own shopping?: Yes    Previous Hospitalizations:   Any hospitalizations or ED visits within the last 12 months?: No      Advance Care Planning:   Living will: Yes    Advanced directive: Yes    Advanced directive counseling given: Yes    End of Life Decisions reviewed with patient: Yes      Comments: Level 3 dnr      Cognitive Screening:   Provider or family/friend/caregiver concerned regarding cognition?: No    PREVENTIVE SCREENINGS      Cardiovascular Screening:    General: Screening Not Indicated and History Lipid Disorder      Diabetes Screening:     General: Screening Current      Colorectal Cancer Screening:     General: Screening Current      Prostate Cancer Screening:    General: Screening Not Indicated      Abdominal Aortic Aneurysm (AAA) Screening:    Risk factors include: tobacco use        General: Screening Not Indicated      Lung Cancer Screening:     General: Screening Not Indicated      Hepatitis C Screening:    General: Screening Not Indicated    Other Counseling Topics:   Regular weightbearing exercise  No exam data present     Physical Exam:     /66   Pulse 79   Resp 16   Ht 6' 1" (1 854 m)   Wt 109 kg (240 lb)   BMI 31 66 kg/m²     Physical Exam   Constitutional: He is oriented to person, place, and time  He appears well-developed  No distress  HENT:   Head: Normocephalic     Mouth/Throat: Oropharynx is clear and moist    Eyes: Conjunctivae are normal  Neck: Neck supple  Cardiovascular: Normal rate and regular rhythm  Pulmonary/Chest: Effort normal  No respiratory distress  He has no wheezes  He has no rales  Abdominal: Soft  Bowel sounds are normal  There is no tenderness  Neurological: He is alert and oriented to person, place, and time  Skin: Skin is warm and dry  No rash noted  Psychiatric: He has a normal mood and affect  Vitals reviewed        Luis E Parmar MD

## 2020-03-17 NOTE — PROGRESS NOTES
Depression Screening Follow-up Plan: Patient's depression screening was positive with a PHQ-2 score of 0  Their PHQ-9 score was   Clinically patient does not have depression  No treatment is required  BMI Counseling: Body mass index is 31 66 kg/m²  The BMI is above normal  Nutrition recommendations include reducing portion sizes

## 2020-03-17 NOTE — PATIENT INSTRUCTIONS
Medicare Preventive Visit Patient Instructions  Thank you for completing your Welcome to Medicare Visit or Medicare Annual Wellness Visit today  Your next wellness visit will be due in one year (3/17/2021)  The screening/preventive services that you may require over the next 5-10 years are detailed below  Some tests may not apply to you based off risk factors and/or age  Screening tests ordered at today's visit but not completed yet may show as past due  Also, please note that scanned in results may not display below  Preventive Screenings:  Service Recommendations Previous Testing/Comments   Colorectal Cancer Screening  · Colonoscopy    · Fecal Occult Blood Test (FOBT)/Fecal Immunochemical Test (FIT)  · Fecal DNA/Cologuard Test  · Flexible Sigmoidoscopy Age: 54-65 years old   Colonoscopy: every 10 years (May be performed more frequently if at higher risk)  OR  FOBT/FIT: every 1 year  OR  Cologuard: every 3 years  OR  Sigmoidoscopy: every 5 years  Screening may be recommended earlier than age 48 if at higher risk for colorectal cancer  Also, an individualized decision between you and your healthcare provider will decide whether screening between the ages of 74-80 would be appropriate   Colonoscopy: 05/03/2016  FOBT/FIT: Not on file  Cologuard: Not on file  Sigmoidoscopy: Not on file    Screening Current     Prostate Cancer Screening Individualized decision between patient and health care provider in men between ages of 53-78   Medicare will cover every 12 months beginning on the day after your 50th birthday PSA: 1 6 ng/mL     Screening Not Indicated     Hepatitis C Screening Once for adults born between 1945 and 1965  More frequently in patients at high risk for Hepatitis C Hep C Antibody: Not on file       Diabetes Screening 1-2 times per year if you're at risk for diabetes or have pre-diabetes Fasting glucose: 115 mg/dL   A1C: No results in last 5 years    Screening Current   Cholesterol Screening Once every 5 years if you don't have a lipid disorder  May order more often based on risk factors  Lipid panel: 03/08/2019    Screening Not Indicated  History Lipid Disorder      Other Preventive Screenings Covered by Medicare:  1  Abdominal Aortic Aneurysm (AAA) Screening: covered once if your at risk  You're considered to be at risk if you have a family history of AAA or a male between the age of 73-68 who smoking at least 100 cigarettes in your lifetime  2  Lung Cancer Screening: covers low dose CT scan once per year if you meet all of the following conditions: (1) Age 50-69; (2) No signs or symptoms of lung cancer; (3) Current smoker or have quit smoking within the last 15 years; (4) You have a tobacco smoking history of at least 30 pack years (packs per day x number of years you smoked); (5) You get a written order from a healthcare provider  3  Glaucoma Screening: covered annually if you're considered high risk: (1) You have diabetes OR (2) Family history of glaucoma OR (3)  aged 48 and older OR (3)  American aged 72 and older  3  Osteoporosis Screening: covered every 2 years if you meet one of the following conditions: (1) Have a vertebral abnormality; (2) On glucocorticoid therapy for more than 3 months; (3) Have primary hyperparathyroidism; (4) On osteoporosis medications and need to assess response to drug therapy  5  HIV Screening: covered annually if you're between the age of 12-76  Also covered annually if you are younger than 13 and older than 72 with risk factors for HIV infection  For pregnant patients, it is covered up to 3 times per pregnancy      Immunizations:  Immunization Recommendations   Influenza Vaccine Annual influenza vaccination during flu season is recommended for all persons aged >= 6 months who do not have contraindications   Pneumococcal Vaccine (Prevnar and Pneumovax)  * Prevnar = PCV13  * Pneumovax = PPSV23 Adults 25-60 years old: 1-3 doses may be recommended based on certain risk factors  Adults 72 years old: Prevnar (PCV13) vaccine recommended followed by Pneumovax (PPSV23) vaccine  If already received PPSV23 since turning 65, then PCV13 recommended at least one year after PPSV23 dose  Hepatitis B Vaccine 3 dose series if at intermediate or high risk (ex: diabetes, end stage renal disease, liver disease)   Tetanus (Td) Vaccine - COST NOT COVERED BY MEDICARE PART B Following completion of primary series, a booster dose should be given every 10 years to maintain immunity against tetanus  Td may also be given as tetanus wound prophylaxis  Tdap Vaccine - COST NOT COVERED BY MEDICARE PART B Recommended at least once for all adults  For pregnant patients, recommended with each pregnancy  Shingles Vaccine (Shingrix) - COST NOT COVERED BY MEDICARE PART B  2 shot series recommended in those aged 48 and above     Health Maintenance Due:      Topic Date Due    CRC Screening: Colonoscopy  05/03/2021     Immunizations Due:      Topic Date Due    DTaP,Tdap,and Td Vaccines (1 - Tdap) 01/22/1956    Pneumococcal Vaccine: 65+ Years (2 of 2 - PCV13) 10/08/2016     Advance Directives   What are advance directives? Advance directives are legal documents that state your wishes and plans for medical care  These plans are made ahead of time in case you lose your ability to make decisions for yourself  Advance directives can apply to any medical decision, such as the treatments you want, and if you want to donate organs  What are the types of advance directives? There are many types of advance directives, and each state has rules about how to use them  You may choose a combination of any of the following:  · Living will: This is a written record of the treatment you want  You can also choose which treatments you do not want, which to limit, and which to stop at a certain time  This includes surgery, medicine, IV fluid, and tube feedings     · Durable power of  for healthcare [facility]): This is a written record that states who you want to make healthcare choices for you when you are unable to make them for yourself  This person, called a proxy, is usually a family member or a friend  You may choose more than 1 proxy  · Do not resuscitate (DNR) order:  A DNR order is used in case your heart stops beating or you stop breathing  It is a request not to have certain forms of treatment, such as CPR  A DNR order may be included in other types of advance directives  · Medical directive: This covers the care that you want if you are in a coma, near death, or unable to make decisions for yourself  You can list the treatments you want for each condition  Treatment may include pain medicine, surgery, blood transfusions, dialysis, IV or tube feedings, and a ventilator (breathing machine)  · Values history: This document has questions about your views, beliefs, and how you feel and think about life  This information can help others choose the care that you would choose  Why are advance directives important? An advance directive helps you control your care  Although spoken wishes may be used, it is better to have your wishes written down  Spoken wishes can be misunderstood, or not followed  Treatments may be given even if you do not want them  An advance directive may make it easier for your family to make difficult choices about your care  Fall Prevention    Fall prevention  includes ways to make your home and other areas safer  It also includes ways you can move more carefully to prevent a fall  Health conditions that cause changes in your blood pressure, vision, or muscle strength and coordination may increase your risk for falls  Medicines may also increase your risk for falls if they make you dizzy, weak, or sleepy  Fall prevention tips:   · Stand or sit up slowly  · Use assistive devices as directed  · Wear shoes that fit well and have soles that   · Wear a personal alarm  · Stay active  · Manage your medical conditions  Home Safety Tips:  · Add items to prevent falls in the bathroom  · Keep paths clear  · Install bright lights in your home  · Keep items you use often on shelves within reach  · Paint or place reflective tape on the edges of your stairs  Weight Management   Why it is important to manage your weight:  Being overweight increases your risk of health conditions such as heart disease, high blood pressure, type 2 diabetes, and certain types of cancer  It can also increase your risk for osteoarthritis, sleep apnea, and other respiratory problems  Aim for a slow, steady weight loss  Even a small amount of weight loss can lower your risk of health problems  How to lose weight safely:  A safe and healthy way to lose weight is to eat fewer calories and get regular exercise  You can lose up about 1 pound a week by decreasing the number of calories you eat by 500 calories each day  Healthy meal plan for weight management:  A healthy meal plan includes a variety of foods, contains fewer calories, and helps you stay healthy  A healthy meal plan includes the following:  · Eat whole-grain foods more often  A healthy meal plan should contain fiber  Fiber is the part of grains, fruits, and vegetables that is not broken down by your body  Whole-grain foods are healthy and provide extra fiber in your diet  Some examples of whole-grain foods are whole-wheat breads and pastas, oatmeal, brown rice, and bulgur  · Eat a variety of vegetables every day  Include dark, leafy greens such as spinach, kale, sachin greens, and mustard greens  Eat yellow and orange vegetables such as carrots, sweet potatoes, and winter squash  · Eat a variety of fruits every day  Choose fresh or canned fruit (canned in its own juice or light syrup) instead of juice  Fruit juice has very little or no fiber  · Eat low-fat dairy foods  Drink fat-free (skim) milk or 1% milk   Eat fat-free yogurt and low-fat cottage cheese  Try low-fat cheeses such as mozzarella and other reduced-fat cheeses  · Choose meat and other protein foods that are low in fat  Choose beans or other legumes such as split peas or lentils  Choose fish, skinless poultry (chicken or turkey), or lean cuts of red meat (beef or pork)  Before you cook meat or poultry, cut off any visible fat  · Use less fat and oil  Try baking foods instead of frying them  Add less fat, such as margarine, sour cream, regular salad dressing and mayonnaise to foods  Eat fewer high-fat foods  Some examples of high-fat foods include french fries, doughnuts, ice cream, and cakes  · Eat fewer sweets  Limit foods and drinks that are high in sugar  This includes candy, cookies, regular soda, and sweetened drinks  Exercise:  Exercise at least 30 minutes per day on most days of the week  Some examples of exercise include walking, biking, dancing, and swimming  You can also fit in more physical activity by taking the stairs instead of the elevator or parking farther away from stores  Ask your healthcare provider about the best exercise plan for you  © Copyright [employer] 2018 Information is for End User's use only and may not be sold, redistributed or otherwise used for commercial purposes  All illustrations and images included in CareNotes® are the copyrighted property of [employer]  or [facility] Preventive Visit Patient Instructions  Thank you for completing your Welcome to Medicare Visit or Medicare Annual Wellness Visit today  Your next wellness visit will be due in one year (3/17/2021)  The screening/preventive services that you may require over the next 5-10 years are detailed below  Some tests may not apply to you based off risk factors and/or age  Screening tests ordered at today's visit but not completed yet may show as past due  Also, please note that scanned in results may not display below    Preventive Screenings:  Service Recommendations Previous Testing/Comments   Colorectal Cancer Screening  · Colonoscopy    · Fecal Occult Blood Test (FOBT)/Fecal Immunochemical Test (FIT)  · Fecal DNA/Cologuard Test  · Flexible Sigmoidoscopy Age: 54-65 years old   Colonoscopy: every 10 years (May be performed more frequently if at higher risk)  OR  FOBT/FIT: every 1 year  OR  Cologuard: every 3 years  OR  Sigmoidoscopy: every 5 years  Screening may be recommended earlier than age 48 if at higher risk for colorectal cancer  Also, an individualized decision between you and your healthcare provider will decide whether screening between the ages of 74-80 would be appropriate  Colonoscopy: 05/03/2016  FOBT/FIT: Not on file  Cologuard: Not on file  Sigmoidoscopy: Not on file    Screening Current     Prostate Cancer Screening Individualized decision between patient and health care provider in men between ages of 53-78   Medicare will cover every 12 months beginning on the day after your 50th birthday PSA: 1 6 ng/mL     Screening Not Indicated     Hepatitis C Screening Once for adults born between 1945 and 1965  More frequently in patients at high risk for Hepatitis C Hep C Antibody: Not on file       Diabetes Screening 1-2 times per year if you're at risk for diabetes or have pre-diabetes Fasting glucose: 115 mg/dL   A1C: No results in last 5 years    Screening Current   Cholesterol Screening Once every 5 years if you don't have a lipid disorder  May order more often based on risk factors  Lipid panel: 03/08/2019    Screening Not Indicated  History Lipid Disorder      Other Preventive Screenings Covered by Medicare:  6  Abdominal Aortic Aneurysm (AAA) Screening: covered once if your at risk  You're considered to be at risk if you have a family history of AAA or a male between the age of 73-68 who smoking at least 100 cigarettes in your lifetime    7  Lung Cancer Screening: covers low dose CT scan once per year if you meet all of the following conditions: (1) Age 50-69; (2) No signs or symptoms of lung cancer; (3) Current smoker or have quit smoking within the last 15 years; (4) You have a tobacco smoking history of at least 30 pack years (packs per day x number of years you smoked); (5) You get a written order from a healthcare provider  8  Glaucoma Screening: covered annually if you're considered high risk: (1) You have diabetes OR (2) Family history of glaucoma OR (3)  aged 48 and older OR (3)  American aged 72 and older  5  Osteoporosis Screening: covered every 2 years if you meet one of the following conditions: (1) Have a vertebral abnormality; (2) On glucocorticoid therapy for more than 3 months; (3) Have primary hyperparathyroidism; (4) On osteoporosis medications and need to assess response to drug therapy  10  HIV Screening: covered annually if you're between the age of 12-76  Also covered annually if you are younger than 13 and older than 72 with risk factors for HIV infection  For pregnant patients, it is covered up to 3 times per pregnancy  Immunizations:  Immunization Recommendations   Influenza Vaccine Annual influenza vaccination during flu season is recommended for all persons aged >= 6 months who do not have contraindications   Pneumococcal Vaccine (Prevnar and Pneumovax)  * Prevnar = PCV13  * Pneumovax = PPSV23 Adults 25-60 years old: 1-3 doses may be recommended based on certain risk factors  Adults 72 years old: Prevnar (PCV13) vaccine recommended followed by Pneumovax (PPSV23) vaccine  If already received PPSV23 since turning 65, then PCV13 recommended at least one year after PPSV23 dose  Hepatitis B Vaccine 3 dose series if at intermediate or high risk (ex: diabetes, end stage renal disease, liver disease)   Tetanus (Td) Vaccine - COST NOT COVERED BY MEDICARE PART B Following completion of primary series, a booster dose should be given every 10 years to maintain immunity against tetanus   Td may also be given as tetanus wound prophylaxis  Tdap Vaccine - COST NOT COVERED BY MEDICARE PART B Recommended at least once for all adults  For pregnant patients, recommended with each pregnancy  Shingles Vaccine (Shingrix) - COST NOT COVERED BY MEDICARE PART B  2 shot series recommended in those aged 48 and above     Health Maintenance Due:      Topic Date Due    CRC Screening: Colonoscopy  05/03/2021     Immunizations Due:      Topic Date Due    DTaP,Tdap,and Td Vaccines (1 - Tdap) 01/22/1956    Pneumococcal Vaccine: 65+ Years (2 of 2 - PCV13) 10/08/2016     Advance Directives   What are advance directives? Advance directives are legal documents that state your wishes and plans for medical care  These plans are made ahead of time in case you lose your ability to make decisions for yourself  Advance directives can apply to any medical decision, such as the treatments you want, and if you want to donate organs  What are the types of advance directives? There are many types of advance directives, and each state has rules about how to use them  You may choose a combination of any of the following:  · Living will: This is a written record of the treatment you want  You can also choose which treatments you do not want, which to limit, and which to stop at a certain time  This includes surgery, medicine, IV fluid, and tube feedings  · Durable power of  for healthcare [facility]): This is a written record that states who you want to make healthcare choices for you when you are unable to make them for yourself  This person, called a proxy, is usually a family member or a friend  You may choose more than 1 proxy  · Do not resuscitate (DNR) order:  A DNR order is used in case your heart stops beating or you stop breathing  It is a request not to have certain forms of treatment, such as CPR  A DNR order may be included in other types of advance directives  · Medical directive:   This covers the care that you want if you are in a coma, near death, or unable to make decisions for yourself  You can list the treatments you want for each condition  Treatment may include pain medicine, surgery, blood transfusions, dialysis, IV or tube feedings, and a ventilator (breathing machine)  · Values history: This document has questions about your views, beliefs, and how you feel and think about life  This information can help others choose the care that you would choose  Why are advance directives important? An advance directive helps you control your care  Although spoken wishes may be used, it is better to have your wishes written down  Spoken wishes can be misunderstood, or not followed  Treatments may be given even if you do not want them  An advance directive may make it easier for your family to make difficult choices about your care  Fall Prevention    Fall prevention  includes ways to make your home and other areas safer  It also includes ways you can move more carefully to prevent a fall  Health conditions that cause changes in your blood pressure, vision, or muscle strength and coordination may increase your risk for falls  Medicines may also increase your risk for falls if they make you dizzy, weak, or sleepy  Fall prevention tips:   · Stand or sit up slowly  · Use assistive devices as directed  · Wear shoes that fit well and have soles that   · Wear a personal alarm  · Stay active  · Manage your medical conditions  Home Safety Tips:  · Add items to prevent falls in the bathroom  · Keep paths clear  · Install bright lights in your home  · Keep items you use often on shelves within reach  · Paint or place reflective tape on the edges of your stairs  Weight Management   Why it is important to manage your weight:  Being overweight increases your risk of health conditions such as heart disease, high blood pressure, type 2 diabetes, and certain types of cancer   It can also increase your risk for osteoarthritis, sleep apnea, and other respiratory problems  Aim for a slow, steady weight loss  Even a small amount of weight loss can lower your risk of health problems  How to lose weight safely:  A safe and healthy way to lose weight is to eat fewer calories and get regular exercise  You can lose up about 1 pound a week by decreasing the number of calories you eat by 500 calories each day  Healthy meal plan for weight management:  A healthy meal plan includes a variety of foods, contains fewer calories, and helps you stay healthy  A healthy meal plan includes the following:  · Eat whole-grain foods more often  A healthy meal plan should contain fiber  Fiber is the part of grains, fruits, and vegetables that is not broken down by your body  Whole-grain foods are healthy and provide extra fiber in your diet  Some examples of whole-grain foods are whole-wheat breads and pastas, oatmeal, brown rice, and bulgur  · Eat a variety of vegetables every day  Include dark, leafy greens such as spinach, kale, sachin greens, and mustard greens  Eat yellow and orange vegetables such as carrots, sweet potatoes, and winter squash  · Eat a variety of fruits every day  Choose fresh or canned fruit (canned in its own juice or light syrup) instead of juice  Fruit juice has very little or no fiber  · Eat low-fat dairy foods  Drink fat-free (skim) milk or 1% milk  Eat fat-free yogurt and low-fat cottage cheese  Try low-fat cheeses such as mozzarella and other reduced-fat cheeses  · Choose meat and other protein foods that are low in fat  Choose beans or other legumes such as split peas or lentils  Choose fish, skinless poultry (chicken or turkey), or lean cuts of red meat (beef or pork)  Before you cook meat or poultry, cut off any visible fat  · Use less fat and oil  Try baking foods instead of frying them   Add less fat, such as margarine, sour cream, regular salad dressing and mayonnaise to foods  Eat fewer high-fat foods  Some examples of high-fat foods include french fries, doughnuts, ice cream, and cakes  · Eat fewer sweets  Limit foods and drinks that are high in sugar  This includes candy, cookies, regular soda, and sweetened drinks  Exercise:  Exercise at least 30 minutes per day on most days of the week  Some examples of exercise include walking, biking, dancing, and swimming  You can also fit in more physical activity by taking the stairs instead of the elevator or parking farther away from stores  Ask your healthcare provider about the best exercise plan for you  © Copyright [employer] 2018 Information is for End User's use only and may not be sold, redistributed or otherwise used for commercial purposes   All illustrations and images included in CareNotes® are the copyrighted property of A D A M , Inc  or [address] [place], [place] [address]

## 2020-04-27 DIAGNOSIS — I25.10 CORONARY ARTERY DISEASE INVOLVING NATIVE CORONARY ARTERY OF NATIVE HEART WITHOUT ANGINA PECTORIS: ICD-10-CM

## 2020-04-27 RX ORDER — ATORVASTATIN CALCIUM 20 MG/1
TABLET, FILM COATED ORAL
Qty: 90 TABLET | Refills: 0 | Status: SHIPPED | OUTPATIENT
Start: 2020-04-27 | End: 2020-07-20

## 2020-04-29 DIAGNOSIS — M1A.9XX0 CHRONIC GOUT WITHOUT TOPHUS, UNSPECIFIED CAUSE, UNSPECIFIED SITE: ICD-10-CM

## 2020-04-29 RX ORDER — ALLOPURINOL 300 MG/1
TABLET ORAL
Qty: 90 TABLET | Refills: 0 | Status: SHIPPED | OUTPATIENT
Start: 2020-04-29 | End: 2020-07-23

## 2020-06-08 DIAGNOSIS — I10 ESSENTIAL HYPERTENSION: ICD-10-CM

## 2020-06-08 RX ORDER — BENAZEPRIL/HYDROCHLOROTHIAZIDE 20 MG-25MG
TABLET ORAL
Qty: 90 TABLET | Refills: 1 | Status: SHIPPED | OUTPATIENT
Start: 2020-06-08 | End: 2020-12-14 | Stop reason: SDUPTHER

## 2020-07-10 ENCOUNTER — HOSPITAL ENCOUNTER (OUTPATIENT)
Dept: RADIOLOGY | Facility: HOSPITAL | Age: 75
Discharge: HOME | End: 2020-07-10

## 2020-07-10 ENCOUNTER — HOSPITAL ENCOUNTER (OUTPATIENT)
Dept: ULTRASOUND IMAGING | Facility: HOSPITAL | Age: 75
Discharge: HOME | End: 2020-07-10
Payer: MEDICARE

## 2020-07-10 DIAGNOSIS — N20.0 NEPHROLITHIASIS: ICD-10-CM

## 2020-07-10 PROCEDURE — 74018 RADEX ABDOMEN 1 VIEW: CPT

## 2020-07-10 PROCEDURE — 76770 US EXAM ABDO BACK WALL COMP: CPT

## 2020-07-20 DIAGNOSIS — I25.10 CORONARY ARTERY DISEASE INVOLVING NATIVE CORONARY ARTERY OF NATIVE HEART WITHOUT ANGINA PECTORIS: ICD-10-CM

## 2020-07-20 RX ORDER — ATORVASTATIN CALCIUM 20 MG/1
TABLET, FILM COATED ORAL
Qty: 90 TABLET | Refills: 0 | Status: SHIPPED | OUTPATIENT
Start: 2020-07-20 | End: 2020-10-12

## 2020-07-22 ENCOUNTER — OFFICE VISIT (OUTPATIENT)
Dept: UROLOGY | Facility: HOSPITAL | Age: 75
End: 2020-07-22
Payer: MEDICARE

## 2020-07-22 VITALS
DIASTOLIC BLOOD PRESSURE: 68 MMHG | WEIGHT: 247 LBS | BODY MASS INDEX: 32.74 KG/M2 | HEART RATE: 82 BPM | HEIGHT: 73 IN | SYSTOLIC BLOOD PRESSURE: 118 MMHG | TEMPERATURE: 97.3 F

## 2020-07-22 DIAGNOSIS — N20.0 NEPHROLITHIASIS: Primary | ICD-10-CM

## 2020-07-22 DIAGNOSIS — N40.0 BPH WITHOUT OBSTRUCTION/LOWER URINARY TRACT SYMPTOMS: ICD-10-CM

## 2020-07-22 PROCEDURE — 3008F BODY MASS INDEX DOCD: CPT | Performed by: NURSE PRACTITIONER

## 2020-07-22 PROCEDURE — 1160F RVW MEDS BY RX/DR IN RCRD: CPT | Performed by: NURSE PRACTITIONER

## 2020-07-22 PROCEDURE — 1036F TOBACCO NON-USER: CPT | Performed by: NURSE PRACTITIONER

## 2020-07-22 PROCEDURE — 3078F DIAST BP <80 MM HG: CPT | Performed by: NURSE PRACTITIONER

## 2020-07-22 PROCEDURE — 4040F PNEUMOC VAC/ADMIN/RCVD: CPT | Performed by: NURSE PRACTITIONER

## 2020-07-22 PROCEDURE — 99213 OFFICE O/P EST LOW 20 MIN: CPT | Performed by: NURSE PRACTITIONER

## 2020-07-22 PROCEDURE — 3074F SYST BP LT 130 MM HG: CPT | Performed by: NURSE PRACTITIONER

## 2020-07-22 NOTE — PROGRESS NOTES
7/22/2020    Shruti Dupree  1945  [id number]      Assessment  -BPH and bladder stones s/p TURP and cystolitholapaxy (9/15/2017)  -Nephrolithiasis    Discussion/Plan  Heather Ruiz is a 67 y o  male being managed by Dr Cristopher Duran  1  BPH and bladder stones s/p TURP and cystolitholapaxy (9/15/2017)- patient continues to do well without any urinary complaints  2  Nephrolithiasis- reviewed results of his recent KUB and renal ultrasound  KUB showed no evidence of calculi  Renal ultrasound also confirmed that no stones were present and there is no hydronephrosis  Again, he is found to have renal cysts which appear unchanged and stable  Reviewed dietary recommendations  Because patient is asymptomatic and imaging remains stable, we discussed obtaining repeat imaging as needed  He would like to continue follow-up annually  Patient instructed to call with any issues    -All questions answered, patient agrees with plan      History of Present Illness  67 y o  male with a history of BPH and bladder stones s/p TURP and cystolitholapaxy (9/15/2017) presents today for follow up  Patient denies any lower urinary tract symptoms, gross hematuria, or dysuria  He has had no recent stone episodes or renal colic  His previous imaging showed no evidence of renal calculi  Renal cysts have been present and remained stable  He denies any changes to his overall health  Review of Systems  Review of Systems   Constitutional: Negative  HENT: Negative  Respiratory: Negative  Cardiovascular: Negative  Gastrointestinal: Negative  Genitourinary: Negative for decreased urine volume, difficulty urinating, dysuria, flank pain, frequency, hematuria and urgency  Musculoskeletal: Negative  Skin: Negative  Neurological: Negative  Psychiatric/Behavioral: Negative        AUA SYMPTOM SCORE      Most Recent Value   AUA SYMPTOM SCORE   How often have you had a sensation of not emptying your bladder completely after you finished urinating? 0   How often have you had to urinate again less than two hours after you finished urinating? 0   How often have you found you stopped and started again several times when you urinate?  0   How often have you found it difficult to postpone urination? 0   How often have you had a weak urinary stream?  0   How often have you had to push or strain to begin urination? 0   How many times did you most typically get up to urinate from the time you went to bed at night until the time you got up in the morning? 1   Quality of Life: If you were to spend the rest of your life with your urinary condition just the way it is now, how would you feel about that?  0   AUA SYMPTOM SCORE  1          Past Medical History  Past Medical History:   Diagnosis Date    Arthritis     Benign familial tremor     Cardiac disease     two cardiac stents    Chest pain     Coronary artery disease     Hyperlipidemia     Hypertension     Kidney disease     Kidney stone     Malignant melanoma of skin of chest ([address] )     Removed 2 years ago   Meniscal injury     Nephrolithiasis     Parkinson's disease ([address] )     PONV (postoperative nausea and vomiting)     Tinnitus     [id number] RESOLVED       Past Social History  Past Surgical History:   Procedure Laterality Date    AMPUTATION Left     AMPUTATION OF FINGER WITH NEURECTOMY(EACH) DISTAL LONG  RING AND SMALL FINGER    CARPAL TUNNEL RELEASE Right     CATARACT EXTRACTION      CORONARY STENT PLACEMENT      two cardiac stents    FINGER AMPUTATION      AMPUTATION OF MIDDLE FINGER WITH NEURECTOMY(EACH)    HAND SURGERY Left     Traumatic amputation of fingers left hand      JOINT REPLACEMENT Bilateral     knees    KNEE ARTHROPLASTY      TOTAL    MALIGNANT SKIN LESION EXCISION      ANTERIOR CHEST FOR MELAMONIA    MO CYSTO/URETERO W/LITHOTRIPSY &INDWELL STENT INSRT Left 7/27/2017    Procedure: CYSTOSCOPY URETEROSCOPY , RETROGRADE PYELOGRAM AND INSERTION STENT URETERAL;  Surgeon: Hetal Bardales MD;  [facility];  Service: Urology    ME LAP,CHOLECYSTECTOMY N/A 2/28/2018    Procedure: CHOLECYSTECTOMY LAPAROSCOPIC;  Surgeon: Opal Brower MD;  [facility];  Service: General    ME REMOVE BLADDER STONE,<2 5 CM N/A 9/15/2017    Procedure: Ad Huertas;  Surgeon: Alfredito Jon MD;  [facility];  Service: Urology    ME TRANSURETHRAL ELEC-SURG PROSTATECTOM N/A 9/15/2017    Procedure: BIPOLAR TRANSURETHRAL RESECTION OF PROSTATE (TURP); Surgeon: Alfredito Jon MD;  [facility];  Service: Urology    TONSILLECTOMY      TRIGGER FINGER RELEASE Right     TUMOR REMOVAL Left     Left foot 20 years ago   WISDOM TOOTH EXTRACTION Bilateral        Past Family History  Family History   Problem Relation Age of Onset    Heart disease Mother     Hypertension Mother     Stroke Mother     Breast cancer Mother     Heart disease Father     Hypertension Father     Stroke Father     Colon cancer Father     Diabetes Sister     Heart disease Brother     Other Family         BACK PAIN    Breast cancer Family     Heart disease Family     Hypertension Family     Stroke Family     Mental illness Neg Hx     Substance Abuse Neg Hx        Past Social history  Social History     Socioeconomic History    Marital status: /Civil Union     Spouse name: Not on file    Number of children: Not on file    Years of education: Not on file    Highest education level: Not on file   Occupational History    Not on file   Social Needs    Financial resource strain: Not hard at all   [place] insecurity:     Worry: Never true     Inability: Never true   [employer] needs:     Medical: No     Non-medical: No   Tobacco Use    Smoking status: Former Smoker     Types: Cigarettes    Smokeless tobacco: Never Used    Tobacco comment: back in college in 1966   Substance and Sexual Activity    Alcohol use:  Yes     Alcohol/week: 4 0 standard drinks     Types: 4 Cans of beer per week     Frequency: 2-4 times a month     Drinks per session: 1 or 2     Binge frequency: Never    Drug use: No    Sexual activity: Not on file   Lifestyle    Physical activity:     Days per week: 0 days     Minutes per session: 0 min    Stress: Not at all   Relationships    Social connections:     Talks on phone: Not on file     Gets together: Not on file     Attends Hoahaoism service: Not on file     Active member of club or organization: Not on file     Attends meetings of clubs or organizations: Not on file     Relationship status: Not on file    Intimate partner violence:     Fear of current or ex partner: No     Emotionally abused: No     Physically abused: No     Forced sexual activity: No   Other Topics Concern    Not on file   Social History Narrative    Lives with wife  Sees dentist reg  Has living will  Feels safe at home  No mental or sub in self       ACTIVE ADVANCE DIRECTIVE    CAREGIVER:  SPOUSE    EXERCISE HABITS  -  DAILY    GOOD DENTAL HYGIENE       Current Medications  Current Outpatient Medications   Medication Sig Dispense Refill    allopurinol (ZYLOPRIM) 300 mg tablet TAKE 1 TABLET BY MOUTH EVERY DAY 90 tablet 0    aspirin (ASPIRIN LOW DOSE) 81 MG tablet Take by mouth      atorvastatin (LIPITOR) 20 mg tablet TAKE 1 TABLET BY MOUTH EVERY DAY 90 tablet 0    benazepril-hydrochlorthiazide (LOTENSIN HCT) 20-25 MG per tablet TAKE 1 TABLET BY MOUTH EVERY DAY 90 tablet 1    Calcium Carbonate-Vitamin D (CALTRATE 600+D) 600-400 MG-UNIT per tablet Caltrate 600+D 600-400 MG-UNIT TABS  Take 1 tablet daily   Refills: 0      , M D ; Active      carbidopa-levodopa (SINEMET)  mg per tablet 2 tabs---three times daily      Cholecalciferol (VITAMIN D-3) 1000 UNITS CAPS Vitamin D3 1000 UNIT Oral Capsule   once a day Refills: 0      , M D ; Active      glucosamine-chondroitin 500-400 MG tablet Take 1 tablet by mouth daily      metoprolol succinate (TOPROL-XL) 50 mg 24 hr tablet TAKE 1 TABLET BY MOUTH EVERY DAY 90 tablet 0    Multiple Vitamin (MULTIVITAMINS PO) Multivitamins Oral once a day Capsule   Refills: 0      , M D ; Active      nitroglycerin (NITROSTAT) 0 4 mg SL tablet Place under the tongue      zolpidem (AMBIEN) 10 mg tablet TAKE 1 TABLET BY MOUTH DAILY AT BEDTIME 30 tablet 3    Zoster Vac Recomb Adjuvanted (Shingrix) 50 MCG/0 5ML SUSR 0 5mL IM for one dose, followed by 0 5mL IM 2-6 months after first dose 1 each 1     No current facility-administered medications for this visit  Allergies  No Known Allergies    Past Medical History, Social History, Family History, medications and allergies were reviewed  Vitals  Vitals:    07/22/20 0910   BP: 118/68   BP Location: Left arm   Patient Position: Sitting   Cuff Size: Adult   Pulse: 82   Temp: (!) 97 3 °F (36 3 °C)   Weight: 112 kg (247 lb)   Height: 6' 1" (1 854 m)       Physical Exam  Physical Exam   Constitutional: He is oriented to person, place, and time  He appears well-developed and well-nourished  HENT:   Head: Normocephalic  Eyes: Pupils are equal, round, and reactive to light  Neck: Normal range of motion  Pulmonary/Chest: Effort normal    Abdominal: Soft  Normal appearance  There is no CVA tenderness  Musculoskeletal: Normal range of motion  Neurological: He is alert and oriented to person, place, and time  Skin: Skin is warm and dry  Psychiatric: He has a normal mood and affect   His behavior is normal  Judgment and thought content normal        Results    I have personally reviewed all pertinent lab results and reviewed with patient  Lab Results   Component Value Date    PSA 1 6 04/05/2017    PSA 1 4 10/06/2015     Lab Results   Component Value Date    GLUCOSE 95 10/06/2015    CALCIUM 8 9 03/10/2020     10/06/2015    K 3 4 (L) 03/10/2020    CO2 28 03/10/2020     03/10/2020    BUN 18 03/10/2020    CREATININE 1 10 03/10/2020     Lab Results Component Value Date    WBC 6 52 03/10/2020    HGB 15 5 03/10/2020    HCT 46 2 03/10/2020    MCV 95 03/10/2020     03/10/2020     No results found for this or any previous visit (from the past 1 hour(s))

## 2020-07-23 DIAGNOSIS — M1A.9XX0 CHRONIC GOUT WITHOUT TOPHUS, UNSPECIFIED CAUSE, UNSPECIFIED SITE: ICD-10-CM

## 2020-07-23 RX ORDER — ALLOPURINOL 300 MG/1
TABLET ORAL
Qty: 90 TABLET | Refills: 0 | Status: SHIPPED | OUTPATIENT
Start: 2020-07-23 | End: 2020-10-15

## 2020-07-26 DIAGNOSIS — I10 ESSENTIAL HYPERTENSION: ICD-10-CM

## 2020-07-26 RX ORDER — METOPROLOL SUCCINATE 50 MG/1
TABLET, EXTENDED RELEASE ORAL
Qty: 90 TABLET | Refills: 0 | Status: SHIPPED | OUTPATIENT
Start: 2020-07-26 | End: 2020-10-19

## 2020-08-19 ENCOUNTER — OFFICE VISIT (OUTPATIENT)
Dept: CARDIOLOGY CLINIC | Facility: CLINIC | Age: 75
End: 2020-08-19
Payer: MEDICARE

## 2020-08-19 VITALS
SYSTOLIC BLOOD PRESSURE: 126 MMHG | HEART RATE: 81 BPM | HEIGHT: 73 IN | BODY MASS INDEX: 31.94 KG/M2 | WEIGHT: 241 LBS | TEMPERATURE: 97.9 F | DIASTOLIC BLOOD PRESSURE: 72 MMHG

## 2020-08-19 DIAGNOSIS — I10 ESSENTIAL HYPERTENSION: ICD-10-CM

## 2020-08-19 DIAGNOSIS — E78.00 HYPERCHOLESTEROLEMIA: ICD-10-CM

## 2020-08-19 DIAGNOSIS — I25.10 CORONARY ARTERY DISEASE INVOLVING NATIVE CORONARY ARTERY OF NATIVE HEART WITHOUT ANGINA PECTORIS: Primary | ICD-10-CM

## 2020-08-19 PROCEDURE — 3008F BODY MASS INDEX DOCD: CPT | Performed by: INTERNAL MEDICINE

## 2020-08-19 PROCEDURE — 3074F SYST BP LT 130 MM HG: CPT | Performed by: INTERNAL MEDICINE

## 2020-08-19 PROCEDURE — 93000 ELECTROCARDIOGRAM COMPLETE: CPT | Performed by: INTERNAL MEDICINE

## 2020-08-19 PROCEDURE — 1160F RVW MEDS BY RX/DR IN RCRD: CPT | Performed by: INTERNAL MEDICINE

## 2020-08-19 PROCEDURE — 1036F TOBACCO NON-USER: CPT | Performed by: INTERNAL MEDICINE

## 2020-08-19 PROCEDURE — 4040F PNEUMOC VAC/ADMIN/RCVD: CPT | Performed by: INTERNAL MEDICINE

## 2020-08-19 PROCEDURE — 3078F DIAST BP <80 MM HG: CPT | Performed by: INTERNAL MEDICINE

## 2020-08-19 PROCEDURE — 99214 OFFICE O/P EST MOD 30 MIN: CPT | Performed by: INTERNAL MEDICINE

## 2020-08-19 NOTE — PROGRESS NOTES
Cardiology Follow Up    Su Mistry  1945  [id number]  [facility] Maryana Leblanc  [address] [place] [id number] [phone]    1  Coronary artery disease involving native coronary artery of native heart without angina pectoris  POCT ECG   2  Essential hypertension     3  Hypercholesterolemia         Interval History:  Cardiology follow-up  Patient continues to do well from a cardiac point of view denies any chest pain or dyspnea, still able to ambulate despite some progression of his Parkinson disease  He has suffered no falls  He denies any orthopnea or PND  Compliant with low-cholesterol diet  Total cholesterol last year 115 with an HDL 46 and LDL of 50, excellent control on medium intensity statin therapy  No bleeding issues on chronic aspirin therapy  Sleep hygiene appears to be poor  He admits to be anxious about the COVID pandemic  Compliant with low-sodium diet, blood pressures been well control      Patient Active Problem List   Diagnosis    Essential hypertension    Primary Parkinsonism ([address] )    Coronary artery disease involving native coronary artery of native heart without angina pectoris    BPH without obstruction/lower urinary tract symptoms    Degeneration, intervertebral disc, lumbosacral    Gastroesophageal reflux disease without esophagitis    Gout    Idiopathic peripheral neuropathy    Nephrolithiasis    Osteoarthritis of knee    Hypercholesterolemia    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    Pharyngeal dysphagia    Other insomnia     Past Medical History:   Diagnosis Date    Arthritis     Benign familial tremor     Cardiac disease     two cardiac stents    Chest pain     Coronary artery disease     Hyperlipidemia     Hypertension     Kidney disease     Kidney stone     Malignant melanoma of skin of chest ([address] )     Removed 2 years ago   Meniscal injury     Nephrolithiasis     Parkinson's disease ([address] )     PONV (postoperative nausea and vomiting)     Tinnitus     [id number] RESOLVED     Social History     Socioeconomic History    Marital status: /Civil Union     Spouse name: Not on file    Number of children: Not on file    Years of education: Not on file    Highest education level: Not on file   Occupational History    Not on file   Social Needs    Financial resource strain: Not hard at all   [place] insecurity     Worry: Never true     Inability: Never true   [employer] needs     Medical: No     Non-medical: No   Tobacco Use    Smoking status: Former Smoker     Types: Cigarettes    Smokeless tobacco: Never Used    Tobacco comment: back in college in 1966   Substance and Sexual Activity    Alcohol use: Yes     Alcohol/week: 4 0 standard drinks     Types: 4 Cans of beer per week     Frequency: 2-4 times a month     Drinks per session: 1 or 2     Binge frequency: Never    Drug use: No    Sexual activity: Not on file   Lifestyle    Physical activity     Days per week: 0 days     Minutes per session: 0 min    Stress: Not at all   Relationships    Social connections     Talks on phone: Not on file     Gets together: Not on file     Attends Bahai service: Not on file     Active member of club or organization: Not on file     Attends meetings of clubs or organizations: Not on file     Relationship status: Not on file    Intimate partner violence     Fear of current or ex partner: No     Emotionally abused: No     Physically abused: No     Forced sexual activity: No   Other Topics Concern    Not on file   Social History Narrative    Lives with wife  Sees dentist reg  Has living will  Feels safe at home  No mental or sub in self       ACTIVE ADVANCE DIRECTIVE    CAREGIVER:  SPOUSE    EXERCISE HABITS  -  DAILY    GOOD DENTAL HYGIENE      Family History   Problem Relation Age of Onset    Heart disease Mother     Hypertension Mother     Stroke Mother     Breast cancer Mother     Heart disease Father     Hypertension Father     Stroke Father     Colon cancer Father     Diabetes Sister     Heart disease Brother     Other Family         BACK PAIN    Breast cancer Family     Heart disease Family     Hypertension Family     Stroke Family     Mental illness Neg Hx     Substance Abuse Neg Hx      Past Surgical History:   Procedure Laterality Date    AMPUTATION Left     AMPUTATION OF FINGER WITH NEURECTOMY(EACH) DISTAL LONG  RING AND SMALL FINGER    CARPAL TUNNEL RELEASE Right     CATARACT EXTRACTION      CORONARY STENT PLACEMENT      two cardiac stents    FINGER AMPUTATION      AMPUTATION OF MIDDLE FINGER WITH NEURECTOMY(EACH)    HAND SURGERY Left     Traumatic amputation of fingers left hand   JOINT REPLACEMENT Bilateral     knees    KNEE ARTHROPLASTY      TOTAL    MALIGNANT SKIN LESION EXCISION      ANTERIOR CHEST FOR MELAMONIA    HI CYSTO/URETERO W/LITHOTRIPSY &INDWELL STENT INSRT Left 7/27/2017    Procedure: CYSTOSCOPY URETEROSCOPY , RETROGRADE PYELOGRAM AND INSERTION STENT URETERAL;  Surgeon: Stephen Beasley MD;  [facility];  Service: Urology    HI LAP,CHOLECYSTECTOMY N/A 2/28/2018    Procedure: CHOLECYSTECTOMY LAPAROSCOPIC;  Surgeon: Padmini Burch MD;  [facility];  Service: General    HI REMOVE BLADDER STONE,<2 5 CM N/A 9/15/2017    Procedure: Kings Castor;  Surgeon: Jasmin Silver MD;  [facility];  Service: Urology    HI TRANSURETHRAL ELEC-SURG PROSTATECTOM N/A 9/15/2017    Procedure: BIPOLAR TRANSURETHRAL RESECTION OF PROSTATE (TURP); Surgeon: Jasmin Silver MD;  [facility];  Service: Urology    TONSILLECTOMY      TRIGGER FINGER RELEASE Right     TUMOR REMOVAL Left     Left foot 20 years ago      WISDOM TOOTH EXTRACTION Bilateral        Current Outpatient Medications:     allopurinol (ZYLOPRIM) 300 mg tablet, TAKE 1 TABLET BY MOUTH EVERY DAY, Disp: 90 tablet, Rfl: 0    aspirin (ASPIRIN LOW DOSE) 81 MG tablet, Take by mouth, Disp: , Rfl:     atorvastatin (LIPITOR) 20 mg tablet, TAKE 1 TABLET BY MOUTH EVERY DAY, Disp: 90 tablet, Rfl: 0    benazepril-hydrochlorthiazide (LOTENSIN HCT) 20-25 MG per tablet, TAKE 1 TABLET BY MOUTH EVERY DAY, Disp: 90 tablet, Rfl: 1    Calcium Carbonate-Vitamin D (CALTRATE 600+D) 600-400 MG-UNIT per tablet, Caltrate 600+D 600-400 MG-UNIT TABS Take 1 tablet daily  Refills: 0   , M D ; Active, Disp: , Rfl:     carbidopa-levodopa (SINEMET)  mg per tablet, 2 tabs---three times daily, Disp: , Rfl:     Cholecalciferol (VITAMIN D-3) 1000 UNITS CAPS, Vitamin D3 1000 UNIT Oral Capsule  once a day Refills: 0   , M D ; Active, Disp: , Rfl:     glucosamine-chondroitin 500-400 MG tablet, Take 1 tablet by mouth daily, Disp: , Rfl:     metoprolol succinate (TOPROL-XL) 50 mg 24 hr tablet, TAKE 1 TABLET BY MOUTH EVERY DAY, Disp: 90 tablet, Rfl: 0    Multiple Vitamin (MULTIVITAMINS PO), Multivitamins Oral once a day Capsule  Refills: 0   , M D ; Active, Disp: , Rfl:     zolpidem (AMBIEN) 10 mg tablet, TAKE 1 TABLET BY MOUTH DAILY AT BEDTIME, Disp: 30 tablet, Rfl: 3    Zoster Vac Recomb Adjuvanted (Shingrix) 50 MCG/0 5ML SUSR, 0 5mL IM for one dose, followed by 0 5mL IM 2-6 months after first dose, Disp: 1 each, Rfl: 1    nitroglycerin (NITROSTAT) 0 4 mg SL tablet, Place under the tongue, Disp: , Rfl:   No Known Allergies    Labs:  Lab on 03/10/2020   Component Date Value    Sodium 03/10/2020 143     Potassium 03/10/2020 3 4*    Chloride 03/10/2020 108     CO2 03/10/2020 28     ANION GAP 03/10/2020 7     BUN 03/10/2020 18     Creatinine 03/10/2020 1 10     Glucose, Fasting 03/10/2020 115*    Calcium 03/10/2020 8 9     AST 03/10/2020 21     ALT 03/10/2020 14     Alkaline Phosphatase 03/10/2020 93     Total Protein 03/10/2020 7 3     Albumin 03/10/2020 4 0     Total Bilirubin 03/10/2020 1 10*    eGFR 03/10/2020 65     WBC 03/10/2020 6 52     RBC 03/10/2020 4 87     Hemoglobin 03/10/2020 15 5     Hematocrit 03/10/2020 46 2     MCV 03/10/2020 95     MCH 03/10/2020 31 8     MCHC 03/10/2020 33 5     RDW 03/10/2020 12 2     Platelets 32/40/1499 176     MPV 03/10/2020 10 8     TSH 3RD GENERATON 03/10/2020 2 320     Vitamin B-12 03/10/2020 886     Magnesium 03/10/2020 2 3      Imaging: No results found  Review of Systems:  Review of Systems   Constitutional: Negative for activity change, fatigue and unexpected weight change  HENT: Negative for nosebleeds  Eyes: Negative for visual disturbance  Respiratory: Negative for apnea, shortness of breath, wheezing and stridor  Cardiovascular: Negative for chest pain and leg swelling  Gastrointestinal: Negative for abdominal pain, anal bleeding and blood in stool  Endocrine: Negative for cold intolerance  Genitourinary: Negative for hematuria  Musculoskeletal: Positive for gait problem  Negative for arthralgias and myalgias  Skin: Positive for wound (Recent excision of premalignant lesion)  Negative for pallor and rash  Allergic/Immunologic: Negative for immunocompromised state  Neurological: Positive for tremors  Negative for dizziness, speech difficulty and light-headedness  Hematological: Does not bruise/bleed easily  Psychiatric/Behavioral: Positive for sleep disturbance  The patient is nervous/anxious  Physical Exam:  Physical Exam  Vitals signs reviewed  Constitutional:       General: He is not in acute distress  Appearance: Normal appearance  He is not ill-appearing, toxic-appearing or diaphoretic  Eyes:      General: No scleral icterus  Cardiovascular:      Rate and Rhythm: Normal rate and regular rhythm  Pulses: Normal pulses  Heart sounds: Normal heart sounds  No murmur  No friction rub  No gallop      Pulmonary:      Effort: Pulmonary effort is normal  No respiratory distress  Breath sounds: Normal breath sounds  No stridor  No wheezing, rhonchi or rales  Chest:      Chest wall: No tenderness  Musculoskeletal:         General: No swelling  Skin:     General: Skin is warm and dry  Capillary Refill: Capillary refill takes less than 2 seconds  Neurological:      General: No focal deficit present  Mental Status: He is alert  Psychiatric:         Mood and Affect: Mood normal          Discussion/Summary:  Coronary artery disease PTCA/drug stent of the  of the LAD in 2009  Stress test 2017, he did 5 minutes of [place] protocol was negative ischemia by EKG criteria  Echocardiogram at that time revealed normal left systolic function with mild tricuspid insufficiency and estimated normal pulmonary pressures suggested by Doppler criteria  No symptoms at the present time, EKG is unremarkable  Will check lipid profile, continue current medications  This note was completed in part utilizing [employer] direct voice recognition software  Grammatical errors, random word insertion, spelling mistakes, and incomplete sentences may be an occasional consequence of the system secondary to software limitations, ambient noise and hardware issues  At the time of dictation, efforts were made to edit, clarify and /or correct errors  Please read the chart carefully and recognize, using context, where substitutions have occurred  If you have any questions or concerns about the context, text or information contained within the body of this dictation, please contact myself, the provider, for further clarification

## 2020-08-27 ENCOUNTER — APPOINTMENT (OUTPATIENT)
Dept: LAB | Facility: HOSPITAL | Age: 75
End: 2020-08-27
Attending: INTERNAL MEDICINE
Payer: MEDICARE

## 2020-08-27 DIAGNOSIS — I25.10 CORONARY ARTERY DISEASE INVOLVING NATIVE CORONARY ARTERY OF NATIVE HEART WITHOUT ANGINA PECTORIS: ICD-10-CM

## 2020-08-27 DIAGNOSIS — E78.00 HYPERCHOLESTEROLEMIA: ICD-10-CM

## 2020-08-27 LAB
CHOLEST SERPL-MCNC: 121 MG/DL (ref 50–200)
HDLC SERPL-MCNC: 48 MG/DL
LDLC SERPL CALC-MCNC: 61 MG/DL (ref 0–100)
TRIGL SERPL-MCNC: 60 MG/DL

## 2020-08-27 PROCEDURE — 80061 LIPID PANEL: CPT

## 2020-08-27 PROCEDURE — 36415 COLL VENOUS BLD VENIPUNCTURE: CPT

## 2020-09-08 ENCOUNTER — TRANSCRIBE ORDERS (OUTPATIENT)
Dept: ADMINISTRATIVE | Facility: HOSPITAL | Age: 75
End: 2020-09-08

## 2020-09-08 ENCOUNTER — LAB (OUTPATIENT)
Dept: LAB | Facility: HOSPITAL | Age: 75
End: 2020-09-08
Attending: INTERNAL MEDICINE
Payer: MEDICARE

## 2020-09-08 DIAGNOSIS — Z13.1 SCREENING FOR DIABETES MELLITUS: ICD-10-CM

## 2020-09-08 DIAGNOSIS — Z13.1 SCREENING FOR DIABETES MELLITUS: Primary | ICD-10-CM

## 2020-09-08 LAB
ANION GAP SERPL CALCULATED.3IONS-SCNC: 7 MMOL/L (ref 4–13)
BUN SERPL-MCNC: 21 MG/DL (ref 5–25)
CALCIUM SERPL-MCNC: 8.7 MG/DL (ref 8.3–10.1)
CHLORIDE SERPL-SCNC: 107 MMOL/L (ref 100–108)
CO2 SERPL-SCNC: 27 MMOL/L (ref 21–32)
CREAT SERPL-MCNC: 1.03 MG/DL (ref 0.6–1.3)
GFR SERPL CREATININE-BSD FRML MDRD: 70 ML/MIN/1.73SQ M
GLUCOSE P FAST SERPL-MCNC: 104 MG/DL (ref 65–99)
POTASSIUM SERPL-SCNC: 3.7 MMOL/L (ref 3.5–5.3)
SODIUM SERPL-SCNC: 141 MMOL/L (ref 136–145)

## 2020-09-08 PROCEDURE — 80048 BASIC METABOLIC PNL TOTAL CA: CPT

## 2020-09-08 PROCEDURE — 36415 COLL VENOUS BLD VENIPUNCTURE: CPT

## 2020-09-16 ENCOUNTER — OFFICE VISIT (OUTPATIENT)
Dept: FAMILY MEDICINE CLINIC | Facility: HOSPITAL | Age: 75
End: 2020-09-16
Payer: MEDICARE

## 2020-09-16 VITALS
HEIGHT: 73 IN | TEMPERATURE: 98.6 F | RESPIRATION RATE: 16 BRPM | BODY MASS INDEX: 32.07 KG/M2 | WEIGHT: 242 LBS | SYSTOLIC BLOOD PRESSURE: 120 MMHG | HEART RATE: 82 BPM | DIASTOLIC BLOOD PRESSURE: 68 MMHG

## 2020-09-16 DIAGNOSIS — I10 ESSENTIAL HYPERTENSION: ICD-10-CM

## 2020-09-16 DIAGNOSIS — I25.10 CORONARY ARTERY DISEASE INVOLVING NATIVE CORONARY ARTERY OF NATIVE HEART WITHOUT ANGINA PECTORIS: ICD-10-CM

## 2020-09-16 DIAGNOSIS — G20 PRIMARY PARKINSONISM (HCC): Primary | ICD-10-CM

## 2020-09-16 DIAGNOSIS — G47.09 OTHER INSOMNIA: ICD-10-CM

## 2020-09-16 PROCEDURE — 99214 OFFICE O/P EST MOD 30 MIN: CPT | Performed by: INTERNAL MEDICINE

## 2020-09-16 NOTE — ASSESSMENT & PLAN NOTE
Blood pressure is controlled, continue benazepril, hydrochlorothiazide and Toprol    Renal function was stable, electrolytes were normal this month

## 2020-09-16 NOTE — PROGRESS NOTES
Assessment/Plan:    Primary [facility]ism ([facility] )  Stable  Follows with Neuro  Continue sinemet  Denies falls  Has difficulty walking up steps    Other insomnia  Pt has intermittent insomnia  Takes zolpidem once or twice a month  [place]  We discussed increased risk for falls, hallucination and confusion the following day    Coronary artery disease involving native coronary artery of native heart without angina pectoris  Patient denies any chest pain or shortness of breath  Continue aspirin and atorvastatin    Essential hypertension  Blood pressure is controlled, continue benazepril, hydrochlorothiazide and Toprol    Renal function was stable, electrolytes were normal this month       Diagnoses and all orders for this visit:    Primary Parkinsonism ([facility] )    Other insomnia    Coronary artery disease involving native coronary artery of native heart without angina pectoris    Essential hypertension          Subjective:      Patient ID: Su Mistry is a 67 y o  male  Patient is here for follow-up of chronic problems, see assessment and plan    Hypertension   This is a chronic problem  The current episode started more than 1 year ago  The problem is unchanged  The problem is controlled  Pertinent negatives include no chest pain, headaches, palpitations or shortness of breath  The following portions of the patient's history were reviewed and updated as appropriate: current medications, past family history, past medical history, past social history, past surgical history and problem list     Review of Systems   Constitutional: Negative for fever  HENT: Negative for congestion  Eyes: Negative for visual disturbance  Respiratory: Negative for cough and shortness of breath  Cardiovascular: Negative for chest pain, palpitations and leg swelling  Gastrointestinal: Negative for abdominal pain, blood in stool and diarrhea  Endocrine: Negative for polydipsia and polyphagia     Genitourinary: Negative for difficulty urinating  Musculoskeletal: Negative for myalgias  Skin: Negative for rash  Neurological: Negative for headaches  Psychiatric/Behavioral: Negative for dysphoric mood  All other systems reviewed and are negative  Objective:    /68   Pulse 82   Temp 98 6 °F (37 °C) (Tympanic)   Resp 16   Ht 6' 1" (1 854 m)   Wt 110 kg (242 lb)   BMI 31 93 kg/m²      Physical Exam  HENT:      Head: Normocephalic  Eyes:      Conjunctiva/sclera: Conjunctivae normal    Neck:      Musculoskeletal: Neck supple  Cardiovascular:      Rate and Rhythm: Normal rate and regular rhythm  Heart sounds: No murmur  Pulmonary:      Effort: No respiratory distress  Breath sounds: No wheezing or rales  Abdominal:      General: Bowel sounds are normal       Tenderness: There is no abdominal tenderness  Skin:     General: Skin is warm and dry  Neurological:      Mental Status: He is alert  Cranial Nerves: No cranial nerve deficit        Comments: Mild resting tremor of the left hand     Psychiatric:         Mood and Affect: Mood normal              Maciej Parekh MD

## 2020-09-16 NOTE — ASSESSMENT & PLAN NOTE
Pt has intermittent insomnia  Takes zolpidem once or twice a month  [place]  We discussed increased risk for falls, hallucination and confusion the following day

## 2020-10-05 ENCOUNTER — IMMUNIZATIONS (OUTPATIENT)
Dept: FAMILY MEDICINE CLINIC | Facility: HOSPITAL | Age: 75
End: 2020-10-05
Payer: MEDICARE

## 2020-10-05 DIAGNOSIS — Z23 ENCOUNTER FOR IMMUNIZATION: ICD-10-CM

## 2020-10-05 PROCEDURE — 90662 IIV NO PRSV INCREASED AG IM: CPT

## 2020-10-05 PROCEDURE — G0008 ADMIN INFLUENZA VIRUS VAC: HCPCS

## 2020-10-11 DIAGNOSIS — I25.10 CORONARY ARTERY DISEASE INVOLVING NATIVE CORONARY ARTERY OF NATIVE HEART WITHOUT ANGINA PECTORIS: ICD-10-CM

## 2020-10-12 RX ORDER — ATORVASTATIN CALCIUM 20 MG/1
TABLET, FILM COATED ORAL
Qty: 90 TABLET | Refills: 0 | Status: SHIPPED | OUTPATIENT
Start: 2020-10-12 | End: 2021-01-04

## 2020-10-15 DIAGNOSIS — M1A.9XX0 CHRONIC GOUT WITHOUT TOPHUS, UNSPECIFIED CAUSE, UNSPECIFIED SITE: ICD-10-CM

## 2020-10-15 RX ORDER — ALLOPURINOL 300 MG/1
TABLET ORAL
Qty: 90 TABLET | Refills: 0 | Status: SHIPPED | OUTPATIENT
Start: 2020-10-15 | End: 2021-01-08 | Stop reason: SDUPTHER

## 2020-10-18 DIAGNOSIS — I10 ESSENTIAL HYPERTENSION: ICD-10-CM

## 2020-10-19 RX ORDER — METOPROLOL SUCCINATE 50 MG/1
TABLET, EXTENDED RELEASE ORAL
Qty: 90 TABLET | Refills: 0 | Status: SHIPPED | OUTPATIENT
Start: 2020-10-19 | End: 2021-01-11 | Stop reason: SDUPTHER

## 2020-12-14 DIAGNOSIS — I10 ESSENTIAL HYPERTENSION: ICD-10-CM

## 2020-12-14 RX ORDER — BENAZEPRIL/HYDROCHLOROTHIAZIDE 20 MG-25MG
1 TABLET ORAL DAILY
Qty: 90 TABLET | Refills: 1 | Status: SHIPPED | OUTPATIENT
Start: 2020-12-14 | End: 2021-05-17 | Stop reason: SDUPTHER

## 2021-01-04 ENCOUNTER — TELEPHONE (OUTPATIENT)
Dept: UROLOGY | Facility: MEDICAL CENTER | Age: 76
End: 2021-01-04

## 2021-01-04 DIAGNOSIS — N20.0 NEPHROLITHIASIS: Primary | ICD-10-CM

## 2021-01-04 DIAGNOSIS — I25.10 CORONARY ARTERY DISEASE INVOLVING NATIVE CORONARY ARTERY OF NATIVE HEART WITHOUT ANGINA PECTORIS: ICD-10-CM

## 2021-01-04 RX ORDER — ATORVASTATIN CALCIUM 20 MG/1
TABLET, FILM COATED ORAL
Qty: 90 TABLET | Refills: 0 | Status: SHIPPED | OUTPATIENT
Start: 2021-01-04 | End: 2021-02-02 | Stop reason: SDUPTHER

## 2021-01-04 NOTE — TELEPHONE ENCOUNTER
X-ray and ultrasound performed in July were negative for stone  Would recommend patient to hydrate, alternate Tylenol and ibuprofen, and heating pad  He should contact his primary care provider in regards to potential COVID testing since he appears to have a high risk exposure  If patient's pain persists over the next 24-48 hours we would need a CT stone study for further evaluation

## 2021-01-04 NOTE — TELEPHONE ENCOUNTER
Patient of [facility] office  History of BPH and bladder stones s/p TURP and cystolitholapaxy (9/15/2017), Nephrolithiasis  Last seen in office July 2020  At that time he was recommended he could follow up PRN, however patient wished to be seen annually for follow up  Patient now stating he is having kidney stone symptoms  However, his daughter tested positive for Covid today and he was with her over the weekend  Will route to provider to advise on how to proceed as far as kidney stones  Call placed to patient and advise to call PCP as to Covid concerns as he should at least quarantine due to exposure  Patient verbalized understanding

## 2021-01-04 NOTE — TELEPHONE ENCOUNTER
Patient  Seen by Kapil Dave at [place]    Patient called stating he is having right side flank pain  He states it feels like a kidney stone  Last night he felt mild pain on groin area  Patient stated his daughter tested positive to covid this morning  He was with her this past weekend  He would like to know how to proceed        Patient can be reached [phone]

## 2021-01-05 NOTE — TELEPHONE ENCOUNTER
Call placed to patient, advised per provider X-ray and ultrasound performed in July were negative for stone  Would recommend patient to hydrate, alternate Tylenol and ibuprofen, and heating pad  Advised that he should contact his primary care provider in regards to potential COVID testing since he appears to have a high risk exposure  If patient's pain persists over the next 24-48 hours we would need a CT stone study for further evaluation  Patient verbalized understanding and agrees with plan

## 2021-01-07 ENCOUNTER — TELEPHONE (OUTPATIENT)
Dept: FAMILY MEDICINE CLINIC | Facility: HOSPITAL | Age: 76
End: 2021-01-07

## 2021-01-07 NOTE — TELEPHONE ENCOUNTER
I spoke with Karon Dave   Pt and his wife were exposed to their daughter who tested positive on Monday 1/4/2021  Patient and his wife were both wearing masks at time of exposure and they were out side, meeting was less then 15 min  I explained that this was not a high risk exposure and patient should quarantine for 10 days watch for symptoms, at this time patient and his wife do not have any symptoms   DD

## 2021-01-08 DIAGNOSIS — M1A.9XX0 CHRONIC GOUT WITHOUT TOPHUS, UNSPECIFIED CAUSE, UNSPECIFIED SITE: ICD-10-CM

## 2021-01-08 RX ORDER — ALLOPURINOL 300 MG/1
300 TABLET ORAL DAILY
Qty: 90 TABLET | Refills: 3 | Status: SHIPPED | OUTPATIENT
Start: 2021-01-08 | End: 2021-11-08 | Stop reason: SDUPTHER

## 2021-01-11 DIAGNOSIS — R53.83 FATIGUE, UNSPECIFIED TYPE: ICD-10-CM

## 2021-01-11 DIAGNOSIS — R53.83 FATIGUE, UNSPECIFIED TYPE: Primary | ICD-10-CM

## 2021-01-11 DIAGNOSIS — I10 ESSENTIAL HYPERTENSION: ICD-10-CM

## 2021-01-11 PROCEDURE — U0003 INFECTIOUS AGENT DETECTION BY NUCLEIC ACID (DNA OR RNA); SEVERE ACUTE RESPIRATORY SYNDROME CORONAVIRUS 2 (SARS-COV-2) (CORONAVIRUS DISEASE [COVID-19]), AMPLIFIED PROBE TECHNIQUE, MAKING USE OF HIGH THROUGHPUT TECHNOLOGIES AS DESCRIBED BY CMS-2020-01-R: HCPCS | Performed by: INTERNAL MEDICINE

## 2021-01-11 PROCEDURE — U0005 INFEC AGEN DETEC AMPLI PROBE: HCPCS | Performed by: INTERNAL MEDICINE

## 2021-01-11 RX ORDER — METOPROLOL SUCCINATE 50 MG/1
50 TABLET, EXTENDED RELEASE ORAL DAILY
Qty: 90 TABLET | Refills: 1 | Status: SHIPPED | OUTPATIENT
Start: 2021-01-11 | End: 2021-08-06 | Stop reason: SDUPTHER

## 2021-01-12 LAB — SARS-COV-2 RNA SPEC QL NAA+PROBE: NOT DETECTED

## 2021-01-18 NOTE — TELEPHONE ENCOUNTER
Patient called in stating he is still having the pain  Patient stated its not a bad pain but it has not gone away from before   Patient can be reached at [phone]

## 2021-01-18 NOTE — TELEPHONE ENCOUNTER
Call returned to patient  He states that he continues to have flank pain and would like to proceed with CT stone study that was discussed a few week sago  Order placed in [address] [place] [address] Rd  Advised will monitor for results and call patient with plan  Central scheduling number provided, patient verbalized understanding and agrees with plan

## 2021-01-20 DIAGNOSIS — Z23 ENCOUNTER FOR IMMUNIZATION: ICD-10-CM

## 2021-01-21 ENCOUNTER — HOSPITAL ENCOUNTER (OUTPATIENT)
Dept: CT IMAGING | Facility: HOSPITAL | Age: 76
Discharge: HOME | End: 2021-01-21
Payer: MEDICARE

## 2021-01-21 ENCOUNTER — IMMUNIZATIONS (OUTPATIENT)
Dept: FAMILY MEDICINE CLINIC | Facility: HOSPITAL | Age: 76
End: 2021-01-21
Payer: MEDICARE

## 2021-01-21 DIAGNOSIS — Z23 ENCOUNTER FOR IMMUNIZATION: Primary | ICD-10-CM

## 2021-01-21 DIAGNOSIS — N20.0 NEPHROLITHIASIS: ICD-10-CM

## 2021-01-21 PROCEDURE — G1004 CDSM NDSC: HCPCS

## 2021-01-21 PROCEDURE — 74176 CT ABD & PELVIS W/O CONTRAST: CPT

## 2021-01-27 NOTE — TELEPHONE ENCOUNTER
Please let the patient know the good news that his CT is negative for any obstructing stones  Very tiny stones noted in his kidneys  Would not recommend surgery for this  If he has further issues he can be scheduled for follow-up for further discussion  Thank you

## 2021-01-27 NOTE — TELEPHONE ENCOUNTER
Called Justen lares and notified him of results he is still having some back pain   Recommended ibuprofen - he said he has to take Tylenol, also a moist heating pad   Also reminded him of appointment in July

## 2021-02-02 DIAGNOSIS — I25.10 CORONARY ARTERY DISEASE INVOLVING NATIVE CORONARY ARTERY OF NATIVE HEART WITHOUT ANGINA PECTORIS: ICD-10-CM

## 2021-02-03 RX ORDER — ATORVASTATIN CALCIUM 20 MG/1
20 TABLET, FILM COATED ORAL DAILY
Qty: 90 TABLET | Refills: 3 | Status: SHIPPED | OUTPATIENT
Start: 2021-02-03 | End: 2022-04-15

## 2021-02-19 ENCOUNTER — IMMUNIZATIONS (OUTPATIENT)
Dept: FAMILY MEDICINE CLINIC | Facility: HOSPITAL | Age: 76
End: 2021-02-19

## 2021-02-19 DIAGNOSIS — Z23 ENCOUNTER FOR IMMUNIZATION: Primary | ICD-10-CM

## 2021-02-19 PROCEDURE — 91301 SARS-COV-2 / COVID-19 MRNA VACCINE (MODERNA) 100 MCG: CPT

## 2021-02-19 PROCEDURE — 0012A SARS-COV-2 / COVID-19 MRNA VACCINE (MODERNA) 100 MCG: CPT

## 2021-03-11 ENCOUNTER — LAB (OUTPATIENT)
Dept: LAB | Facility: HOSPITAL | Age: 76
End: 2021-03-11
Payer: MEDICARE

## 2021-03-11 ENCOUNTER — TRANSCRIBE ORDERS (OUTPATIENT)
Dept: ADMINISTRATIVE | Facility: HOSPITAL | Age: 76
End: 2021-03-11

## 2021-03-11 DIAGNOSIS — R41.3 MEMORY LOSS: Primary | ICD-10-CM

## 2021-03-11 DIAGNOSIS — R41.3 AMNESIA: Primary | ICD-10-CM

## 2021-03-11 DIAGNOSIS — R41.3 MEMORY LOSS: ICD-10-CM

## 2021-03-11 LAB
FOLATE SERPL-MCNC: >20 NG/ML (ref 3.1–17.5)
TSH SERPL DL<=0.05 MIU/L-ACNC: 1.86 UIU/ML (ref 0.36–3.74)
VIT B12 SERPL-MCNC: 871 PG/ML (ref 100–900)

## 2021-03-11 PROCEDURE — 36415 COLL VENOUS BLD VENIPUNCTURE: CPT

## 2021-03-11 PROCEDURE — 84443 ASSAY THYROID STIM HORMONE: CPT

## 2021-03-11 PROCEDURE — 82607 VITAMIN B-12: CPT

## 2021-03-11 PROCEDURE — 82746 ASSAY OF FOLIC ACID SERUM: CPT

## 2021-03-17 ENCOUNTER — TRANSCRIBE ORDERS (OUTPATIENT)
Dept: PHYSICAL THERAPY | Facility: CLINIC | Age: 76
End: 2021-03-17

## 2021-03-17 ENCOUNTER — EVALUATION (OUTPATIENT)
Dept: PHYSICAL THERAPY | Facility: CLINIC | Age: 76
End: 2021-03-17
Payer: MEDICARE

## 2021-03-17 DIAGNOSIS — G20 PARKINSON'S DISEASE (HCC): Primary | ICD-10-CM

## 2021-03-17 PROCEDURE — 97112 NEUROMUSCULAR REEDUCATION: CPT | Performed by: PHYSICAL THERAPIST

## 2021-03-17 PROCEDURE — 97162 PT EVAL MOD COMPLEX 30 MIN: CPT | Performed by: PHYSICAL THERAPIST

## 2021-03-17 NOTE — PROGRESS NOTES
PT Evaluation     Today's date: 3/17/2021  Patient name: Ben Arita  : 1945  MRN: [id number]  Referring provider: Kevin Valdez MD  Dx:   Encounter Diagnosis     ICD-10-CM    1  Parkinson's disease ([facility] )  G20                   Assessment  Assessment details: Pt is a 67year old male presenting to outpatient Physical Therapy with primary complaints of balance deficits secondary to Parkinson's Disease  He has had two falls in the last few months, and notes that he has noticed a more rapid regression than any other time since his diagnosis 8 years ago  Relevent co-morbidities include significant bilateral peripheral neuropathies  Today, the patient presents with an impaired gait pattern, including striking with the forefoot, and taking smaller, slower steps  He scored (+) for falls risk on his mini BEST test, scoring 17/28 points  These deficits are interfering with the pt's daily life, such as feeling safe, ascending stairs, and getting dressed  He would benefit from skilled PT in order to improve these deficits and help him achieve his maximum level of functioning  Thank you for your referral!  Impairments: abnormal gait, abnormal movement, impaired balance, lacks appropriate home exercise program and safety issue  Barriers to therapy: None  Understanding of Dx/Px/POC: good   Prognosis: good    Goals  STG (2-3 weeks):  1  The patient will be fully compliant with HEP  2  The patient will display at least 25% improved heel strike bilaterally during gait without verbal cuing  3  The patient will report mild to no difficulty with rolling over in bed at night    LTG (4-6 weeks):  1  The patient will increase FOTO score to 66   2  The patient will improve mini-BEST test by at least 5 points  3  The patient will display at least 75% improved heel strike during gait without verbal cuing  4  The patient will report ability to put on pants independently, without having to hold onto the wall  5  The patient will be able to ascend/descend stairs with a reciprocal pattern with use of one hand rail     Plan  Patient would benefit from: skilled physical therapy  Planned modality interventions: thermotherapy: hydrocollator packs, cryotherapy, ultrasound, traction, TENS and unattended electrical stimulation  Planned therapy interventions: therapeutic activities, therapeutic exercise, home exercise program, manual therapy, joint mobilization, balance, patient education, neuromuscular re-education, massage, abdominal trunk stabilization, stretching, strengthening, prosthetic fitting/training, graded motor, graded exercise, graded activity, gait training, functional ROM exercises, flexibility, breathing training, coordination, balance/weight bearing training and body mechanics training  Frequency: 2x week  Duration in weeks: 6  Plan of Care beginning date: 3/17/2021  Plan of Care expiration date: 4/28/2021  Treatment plan discussed with: patient        Subjective Evaluation    History of Present Illness  Mechanism of injury: Patient was diagnosed with Parkinson's Disease 8 years ago  He recently met his new Neurologist and told her that he feels his Levadopa is no longer as effective as they were  He asked her about the [employer] program that had been suggested to him a few years ago  His symptoms include tremors, voice changes, slow down in speaking, and difficulties walking (secondary to neuropathy)  He notes that he has neuropathy on both feet, with numbness, and "feeling like standing on water balloons "    He says the degradation of his balance have been a concern to him  He has had two fall sin the last year, both times falling going up the steps when entering the house  Pain  No pain reported  Progression: worsening    Social Support  Steps to enter house: yes (3)  Stairs in house: yes (master bedroom on the first floor-but has duel railins)   Lives with: spouse    Working: Retired     Hand dominance: right    Patient Goals  Patient goals for therapy: improved balance  Patient goal: prevent falls, ascend stairs more easily, turn more easily        Objective     Strength/Myotome Testing     Lumbar   Left   Normal strength    Right   Normal strength    Ambulation     Observational Gait   Decreased walking speed and stride length  Left foot contact pattern: forefoot  Right foot contact pattern: forefoot  Left arm swing: decreased  Base of support: decreased    Functional Assessment        Comments  Mini-BEST Test    Sit to stand-2  Ride to toes-0  Stand on one leg-0  Compensatory stepping correction (forward)-1  Compensatory stepping correction (backward)-1  Compensatory stepping correction (Lateral)-1  Stance(feet together) Eyes open, firm surface-2  Stance (feet together) Eyes closed, foam surface-2  Incline(eyes closed)-1  Change in Gait speed-1  Walk with head turns-1  Walk with pivot turns-2  Step Over obstacles-2  Timed Up and Go with dual task-1    Total:17/28          Flowsheet Rows      Most Recent Value   PT/OT G-Codes   Current Score  60   Projected Score  66             Precautions: Falls Risk, Bilateral LE Neuropathies, History of Cardiovascular Disease, Hx of bilateral TKR  POC: 4/28  HEP; Seated PWR!:posture, weight shift, and trunk rotation  Manuals 3/17                                                   Neuro Re-Ed             PWR! Supine:             Posture             Weight shift             Trunk Rotation             Transition             PWR!  Standing:             Transition             Twist                          Tandem Walking                                                                              Pt education/HEP review 10'            Ther Ex             Stationary Bike/Nu step             Arm Circles             High [employer]                                                                              Ther Activity             Functional Turning             Weight shift with fitness [place] (pants simulation)             Bed mobility (turns)                                                                 Gait Training             Heel strike (repeated rock)             Heel strike with dots                                                                 Modalities

## 2021-03-17 NOTE — LETTER
2021    [address] MarksWilfr[address] [place] [id number]    Patient: Darin Davis   YOB: 1945   Date of Visit: 3/17/2021     Encounter Diagnosis     ICD-10-CM    1  Parkinson's disease [facility])  G20        Dear Dr Joby Patel:    Thank you for your recent referral of Darin Davis  Please review the attached evaluation summary from Justen's recent visit  Please verify that you agree with the plan of care by signing the attached order  If you have any questions or concerns, please do not hesitate to call  I sincerely appreciate the opportunity to share in the care of one of your patients and hope to have another opportunity to work with you in the near future  Sincerely,    Mandie Robins, PT      Referring Provider:      I certify that I have read the below Plan of Care and certify the need for these services furnished under this plan of treatment while under my care  Leonardagianaedson MarksWilfrid [address] [place] [id number]  Via Fax: [phone]          PT Evaluation     Today's date: 3/17/2021  Patient name: Darin Davis  : 1945  MRN: [id number]  Referring provider: Agustin Coppola MD  Dx:   Encounter Diagnosis     ICD-10-CM    1  [facility]'s [facility] ([facility] )  G20                   Assessment  Assessment details: Pt is a 67year old male presenting to outpatient Physical Therapy with primary complaints of balance deficits secondary to Parkinson's Disease  He has had two falls in the last few months, and notes that he has noticed a more rapid regression than any other time since his diagnosis 8 years ago  Relevent co-morbidities include significant bilateral peripheral neuropathies  Today, the patient presents with an impaired gait pattern, including striking with the forefoot, and taking smaller, slower steps  He scored (+) for falls risk on his mini BEST test, scoring 17/28 points   These deficits are interfering with the pt's daily life, such as feeling safe, ascending stairs, and getting dressed  He would benefit from skilled PT in order to improve these deficits and help him achieve his maximum level of functioning  Thank you for your referral!  Impairments: abnormal gait, abnormal movement, impaired balance, lacks appropriate home exercise program and safety issue  Barriers to therapy: None  Understanding of Dx/Px/POC: good   Prognosis: good    Goals  STG (2-3 weeks):  1  The patient will be fully compliant with HEP  2  The patient will display at least 25% improved heel strike bilaterally during gait without verbal cuing  3  The patient will report mild to no difficulty with rolling over in bed at night    LTG (4-6 weeks):  1  The patient will increase FOTO score to 66   2  The patient will improve mini-BEST test by at least 5 points  3  The patient will display at least 75% improved heel strike during gait without verbal cuing  4  The patient will report ability to put on pants independently, without having to hold onto the wall  5  The patient will be able to ascend/descend stairs with a reciprocal pattern with use of one hand rail     Plan  Patient would benefit from: skilled physical therapy  Planned modality interventions: thermotherapy: hydrocollator packs, cryotherapy, ultrasound, traction, TENS and unattended electrical stimulation  Planned therapy interventions: therapeutic activities, therapeutic exercise, home exercise program, manual therapy, joint mobilization, balance, patient education, neuromuscular re-education, massage, abdominal trunk stabilization, stretching, strengthening, prosthetic fitting/training, graded motor, graded exercise, graded activity, gait training, functional ROM exercises, flexibility, breathing training, coordination, balance/weight bearing training and body mechanics training  Frequency: 2x week  Duration in weeks: 6  Plan of Care beginning date: 3/17/2021  Plan of Care expiration date: 4/28/2021  Treatment plan discussed with: patient        Subjective Evaluation    History of Present Illness  Mechanism of injury: Patient was diagnosed with Parkinson's Disease 8 years ago  He recently met his new Neurologist and told her that he feels his Levadopa is no longer as effective as they were  He asked her about the [employer] program that had been suggested to him a few years ago  His symptoms include tremors, voice changes, slow down in speaking, and difficulties walking (secondary to neuropathy)  He notes that he has neuropathy on both feet, with numbness, and "feeling like standing on water balloons "    He says the degradation of his balance have been a concern to him  He has had two fall sin the last year, both times falling going up the steps when entering the house  Pain  No pain reported  Progression: worsening    Social Support  Steps to enter house: yes (3)  Stairs in house: yes (master bedroom on the first floor-but has duel railins)   Lives with: spouse    Working: Retired   Hand dominance: right    Patient Goals  Patient goals for therapy: improved balance  Patient goal: prevent falls, ascend stairs more easily, turn more easily        Objective     Strength/Myotome Testing     Lumbar   Left   Normal strength    Right   Normal strength    Ambulation     Observational Gait   Decreased walking speed and stride length     Left foot contact pattern: forefoot  Right foot contact pattern: forefoot  Left arm swing: decreased  Base of support: decreased    Functional Assessment        Comments  Mini-BEST Test    Sit to stand-2  Ride to toes-0  Stand on one leg-0  Compensatory stepping correction (forward)-1  Compensatory stepping correction (backward)-1  Compensatory stepping correction (Lateral)-1  Stance(feet together) Eyes open, firm surface-2  Stance (feet together) Eyes closed, foam surface-2  Incline(eyes closed)-1  Change in Gait speed-1  Walk with head turns-1  Walk with pivot turns-2  Step Over obstacles-2  Timed Up and Go with dual task-1    Total:17/28          Flowsheet Rows      Most Recent Value   PT/OT G-Codes   Current Score  60   Projected Score  66             Precautions: Falls Risk, Bilateral LE Neuropathies, History of Cardiovascular Disease, Hx of bilateral TKR  POC: 4/28  HEP; Seated PWR!:posture, weight shift, and trunk rotation  Manuals 3/17                                                   Neuro Re-Ed             PWR! Supine:             Posture             Weight shift             Trunk Rotation             Transition             PWR!  Standing:             Transition             Twist                          Tandem Walking                                                                              Pt education/HEP review 10'            Ther Ex             Stationary Bike/Nu step             Arm Circles             High [facility]                                                                              Ther Activity             Functional Turning             Weight shift with fitness [place] (pants simulation)             Bed mobility (turns)                                                                 Gait Training             Heel strike (repeated rock)             Heel strike with dots                                                                 Modalities

## 2021-03-22 ENCOUNTER — OFFICE VISIT (OUTPATIENT)
Dept: PHYSICAL THERAPY | Facility: CLINIC | Age: 76
End: 2021-03-22
Payer: MEDICARE

## 2021-03-22 DIAGNOSIS — G20 PARKINSON'S DISEASE (HCC): Primary | ICD-10-CM

## 2021-03-22 PROCEDURE — 97112 NEUROMUSCULAR REEDUCATION: CPT | Performed by: PHYSICAL THERAPIST

## 2021-03-22 PROCEDURE — 97530 THERAPEUTIC ACTIVITIES: CPT | Performed by: PHYSICAL THERAPIST

## 2021-03-22 NOTE — PROGRESS NOTES
Daily Note     Today's date: 3/22/2021  Patient name: Maggy Ramos  : 1945  MRN: [id number]  Referring provider: Feliz Mckeon MD  Dx:   Encounter Diagnosis     ICD-10-CM    1  Parkinson's disease ([facility] )  Yamilex Liter                   Subjective:Pt reports that he has been performing his HEP and the rotation exercise is hurting his low back  Objective: See treatment diary below  Vitals in Supine: O2-95   HR:75   BP:160/100  Vitals seated: O2-96   HR-75   BP:120/90    Assessment: Tolerated treatment well overall, displaying high levels of motivation  In the beginning of the session HEP was review, with verbal and visual cuing to increase amplitude of all movements  Pt was encouraged to pick a word or sound to shout with the motions at home to progress voice volume, he verbalized understanding  He required minimal visual and verbal cues to perform supine PWR! Exercises, progressing to combined movements to practice bed mobility  He was able to consistently perform supine<>side with flexed contra lateral knee, with a contralateral UE reach  Towards the end of the session the patient reported feeling light headed, so exercise was stopped, and he rested in supine  Vitals listed in objective section  He reported feeling better and ready to go home after five minute of sitting following being in supine  Dizziness likely from repeated twisting and position changing  He would benefit from continued skilled PT in order to progress functional mobility, balance, and safety  Plan: Balance and gait     Precautions: Falls Risk, Bilateral LE Neuropathies, History of Cardiovascular Disease, Hx of bilateral TKR  POC:   HEP; Seated PWR!:posture, weight shift, and trunk rotation  Manuals 3/17 3/22                                                  Neuro Re-Ed             PWR!  Supine:             Posture  x10           Weight shift  x10           Trunk Rotation  x10           Transition  x10 PWR! Standing:             Transition             Twist                          Tandem Walking                          Seated Flow  x3                                                  Pt education/HEP review 10'            Ther Ex             Stationary Bike/Nu step  5'           Arm Circles             High marches                          Treadmill training             flows                                       Ther Activity             Functional Turning             Weight shift with fitness [place] (pants simulation)             Bed mobility (turns)  x4  +airex  x2           Floor<>stant transfer                                                    Gait Training             Heel strike (repeated rock)             Heel strike with dots             Figure 8 walk             4 square agility in walking                                       Modalities

## 2021-03-24 ENCOUNTER — OFFICE VISIT (OUTPATIENT)
Dept: PHYSICAL THERAPY | Facility: CLINIC | Age: 76
End: 2021-03-24
Payer: MEDICARE

## 2021-03-24 DIAGNOSIS — G20 PARKINSON'S DISEASE (HCC): Primary | ICD-10-CM

## 2021-03-24 PROCEDURE — 97530 THERAPEUTIC ACTIVITIES: CPT | Performed by: PHYSICAL THERAPIST

## 2021-03-24 PROCEDURE — 97112 NEUROMUSCULAR REEDUCATION: CPT | Performed by: PHYSICAL THERAPIST

## 2021-03-24 PROCEDURE — 97110 THERAPEUTIC EXERCISES: CPT | Performed by: PHYSICAL THERAPIST

## 2021-03-24 NOTE — PROGRESS NOTES
Daily Note     Today's date: 3/24/2021  Patient name: Jhony Moreno  : 1945  MRN: [id number]  Referring provider: Dusty Regan MD  Dx:   Encounter Diagnosis     ICD-10-CM    1  Parkinson's disease ([place])  G20                   Subjective: Pt reports that he can start to feel his Levodopa mediation wearing off  He also notes that he is prone to motion sickness with certain movements  Objective: See treatment diary below      Assessment: Tolerated treatment well  Patient displayed a significant R sided hip drop during marching at the start of treatment today, indicating considerable glute med weakness  He was given glute strengthening TE to add to HEP, as this deficit may be contributing to his decreased balance  He performed the exercises and displayed good understanding  He required moderate verbal cuing for contralateral trunk shift with pants donning simulation  He benefited from practicing seated marches between the pants activity to encourage increase hip and knee flexion to get his leg through the fitness [place]  He displayed fatigue at the end of the session and would benefit from continued skilled PT  Plan: Balance, functional mobility     Precautions: Falls Risk, Bilateral LE Neuropathies, History of Cardiovascular Disease, Hx of bilateral TKR  POC:   HEP; Seated PWR!:posture, weight shift, and trunk rotation  HEP(3/24): Manuals 3/17 3/22 3/24                                                 Neuro Re-Ed             PWR! Supine:             Posture  x10           Weight shift  x10           Trunk Rotation  x10           Transition  x10                        PWR!  Standing:             Transition             Twist             Weight shift   2 x10                       Tandem Walking   10 feet  x6                       Seated Flow  x3                        Seated marches: Neuropriming for doressing   2 x10                       Pt education/HEP review 8'  5'          Ther Ex Stationary Bike/Nu step  5' 5'          Arm Circles   2 x20"          High marches   2 x20"          standing straight leg hip abduction   x10 HEP         Standing straight leg hip extension   x10 HEP                      Treadmill training             flows                                       Ther Activity             Functional Turning             Weight shift with fitness [place] (pants simulation)   Seated  3x5          Bed mobility (turns)  x4  +airex  x2           Floor<>stant transfer                                                    Gait Training             Heel strike (repeated rock)             Heel strike with dots             Figure 8 walk             4 square agility in walking                                       Modalities

## 2021-03-30 ENCOUNTER — HOSPITAL ENCOUNTER (OUTPATIENT)
Dept: MRI IMAGING | Facility: HOSPITAL | Age: 76
Discharge: HOME | End: 2021-03-30
Payer: MEDICARE

## 2021-03-30 ENCOUNTER — OFFICE VISIT (OUTPATIENT)
Dept: PHYSICAL THERAPY | Facility: CLINIC | Age: 76
End: 2021-03-30
Payer: MEDICARE

## 2021-03-30 DIAGNOSIS — G20 PARKINSON'S DISEASE (HCC): Primary | ICD-10-CM

## 2021-03-30 DIAGNOSIS — R41.3 AMNESIA: ICD-10-CM

## 2021-03-30 PROCEDURE — 97110 THERAPEUTIC EXERCISES: CPT

## 2021-03-30 PROCEDURE — 97112 NEUROMUSCULAR REEDUCATION: CPT

## 2021-03-30 PROCEDURE — 70551 MRI BRAIN STEM W/O DYE: CPT

## 2021-03-30 PROCEDURE — 97530 THERAPEUTIC ACTIVITIES: CPT

## 2021-03-30 PROCEDURE — G1004 CDSM NDSC: HCPCS

## 2021-03-30 NOTE — PROGRESS NOTES
Daily Note     Today's date: 3/30/2021  Patient name: Cuca Magallon  : 1945  MRN: [id number]  Referring provider: Laurie Quigley MD  Dx:   Encounter Diagnosis     ICD-10-CM    1  Parkinson's disease ([address] )  G20                   Subjective: Pt reports feeling good since LV  Would like to start working on some exercises to stand up taller and straighter  Objective: See treatment diary below  Assessment: Tolerated treatment well  Continued with program as outline below  Program was progress with addition of standing shldr horz abd against wall in able to address deficits with upper body posturing while in standing  Very challenged with pants don/doff simulation with fitness [place]  Performance did improve when educated on weight shifting while in sitting  Most challenged with LLE lead while performing this intervention  Patient would benefit from continued PT      Plan: Continue per plan of care  Precautions: Falls Risk, Bilateral LE Neuropathies, History of Cardiovascular Disease, Hx of bilateral TKR  POC:   HEP; Seated PWR!:posture, weight shift, and trunk rotation  HEP(3/24): Manuals 3/17 3/22 3/24 3/30                                                Neuro Re-Ed             PWR! Supine:             Posture  x10           Weight shift  x10           Trunk Rotation  x10           Transition  x10           B/L shldr horizontal abd against wall    10"x10         PWR!  Standing:             Transition             Twist             Weight shift   2 x10 2x10                      Tandem Walking   10 feet  x6 10 feet  x6                      Seated Flow  x3                        Seated marches: Neuropriming for doressing   2 x10 2x10                      Pt education/HEP review 10'  5'          Ther Ex             Stationary Bike/Nu step  5' 5' 5 min         Arm Circles   2 x20"          High marches   2 x20"          standing straight leg hip abduction   x10 HEP  2x10         Standing straight leg hip extension   x10 HEP  2x10                      Treadmill training             flows                                       Ther Activity             Functional Turning             Weight shift with fitness [place] (pants simulation)   Seated  3x5 Seated  3x5         Bed mobility (turns)  x4  +airex  x2           Floor<>stant transfer                                                    Gait Training             Heel strike (repeated rock)             Heel strike with dots             Figure 8 walk             4 square agility in walking                                       Modalities

## 2021-04-02 ENCOUNTER — OFFICE VISIT (OUTPATIENT)
Dept: FAMILY MEDICINE CLINIC | Facility: HOSPITAL | Age: 76
End: 2021-04-02
Payer: MEDICARE

## 2021-04-02 ENCOUNTER — OFFICE VISIT (OUTPATIENT)
Dept: PHYSICAL THERAPY | Facility: CLINIC | Age: 76
End: 2021-04-02
Payer: MEDICARE

## 2021-04-02 VITALS
HEIGHT: 73 IN | RESPIRATION RATE: 16 BRPM | BODY MASS INDEX: 32.07 KG/M2 | WEIGHT: 242 LBS | HEART RATE: 81 BPM | DIASTOLIC BLOOD PRESSURE: 68 MMHG | SYSTOLIC BLOOD PRESSURE: 120 MMHG

## 2021-04-02 DIAGNOSIS — Z00.00 MEDICARE ANNUAL WELLNESS VISIT, SUBSEQUENT: Primary | ICD-10-CM

## 2021-04-02 DIAGNOSIS — Z13.6 SCREENING FOR CARDIOVASCULAR CONDITION: ICD-10-CM

## 2021-04-02 DIAGNOSIS — G20 PARKINSON'S DISEASE (HCC): Primary | ICD-10-CM

## 2021-04-02 DIAGNOSIS — Z13.1 SCREENING FOR DIABETES MELLITUS: ICD-10-CM

## 2021-04-02 DIAGNOSIS — R13.13 PHARYNGEAL DYSPHAGIA: ICD-10-CM

## 2021-04-02 DIAGNOSIS — Z12.11 SCREENING FOR COLORECTAL CANCER: ICD-10-CM

## 2021-04-02 DIAGNOSIS — Z12.12 SCREENING FOR COLORECTAL CANCER: ICD-10-CM

## 2021-04-02 PROCEDURE — G0439 PPPS, SUBSEQ VISIT: HCPCS | Performed by: INTERNAL MEDICINE

## 2021-04-02 PROCEDURE — 97110 THERAPEUTIC EXERCISES: CPT | Performed by: PHYSICAL THERAPIST

## 2021-04-02 PROCEDURE — 97112 NEUROMUSCULAR REEDUCATION: CPT | Performed by: PHYSICAL THERAPIST

## 2021-04-02 NOTE — PROGRESS NOTES
Daily Note     Today's date: 2021  Patient name: Nani Estrada  : 1945  MRN: [id number]  Referring provider: Yeimi Raygoza MD  Dx:   Encounter Diagnosis     ICD-10-CM    1  Parkinson's disease ([address] )  G20                   Subjective: Pt notes that he has started to notice a bit more trunk mobility with his exercises  Objective: See treatment diary below      Assessment: Tolerated treatment well, displaying high levels of motivation and participation  Patient is showing good progress with trunk mobility, seen during standing PWR! Exercises, compared to his first visit  He is displaying at least 50% improvement with trunk rotation  During tandem walking, he displayed several episodes of stepping strategy, but was able to self correct LOB well  He displays rapid LE fatigue with thereputic activity simulating donning/dogging pants  He displayed fatigue at the end of the session and would benefit from continued skilled PT  Plan: Continue per plan of care  Progress treatment as tolerated  Precautions: Falls Risk, Bilateral LE Neuropathies, History of Cardiovascular Disease, Hx of bilateral TKR  POC:   HEP; Seated PWR!:posture, weight shift, and trunk rotation  HEP(3/24):standing hip ABD and extension  HEP(): Posture TWY against wall  Manuals 3/17 3/22 3/24 3/30 4/2                                               Neuro Re-Ed             PWR! Supine:             Posture  x10   discont        Weight shift  x10   discont  Trunk Rotation  x10   discont  Transition  x10   discont  B/L shldr horizontal abd against wall    10"x10 progressed                     PWR!  Standing:             Transition     x10 +dots       Twist     x10        Weight shift   2 x10 2x10 x10        POsture     x10        Standing flow     x5        Tandem Walking   10 feet  x6 10 feet  x6 10 feet  x6                     Seated Flow  x3                        Seated marches: Neuropriming for dressing   2 x10 2x10 2 x10        Standing on BOSU dome     5" hold  x3        Pt education/HEP review 10'  5'          Ther Ex             Stationary Bike/Nu step  5' 5' 5 min 5 min        Arm Circles   2 x20"          High marches   2 x20"          TWY Posture at wall     10"x3 each HEP       Treadmill training                                                    Ther Activity             Functional Turning             Weight shift with fitness [place] (pants simulation)   Seated  3x5 Seated  3x5 Seated  3 x5        Bed mobility (turns)  x4  +airex  x2           Floor<>stant transfer                                                    Gait Training             Heel strike (repeated rock)             Heel strike with dots             Figure 8 walk             4 square agility in walking                                       Modalities

## 2021-04-02 NOTE — PATIENT INSTRUCTIONS
Medicare Preventive Visit Patient Instructions  Thank you for completing your Welcome to Medicare Visit or Medicare Annual Wellness Visit today  Your next wellness visit will be due in one year (4/3/2022)  The screening/preventive services that you may require over the next 5-10 years are detailed below  Some tests may not apply to you based off risk factors and/or age  Screening tests ordered at today's visit but not completed yet may show as past due  Also, please note that scanned in results may not display below  Preventive Screenings:  Service Recommendations Previous Testing/Comments   Colorectal Cancer Screening  · Colonoscopy    · Fecal Occult Blood Test (FOBT)/Fecal Immunochemical Test (FIT)  · Fecal DNA/Cologuard Test  · Flexible Sigmoidoscopy Age: 54-65 years old   Colonoscopy: every 10 years (May be performed more frequently if at higher risk)  OR  FOBT/FIT: every 1 year  OR  Cologuard: every 3 years  OR  Sigmoidoscopy: every 5 years  Screening may be recommended earlier than age 48 if at higher risk for colorectal cancer  Also, an individualized decision between you and your healthcare provider will decide whether screening between the ages of 74-80 would be appropriate   Colonoscopy: 05/03/2016  FOBT/FIT: Not on file  Cologuard: Not on file  Sigmoidoscopy: Not on file          Prostate Cancer Screening Individualized decision between patient and health care provider in men between ages of 53-78   Medicare will cover every 12 months beginning on the day after your 50th birthday PSA: 1 6 ng/mL     Screening Not Indicated     Hepatitis C Screening Once for adults born between 1945 and 1965  More frequently in patients at high risk for Hepatitis C Hep C Antibody: Not on file        Diabetes Screening 1-2 times per year if you're at risk for diabetes or have pre-diabetes Fasting glucose: 104 mg/dL   A1C: No results in last 5 years    Screening Current   Cholesterol Screening Once every 5 years if you don't have a lipid disorder  May order more often based on risk factors  Lipid panel: 08/27/2020    Screening Not Indicated  History Lipid Disorder      Other Preventive Screenings Covered by Medicare:  1  Abdominal Aortic Aneurysm (AAA) Screening: covered once if your at risk  You're considered to be at risk if you have a family history of AAA or a male between the age of 73-68 who smoking at least 100 cigarettes in your lifetime  2  Lung Cancer Screening: covers low dose CT scan once per year if you meet all of the following conditions: (1) Age 50-69; (2) No signs or symptoms of lung cancer; (3) Current smoker or have quit smoking within the last 15 years; (4) You have a tobacco smoking history of at least 30 pack years (packs per day x number of years you smoked); (5) You get a written order from a healthcare provider  3  Glaucoma Screening: covered annually if you're considered high risk: (1) You have diabetes OR (2) Family history of glaucoma OR (3)  aged 48 and older OR (3)  American aged 72 and older  3  Osteoporosis Screening: covered every 2 years if you meet one of the following conditions: (1) Have a vertebral abnormality; (2) On glucocorticoid therapy for more than 3 months; (3) Have primary hyperparathyroidism; (4) On osteoporosis medications and need to assess response to drug therapy  5  HIV Screening: covered annually if you're between the age of 12-76  Also covered annually if you are younger than 13 and older than 72 with risk factors for HIV infection  For pregnant patients, it is covered up to 3 times per pregnancy      Immunizations:  Immunization Recommendations   Influenza Vaccine Annual influenza vaccination during flu season is recommended for all persons aged >= 6 months who do not have contraindications   Pneumococcal Vaccine (Prevnar and Pneumovax)  * Prevnar = PCV13  * Pneumovax = PPSV23 Adults 25-60 years old: 1-3 doses may be recommended based on certain risk factors  Adults 72 years old: Prevnar (PCV13) vaccine recommended followed by Pneumovax (PPSV23) vaccine  If already received PPSV23 since turning 65, then PCV13 recommended at least one year after PPSV23 dose  Hepatitis B Vaccine 3 dose series if at intermediate or high risk (ex: diabetes, end stage renal disease, liver disease)   Tetanus (Td) Vaccine - COST NOT COVERED BY MEDICARE PART B Following completion of primary series, a booster dose should be given every 10 years to maintain immunity against tetanus  Td may also be given as tetanus wound prophylaxis  Tdap Vaccine - COST NOT COVERED BY MEDICARE PART B Recommended at least once for all adults  For pregnant patients, recommended with each pregnancy  Shingles Vaccine (Shingrix) - COST NOT COVERED BY MEDICARE PART B  2 shot series recommended in those aged 48 and above     Health Maintenance Due:      Topic Date Due    Colonoscopy Surveillance  05/03/2021     Immunizations Due:      Topic Date Due    DTaP,Tdap,and Td Vaccines (1 - Tdap) Never done     Advance Directives   What are advance directives? Advance directives are legal documents that state your wishes and plans for medical care  These plans are made ahead of time in case you lose your ability to make decisions for yourself  Advance directives can apply to any medical decision, such as the treatments you want, and if you want to donate organs  What are the types of advance directives? There are many types of advance directives, and each state has rules about how to use them  You may choose a combination of any of the following:  · Living will: This is a written record of the treatment you want  You can also choose which treatments you do not want, which to limit, and which to stop at a certain time  This includes surgery, medicine, IV fluid, and tube feedings  · Durable power of  for healthcare [place] [facility]):   This is a written record that states who you want to make healthcare choices for you when you are unable to make them for yourself  This person, called a proxy, is usually a family member or a friend  You may choose more than 1 proxy  · Do not resuscitate (DNR) order:  A DNR order is used in case your heart stops beating or you stop breathing  It is a request not to have certain forms of treatment, such as CPR  A DNR order may be included in other types of advance directives  · Medical directive: This covers the care that you want if you are in a coma, near death, or unable to make decisions for yourself  You can list the treatments you want for each condition  Treatment may include pain medicine, surgery, blood transfusions, dialysis, IV or tube feedings, and a ventilator (breathing machine)  · Values history: This document has questions about your views, beliefs, and how you feel and think about life  This information can help others choose the care that you would choose  Why are advance directives important? An advance directive helps you control your care  Although spoken wishes may be used, it is better to have your wishes written down  Spoken wishes can be misunderstood, or not followed  Treatments may be given even if you do not want them  An advance directive may make it easier for your family to make difficult choices about your care  Fall Prevention    Fall prevention  includes ways to make your home and other areas safer  It also includes ways you can move more carefully to prevent a fall  Health conditions that cause changes in your blood pressure, vision, or muscle strength and coordination may increase your risk for falls  Medicines may also increase your risk for falls if they make you dizzy, weak, or sleepy  Fall prevention tips:   · Stand or sit up slowly  · Use assistive devices as directed  · Wear shoes that fit well and have soles that   · Wear a personal alarm  · Stay active  · Manage your medical conditions      Home Safety Tips:  · Add items to prevent falls in the bathroom  · Keep paths clear  · Install bright lights in your home  · Keep items you use often on shelves within reach  · Paint or place reflective tape on the edges of your stairs  Weight Management   Why it is important to manage your weight:  Being overweight increases your risk of health conditions such as heart disease, high blood pressure, type 2 diabetes, and certain types of cancer  It can also increase your risk for osteoarthritis, sleep apnea, and other respiratory problems  Aim for a slow, steady weight loss  Even a small amount of weight loss can lower your risk of health problems  How to lose weight safely:  A safe and healthy way to lose weight is to eat fewer calories and get regular exercise  You can lose up about 1 pound a week by decreasing the number of calories you eat by 500 calories each day  Healthy meal plan for weight management:  A healthy meal plan includes a variety of foods, contains fewer calories, and helps you stay healthy  A healthy meal plan includes the following:  · Eat whole-grain foods more often  A healthy meal plan should contain fiber  Fiber is the part of grains, fruits, and vegetables that is not broken down by your body  Whole-grain foods are healthy and provide extra fiber in your diet  Some examples of whole-grain foods are whole-wheat breads and pastas, oatmeal, brown rice, and bulgur  · Eat a variety of vegetables every day  Include dark, leafy greens such as spinach, kale, sachin greens, and mustard greens  Eat yellow and orange vegetables such as carrots, sweet potatoes, and winter squash  · Eat a variety of fruits every day  Choose fresh or canned fruit (canned in its own juice or light syrup) instead of juice  Fruit juice has very little or no fiber  · Eat low-fat dairy foods  Drink fat-free (skim) milk or 1% milk  Eat fat-free yogurt and low-fat cottage cheese   Try low-fat cheeses such as mozzarella and other reduced-fat cheeses  · Choose meat and other protein foods that are low in fat  Choose beans or other legumes such as split peas or lentils  Choose fish, skinless poultry (chicken or turkey), or lean cuts of red meat (beef or pork)  Before you cook meat or poultry, cut off any visible fat  · Use less fat and oil  Try baking foods instead of frying them  Add less fat, such as margarine, sour cream, regular salad dressing and mayonnaise to foods  Eat fewer high-fat foods  Some examples of high-fat foods include french fries, doughnuts, ice cream, and cakes  · Eat fewer sweets  Limit foods and drinks that are high in sugar  This includes candy, cookies, regular soda, and sweetened drinks  Exercise:  Exercise at least 30 minutes per day on most days of the week  Some examples of exercise include walking, biking, dancing, and swimming  You can also fit in more physical activity by taking the stairs instead of the elevator or parking farther away from stores  Ask your healthcare provider about the best exercise plan for you  © Copyright [address] 2018 Information is for End User's use only and may not be sold, redistributed or otherwise used for commercial purposes  All illustrations and images included in CareNotes® are the copyrighted property of [employer]  or [facility] Preventive Visit Patient Instructions  Thank you for completing your Welcome to Medicare Visit or Medicare Annual Wellness Visit today  Your next wellness visit will be due in one year (4/3/2022)  The screening/preventive services that you may require over the next 5-10 years are detailed below  Some tests may not apply to you based off risk factors and/or age  Screening tests ordered at today's visit but not completed yet may show as past due  Also, please note that scanned in results may not display below    Preventive Screenings:  Service Recommendations Previous Testing/Comments Colorectal Cancer Screening  · Colonoscopy    · Fecal Occult Blood Test (FOBT)/Fecal Immunochemical Test (FIT)  · Fecal DNA/Cologuard Test  · Flexible Sigmoidoscopy Age: 54-65 years old   Colonoscopy: every 10 years (May be performed more frequently if at higher risk)  OR  FOBT/FIT: every 1 year  OR  Cologuard: every 3 years  OR  Sigmoidoscopy: every 5 years  Screening may be recommended earlier than age 48 if at higher risk for colorectal cancer  Also, an individualized decision between you and your healthcare provider will decide whether screening between the ages of 74-80 would be appropriate  Colonoscopy: 05/03/2016  FOBT/FIT: Not on file  Cologuard: Not on file  Sigmoidoscopy: Not on file          Prostate Cancer Screening Individualized decision between patient and health care provider in men between ages of 53-78   Medicare will cover every 12 months beginning on the day after your 50th birthday PSA: 1 6 ng/mL     Screening Not Indicated     Hepatitis C Screening Once for adults born between 1945 and 1965  More frequently in patients at high risk for Hepatitis C Hep C Antibody: Not on file        Diabetes Screening 1-2 times per year if you're at risk for diabetes or have pre-diabetes Fasting glucose: 104 mg/dL   A1C: No results in last 5 years    Screening Current   Cholesterol Screening Once every 5 years if you don't have a lipid disorder  May order more often based on risk factors  Lipid panel: 08/27/2020    Screening Not Indicated  History Lipid Disorder      Other Preventive Screenings Covered by Medicare:  6  Abdominal Aortic Aneurysm (AAA) Screening: covered once if your at risk  You're considered to be at risk if you have a family history of AAA or a male between the age of 73-68 who smoking at least 100 cigarettes in your lifetime    7  Lung Cancer Screening: covers low dose CT scan once per year if you meet all of the following conditions: (1) Age 50-69; (2) No signs or symptoms of lung cancer; (3) Current smoker or have quit smoking within the last 15 years; (4) You have a tobacco smoking history of at least 30 pack years (packs per day x number of years you smoked); (5) You get a written order from a healthcare provider  8  Glaucoma Screening: covered annually if you're considered high risk: (1) You have diabetes OR (2) Family history of glaucoma OR (3)  aged 48 and older OR (3)  American aged 72 and older  5  Osteoporosis Screening: covered every 2 years if you meet one of the following conditions: (1) Have a vertebral abnormality; (2) On glucocorticoid therapy for more than 3 months; (3) Have primary hyperparathyroidism; (4) On osteoporosis medications and need to assess response to drug therapy  10  HIV Screening: covered annually if you're between the age of 12-76  Also covered annually if you are younger than 13 and older than 72 with risk factors for HIV infection  For pregnant patients, it is covered up to 3 times per pregnancy  Immunizations:  Immunization Recommendations   Influenza Vaccine Annual influenza vaccination during flu season is recommended for all persons aged >= 6 months who do not have contraindications   Pneumococcal Vaccine (Prevnar and Pneumovax)  * Prevnar = PCV13  * Pneumovax = PPSV23 Adults 25-60 years old: 1-3 doses may be recommended based on certain risk factors  Adults 72 years old: Prevnar (PCV13) vaccine recommended followed by Pneumovax (PPSV23) vaccine  If already received PPSV23 since turning 65, then PCV13 recommended at least one year after PPSV23 dose  Hepatitis B Vaccine 3 dose series if at intermediate or high risk (ex: diabetes, end stage renal disease, liver disease)   Tetanus (Td) Vaccine - COST NOT COVERED BY MEDICARE PART B Following completion of primary series, a booster dose should be given every 10 years to maintain immunity against tetanus  Td may also be given as tetanus wound prophylaxis     Tdap Vaccine - COST NOT COVERED BY MEDICARE PART B Recommended at least once for all adults  For pregnant patients, recommended with each pregnancy  Shingles Vaccine (Shingrix) - COST NOT COVERED BY MEDICARE PART B  2 shot series recommended in those aged 48 and above     Health Maintenance Due:      Topic Date Due    Colonoscopy Surveillance  05/03/2021     Immunizations Due:      Topic Date Due    DTaP,Tdap,and Td Vaccines (1 - Tdap) Never done     Advance Directives   What are advance directives? Advance directives are legal documents that state your wishes and plans for medical care  These plans are made ahead of time in case you lose your ability to make decisions for yourself  Advance directives can apply to any medical decision, such as the treatments you want, and if you want to donate organs  What are the types of advance directives? There are many types of advance directives, and each state has rules about how to use them  You may choose a combination of any of the following:  · Living will: This is a written record of the treatment you want  You can also choose which treatments you do not want, which to limit, and which to stop at a certain time  This includes surgery, medicine, IV fluid, and tube feedings  · Durable power of  for healthcare [place] [facility]): This is a written record that states who you want to make healthcare choices for you when you are unable to make them for yourself  This person, called a proxy, is usually a family member or a friend  You may choose more than 1 proxy  · Do not resuscitate (DNR) order:  A DNR order is used in case your heart stops beating or you stop breathing  It is a request not to have certain forms of treatment, such as CPR  A DNR order may be included in other types of advance directives  · Medical directive: This covers the care that you want if you are in a coma, near death, or unable to make decisions for yourself   You can list the treatments you want for each condition  Treatment may include pain medicine, surgery, blood transfusions, dialysis, IV or tube feedings, and a ventilator (breathing machine)  · Values history: This document has questions about your views, beliefs, and how you feel and think about life  This information can help others choose the care that you would choose  Why are advance directives important? An advance directive helps you control your care  Although spoken wishes may be used, it is better to have your wishes written down  Spoken wishes can be misunderstood, or not followed  Treatments may be given even if you do not want them  An advance directive may make it easier for your family to make difficult choices about your care  Fall Prevention    Fall prevention  includes ways to make your home and other areas safer  It also includes ways you can move more carefully to prevent a fall  Health conditions that cause changes in your blood pressure, vision, or muscle strength and coordination may increase your risk for falls  Medicines may also increase your risk for falls if they make you dizzy, weak, or sleepy  Fall prevention tips:   · Stand or sit up slowly  · Use assistive devices as directed  · Wear shoes that fit well and have soles that   · Wear a personal alarm  · Stay active  · Manage your medical conditions  Home Safety Tips:  · Add items to prevent falls in the bathroom  · Keep paths clear  · Install bright lights in your home  · Keep items you use often on shelves within reach  · Paint or place reflective tape on the edges of your stairs  Weight Management   Why it is important to manage your weight:  Being overweight increases your risk of health conditions such as heart disease, high blood pressure, type 2 diabetes, and certain types of cancer  It can also increase your risk for osteoarthritis, sleep apnea, and other respiratory problems  Aim for a slow, steady weight loss   Even a small amount of weight loss can lower your risk of health problems  How to lose weight safely:  A safe and healthy way to lose weight is to eat fewer calories and get regular exercise  You can lose up about 1 pound a week by decreasing the number of calories you eat by 500 calories each day  Healthy meal plan for weight management:  A healthy meal plan includes a variety of foods, contains fewer calories, and helps you stay healthy  A healthy meal plan includes the following:  · Eat whole-grain foods more often  A healthy meal plan should contain fiber  Fiber is the part of grains, fruits, and vegetables that is not broken down by your body  Whole-grain foods are healthy and provide extra fiber in your diet  Some examples of whole-grain foods are whole-wheat breads and pastas, oatmeal, brown rice, and bulgur  · Eat a variety of vegetables every day  Include dark, leafy greens such as spinach, kale, sachin greens, and mustard greens  Eat yellow and orange vegetables such as carrots, sweet potatoes, and winter squash  · Eat a variety of fruits every day  Choose fresh or canned fruit (canned in its own juice or light syrup) instead of juice  Fruit juice has very little or no fiber  · Eat low-fat dairy foods  Drink fat-free (skim) milk or 1% milk  Eat fat-free yogurt and low-fat cottage cheese  Try low-fat cheeses such as mozzarella and other reduced-fat cheeses  · Choose meat and other protein foods that are low in fat  Choose beans or other legumes such as split peas or lentils  Choose fish, skinless poultry (chicken or turkey), or lean cuts of red meat (beef or pork)  Before you cook meat or poultry, cut off any visible fat  · Use less fat and oil  Try baking foods instead of frying them  Add less fat, such as margarine, sour cream, regular salad dressing and mayonnaise to foods  Eat fewer high-fat foods  Some examples of high-fat foods include french fries, doughnuts, ice cream, and cakes    · Eat fewer sweets  Limit foods and drinks that are high in sugar  This includes candy, cookies, regular soda, and sweetened drinks  Exercise:  Exercise at least 30 minutes per day on most days of the week  Some examples of exercise include walking, biking, dancing, and swimming  You can also fit in more physical activity by taking the stairs instead of the elevator or parking farther away from stores  Ask your healthcare provider about the best exercise plan for you  © Copyright [employer] 2018 Information is for End User's use only and may not be sold, redistributed or otherwise used for commercial purposes   All illustrations and images included in CareNotes® are the copyrighted property of [employer] Inc  or [address] [place] [address]

## 2021-04-02 NOTE — PROGRESS NOTES
Assessment and Plan:     Problem List Items Addressed This Visit        Digestive    Pharyngeal dysphagia    Relevant Orders    FL barium swallow video w speech    Ambulatory referral to Speech Therapy      Other Visit Diagnoses     Medicare annual wellness visit, subsequent    -  Primary    Screening for diabetes mellitus        Screening for cardiovascular condition        Relevant Orders    Lipid panel    Comprehensive metabolic panel    Screening for colorectal cancer        Relevant Orders    Ambulatory referral to Gastroenterology        BMI Counseling: Body mass index is 31 93 kg/m²  The BMI is above normal  Nutrition recommendations include decreasing portion sizes  Depression Screening and Follow-up Plan: Patient's depression screening was positive with a PHQ-2 score of 0  Clincally patient does not have depression  No treatment is required  Falls Plan of Care: balance, strength, and gait training instructions were provided  Recommended assistive device to help with gait and balance  Medications that increase falls were reviewed  Preventive health issues were discussed with patient, and age appropriate screening tests were ordered as noted in patient's After Visit Summary  Personalized health advice and appropriate referrals for health education or preventive services given if needed, as noted in patient's After Visit Summary  History of Present Illness:     Patient presents for Welcome to Medicare visit  Patient Care Team:  Amanda Lima MD as PCP - Richelle Cheadle, MD Dawn Locket, MD Eben Bateman, DO Melonie Epstein, MD Canda Hands, CRNP     Review of Systems:     Review of Systems   Constitutional: Negative for fever  HENT: Negative for congestion  Eyes: Negative for visual disturbance  Respiratory: Negative for cough and shortness of breath  Cardiovascular: Negative for chest pain, palpitations and leg swelling  Gastrointestinal: Positive for constipation  Negative for abdominal pain, blood in stool and diarrhea  Dysphagia, gagging at times on saliva   Endocrine: Negative for polydipsia  Genitourinary: Negative for difficulty urinating and hematuria  Musculoskeletal: Negative for joint swelling, myalgias and neck stiffness  Skin: Negative for rash  Neurological: Positive for numbness (chronic numbness of feet)  Negative for weakness and headaches  Hematological: Negative for adenopathy  Psychiatric/Behavioral: Negative for dysphoric mood  All other systems reviewed and are negative  Problem List:     Patient Active Problem List   Diagnosis    Essential hypertension    Primary Parkinsonism ([facility] [address]    Coronary artery disease involving native coronary artery of native heart without angina pectoris    BPH without obstruction/lower urinary tract symptoms    Degeneration, intervertebral disc, lumbosacral    Gastroesophageal reflux disease without esophagitis    Gout    Idiopathic peripheral neuropathy    Nephrolithiasis    Osteoarthritis of knee    Hypercholesterolemia    Pharyngeal dysphagia    Other insomnia      Past Medical and Surgical History:     Past Medical History:   Diagnosis Date    Arthritis     Benign familial tremor     Cardiac disease     two cardiac stents    Chest pain     Coronary artery disease     Hyperlipidemia     Hypertension     Kidney disease     Kidney stone     Malignant melanoma of skin of chest ([facility] [address] )     Removed 2 years ago      Meniscal injury     Nephrolithiasis     Parkinson's disease ([facility] [address] [facility]     Peripheral neuropathy     PONV (postoperative nausea and vomiting)     Tinnitus     [id number] RESOLVED     Past Surgical History:   Procedure Laterality Date    AMPUTATION Left     AMPUTATION OF FINGER WITH NEURECTOMY(EACH) DISTAL LONG  RING AND SMALL FINGER    CARPAL TUNNEL RELEASE Right     CATARACT EXTRACTION      CORONARY STENT PLACEMENT      two cardiac stents    FINGER AMPUTATION AMPUTATION OF MIDDLE FINGER WITH NEURECTOMY(EACH)    HAND SURGERY Left     Traumatic amputation of fingers left hand   JOINT REPLACEMENT Bilateral     knees    KNEE ARTHROPLASTY      TOTAL    MALIGNANT SKIN LESION EXCISION      ANTERIOR CHEST FOR MELAMONIA    ND CYSTO/URETERO W/LITHOTRIPSY &INDWELL STENT INSRT Left 7/27/2017    Procedure: CYSTOSCOPY URETEROSCOPY , RETROGRADE PYELOGRAM AND INSERTION STENT URETERAL;  Surgeon: Kyleigh Zayas MD;  [facility];  Service: Urology    ND LAP,CHOLECYSTECTOMY N/A 2/28/2018    Procedure: CHOLECYSTECTOMY LAPAROSCOPIC;  Surgeon: Venkat Motta MD;  [facility];  Service: General    ND REMOVE BLADDER STONE,<2 5 CM N/A 9/15/2017    Procedure: Lisa Giang;  Surgeon: Jayden Rodriguez MD;  [facility];  Service: Urology    ND TRANSURETHRAL ELEC-SURG PROSTATECTOM N/A 9/15/2017    Procedure: BIPOLAR TRANSURETHRAL RESECTION OF PROSTATE (TURP); Surgeon: Jayden Rodriguez MD;  [facility];  Service: Urology    TONSILLECTOMY      TRIGGER FINGER RELEASE Right     TUMOR REMOVAL Left     Left foot 20 years ago      WISDOM TOOTH EXTRACTION Bilateral       Family History:     Family History   Problem Relation Age of Onset    Heart disease Mother     Hypertension Mother     Stroke Mother     Breast cancer Mother     Heart disease Father     Hypertension Father     Stroke Father     Colon cancer Father     Diabetes Sister     Heart disease Brother     Other Family         BACK PAIN    Breast cancer Family     Heart disease Family     Hypertension Family     Stroke Family     Mental illness Neg Hx     Substance Abuse Neg Hx       Social History:        Social History     Socioeconomic History    Marital status: /Civil Union     Spouse name: None    Number of children: None    Years of education: None    Highest education level: None   Occupational History    None   Social Needs    Financial resource strain: Not hard at all    Food insecurity     Worry: Never true     Inability: Never true    Transportation needs     Medical: No     Non-medical: No   Tobacco Use    Smoking status: Former Smoker     Types: Cigarettes    Smokeless tobacco: Never Used    Tobacco comment: back in college in 1966   Substance and Sexual Activity    Alcohol use: Yes     Alcohol/week: 4 0 standard drinks     Types: 4 Cans of beer per week     Frequency: 2-4 times a month     Drinks per session: 1 or 2     Binge frequency: Never    Drug use: No    Sexual activity: None   Lifestyle    Physical activity     Days per week: 0 days     Minutes per session: 0 min    Stress: Not at all   Relationships    Social connections     Talks on phone: None     Gets together: None     Attends [employer] service: None     Active member of club or organization: None     Attends meetings of clubs or organizations: None     Relationship status: None    Intimate partner violence     Fear of current or ex partner: No     Emotionally abused: No     Physically abused: No     Forced sexual activity: No   Other Topics Concern    None   Social History Narrative    Lives with wife  Sees dentist reg  Has living will  Feels safe at home  No mental or sub in self       ACTIVE ADVANCE DIRECTIVE    CAREGIVER:  SPOUSE    EXERCISE HABITS  -  DAILY    GOOD DENTAL HYGIENE      Medications and Allergies:     Current Outpatient Medications   Medication Sig Dispense Refill    allopurinol (ZYLOPRIM) 300 mg tablet Take 1 tablet (300 mg total) by mouth daily 90 tablet 3    aspirin (ASPIRIN LOW DOSE) 81 MG tablet Take by mouth      atorvastatin (LIPITOR) 20 mg tablet Take 1 tablet (20 mg total) by mouth daily 90 tablet 3    benazepril-hydrochlorthiazide (LOTENSIN HCT) 20-25 MG per tablet Take 1 tablet by mouth daily 90 tablet 1    Calcium Carbonate-Vitamin D (CALTRATE 600+D) 600-400 MG-UNIT per tablet Caltrate 600+D 600-400 MG-UNIT TABS  Take 1 tablet daily   Refills: 0      , M D ; Active      carbidopa-levodopa (SINEMET)  mg per tablet 2 tabs---three times daily      Cholecalciferol (VITAMIN D-3) 1000 UNITS CAPS 1 daily      glucosamine-chondroitin 500-400 MG tablet Take 1 tablet by mouth daily      metoprolol succinate (TOPROL-XL) 50 mg 24 hr tablet Take 1 tablet (50 mg total) by mouth daily 90 tablet 1    Multiple Vitamin (MULTIVITAMINS PO) Multivitamins Oral once a day Capsule   Refills: 0      , M D ; Active      nitroglycerin (NITROSTAT) 0 4 mg SL tablet Place under the tongue      zolpidem (AMBIEN) 10 mg tablet TAKE 1 TABLET BY MOUTH DAILY AT BEDTIME (Patient taking differently: daily at bedtime as needed ) 30 tablet 3     No current facility-administered medications for this visit  No Known Allergies   Immunizations:     Immunization History   Administered Date(s) Administered    Influenza Split High Dose Preservative Free IM 09/28/2015, 09/14/2016, 10/19/2017    Influenza, high dose seasonal 0 7 mL 09/05/2019, 10/05/2020    Influenza, seasonal, injectable 10/03/2013    Pneumococcal Conjugate 13-Valent 10/08/2015    Pneumococcal Conjugate PCV 7 10/08/2015    Pneumococcal Polysaccharide PPV23 08/13/2012    SARS-CoV-2 / COVID-19 mRNA IM ([facility]) 01/21/2021, 02/19/2021      Health Maintenance:         Topic Date Due    Colonoscopy Surveillance  05/03/2021         Topic Date Due    DTaP,Tdap,and Td Vaccines (1 - Tdap) Never done      Medicare Screening Tests and Risk Assessments:     Mina Rowe is here for his Subsequent Wellness visit  Health Risk Assessment:   Patient rates overall health as good  Patient feels that their physical health rating is same  Patient is satisfied with their life  Eyesight was rated as same  Hearing was rated as slightly worse  Patient feels that their emotional and mental health rating is same  Patients states they are never, rarely angry  Patient states they are sometimes unusually tired/fatigued   Pain experienced in the last 7 days has been none  Patient states that he has experienced no weight loss or gain in last 6 months  Depression Screening:   PHQ-2 Score: 0      Fall Risk Screening: In the past year, patient has experienced: history of falling in past year    Number of falls: 2 or more  Injured during fall?: Yes    Feels unsteady when standing or walking?: Yes    Worried about falling?: Yes      Home Safety:  Patient has trouble with stairs inside or outside of their home  Patient has working smoke alarms and has working carbon monoxide detector  Home safety hazards include: none  Nutrition:   Pt eats what he wants  Medications:   Patient is currently taking over-the-counter supplements  OTC medications include: see medication list  Patient is able to manage medications  Activities of Daily Living (ADLs)/Instrumental Activities of Daily Living (IADLs):   Walk and transfer into and out of bed and chair?: Yes  Dress and groom yourself?: Yes    Bathe or shower yourself?: Yes    Feed yourself?  Yes  Do your laundry/housekeeping?: Yes  Manage your money, pay your bills and track your expenses?: Yes  Make your own meals?: Yes    Do your own shopping?: Yes    Previous Hospitalizations:   Any hospitalizations or ED visits within the last 12 months?: No      Advance Care Planning:     Advanced directive counseling given: Yes    End of Life Decisions reviewed with patient: Yes      Comments: Level 3 dnr    Cognitive Screening:   Provider or family/friend/caregiver concerned regarding cognition?: No    PREVENTIVE SCREENINGS      Cardiovascular Screening:    General: Screening Not Indicated and History Lipid Disorder      Diabetes Screening:     General: Screening Current      Colorectal Cancer Screening:     General: Risks and Benefits Discussed    Due for: Colonoscopy - Low Risk      Prostate Cancer Screening:    General: Screening Not Indicated      Abdominal Aortic Aneurysm (AAA) Screening:    Risk factors include: tobacco use        Lung Cancer Screening:     General: Screening Not Indicated    Screening, Brief Intervention, and Referral to Treatment (SBIRT)    Screening  Typical number of drinks in a day: 0  Typical number of drinks in a week: 0  Interpretation: Low risk drinking behavior  Single Item Drug Screening:  How often have you used an illegal drug (including marijuana) or a prescription medication for non-medical reasons in the past year? never    Single Item Drug Screen Score: 0  Interpretation: Negative screen for possible drug use disorder    Other Counseling Topics:   Regular weightbearing exercise  We discussed increasing fiber intake for constipation and if not enough pt will try colace    No exam data present     Physical Exam:     /68   Pulse 81   Resp 16   Ht 6' 1" (1 854 m)   Wt 110 kg (242 lb)   BMI 31 93 kg/m²     Physical Exam  Constitutional:       Appearance: He is well-developed  HENT:      Head: Normocephalic  Right Ear: Tympanic membrane normal  There is no impacted cerumen  Left Ear: Tympanic membrane normal  There is no impacted cerumen  Eyes:      Conjunctiva/sclera: Conjunctivae normal    Neck:      Musculoskeletal: Neck supple  Cardiovascular:      Rate and Rhythm: Normal rate and regular rhythm  Heart sounds: No murmur  Pulmonary:      Effort: No respiratory distress  Breath sounds: No wheezing or rales  Abdominal:      Tenderness: There is no abdominal tenderness  Skin:     General: Skin is warm and dry  Findings: No erythema  Neurological:      Mental Status: He is alert and oriented to person, place, and time        Comments: Shuffling gait, resting tremor of left hand   Psychiatric:         Mood and Affect: Mood normal           Hank Cervantes MD

## 2021-04-05 ENCOUNTER — OFFICE VISIT (OUTPATIENT)
Dept: PHYSICAL THERAPY | Facility: CLINIC | Age: 76
End: 2021-04-05
Payer: MEDICARE

## 2021-04-05 ENCOUNTER — LAB (OUTPATIENT)
Dept: LAB | Facility: HOSPITAL | Age: 76
End: 2021-04-05
Attending: INTERNAL MEDICINE
Payer: MEDICARE

## 2021-04-05 DIAGNOSIS — G20 PARKINSON'S DISEASE (HCC): Primary | ICD-10-CM

## 2021-04-05 DIAGNOSIS — Z13.6 SCREENING FOR CARDIOVASCULAR CONDITION: ICD-10-CM

## 2021-04-05 LAB
ALBUMIN SERPL BCP-MCNC: 3.9 G/DL (ref 3.5–5)
ALP SERPL-CCNC: 86 U/L (ref 46–116)
ALT SERPL W P-5'-P-CCNC: 12 U/L (ref 12–78)
ANION GAP SERPL CALCULATED.3IONS-SCNC: 3 MMOL/L (ref 4–13)
AST SERPL W P-5'-P-CCNC: 23 U/L (ref 5–45)
BILIRUB SERPL-MCNC: 0.82 MG/DL (ref 0.2–1)
BUN SERPL-MCNC: 20 MG/DL (ref 5–25)
CALCIUM SERPL-MCNC: 8.6 MG/DL (ref 8.3–10.1)
CHLORIDE SERPL-SCNC: 107 MMOL/L (ref 100–108)
CHOLEST SERPL-MCNC: 115 MG/DL (ref 50–200)
CO2 SERPL-SCNC: 31 MMOL/L (ref 21–32)
CREAT SERPL-MCNC: 1.12 MG/DL (ref 0.6–1.3)
GFR SERPL CREATININE-BSD FRML MDRD: 63 ML/MIN/1.73SQ M
GLUCOSE P FAST SERPL-MCNC: 96 MG/DL (ref 65–99)
HDLC SERPL-MCNC: 47 MG/DL
LDLC SERPL CALC-MCNC: 50 MG/DL (ref 0–100)
NONHDLC SERPL-MCNC: 68 MG/DL
POTASSIUM SERPL-SCNC: 3.7 MMOL/L (ref 3.5–5.3)
PROT SERPL-MCNC: 7.1 G/DL (ref 6.4–8.2)
SODIUM SERPL-SCNC: 141 MMOL/L (ref 136–145)
TRIGL SERPL-MCNC: 89 MG/DL

## 2021-04-05 PROCEDURE — 36415 COLL VENOUS BLD VENIPUNCTURE: CPT

## 2021-04-05 PROCEDURE — 80061 LIPID PANEL: CPT

## 2021-04-05 PROCEDURE — 97116 GAIT TRAINING THERAPY: CPT | Performed by: PHYSICAL THERAPIST

## 2021-04-05 PROCEDURE — 80053 COMPREHEN METABOLIC PANEL: CPT

## 2021-04-05 PROCEDURE — 97112 NEUROMUSCULAR REEDUCATION: CPT | Performed by: PHYSICAL THERAPIST

## 2021-04-05 NOTE — RESULT ENCOUNTER NOTE
Call patient: Tana Blanca normal cholesterol, normal blood sugar, electrolytes and liver function tests

## 2021-04-05 NOTE — PROGRESS NOTES
Daily Note     Today's date: 2021  Patient name: Salas Cortes  : 1945  MRN: [id number]  Referring provider: Armaan Rankin MD  Dx:   Encounter Diagnosis     ICD-10-CM    1  Parkinson's disease ([facility] [address] )  G20                   Subjective: Pt has been performing his HEP every day  He says that though the exercises seems simple, he works up a sweat doing them  Objective: See treatment diary below      Assessment: Tolerated treatment well, continuing to display high levels of motivation  Patient improved strength of the Vestibular system with balance on the BOSU today, performing with a more narrow REJI, for longer periods of time  He displays good compensative forward trunk lean with arms out in front when he begins to feel himself losing his balance posteriorly on the BOSU  During gait he displays little arm swing and little heel strike  He was able to improve both heel strike and step length with use of dots on the floor for visual cuing  He would benefit from continued skilled PT to further his progress and reach his maximum level of functional mobility  Plan: Continue with balance and gait, add in arm swing and trunk rotation practice     Precautions: Falls Risk, Bilateral LE Neuropathies, History of Cardiovascular Disease, Hx of bilateral TKR  POC:   HEP; Seated PWR!:posture, weight shift, and trunk rotation  HEP(3/24):standing hip ABD and extension  HEP(): Posture TWY against wall  Manuals 3/17 3/22 3/24 3/30 4/2 4/5                                              Neuro Re-Ed             PWR! Supine:             Posture  x10   discont        Weight shift  x10   discont  Trunk Rotation  x10   discont  Transition  x10   discont  PWR!  Standing:             Transition     x10 x10  +dots       Twist     x10 x10       Weight shift   2 x10 2x10 x10 x10       POsture     x10 x10       Standing flow     x5 x3       Tandem Walking   10 feet  x6 10 feet  x6 10 feet  x6 20 feet x4                    Seated Flow  x3                        Seated marches: Neuropriming for dressing   2 x10 2x10 2 x10        Standing on BOSU dome     5" hold  x3 15" x3       Pt education/HEP review 10'  5'          Ther Ex             Stationary Bike/Nu step  5' 5' 5 min 5 min 5 min       Posture training tandem game      x20       Arm Circles   2 x20"          High marches   2 x20"          TWY Posture at wall     10"x3 each HEP       Treadmill training                                                    Ther Activity             Functional Turning             Weight shift with fitness [place] (pants simulation)   Seated  3x5 Seated  3x5 Seated  3 x5        Bed mobility (turns)  x4  +airex  x2           Floor<>stant transfer                                                    Gait Training             Heel strike (repeated rock)             Arm swing +cane             Trunk rotation; PT assist             Heel strike with dots      6 dots  x10 laps       Figure 8 walk             4 square agility in walking                                       Modalities

## 2021-04-07 ENCOUNTER — OFFICE VISIT (OUTPATIENT)
Dept: PHYSICAL THERAPY | Facility: CLINIC | Age: 76
End: 2021-04-07
Payer: MEDICARE

## 2021-04-07 DIAGNOSIS — G20 PARKINSON'S DISEASE (HCC): Primary | ICD-10-CM

## 2021-04-07 PROCEDURE — 97116 GAIT TRAINING THERAPY: CPT | Performed by: PHYSICAL THERAPIST

## 2021-04-07 PROCEDURE — 97140 MANUAL THERAPY 1/> REGIONS: CPT | Performed by: PHYSICAL THERAPIST

## 2021-04-07 NOTE — PROGRESS NOTES
Daily Note     Today's date: 2021  Patient name: Paige Bergeron  : 1945  MRN: [id number]  Referring provider: Belkis Miranda MD  Dx:   Encounter Diagnosis     ICD-10-CM    1  Parkinson's disease ([facility] [address] )  G20                   Subjective: Pt reports that he has some low back soreness today, so he wants to be careful what exercises we are doing  Objective: See treatment diary below      Assessment: Tolerated treatment well, displaying high levels of motivation  Pt presents to therapy with no trunk mobility during gait, and little to no arm swing  Patient was able to increase trunk mobility following tactile cuing, but was unable to coordinate in increased contralateral arm swing when prompted  Patient was able to improve the coordination inconsistently with verbal cuing, but improved significantly when a scarf was tied to contralateral wrist and ankle as a visual cue for the patient  He would benefit from continued skilled PT to progress these deficits and maximize functional mobility  Plan: Gait, balance, posture     Precautions: Falls Risk, Bilateral LE Neuropathies, History of Cardiovascular Disease, Hx of bilateral TKR  POC:   HEP; Seated PWR!:posture, weight shift, and trunk rotation  HEP(3/24):standing hip ABD and extension  HEP(): Posture TWY against wall  Manuals 3/17 3/22 3/24 3/30 4/2 4/5 4/7                                             Neuro Re-Ed                                       PWR!  Standing:             Transition     x10 x10  +dots x10      Twist     x10 x10 x10      Weight shift   2 x10 2x10 x10 x10 x10      POsture     x10 x10 x10      Standing flow     x5 x3 x5      Tandem Walking   10 feet  x6 10 feet  x6 10 feet  x6 20 feet x4                    Seated Flow  x3                        Seated marches: Neuropriming for dressing   2 x10 2x10 2 x10        Standing on BOSU dome     5" hold  x3 15" x3                    Pt education/HEP review 8'  5'          Ther Ex Stationary Bike/Nu step  5' 5' 5 min 5 min 5 min 5 min      Posture training tandem game      x20       Arm Circles   2 x20"          High marches   2 x20"          TWY Posture at wall     10"x3 each HEP       Treadmill training                                                    Ther Activity             Functional Turning             Weight shift with fitness [place] (pants simulation)   Seated  3x5 Seated  3x5 Seated  3 x5        Bed mobility (turns)  x4  +airex  x2           Floor<>stant transfer                                                    Gait Training             Arm swing practice +scarf VC       20 feet x6      Arm swing practice        20 feet x10 laps      Trunk rotation; PT assist       20 feet x6 laps      Heel strike with dots      6 dots  x10 laps 6 dots x6 laps       Figure 8 walk             4 square agility in walking                                       Modalities

## 2021-04-12 ENCOUNTER — OFFICE VISIT (OUTPATIENT)
Dept: PHYSICAL THERAPY | Facility: CLINIC | Age: 76
End: 2021-04-12
Payer: MEDICARE

## 2021-04-12 DIAGNOSIS — G20 PARKINSON'S DISEASE (HCC): Primary | ICD-10-CM

## 2021-04-12 PROCEDURE — 97116 GAIT TRAINING THERAPY: CPT | Performed by: PHYSICAL THERAPY ASSISTANT

## 2021-04-12 PROCEDURE — 97112 NEUROMUSCULAR REEDUCATION: CPT | Performed by: PHYSICAL THERAPY ASSISTANT

## 2021-04-12 NOTE — PROGRESS NOTES
Daily Note     Today's date: 2021  Patient name: Adriana Goltz  : 1945  MRN: [id number]  Referring provider: Romel Crews MD  Dx:   Encounter Diagnosis     ICD-10-CM    1  Parkinson's disease ([facility] [address] )  G20                   Subjective: No new complaints  Pt reports he is consistent with HEP flow program and feels as though it helps him feel looser  Objective: See treatment diary below      Assessment: Tolerated treatment well, displaying high levels of motivation  Pt demonstrating improved coordination with arm swing while walking this session  Pt demonstrates good tolerance to TE and would benefit from continued therapy to address deficits in strength and balance  Plan: Gait, balance, posture     Precautions: Falls Risk, Bilateral LE Neuropathies, History of Cardiovascular Disease, Hx of bilateral TKR  POC:   HEP; Seated PWR!:posture, weight shift, and trunk rotation  HEP(3/24):standing hip ABD and extension  HEP(): Posture TWY against wall  Manuals 3/17 3/22 3/24 3/30 4/2 4/5 4/7 4/12                                            Neuro Re-Ed                                       PWR!  Standing:             Transition     x10 x10  +dots x10 x10     Twist     x10 x10 x10 x10     Weight shift   2 x10 2x10 x10 x10 x10 x10     POsture     x10 x10 x10 x10     Standing flow     x5 x3 x5 x5     Tandem Walking   10 feet  x6 10 feet  x6 10 feet  x6 20 feet x4  20' x6                  Seated Flow  x3                        Seated marches: Neuropriming for dressing   2 x10 2x10 2 x10        Standing on BOSU dome     5" hold  x3 15" x3                    Pt education/HEP review 10'  5'          Ther Ex             Stationary Bike/Nu step  5' 5' 5 min 5 min 5 min 5 min 5 min     Posture training tandem game      x20       Arm Circles   2 x20"          High marches   2 x20"          TWY Posture at wall     10"x3 each HEP       Treadmill training Ther Activity             Functional Turning             Weight shift with fitness [place] (pants simulation)   Seated  3x5 Seated  3x5 Seated  3 x5   seated 3 x 5  Shoes off     Bed mobility (turns)  x4  +airex  x2           Floor<>stant transfer                                                    Gait Training             Arm swing practice +scarf VC       20 feet x6      Arm swing practice        20 feet x10 laps 20'x10 laps     Trunk rotation; PT assist       20 feet x6 laps      Heel strike with dots      6 dots  x10 laps 6 dots x6 laps       Figure 8 walk             4 square agility in walking             Hurdles low        x6 laps with high march                  Modalities

## 2021-04-15 ENCOUNTER — EVALUATION (OUTPATIENT)
Dept: PHYSICAL THERAPY | Facility: CLINIC | Age: 76
End: 2021-04-15
Payer: MEDICARE

## 2021-04-15 DIAGNOSIS — G20 PARKINSON'S DISEASE (HCC): Primary | ICD-10-CM

## 2021-04-15 PROCEDURE — 97110 THERAPEUTIC EXERCISES: CPT | Performed by: PHYSICAL THERAPIST

## 2021-04-15 PROCEDURE — 97530 THERAPEUTIC ACTIVITIES: CPT | Performed by: PHYSICAL THERAPIST

## 2021-04-15 NOTE — PROGRESS NOTES
Daily Note     Today's date: 4/15/2021  Patient name: Damian Nergo  : 1945  MRN: [id number]  Referring provider: Elo Gibson MD  Dx:   Encounter Diagnosis     ICD-10-CM    1  Parkinson's disease ([address] )  G20                   Subjective: Pt says that balance and his neuropathy continue to be his biggest weakness  Objective: See treatment diary below      Assessment: Tolerated treatment well  Patient required frequent verbal and visual cuing to utilize weight shifting strategy for turns  Within the session he made excellent progress, consistently performing with correct form and good control throughout the session  He continues to display good carry over of arm swing with gait, but requires cuing for increased heel strike  He is ready for re-evaluation at his next visit  Plan: Progress note during next visit  Precautions: Falls Risk, Bilateral LE Neuropathies, History of Cardiovascular Disease, Hx of bilateral TKR  POC:   HEP; Seated PWR!:posture, weight shift, and trunk rotation  HEP(3/24):standing hip ABD and extension  HEP(): Posture TWY against wall  Manuals 3/17 3/22 3/24 3/30 4/2 4/5 4/7 4/12 4/15                                           Neuro Re-Ed                                       PWR!  Standing:             Transition     x10 x10  +dots x10 x10     Twist     x10 x10 x10 x10     Weight shift   2 x10 2x10 x10 x10 x10 x10     POsture     x10 x10 x10 x10     Standing flow     x5 x3 x5 x5     Tandem Walking   10 feet  x6 10 feet  x6 10 feet  x6 20 feet x4  20' x6                  Seated Flow  x3                        Seated marches: Neuropriming for dressing   2 x10 2x10 2 x10        Standing on BOSU dome     5" hold  x3 15" x3                    Pt education/HEP review 10'  5'          Ther Ex             Stationary Bike/Nu step  5' 5' 5 min 5 min 5 min 5 min 5 min 5  min    Posture training tandem game      x20       Arm Circles   2 x20"          High marches   2 x20" TWY Posture at wall     10"x3 each HEP       Treadmill training                                                    Ther Activity             Functional Turning         4 square weight shift  3 x10      Turning with walking on que         20 feet  x10    Random walking with turning         150 feet x3                 Weight shift with fitness [place] (pants simulation)   Seated  3x5 Seated  3x5 Seated  3 x5   seated 3 x 5  Shoes off     Bed mobility (turns)  x4  +airex  x2           Floor<>stant transfer                                                    Gait Training             Arm swing practice +scarf VC       20 feet x6      Arm swing practice        20 feet x10 laps 20'x10 laps 20'x8 laps    Trunk rotation; PT assist       20 feet x6 laps      Heel strike with dots      6 dots  x10 laps 6 dots x6 laps       Figure 8 walk             4 square agility in walking             Hurdles low        x6 laps with high march +heel strike  x6 laps                   Modalities

## 2021-04-16 ENCOUNTER — EVALUATION (OUTPATIENT)
Dept: SPEECH THERAPY | Facility: CLINIC | Age: 76
End: 2021-04-16
Payer: MEDICARE

## 2021-04-16 DIAGNOSIS — G20 PRIMARY PARKINSONISM (HCC): ICD-10-CM

## 2021-04-16 DIAGNOSIS — R13.12 OROPHARYNGEAL DYSPHAGIA: Primary | ICD-10-CM

## 2021-04-16 DIAGNOSIS — R13.13 PHARYNGEAL DYSPHAGIA: ICD-10-CM

## 2021-04-16 PROCEDURE — 92610 EVALUATE SWALLOWING FUNCTION: CPT

## 2021-04-16 NOTE — PROGRESS NOTES
Speech-Language Pathology Initial Evaluation    Today's date: 2021  Patients name: Silvia Head  : 1945  MRN: [id number]  Safety measures: NKA  Referring provider: Aminata Kohli MD    Encounter Diagnosis     ICD-10-CM    1  Oropharyngeal dysphagia  R13 12    2  Pharyngeal dysphagia  R13 13 Ambulatory referral to Speech Therapy   3  Primary Parkinsonism ([address] )  [address]          Visit tracking:  -Referring provider: Epic  -Billing guidelines: CMS  -Visit #1/10  -Insurance: Medicare ([employer] secondary)  -RE due 2021     Subjective comments: Patient reports increasing difficulties with swallowing, noting increased frequency/severity of coughing episodes    How did the patient hear about us? Prior patient    Patient's goal(s): " to counteract the gagging(coughing episodes)" as patient reports being scared he will inhale whatever is gagging/coughing      Reason for referral: Difficulty swallowing solids or liquids  Prior functional status: Swallowing effective with use of compensatory strategies  Clinically complex situations: Previous therapy to address similar deficits    History: Patient is a 67 y o  male who was referred to outpatient skilled Speech Therapy services for a dysphagia evaluation  Pt with medical history significant for Parkinson's disease, pharyngeal dysphagia resulting in oropharyngeal dysphagia at this time  Pt was previously able to tolerate regular diet and thin liquids with use of strategies however now presents with increased difficulties tolreating, coughing during PO intake impacting toleration of liquids and solids  Pt requires skilled SLP interventions to reduce risk of further decline in function, aspiration and related complications      Hearing: 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[employer], currently retired    Mental status: Alert  Behavior status: Cooperative  Communication modalities: Verbal  Rehabilitation prognosis: Good rehab potential to reach the established goals    Assessments        DYSPHAGIA EVALUATION:    -Reason for referral: Signs/symptoms of dysphagia    -Subjective report of swallowing difficulty: Poor secretion management , Coughing, Globus sensation  and Regurgitation of food    -Eating Assessment Tool (EAT-10) Swallowing Screening Tool is a patient self-assessment tool that rates the percieved impairment a swallowing disorder has on the Functional, Physical, and Emotional aspects of one's life  EAT-10 score: 7/40    -Difficulty swallowing: Solids, Liquids and Pills    -Current diet (solids): Regular  -Current diet (liquids): Thin  -Alternative Feeding Method?: No    -Facial appearance Symmetrical   -Dentition Adequate   -Labial function Decreased ROM (bilateral) and Decreased strength (bilateral)   -Lingual function Decreased ROM (bilateral), Decreased ROM (elevation), Decreased strength (bilateral), Decreased strength (protrusion), Decreased protrusion/retraction and Deviaton to the right side   -Velar function Symmetrical       LIQUID CONSISTENCY TESTIN  Liquid Consistency (Thin) - water   Administered by: water bottle   Strategies, attempts, and responses: None    CLINICAL FINDINGS:   Oral phase impairments: WFL   Pharyngeal Phase Impairments: Other (multiple swallows to clear)    *Laryngeal excursion upon palpation: variable HLE upon palpation      SOLID CONSISTENCY TESTIN   Solid Consistency (Regular, Mechanical Soft and Puree) - pretzels, Vanilla Wafer, and applesauce   Administered by: Self-fed   Strategies, attempts, and responses: None    CLINICAL FINDINGS:   Oral phase impairments: Other (minimally prolonged mastication)   Pharyngeal Phase Impairments: Cough, Complaints of globus sensation and Other (multiple swallows to clear)    *Laryngeal excursion upon palpation: variable HLE upon palpation      SWALLOWING DIAGNOSTIC IMPRESSION:  -Swallowing diagnosis/severity: mild to moderate oropharyngeal phase dysphagia    -Factors affecting performance: None    -Safety concerns: Risk for aspiration and Risk for choking    -Risk factors: Progressive neurological disease    SAFETY PRECAUTIONS:  -Supervision: Independent     -Strategies: Small sips and bites when eating, Slow rate, swallow between bites and Alternate liquids and solids    -Positioning: Upright position at least 30 minutes after meal and Upright position during meals    -Compensatory strategies: Other:n/a    -Recommend (solids): Dysphagia advanced diet (instruction for self selection of tolerable items, ground meats, soft cooked vegetables and avoidance of dry/dense/chewy textures, prep to bite size pieces, addition of gravies/sauces/mositure to meals, patient verbalized understanding)     -Recommend (liquids): Thin    -Recommend (medications): as tolerated    REFERRALS: Otolaryngology    Goals    Short-term goals:  1  Patient will perform compensatory strategies (e g , upright positioning, small bites/sips, slow rate, alternation of consistencies, multiple swallows, effortful swallow, chin tuck, head turn, etc ) with 80% accuracy to eliminate overt s/sx penetration/aspiration of least restrictive food/liquid consistencies, to be achieved in 4-6 weeks  2  Patient will improve HLE/pharyngeal strength and timing with use of HLE exercises and additional appropriate interventions in order to facilitate improved toleration of liquids and solids demonstrating 90% free of overt s/s of aspiration/penetration, to be achieved in 4-6 weeks  3  Pt will tolerate least restrictive with no overt s/s of aspiration 90% of the time provided mild cues for use of compensatory strategies allowing for optimized hydration/nutrition      4  Patient will tolerate therapeutic trials of regular diet textures with 80% free of s/s of aspiration, to be achieved in 4-6 weeks  Long-term goals:  1  Patient will receive a videofluoroscopic swallow study (VFSS) to fully assess physiology and anatomy of the swallow and to determine the appropriate diet/liquid level and appropriate therapeutic interventions by discharge  2  Patient will utilize compensatory strategies 90% of the time independently optimizing safety and efficacy of swallowing function on P O  intake without overt s/sx of penetration or aspiration 90% of the time by discharge  Impressions/Recommendations    Impressions:   -Patient presents with mild to moderate dysphagia characterized by variable HLE, significant coughing episode with regular textures, complaints of globus sensation impacting toleration of liquids and solids, requiring SLP recommendations for diet modifications to dysphagia advanced textures and thin liquids  Patient provided education on recommendations, verbalized understanding  Patient currently scheduled for VFSS 4/28/2021  Patient may be candidate for NMES interventions at this time in order to facilitae improved toleration of liquids and solids through improved pharyngeal strength, no contraindications noted at this time  Patient does also wish to address recent cognitive concerns, will assess at later date  Patient requires skilled SLP interventions at this time in order to facilitate improved toleration of liquids and solids, improved pharyngeal strength/OM strength/rom/coordination and to train in strategies to facilitate improved toleration of liquids and solids allowing for optimized hydration/nutrition, toleration of least restrictive diet consistency        Recommendations:  -Patient would benefit from outpatient skilled Speech Therapy services: Dysphagia therapy    -Frequency: 1x weekly  -Duration: 4-6 weeks    -Intervention certification from: 8/10/7973  -Intervention certification to: 0/42/5707     Referral request for ENT placed this date as well, due to patient noting increased voice changes, globus sensation

## 2021-04-19 ENCOUNTER — TRANSCRIBE ORDERS (OUTPATIENT)
Dept: PHYSICAL THERAPY | Facility: CLINIC | Age: 76
End: 2021-04-19

## 2021-04-19 ENCOUNTER — EVALUATION (OUTPATIENT)
Dept: PHYSICAL THERAPY | Facility: CLINIC | Age: 76
End: 2021-04-19
Payer: MEDICARE

## 2021-04-19 DIAGNOSIS — G20 PARKINSON'S DISEASE (HCC): Primary | ICD-10-CM

## 2021-04-19 PROCEDURE — 97112 NEUROMUSCULAR REEDUCATION: CPT | Performed by: PHYSICAL THERAPIST

## 2021-04-19 PROCEDURE — 97116 GAIT TRAINING THERAPY: CPT | Performed by: PHYSICAL THERAPIST

## 2021-04-19 NOTE — LETTER
2021    [address]  [facility] [address]    Patient: Charlotte Jones   YOB: 1945   Date of Visit: 2021     Encounter Diagnosis     ICD-10-CM    1  Parkinson's disease [facility])  G20        Dear Dr Brandee Faye:    Thank you for your recent referral of Charlotte Jones  Please review the attached evaluation summary from Justen's recent visit  Please verify that you agree with the plan of care by signing the attached order  If you have any questions or concerns, please do not hesitate to call  I sincerely appreciate the opportunity to share in the care of one of your patients and hope to have another opportunity to work with you in the near future  Sincerely,    Claudius Goldmann, PT      Referring Provider:      I certify that I have read the below Plan of Care and certify the need for these services furnished under this plan of treatment while under my care  [address]  [facility] [address]  Via Fax: [phone]          PT Re-Evaluation     Today's date: 2021  Patient name: Charlotte Jones  : 1945  MRN: [id number]  Referring provider: Lawyer Reny MD  Dx:   Encounter Diagnosis     ICD-10-CM    1  Parkinson's disease ([email or web address] )  G20                   Assessment  Assessment details: Pt is a 67year old male who has been diligently attending outpatient Physical Therapy with primary complaints of balance deficits secondary to Parkinson's Disease  He has had two falls in the last few months, and notes that he has noticed a more rapid regression than any other time since his diagnosis 8 years ago  Relevent co-morbidities include significant bilateral peripheral neuropathies  The patient has been highly compliant with PT and HEP since the start of care, and has been making steady progress  At the time of re-evaluation today he is displaying good improvements with gait, balance, and posture   He increased his score on the FGA by 6 points, indicating that he is at decreased risk of falls  Despite his good progress he continues to display several deficits including mobility with gait, balance, and function  He would benefit from another 4 weeks of PT to progress these deficits and maximize safety and function  Thank you for your referral!  Impairments: abnormal gait, abnormal movement, impaired balance and safety issue  Barriers to therapy: None  Understanding of Dx/Px/POC: good   Prognosis: good    Goals  STG (2-3 weeks):  1  The patient will be fully compliant with HEP-met  2  The patient will display at least 25% improved heel strike bilaterally during gait without verbal cuing-met  3  The patient will report mild to no difficulty with rolling over in bed at night-met    LTG (4-6 weeks):  1  The patient will increase FOTO score to 66 -met  2  The patient will improve mini-BEST test by at least 5 points-met  3  The patient will display at least 75% improved heel strike during gait without verbal cuing-partially met (50%)  4  The patient will report ability to put on pants independently, without having to hold onto the wall-not met  5  The patient will be able to ascend/descend stairs with a reciprocal pattern with use of one hand rail-met   6   The patient will be able to perform SLS bilaterally for up to 15 seconds or more each in order to don/doff pants without UE support    Plan  Patient would benefit from: skilled physical therapy  Planned modality interventions: thermotherapy: hydrocollator packs, cryotherapy, ultrasound, traction, TENS and unattended electrical stimulation  Planned therapy interventions: therapeutic activities, therapeutic exercise, home exercise program, manual therapy, joint mobilization, balance, patient education, neuromuscular re-education, massage, abdominal trunk stabilization, stretching, strengthening, prosthetic fitting/training, graded motor, graded exercise, graded activity, gait training, functional ROM exercises, flexibility, breathing training, coordination, balance/weight bearing training and body mechanics training  Frequency: 2x week  Duration in weeks: 4  Plan of Care beginning date: 4/19/2021  Plan of Care expiration date: 5/17/2021  Treatment plan discussed with: patient and family        Subjective Evaluation    History of Present Illness  Mechanism of injury: Patient was diagnosed with Parkinson's Disease 8 years ago  He recently met his new Neurologist and told her that he feels his Levadopa is no longer as effective as they were  He asked her about the [employer] program that had been suggested to him a few years ago  His symptoms include tremors, voice changes, slow down in speaking, and difficulties walking (secondary to neuropathy)  He notes that he has neuropathy on both feet, with numbness, and "feeling like standing on water balloons "    He says the degradation of his balance have been a concern to him  He has had two fall sin the last year, both times falling going up the steps when entering the house  Re-evaluation 4/19: Pt says things like walking, posture, and sitting is improved  When it comes to balance, he is having more difficulties, which he blames on his neuropathies  Pain  No pain reported  Progression: worsening    Social Support  Steps to enter house: yes (3)  Stairs in house: yes (master bedroom on the first floor-but has duel [facility])   Lives with: spouse    Working: Retired   Hand dominance: right    Patient Goals  Patient goals for therapy: improved balance  Patient goal: prevent falls, ascend stairs more easily, turn more easily        Objective     Static Posture     Head  Forward  Strength/Myotome Testing     Lumbar   Left   Normal strength    Right   Normal strength    Ambulation     Observational Gait   Walking speed within functional limits  Decreased stride length     Left foot contact pattern: heel to toe  Right foot contact pattern: heel to toe  Left arm swing: decreased  Base of support: decreased    Additional Observational Gait Details  Pt displays little to no trunk rotation with gait  Minimal heel strike    Functional Assessment        Comments  Mini-BEST Test    Sit to stand-2  Ride to toes-2  Stand on one leg-1  Compensatory stepping correction (forward)-2   Compensatory stepping correction (backward)-2  Compensatory stepping correction (Lateral)-2  Stance(feet together) Eyes open, firm surface-2  Stance (feet together) Eyes closed, foam surface-2  Incline(eyes closed)-1  Change in Gait speed-2  Walk with head turns-1  Walk with pivot turns-2  Step Over obstacles-1  Timed Up and Go with dual task-2    Total:23/28                 Precautions: Falls Risk, Bilateral LE Neuropathies, History of Cardiovascular Disease, Hx of bilateral TKR  POC: 5/17  EP; Seated PWR!:posture, weight shift, and trunk rotation  HEP(3/24):standing hip ABD and extension  HEP(4/2): Posture TWY against wall  Manuals 3/17 3/22 3/24 3/30 4/2 4/5 4/7 4/12 4/15 4/19                                          Neuro Re-Ed             Re-eval          34'   SLS          2 fingers  3 x20" each   PWR!  Standing:          nv   Contralateral Foot reach                          Standing on BOSU dome     5" hold  x3 15" x3                    Pt education/HEP review 10'  5'          Ther Ex             Stationary Bike/Nu step  5' 5' 5 min 5 min 5 min 5 min 5 min 5  min                              Ther Activity             Functional Turning         4 square weight shift  3 x10      Turning with walking on que         20 feet  x10    Random walking with turning         150 feet x3    Weight shift with fitness [place] (pants simulation)   Seated  3x5 Seated  3x5 Seated  3 x5   seated 3 x 5  Shoes off     Floor<>stand transfer                                                    Gait Training             Arm swing practice        20 feet x10 laps 20'x10 laps 20'x8 laps    Trunk rotation: boom vicky PT assist          20 feet   x6 laps    Heel strike with dots      6 dots  x10 laps 6 dots x6 laps       Figure 8 walk          nv   4 square agility in walking          nv   Hurdles low        x6 laps with high march +heel strike  x6 laps   nv                Modalities

## 2021-04-19 NOTE — PROGRESS NOTES
PT Re-Evaluation     Today's date: 2021  Patient name: Su Mistry  : 1945  MRN: [id number]  Referring provider: Gogo Mars MD  Dx:   Encounter Diagnosis     ICD-10-CM    1  Parkinson's disease ([address] )  G20                   Assessment  Assessment details: Pt is a 67year old male who has been diligently attending outpatient Physical Therapy with primary complaints of balance deficits secondary to Parkinson's Disease  He has had two falls in the last few months, and notes that he has noticed a more rapid regression than any other time since his diagnosis 8 years ago  Relevent co-morbidities include significant bilateral peripheral neuropathies  The patient has been highly compliant with PT and HEP since the start of care, and has been making steady progress  At the time of re-evaluation today he is displaying good improvements with gait, balance, and posture  He increased his score on the FGA by 6 points, indicating that he is at decreased risk of falls  Despite his good progress he continues to display several deficits including mobility with gait, balance, and function  He would benefit from another 4 weeks of PT to progress these deficits and maximize safety and function  Thank you for your referral!  Impairments: abnormal gait, abnormal movement, impaired balance and safety issue  Barriers to therapy: None  Understanding of Dx/Px/POC: good   Prognosis: good    Goals  STG (2-3 weeks):  1  The patient will be fully compliant with HEP-met  2  The patient will display at least 25% improved heel strike bilaterally during gait without verbal cuing-met  3  The patient will report mild to no difficulty with rolling over in bed at night-met    LTG (4-6 weeks):  1  The patient will increase FOTO score to 66 -met  2  The patient will improve mini-BEST test by at least 5 points-met  3  The patient will display at least 75% improved heel strike during gait without verbal cuing-partially met (50%)  4  The patient will report ability to put on pants independently, without having to hold onto the wall-not met  5  The patient will be able to ascend/descend stairs with a reciprocal pattern with use of one hand rail-met   6  The patient will be able to perform SLS bilaterally for up to 15 seconds or more each in order to don/doff pants without UE support    Plan  Patient would benefit from: skilled physical therapy  Planned modality interventions: thermotherapy: hydrocollator packs, cryotherapy, ultrasound, traction, TENS and unattended electrical stimulation  Planned therapy interventions: therapeutic activities, therapeutic exercise, home exercise program, manual therapy, joint mobilization, balance, patient education, neuromuscular re-education, massage, abdominal trunk stabilization, stretching, strengthening, prosthetic fitting/training, graded motor, graded exercise, graded activity, gait training, functional ROM exercises, flexibility, breathing training, coordination, balance/weight bearing training and body mechanics training  Frequency: 2x week  Duration in weeks: 4  Plan of Care beginning date: 4/19/2021  Plan of Care expiration date: 5/17/2021  Treatment plan discussed with: patient and family        Subjective Evaluation    History of Present Illness  Mechanism of injury: Patient was diagnosed with Parkinson's Disease 8 years ago  He recently met his new Neurologist and told her that he feels his Levadopa is no longer as effective as they were  He asked her about the [employer] program that had been suggested to him a few years ago  His symptoms include tremors, voice changes, slow down in speaking, and difficulties walking (secondary to neuropathy)  He notes that he has neuropathy on both feet, with numbness, and "feeling like standing on water balloons "    He says the degradation of his balance have been a concern to him   He has had two fall sin the last year, both times falling going up the steps when entering the house  Re-evaluation 4/19: Pt says things like walking, posture, and sitting is improved  When it comes to balance, he is having more difficulties, which he blames on his neuropathies  Pain  No pain reported  Progression: worsening    Social Support  Steps to enter house: yes (3)  Stairs in house: yes (master bedroom on the first floor-but has duel railins)   Lives with: spouse    Working: Retired   Hand dominance: right    Patient Goals  Patient goals for therapy: improved balance  Patient goal: prevent falls, ascend stairs more easily, turn more easily        Objective     Static Posture     Head  Forward  Strength/Myotome Testing     Lumbar   Left   Normal strength    Right   Normal strength    Ambulation     Observational Gait   Walking speed within functional limits  Decreased stride length     Left foot contact pattern: heel to toe  Right foot contact pattern: heel to toe  Left arm swing: decreased  Base of support: decreased    Additional Observational Gait Details  Pt displays little to no trunk rotation with gait  Minimal heel strike    Functional Assessment        Comments  Mini-BEST Test    Sit to stand-2  Ride to toes-2  Stand on one leg-1  Compensatory stepping correction (forward)-2   Compensatory stepping correction (backward)-2  Compensatory stepping correction (Lateral)-2  Stance(feet together) Eyes open, firm surface-2  Stance (feet together) Eyes closed, foam surface-2  Incline(eyes closed)-1  Change in Gait speed-2  Walk with head turns-1  Walk with pivot turns-2  Step Over obstacles-1  Timed Up and Go with dual task-2    Total:23/28                 Precautions: Falls Risk, Bilateral LE Neuropathies, History of Cardiovascular Disease, Hx of bilateral TKR  POC: 5/17  EP; Seated PWR!:posture, weight shift, and trunk rotation  HEP(3/24):standing hip ABD and extension  HEP(4/2): Posture TWY against wall  Manuals 3/17 3/22 3/24 3/30 4/2 4/5 4/7 4/12 4/15 4/19                                          Neuro Re-Ed             Re-eval          34'   SLS          2 fingers  3 x20" each   PWR!  Standing:          nv   Contralateral Foot reach                          Standing on BOSU dome     5" hold  x3 15" x3                    Pt education/HEP review 10'  5'          Ther Ex             Stationary Bike/Nu step  5' 5' 5 min 5 min 5 min 5 min 5 min 5  min                              Ther Activity             Functional Turning         4 square weight shift  3 x10      Turning with walking on que         20 feet  x10    Random walking with turning         150 feet x3    Weight shift with fitness [place] (pants simulation)   Seated  3x5 Seated  3x5 Seated  3 x5   seated 3 x 5  Shoes off     Floor<>stand transfer                                                    Gait Training             Arm swing practice        20 feet x10 laps 20'x10 laps 20'x8 laps    Trunk rotation: boom vicky PT assist          20 feet   x6 laps    Heel strike with dots      6 dots  x10 laps 6 dots x6 laps       Figure 8 walk          nv   4 square agility in walking          nv   Hurdles low        x6 laps with high march +heel strike  x6 laps   nv                Modalities

## 2021-04-20 VITALS — HEIGHT: 73 IN | WEIGHT: 242 LBS | BODY MASS INDEX: 32.07 KG/M2

## 2021-04-20 NOTE — TELEPHONE ENCOUNTER
Why does your doctor want you to have this procedure? Hx polyps; Fhx colon ca-father    Do you have kidney disease?  no  If yes, are you on dialysis :     Have you had diverticulitis within the past 2 months? no    Are you diabetic?  no  If yes, insulin dependent: If yes, provide diabetic instructions sheet     Do take iron supplements?  no  If yes, instruct patient to hold iron supplement for 7 days prior    Are you on a blood thinner? no   Was the blood thinner sheet complete and faxed to cardiologist no  Plavix (clopidogrel), Coumadin (warfarin), Lovenox (enoxaparin), Xarelto (rivaroxaban), Pradaxa(dabigatran), Eliquis(apixaban) Savaysa/Lixiana (edoxapan)    Do you have an automatic implantable cardiac defibrillator (AICD)/pacemaker ([facility])? no  Was AICD/pacemaker sheet completed and faxed to cardiologist? no    Are you on home oxygen? no  If yes, continuous or nocturnal:     Have you been treated for MRSA, VRE or any communicable diseases? no    Heart attack, stroke, or stent within 3 months? no  Schedule at Hospital if within 3-6 months   Use nitroglycerin for chest pain in the last 6 months? no    History of organ  transplant?  no   If yes, notify Endo      History of neck/throat/tongue surgery or cancer? no  IF yes, notify Endo      Any problems with anesthesia in the past? Yes nausea sometimes     Was stool C diff ordered?  no Stool specimen needs to be completed prior to procedure    Do have any facial or body piercings?no     Do you have a latex allergy? no     Do have an allergy to metals? ([facility] study only) no     If pediatric patient, was consent faxed to pediatrician no     Patient rights reviewed yes     Colon phone prep completed miralax instructions reviewed and sent to pt via [email or web address]

## 2021-04-21 NOTE — PROGRESS NOTES
Daily Speech Treatment Note    Today's date: 2021  Patients name: Reji Elias  : 1945  MRN: [id number]  Safety measures: NKA  Referring provider: Amauri Yarbrough MD    Encounter Diagnosis     ICD-10-CM    1  Oropharyngeal dysphagia  R13 12    2  Pharyngeal dysphagia  R13 13    3  Primary parkinsonism ([address] )  [address]        Visit tracking:  -Referring provider: Epic  -Billing guidelines: CMS  -Visit #2/10  -Insurance: Medicare ([employer] secondary)  -RE due 2021     Subjective/Behavioral:  -Patient report swallowing has been going well this past week, reports he has not modified diet since last session  -Patient reports utilizing increased mastication time/thourough mastication with things such as raw carrots and has noted improved toleration    Objective/Assessment:  -Patient education on plan of therapy, verbalized understanding  Short-term goals:    1  Patient will perform compensatory strategies (e g , upright positioning, small bites/sips, slow rate, alternation of consistencies, multiple swallows, effortful swallow, chin tuck, head turn, etc ) with 80% accuracy to eliminate overt s/sx penetration/aspiration of least restrictive food/liquid consistencies, to be achieved in 4-6 weeks  Patient trained in compensatory strategies in order to facilitate improved toleration of liquids and solids  Patient verbalized understanding and states will implement within home environment  2  Patient will improve HLE/pharyngeal strength and timing with use of HLE exercises and additional appropriate interventions in order to facilitate improved toleration of liquids and solids demonstrating 90% free of overt s/s of aspiration/penetration, to be achieved in 4-6 weeks  Patient completed 2 sets of 10 (and 1 set of 5 each) effortful swallows, Mary Maneuvers this date to facilitate improved HLE for improved toleration of liquids and solids  Patient required min level cues for accuracy   Patient with 2x10 attempts of Mendelsohn maneuver, however unable to complete hold portion of swallow this date provided cues  3  Pt will tolerate least restrictive with no overt s/s of aspiration 90% of the time provided mild cues for use of compensatory strategies allowing for optimized hydration/nutrition  Patient tolerating thin liquids with no overt s/s of aspiration this date with 85% accuracy, cough noted x1 following bolus presentation via cup sips  4  Patient will tolerate therapeutic trials of regular diet textures with 80% free of s/s of aspiration, to be achieved in 4-6 weeks  Plan:  -Patient was provided with home exercises/activities to target goals in plan of care at the end of today's session   -Continue with current plan of care

## 2021-04-22 ENCOUNTER — EVALUATION (OUTPATIENT)
Dept: PHYSICAL THERAPY | Facility: CLINIC | Age: 76
End: 2021-04-22
Payer: MEDICARE

## 2021-04-22 DIAGNOSIS — G20 PARKINSON'S DISEASE (HCC): Primary | ICD-10-CM

## 2021-04-22 PROCEDURE — 97112 NEUROMUSCULAR REEDUCATION: CPT | Performed by: PHYSICAL THERAPIST

## 2021-04-22 PROCEDURE — 97116 GAIT TRAINING THERAPY: CPT | Performed by: PHYSICAL THERAPIST

## 2021-04-22 NOTE — PROGRESS NOTES
Daily Note     Today's date: 2021  Patient name: Jacob Bardales  : 1945  MRN: [id number]  Referring provider: Toña Zhu MD  Dx:   Encounter Diagnosis     ICD-10-CM    1  Parkinson's disease ([facility] [address] )  G20                   Subjective: Pt says that he has returned to walking outside for the first time since the start of the pandemic  He walked one mile, and notes that he seemed to veer right and left, he was not walking in a perfectly straight line  Objective: See treatment diary below      Assessment: Tolerated treatment well  Patient is now displaying excellent coordination of arm swing during gait without any therapist assistance  He was challenged today with coordinating arm swing for backwards steps, requiring frequent verbal and visual cuing to show contralateral pattern  He benefited most from neuropriming the pattern: performing the motion in place, then switching sides  Despite pt good progress with arm swing, he continues to display little to no trunk rotation with gait  He was introduced to staggered stance with contralateral rotation at the wall to exaggerate the trunk rotation pattern during gait  He displayed several instances of stepping strategy when leading with the R LE, as, he has difficulty with L sided SLS  He would benefit from continued skilled PT to further address these deficits and to maximize functional mobility         Plan: gait with trunk rotation, single leg balance     Precautions: Falls Risk, Bilateral LE Neuropathies, History of Cardiovascular Disease, Hx of bilateral TKR  POC:   EP; Seated PWR!:posture, weight shift, and trunk rotation  HEP(3/24):standing hip ABD and extension  HEP(): Posture TWY against wall  Manuals 4/5 4/7 4/12 4/15 4/19 4/22                              Neuro Re-Ed         Re-eval     34'    SLS     2 fingers  3 x20" each    Staggered stance + contralateral twist at wall      x10 each   Neuro priming: backwards step      3 x10 PWR! Standing:     nv    Contralateral Foot reach                  Standing on BOSU dome 15" x3                 Pt education/HEP review         Ther Ex         Stationary Bike/Nu step 5 min 5 min 5 min 5  min  5 min                     Ther Activity         Functional Turning    4 square weight shift  3 x10       Turning with walking on que    20 feet  x10     Random walking with turning    150 feet x3     Weight shift with fitness [place] (pants simulation)   seated 3 x 5  Shoes off      Floor<>stand transfer                                    Gait Training         Arm swing practice   20 feet x10 laps 20'x10 laps 20'x8 laps     Trunk rotation: ronny perry PT assist     20 feet   x6 laps     Heel strike with dots 6 dots  x10 laps 6 dots x6 laps        Figure 8 walk     nv 12' x4  6' x4  3' x2   4 square agility in walking     nv x5 each direction   Fw<>BW walking       10 feet  3 x10   Hurdles low   x6 laps with high march +heel strike  x6 laps   nv             Modalities

## 2021-04-23 ENCOUNTER — OFFICE VISIT (OUTPATIENT)
Dept: SPEECH THERAPY | Facility: CLINIC | Age: 76
End: 2021-04-23
Payer: MEDICARE

## 2021-04-23 DIAGNOSIS — R13.12 OROPHARYNGEAL DYSPHAGIA: Primary | ICD-10-CM

## 2021-04-23 DIAGNOSIS — G20 PRIMARY PARKINSONISM (HCC): ICD-10-CM

## 2021-04-23 DIAGNOSIS — R13.13 PHARYNGEAL DYSPHAGIA: ICD-10-CM

## 2021-04-23 PROCEDURE — 92526 ORAL FUNCTION THERAPY: CPT

## 2021-04-26 ENCOUNTER — EVALUATION (OUTPATIENT)
Dept: PHYSICAL THERAPY | Facility: CLINIC | Age: 76
End: 2021-04-26
Payer: MEDICARE

## 2021-04-26 DIAGNOSIS — G20 PARKINSON'S DISEASE (HCC): Primary | ICD-10-CM

## 2021-04-26 PROCEDURE — 97112 NEUROMUSCULAR REEDUCATION: CPT

## 2021-04-26 PROCEDURE — 97110 THERAPEUTIC EXERCISES: CPT

## 2021-04-26 NOTE — PROGRESS NOTES
Daily Note     Today's date: 2021  Patient name: Mali Orosco  : 1945  MRN: [id number]  Referring provider: Halima Koch MD  Dx:   Encounter Diagnosis     ICD-10-CM    1  Parkinson's disease ([address] )  G20                   Subjective: Pt states he was doing some of his HEP and the one that he walks backward made him a bit dizzy  Objective: See treatment diary below      Assessment: Tolerated treatment well with no increase in pain  Pt demonstrates improved gait pattern seen throughout session as he is able to walk with more contralateral arm swing than he has in the past few PT sessions  Pt was able to preform turns with less steps today as we practiced this with square stepping   Pt demonstrates good form with neuro priming step ups  Patient would benefit from continued PT      Plan: Continue per plan of care  Precautions: Falls Risk, Bilateral LE Neuropathies, History of Cardiovascular Disease, Hx of bilateral TKR  POC:   EP; Seated PWR!:posture, weight shift, and trunk rotation  HEP(3/24):standing hip ABD and extension  HEP(): Posture TWY against wall  Manuals 4/5 4/7 4/12 4/15 4/19 4/22 4/26                                  Neuro Re-Ed          Re-eval     34'     SLS     2 fingers  3 x20" each  2 finger  3 x 20"    Staggered stance + contralateral twist at wall      x10 each 10x ea    Neuro priming: backwards step      3 x10 3 x 10    PWR!  Standing:     nv     Contralateral Foot reach                    Standing on BOSU dome 15" x3                   Pt education/HEP review          Ther Ex          Stationary Bike/Nu step 5 min 5 min 5 min 5  min  5 min 5 mins                        Ther Activity          Functional Turning    4 square weight shift  3 x10        Turning with walking on que    20 feet  x10      Random walking with turning    150 feet x3      Weight shift with fitness [place] (pants simulation)   seated 3 x 5  Shoes off       Floor<>stand transfer Gait Training          Arm swing practice   20 feet x10 laps 20'x10 laps 20'x8 laps      Trunk rotation: boom vicky PT assist     20 feet   x6 laps      Heel strike with dots 6 dots  x10 laps 6 dots x6 laps         Figure 8 walk     nv 12' x4  6' x4  3' x2 12' x 4  6' x 4   3' x 4    4 square agility in walking     nv x5 each direction 5x ea direction    Fw<>BW walking       10 feet  3 x10 10 feet   3 x 10    Hurdles low   x6 laps with high march +heel strike  x6 laps   nv               Modalities

## 2021-04-27 ENCOUNTER — ANESTHESIA EVENT (OUTPATIENT)
Dept: GASTROENTEROLOGY | Facility: AMBULATORY SURGERY CENTER | Age: 76
End: 2021-04-27

## 2021-04-27 ENCOUNTER — HOSPITAL ENCOUNTER (OUTPATIENT)
Dept: GASTROENTEROLOGY | Facility: AMBULATORY SURGERY CENTER | Age: 76
Discharge: HOME | End: 2021-04-27
Payer: MEDICARE

## 2021-04-27 ENCOUNTER — ANESTHESIA (OUTPATIENT)
Dept: GASTROENTEROLOGY | Facility: AMBULATORY SURGERY CENTER | Age: 76
End: 2021-04-27

## 2021-04-27 VITALS
OXYGEN SATURATION: 94 % | RESPIRATION RATE: 20 BRPM | SYSTOLIC BLOOD PRESSURE: 116 MMHG | TEMPERATURE: 97.9 F | DIASTOLIC BLOOD PRESSURE: 63 MMHG | HEART RATE: 79 BPM

## 2021-04-27 DIAGNOSIS — Z80.0 FAMILY HISTORY OF COLON CANCER: ICD-10-CM

## 2021-04-27 DIAGNOSIS — Z86.010 HISTORY OF COLON POLYPS: ICD-10-CM

## 2021-04-27 PROBLEM — Z98.890 PONV (POSTOPERATIVE NAUSEA AND VOMITING): Status: ACTIVE | Noted: 2021-04-27

## 2021-04-27 PROBLEM — R11.2 PONV (POSTOPERATIVE NAUSEA AND VOMITING): Status: ACTIVE | Noted: 2021-04-27

## 2021-04-27 PROCEDURE — 45380 COLONOSCOPY AND BIOPSY: CPT | Performed by: INTERNAL MEDICINE

## 2021-04-27 PROCEDURE — 45385 COLONOSCOPY W/LESION REMOVAL: CPT | Performed by: INTERNAL MEDICINE

## 2021-04-27 PROCEDURE — 88305 TISSUE EXAM BY PATHOLOGIST: CPT | Performed by: PATHOLOGY

## 2021-04-27 RX ORDER — ONDANSETRON 2 MG/ML
INJECTION INTRAMUSCULAR; INTRAVENOUS AS NEEDED
Status: DISCONTINUED | OUTPATIENT
Start: 2021-04-27 | End: 2021-04-27

## 2021-04-27 RX ORDER — PROPOFOL 10 MG/ML
INJECTION, EMULSION INTRAVENOUS AS NEEDED
Status: DISCONTINUED | OUTPATIENT
Start: 2021-04-27 | End: 2021-04-27

## 2021-04-27 RX ORDER — SODIUM CHLORIDE 9 MG/ML
50 INJECTION, SOLUTION INTRAVENOUS CONTINUOUS
Status: DISCONTINUED | OUTPATIENT
Start: 2021-04-27 | End: 2021-05-01 | Stop reason: HOSPADM

## 2021-04-27 RX ADMIN — PROPOFOL 30 MG: 10 INJECTION, EMULSION INTRAVENOUS at 10:17

## 2021-04-27 RX ADMIN — ONDANSETRON 4 MG: 2 INJECTION INTRAMUSCULAR; INTRAVENOUS at 10:05

## 2021-04-27 RX ADMIN — PROPOFOL 20 MG: 10 INJECTION, EMULSION INTRAVENOUS at 10:23

## 2021-04-27 RX ADMIN — SODIUM CHLORIDE 50 ML/HR: 9 INJECTION, SOLUTION INTRAVENOUS at 09:49

## 2021-04-27 RX ADMIN — SODIUM CHLORIDE: 9 INJECTION, SOLUTION INTRAVENOUS at 09:54

## 2021-04-27 RX ADMIN — PROPOFOL 100 MG: 10 INJECTION, EMULSION INTRAVENOUS at 10:12

## 2021-04-27 RX ADMIN — PROPOFOL 150 MG: 10 INJECTION, EMULSION INTRAVENOUS at 10:05

## 2021-04-27 NOTE — PROGRESS NOTES
Daily Speech Treatment Note    Today's date: 2021  Patients name: Francis Briones  : 1945  MRN: [id number]  Safety measures: NKA  Referring provider: James Monge MD    Encounter Diagnosis     ICD-10-CM    1  Oropharyngeal dysphagia  R13 12    2  Pharyngeal dysphagia  R13 13    3  Primary parkinsonism ([address] )  [address]        Visit tracking:  -Referring provider: Epic  -Billing guidelines: CMS  -Visit #3/10  -Insurance: Medicare ([employer] secondary)  -RE due 2021     Subjective/Behavioral:  -Patient reports VFSS "went well"    Objective/Assessment:  -Patient education on plan of therapy, verbalized understanding    -Significant education provided on recommendations from VFSS, soft diet recommendations, handout provided to improve carryover outside of skilled SLP interventions  Patient verbalized understanding  Short-term goals:    1  Patient will perform compensatory strategies (e g , upright positioning, small bites/sips, slow rate, alternation of consistencies, multiple swallows, effortful swallow, chin tuck, head turn, etc ) with 80% accuracy to eliminate overt s/sx penetration/aspiration of least restrictive food/liquid consistencies, to be achieved in 4-6 weeks  Patient trained in compensatory strategies in order to facilitate improved toleration of liquids and solids  Patient verbalized understanding and states will implement within home environment  2  Patient will improve HLE/pharyngeal strength and timing with use of HLE exercises and additional appropriate interventions in order to facilitate improved toleration of liquids and solids demonstrating 90% free of overt s/s of aspiration/penetration, to be achieved in 4-6 weeks  Patient completed 2 sets of 10  effortful swallows this date to facilitate improved HLE for improved toleration of liquids and solids  Patient required min level cues for accuracy   Attempted to trial use of sMEG biofeedback this date for improved completion of effortful swallows and Mendelsohn maneuvers however biofeedback unable to successfully register due to accessory muscle movements this date, discontinued  SLP provided re-training in HLE exercises for completion within home environment  Patient verbalized understanding  3  Pt will tolerate least restrictive with no overt s/s of aspiration 90% of the time provided mild cues for use of compensatory strategies allowing for optimized hydration/nutrition  Patient tolerating thin liquids with no overt s/s of aspiration this date with 90% accuracy  4  Patient will tolerate therapeutic trials of regular diet textures with 80% free of s/s of aspiration, to be achieved in 4-6 weeks  Plan:  -Patient was provided with home exercises/activities to target goals in plan of care at the end of today's session   -Continue with current plan of care

## 2021-04-27 NOTE — ANESTHESIA POSTPROCEDURE EVALUATION
Post-Op Assessment Note    CV Status:  Stable  Pain Score: 0    Pain management: adequate     Mental Status:  Awake   Hydration Status:  Euvolemic   PONV Controlled:  Controlled   Airway Patency:  Patent       Post Op Vitals Reviewed: No      Staff: Anesthesiologist         No complications documented      BP      Temp      Pulse     Resp      SpO2

## 2021-04-27 NOTE — H&P
History and Physical - [facility]  Nila Aden 67 y o  male MRN: [id number]    HPI: Nila Aden is a 67y o  year old male who presents for surveillance colonoscopy  He has a family history of colon cancer and has a personal history of colon polyps    REVIEW OF SYSTEMS: Per the HPI, and otherwise unremarkable  Historical Information   Past Medical History:   Diagnosis Date    Arthritis     Benign familial tremor     Cardiac disease     two cardiac stents    Chest pain     Coronary artery disease     Hyperlipidemia     Hypertension     Kidney disease     Kidney stone     Malignant melanoma of skin of chest ([facility] [address] )     Removed 2 years ago   Meniscal injury     Nephrolithiasis     Parkinson's disease ([facility] [address] )     Peripheral neuropathy     PONV (postoperative nausea and vomiting)     Tinnitus     [id number] RESOLVED     Past Surgical History:   Procedure Laterality Date    AMPUTATION Left     AMPUTATION OF FINGER WITH NEURECTOMY(EACH) DISTAL LONG  RING AND SMALL FINGER    CARPAL TUNNEL RELEASE Right     CATARACT EXTRACTION      CHOLECYSTECTOMY      CORONARY STENT PLACEMENT      two cardiac stents    FINGER AMPUTATION      AMPUTATION OF MIDDLE FINGER WITH NEURECTOMY(EACH)    HAND SURGERY Left     Traumatic amputation of fingers left hand      JOINT REPLACEMENT Bilateral     knees    KNEE ARTHROPLASTY      TOTAL    MALIGNANT SKIN LESION EXCISION      ANTERIOR CHEST FOR MELAMONIA    ME CYSTO/URETERO W/LITHOTRIPSY &INDWELL STENT INSRT Left 7/27/2017    Procedure: CYSTOSCOPY URETEROSCOPY , RETROGRADE PYELOGRAM AND INSERTION STENT URETERAL;  Surgeon: Mariano Onofre MD;  [facility];  Service: Urology    ME LAP,CHOLECYSTECTOMY N/A 2/28/2018    Procedure: Dwight Courtney;  Surgeon: Ivette Guo MD;  [facility];  Service: General    ME REMOVE BLADDER STONE,<2 5 CM N/A 9/15/2017    Procedure: Malvin Limb;  Surgeon: Jayne Bess MD;  Location:  MAIN OR;  Service: Urology    MN TRANSURETHRAL ELEC-SURG PROSTATECTOM N/A 9/15/2017    Procedure: BIPOLAR TRANSURETHRAL RESECTION OF PROSTATE (TURP); Surgeon: Jayne Bess MD;  [facility];  Service: Urology    TONSILLECTOMY      TRIGGER FINGER RELEASE Right     TUMOR REMOVAL Left     Left foot 20 years ago      WISDOM TOOTH EXTRACTION Bilateral      Social History   Social History     Substance and Sexual Activity   Alcohol Use Yes    Alcohol/week: 4 0 standard drinks    Types: 4 Cans of beer per week    Frequency: 2-4 times a month    Drinks per session: 1 or 2    Binge frequency: Never     Social History     Substance and Sexual Activity   Drug Use No     Social History     Tobacco Use   Smoking Status Former Smoker    Types: Cigarettes   Smokeless Tobacco Never Used   Tobacco Comment    back in college in 1966     Family History   Problem Relation Age of Onset    Heart disease Mother     Hypertension Mother    [employer] Stroke Mother    [employer] Breast cancer Mother     Heart disease Father     Hypertension Father     Stroke Father     Colon cancer Father     Diabetes Sister     Heart disease Brother     Other Family         BACK PAIN    Breast cancer Family     Heart disease Family     Hypertension Family     Stroke Family     Mental illness Neg Hx     Substance Abuse Neg Hx        Meds/Allergies       Current Outpatient Medications:     allopurinol (ZYLOPRIM) 300 mg tablet    aspirin (ASPIRIN LOW DOSE) 81 MG tablet    atorvastatin (LIPITOR) 20 mg tablet    benazepril-hydrochlorthiazide (LOTENSIN HCT) 20-25 MG per tablet    Calcium Carbonate-Vitamin D (CALTRATE 600+D) 600-400 MG-UNIT per tablet    carbidopa-levodopa (SINEMET)  mg per tablet    Cholecalciferol (VITAMIN D-3) 1000 UNITS CAPS    glucosamine-chondroitin 500-400 MG tablet    metoprolol succinate (TOPROL-XL) 50 mg 24 hr tablet    Multiple Vitamin (MULTIVITAMINS PO)    zolpidem (AMBIEN) 10 mg tablet    nitroglycerin (NITROSTAT) 0 4 mg SL tablet    Current Facility-Administered Medications:     sodium chloride 0 9 % infusion, 50 mL/hr, Intravenous, Continuous, 50 mL/hr at 04/27/21 0949    No Known Allergies    Objective     /68   Pulse 90   Temp 97 9 °F (36 6 °C) (Temporal)   Resp 22   SpO2 95%     PHYSICAL EXAM    Gen: NAD AAOx3  CV: S1S2 RRR no m/r/g  CHEST: Clear b/l no c/r/w  ABD: soft, +BS NT/ND  EXT: no edema    ASSESSMENT/PLAN:  This is a 67y o  year old male here for surveillance colonoscopy and he is stable and optimized for his procedure

## 2021-04-27 NOTE — ANESTHESIA PREPROCEDURE EVALUATION
Procedure:  COLONOSCOPY    Relevant Problems   ANESTHESIA   (+) PONV (postoperative nausea and vomiting)      CARDIO   (+) Coronary artery disease involving native coronary artery of native heart without angina pectoris   (+) Essential hypertension   (+) Hypercholesterolemia      GI/HEPATIC   (+) Gastroesophageal reflux disease without esophagitis   (+) Pharyngeal dysphagia      /RENAL   (+) BPH without obstruction/lower urinary tract symptoms   (+) Nephrolithiasis      MUSCULOSKELETAL   (+) Degeneration, intervertebral disc, lumbosacral   (+) Gout   (+) Osteoarthritis of knee        Physical Exam    Airway    Mallampati score: III  TM Distance: >3 FB  Neck ROM: full     Dental       Cardiovascular  Rhythm: regular, Rate: normal, Cardiovascular exam normal    Pulmonary  Pulmonary exam normal     Other Findings        Anesthesia Plan  ASA Score- 3     Anesthesia Type- IV sedation with anesthesia with ASA Monitors  Additional Monitors:   Airway Plan:           Plan Factors-    Chart reviewed  Patient summary reviewed  Induction- intravenous  Postoperative Plan-     Informed Consent- Anesthetic plan and risks discussed with patient

## 2021-04-27 NOTE — DISCHARGE INSTRUCTIONS
Colonoscopy   WHAT YOU NEED TO KNOW:   A colonoscopy is a procedure to examine the inside of your colon (intestine) with a scope  Polyps or tissue growths may have been removed during your colonoscopy  It is normal to feel bloated and to have some abdominal discomfort  You should be passing gas  If you have hemorrhoids or you had polyps removed, you may have a small amount of bleeding  DISCHARGE INSTRUCTIONS:   Seek care immediately if:    You have sudden, severe abdominal pain   You have problems swallowing   You have a large amount of black, sticky bowel movements or blood in your bowel movements   You have sudden trouble breathing   You feel weak, lightheaded, or faint or your heart beats faster than normal for you  Contact your healthcare provider if:    You have a fever and chills   You have nausea or are vomiting   Your abdomen is bloated or feels full and hard   You have abdominal pain   You have black, sticky bowel movements or blood in your bowel movements   You have not had a bowel movement for 3 days after your procedure   You have rash or hives   You have questions or concerns about your procedure  Activity:    Do not lift, strain, or run for 24 hours after your procedure   Rest after your procedure  You have been given medicine to relax you  Do not drive or make important decisions until the day after your procedure  Return to your normal activity as directed   Relieve gas and discomfort from bloating by lying on your right side with a heating pad on your abdomen  You may need to take short walks to help the gas move out  Eat small meals until bloating is relieved  Follow up with your healthcare provider as directed: Write down your questions so you remember to ask them during your visits  If you take a blood thinner, please review the specific instructions from your endoscopist about when you should resume it   These can be found in the Recommendation and Your Medication list sections of this After Visit Summary  Hemorrhoids   WHAT YOU NEED TO KNOW:   What are hemorrhoids? Hemorrhoids are swollen blood vessels inside your rectum (internal hemorrhoids) or on your anus (external hemorrhoids)  Sometimes a hemorrhoid may prolapse  This means it extends out of your anus  What increases my risk for hemorrhoids? · Pregnancy or obesity    · Straining or sitting for a long time during bowel movements    · Liver disease    · Weak muscles around the anus caused by older age, rectal surgery, or anal intercourse    · A lack of physical activity    · Chronic diarrhea or constipation    · A low-fiber diet    What are the signs and symptoms of hemorrhoids? · Pain or itching around your anus or inside your rectum    · Swelling or bumps around your anus    · Bright red blood in your bowel movement, on the toilet paper, or in the toilet bowl    · Tissue bulging out of your anus (prolapsed hemorrhoids)    · Incontinence (poor control over urine or bowel movements)    How are hemorrhoids diagnosed? Your healthcare provider will ask about your symptoms, the foods you eat, and your bowel movements  He or she will examine your anus for external hemorrhoids  You may need the following:  · A digital rectal exam  is a test to check for hemorrhoids  Your healthcare provider will put a gloved finger inside your anus to feel for the hemorrhoids  · An anoscopy  is a test that uses a scope (small tube with a light and camera on the end) to look at your hemorrhoids  How are hemorrhoids treated? Treatment will depend on your symptoms  You may need any of the following:  · Medicines  can help decrease pain and swelling, and soften your bowel movement  The medicine may be a pill, pad, cream, or ointment  · Procedures  may be used to shrink or remove your hemorrhoid  Examples include rubber-band ligation, sclerotherapy, and photocoagulation  These procedures may be done in your healthcare provider's office  Ask your healthcare provider for more information about these procedures  · Surgery  may be needed to shrink or remove your hemorrhoids  How can I manage my symptoms? · Apply ice on your anus for 15 to 20 minutes every hour or as directed  Use an ice pack, or put crushed ice in a plastic bag  Cover it with a towel before you apply it to your anus  Ice helps prevent tissue damage and decreases swelling and pain  · Take a sitz bath  Fill a bathtub with 4 to 6 inches of warm water  You may also use a sitz bath pan that fits inside a toilet bowl  Sit in the sitz bath for 15 minutes  Do this 3 times a day, and after each bowel movement  The warm water can help decrease pain and swelling  · Keep your anal area clean  Gently wash the area with warm water daily  Soap may irritate the area  After a bowel movement, wipe with moist towelettes or wet toilet paper  Dry toilet paper can irritate the area  How can I help prevent hemorrhoids? · Do not strain to have a bowel movement  Do not sit on the toilet too long  These actions can increase pressure on the tissues in your rectum and anus  · Drink plenty of liquids  Liquids can help prevent constipation  Ask how much liquid to drink each day and which liquids are best for you  · Eat a variety of high-fiber foods  Examples include fruits, vegetables, and whole grains  Ask your healthcare provider how much fiber you need each day  You may need to take a fiber supplement  · Exercise as directed  Exercise, such as walking, may make it easier to have a bowel movement  Ask your healthcare provider to help you create an exercise plan  · Do not have anal sex  Anal sex can weaken the skin around your rectum and anus  · Avoid heavy lifting  This can cause straining and increase your risk for another hemorrhoid  When should I seek immediate care?    · You have severe pain in your rectum or around your anus  · You have severe pain in your abdomen and you are vomiting  · You have bleeding from your anus that soaks through your underwear  When should I contact my healthcare provider? · You have frequent and painful bowel movements  · Your hemorrhoid looks or feels more swollen than usual      · You do not have a bowel movement for 2 days or more  · You see or feel tissue coming through your anus  · You have questions or concerns about your condition or care  CARE AGREEMENT:   You have the right to help plan your care  Learn about your health condition and how it may be treated  Discuss treatment options with your healthcare providers to decide what care you want to receive  You always have the right to refuse treatment  The above information is an  only  It is not intended as medical advice for individual conditions or treatments  Talk to your doctor, nurse or pharmacist before following any medical regimen to see if it is safe and effective for you  © Copyright [address] Information is for End User's use only and may not be sold, redistributed or otherwise used for commercial purposes  All illustrations and images included in CareNotes® are the copyrighted property of [employer]  or [employer] [facility]  Gastric Polyps   WHAT YOU NEED TO KNOW:   What are gastric polyps? Gastric polyps are growths that form in the lining of your stomach  They are not cancerous, but certain types of polyps can change into cancer  What puts me at risk for gastric polyps? · Chronic gastritis caused by NSAIDs use or ulcers    · Long-term use of proton pump inhibitor medicines (used to decrease stomach acid)    · An infection in your stomach caused by H  pylori bacteria    What are the symptoms of gastric polyps? You may have no symptoms   Large polyps may cause any of the following:  · Abdominal pain    · Indigestion    · Vomiting after meals or vomiting blood    · Dark or bloody bowel movements    How are gastric polyps diagnosed? Gastric polyps are usually found during an endoscopy for another reason  All or part of the polyp will be removed during the test  Your healthcare provider may also remove tissue from your stomach  The polyps and tissue are sent to the lab for testing  How are gastric polyps treated? Some types of polyps go away on their own  Other types may be removed if they are large, you have symptoms, or abnormal cells are found  Large polyps and abnormal cells increase your risk for cancer  You may also need antibiotics if you have an infection caused by H  pylori bacteria  Part of your stomach may be removed if the polyps cannot be removed and abnormal cells are found  When should I seek immediate care? · You have blood in your vomit  · You have dark or bloody bowel movements  · You have severe pain in your abdomen that does not go away after you take medicine  When should I contact my healthcare provider? · You have indigestion that does not go away with treatment  · You vomit after meals  · You have questions or concerns about your condition or care  CARE AGREEMENT:   You have the right to help plan your care  Learn about your health condition and how it may be treated  Discuss treatment options with your healthcare providers to decide what care you want to receive  You always have the right to refuse treatment  The above information is an  only  It is not intended as medical advice for individual conditions or treatments  Talk to your doctor, nurse or pharmacist before following any medical regimen to see if it is safe and effective for you  © Copyright [address] Information is for End User's use only and may not be sold, redistributed or otherwise used for commercial purposes   All illustrations and images included in CareNotes® are the copyrighted property of [employer]  or [employer] [facility]

## 2021-04-28 ENCOUNTER — HOSPITAL ENCOUNTER (OUTPATIENT)
Dept: RADIOLOGY | Facility: HOSPITAL | Age: 76
Discharge: HOME | End: 2021-04-28
Attending: INTERNAL MEDICINE
Payer: MEDICARE

## 2021-04-28 DIAGNOSIS — R13.13 PHARYNGEAL DYSPHAGIA: ICD-10-CM

## 2021-04-28 PROCEDURE — 74230 X-RAY XM SWLNG FUNCJ C+: CPT

## 2021-04-28 PROCEDURE — 92611 MOTION FLUOROSCOPY/SWALLOW: CPT

## 2021-04-28 NOTE — PROCEDURES
Video Swallow Study      Patient Name: Sarah Contreras  AGHDB'U Date: 4/28/2021        Past Medical History  Past Medical History:   Diagnosis Date    Arthritis     Benign familial tremor     Cardiac disease     two cardiac stents    Chest pain     Coronary artery disease     Hyperlipidemia     Hypertension     Kidney disease     Kidney stone     Malignant melanoma of skin of chest ([address]     Removed 2 years ago   Meniscal injury     Nephrolithiasis     Parkinson's disease ([address]     Peripheral neuropathy     PONV (postoperative nausea and vomiting)     Tinnitus     [id number] RESOLVED        Past Surgical History  Past Surgical History:   Procedure Laterality Date    AMPUTATION Left     AMPUTATION OF FINGER WITH NEURECTOMY(EACH) DISTAL LONG  RING AND SMALL FINGER    CARPAL TUNNEL RELEASE Right     CATARACT EXTRACTION      CHOLECYSTECTOMY      CORONARY STENT PLACEMENT      two cardiac stents    FINGER AMPUTATION      AMPUTATION OF MIDDLE FINGER WITH NEURECTOMY(EACH)    HAND SURGERY Left     Traumatic amputation of fingers left hand   JOINT REPLACEMENT Bilateral     knees    KNEE ARTHROPLASTY      TOTAL    MALIGNANT SKIN LESION EXCISION      ANTERIOR CHEST FOR MELAMONIA    NM CYSTO/URETERO W/LITHOTRIPSY &INDWELL STENT INSRT Left 7/27/2017    Procedure: CYSTOSCOPY URETEROSCOPY , RETROGRADE PYELOGRAM AND INSERTION STENT URETERAL;  Surgeon: Dayana Garcia MD;  [facility];  Service: Urology    NM LAP,CHOLECYSTECTOMY N/A 2/28/2018    Procedure: CHOLECYSTECTOMY LAPAROSCOPIC;  Surgeon: Polo Astudillo MD;  [facility];  Service: General    NM REMOVE BLADDER STONE,<2 5 CM N/A 9/15/2017    Procedure: Norah Monday;  Surgeon: Danny Melgoza MD;  [facility];  Service: Urology    NM TRANSURETHRAL ELEC-SURG PROSTATECTOM N/A 9/15/2017    Procedure: BIPOLAR TRANSURETHRAL RESECTION OF PROSTATE (TURP);   Surgeon: Jocelin Wild Antonia Gutiérrez MD;  Location:  MAIN OR;  Service: Urology    TONSILLECTOMY      TRIGGER FINGER RELEASE Right     TUMOR REMOVAL Left     Left foot 20 years ago   WISDOM TOOTH EXTRACTION Bilateral      Video Barium Swallow Study    Summary:  Images are on PACS for review  Pt presents w/ wfl oral, mild pharyngeal dysphagia  Swallow initiation is timely, though hyo-laryngeal excursion is reduced  Pt w/ bony protrusion impacting bolus clearance  Retention noted above CP and under suspected osteophyte, pt noted to aspirate x2 on this retention - throat clear response  Liquid wash somewhat helpful, though during swallow w/ significant retention, transient aspiration noted w/ thin as it could not clear through the retention  Pt w/ at least mild aspiration risk, recommend ongoing OP ST to f/u on findings from VBS, diet prep, and strategies to optimize swallow safety  Findings of VBS immediately reviewed w/ pt, education provided on choosing softer foods, alternating liquids and solids, good oral hygiene, and following up w/ primary SLP - pt verbalized understanding  Recommendations:  Diet: Soft solids   Liquids: Thin liquids   Meds: As tolerated   Strategies: Small bites/sips, alternate bites/liquids   Upright position  F/u ST tx: Yes at OP for diet prep, ongoing strategies  Therapy Prognosis: Fair   Prognosis considerations: Age, medical hx  Aspiration Precautions  Consider consult with: Discussions re: possible intervention for osteophyte as able   Results reviewed with: pt  Aspiration precautions posted  If a dedicated assessment of the esophagus is desired, consider esophagram/barium swallow or EGD  Patient's goal:  None stated    Goals:  Pt will tolerate least restrictive diet w/out s/s aspiration or oral/pharyngeal difficulties  PMH:  Aroldo Melgar 4/16/21:   "67 y o  male who was referred to outpatient skilled Speech Therapy services for a dysphagia evaluation   Pt with medical history significant for Parkinson's disease, pharyngeal dysphagia resulting in oropharyngeal dysphagia at this time  Pt was previously able to tolerate regular diet and thin liquids with use of strategies however now presents with increased difficulties tolreating, coughing during PO intake impacting toleration of liquids and solids  Pt requires skilled SLP interventions to reduce risk of further decline in function, aspiration and related complications "     Today, pt denies pneumonias, weight loss, or need to change diet due to dysphagia  Reports he gags and feels residue in his throat that he cannot clear  Previous VBS:  3/20/19:   Video Barium Swallow Study     Summary: Pt presents w functional oral, & mild pharyngeal dysphagia characterized by mildly decreased epiglottic inversion, mildly reduced superior laryngeal rise, and mildly reduced pharyngeal constriction  Pt has Parkinson's disease and GERD, primarily c/o gagging w both PO and saliva, often leading to coughing spells  Pt had transient penetration w nectar and thin, did not aspirate during the study  Osteophyte noted at C4-C5, pt had mild regurgitation and small amt of retention noted w all consistencies  Retention of solids near the osteophyte & in CP at times was regurgitated towards the PS  Brief screening of the esophagus was completed, pt had moderate dysmotility and mild reflux         Recommendations:  Diet: Regular  Liquids: Thin  Meds: as tolerated  Strategies: mult swallows,   Upright position  F/u ST tx:  F/u for education as able, diet recs, support    Current Diet:  Regular w/ thin     Premorbid diet:  Regular w/ thin     Dentition:  Natural     O2 requirement:  RA    Oral mech:  Strength and ROM: WFL     Vocal Quality/Speech:  Strong, clear    Cognitive status:  Awake, alert, oriented     Consistencies administered: Barium laden applesauce, banana, chicken, bagel, honey thick, nectar thick, thin liquids, 13mm barium pill whole w/ thin  Liquids were administered by cup and straw  Pt was seated laterally at 90 degrees  Oral stage:  Lip closure: wfl   Mastication: wfl   Bolus formation: wfl    Bolus control:  wfl   Transfer: wfl   Residue: -     Pharyngeal stage:  Mild impairment  Swallow promptness: fairly propmpt  Spill to valleculae: -   Spill to pyriforms: -   Epiglottic inversion: complete   Laryngeal excursion: reduced   Pharyngeal constriction: reduced   Vallecular retention: mild   Pyriform retention: w/ all, noted at osteophyte  PPW coating: -   Osteophytes: large at C4-C5, impacting bolus clearance   Retention noted at area of osteophyte, eventually aspirated   CP prominence: +   Retropulsion from prominence: +   Transient penetration: -   Epiglottic undercoat: -   Penetration: -   Aspiration: on retention from bagel at space between CP and osteophyte  Strategies: -   Response to aspiration: throat clear    Screening of Esophageal stage:  Pill stasis, and ?reflux

## 2021-04-29 ENCOUNTER — EVALUATION (OUTPATIENT)
Dept: PHYSICAL THERAPY | Facility: CLINIC | Age: 76
End: 2021-04-29
Payer: MEDICARE

## 2021-04-29 DIAGNOSIS — G20 PARKINSON'S DISEASE (HCC): Primary | ICD-10-CM

## 2021-04-29 PROCEDURE — 97112 NEUROMUSCULAR REEDUCATION: CPT | Performed by: PHYSICAL THERAPIST

## 2021-04-29 PROCEDURE — 97116 GAIT TRAINING THERAPY: CPT | Performed by: PHYSICAL THERAPIST

## 2021-04-29 NOTE — PROGRESS NOTES
Daily Note     Today's date: 2021  Patient name: Nila Aden  : 1945  MRN: [id number]  Referring provider: Milton Mora MD  Dx:   Encounter Diagnosis     ICD-10-CM    1  Parkinson's disease ([facility] [address] )  G20                   Subjective: Pt says that he feels he is overall getting more flexible, walking, and just generally moving better  His biggest concern is his balance  Objective: See treatment diary below      Assessment: Tolerated treatment well, displaying high levels of motivation  Patient required supervision level  Assistance to perform staggered stance with rotation at the wall when leading with the R LE, without episode of LOB that he was able to self correct with several steps  He displays decreased WB on his stance legs, possibly due to hip weakness, therefore resulting in staggered steps with exercise and shorter steps with gait  His fatigue with the addition of repeated stance phase practice supports these findings  He displayed good carry over of trunk rotation in gait with ambulation at the end of the session  He would benefit from continued skilled PT  Plan: Continue per plan of care  Progress note during next visit        Precautions: Falls Risk, Bilateral LE Neuropathies, History of Cardiovascular Disease, Hx of bilateral TKR  POC:   EP; Seated PWR!:posture, weight shift, and trunk rotation  HEP(3/24):standing hip ABD and extension  HEP(): Posture TWY against wall  Manuals 4/5 4/7 4/12 4/15 4/19 4/22 4/26  4/29                                    Neuro Re-Ed           Re-eval     34'      SLS     2 fingers  3 x20" each  2 finger  3 x 20"     Staggered stance + contralateral twist at wall      x10 each 10x ea  2 x10 each   Neuro priming: backwards step      3 x10 3 x 10     Staggered stance + rotational gait        20 feet x8                         Pt education/HEP review           Ther Ex           Stationary Bike/Nu step 5 min 5 min 5 min 5  min  5 min 5 mins  5 min                         Ther Activity           Functional Turning    4 square weight shift  3 x10         Turning with walking on que    20 feet  x10       Random walking with turning    150 feet x3       Weight shift with fitness [place] (pants simulation)   seated 3 x 5  Shoes off        Floor<>stand transfer                                            Gait Training           Trunk rotation: bohiren careyer PT assist     20 feet   x6 laps    No prop  20 feet x6   Figure 8 walk     nv 12' x4  6' x4  3' x2 12' x 4  6' x 4   3' x 4     4 square agility in walking     nv x5 each direction 5x ea direction     Fw<>BW walking       10 feet  3 x10 10 feet   3 x 10     Single leg weight bearing: repeated stance phase        x10 each              Modalities

## 2021-04-30 ENCOUNTER — OFFICE VISIT (OUTPATIENT)
Dept: SPEECH THERAPY | Facility: CLINIC | Age: 76
End: 2021-04-30
Payer: MEDICARE

## 2021-04-30 DIAGNOSIS — R13.12 OROPHARYNGEAL DYSPHAGIA: Primary | ICD-10-CM

## 2021-04-30 DIAGNOSIS — G20 PRIMARY PARKINSONISM (HCC): ICD-10-CM

## 2021-04-30 DIAGNOSIS — R13.13 PHARYNGEAL DYSPHAGIA: ICD-10-CM

## 2021-04-30 PROCEDURE — 92526 ORAL FUNCTION THERAPY: CPT

## 2021-05-03 ENCOUNTER — OFFICE VISIT (OUTPATIENT)
Dept: PHYSICAL THERAPY | Facility: CLINIC | Age: 76
End: 2021-05-03
Payer: MEDICARE

## 2021-05-03 DIAGNOSIS — G20 PARKINSON'S DISEASE (HCC): Primary | ICD-10-CM

## 2021-05-03 PROCEDURE — 97112 NEUROMUSCULAR REEDUCATION: CPT | Performed by: PHYSICAL THERAPIST

## 2021-05-03 PROCEDURE — 97530 THERAPEUTIC ACTIVITIES: CPT | Performed by: PHYSICAL THERAPIST

## 2021-05-03 NOTE — PROGRESS NOTES
Daily Note     Today's date: 5/3/2021  Patient name: Kinsey Tsang  : 1945  MRN: [id number]  Referring provider: Gabriel Kohli MD  Dx:   Encounter Diagnosis     ICD-10-CM    1  Parkinson's disease ([facility] [address] )  G20                   Subjective: Pt says that he now seldom feels out of sync with walking, but still has some trouble with the contralateral twisting in staggered stance  He has been practicing it over the weekend  He says overall his walking, balance, posture, and flexibility feels much better  The only deficit that he is still nervous about is his LE neuropathy  Objective: See treatment diary below      Assessment: Tolerated treatment well  Patient is over all displaying excellent progress with his gait pattern, with mild trunk rotation now present for the first time since he presented to therapy  He displayed improved performance of standing contralateral rotation with staggered stance from his last visit, with appropriate weight shifting and no signs of instability  He continues to display significant instability with single leg stance activities, which correlates to his reports of needing to hold onto the wall to put his pants on  Reviewed seated weight shifting to practice donning/doffing pants in a safer manner  He verbalized good understanding  Seated repeated trunk motion exercises added to help improve pt trunk mobility and address reports of difficulty with putting his shoes and socks on  He was given these exercises to add to HEP  Plan: Progress treatment as tolerated         Precautions: Falls Risk, Bilateral LE Neuropathies, History of Cardiovascular Disease, Hx of bilateral TKR  POC:   EP; Seated PWR!:posture, weight shift, and trunk rotation  HEP(3/24):standing hip ABD and extension  HEP(): Posture TWY against wall  HEP(5/3): seated contralateral foot reach, seated reach under chair   Manuals 4/19 4/22 4/26  4/29 5/3                               Neuro Re-Ed Re-eval 34'        SLS 2 fingers  3 x20" each  2 finger  3 x 20"       Staggered stance + contralateral twist at wall  x10 each 10x ea  2 x10 each x5 each    Neuro priming: backwards step  3 x10 3 x 10       Staggered stance + rotational gait    20 feet x8 20 feet x4    Seated contralateral foot reach     2 x10 HEP   Seated reach under chair     2 x10 HEP   Pt education/HEP review         Ther Ex         Stationary Bike/Nu step  5 min 5 mins  5 min 5 min                      Ther Activity         Standing weight shift      x10    Standing weight shift + contralateral knee touch     x10    Weight shift with fitness [place] (pants simulation)     seated  x5    Floor<>stand transfer                                    Gait Training         Trunk rotation: boom vicky PT assist 20 feet   x6 laps    No prop  20 feet x6     Figure 8 walk nv 12' x4  6' x4  3' x2 12' x 4  6' x 4   3' x 4       4 square agility in walking nv x5 each direction 5x ea direction       Fw<>BW walking   10 feet  3 x10 10 feet   3 x 10       Single leg weight bearing: repeated stance phase    x10 each              Modalities

## 2021-05-06 ENCOUNTER — APPOINTMENT (OUTPATIENT)
Dept: PHYSICAL THERAPY | Facility: CLINIC | Age: 76
End: 2021-05-06
Payer: MEDICARE

## 2021-05-07 ENCOUNTER — APPOINTMENT (OUTPATIENT)
Dept: SPEECH THERAPY | Facility: CLINIC | Age: 76
End: 2021-05-07
Payer: MEDICARE

## 2021-05-10 ENCOUNTER — APPOINTMENT (OUTPATIENT)
Dept: PHYSICAL THERAPY | Facility: CLINIC | Age: 76
End: 2021-05-10
Payer: MEDICARE

## 2021-05-13 ENCOUNTER — OFFICE VISIT (OUTPATIENT)
Dept: PHYSICAL THERAPY | Facility: CLINIC | Age: 76
End: 2021-05-13
Payer: MEDICARE

## 2021-05-13 DIAGNOSIS — G20 PARKINSON'S DISEASE (HCC): Primary | ICD-10-CM

## 2021-05-13 PROCEDURE — 97112 NEUROMUSCULAR REEDUCATION: CPT | Performed by: PHYSICAL THERAPIST

## 2021-05-13 NOTE — PROGRESS NOTES
Daily Note/Discharge Summary     Today's date: 2021  Patient name: Paige Bergeron  : 1945  MRN: [id number]  Referring provider: Belkis Miranda MD  Dx:   Encounter Diagnosis     ICD-10-CM    1  Parkinson's disease ([facility] [address] )  G20                   Subjective: Pt reports that he feels ready to discharge form PT, he is comfortable with his home program and has made the progress he was hoping to  He notes that his wife is very happy with his improvements with walking  Objective: See treatment diary below      Assessment: Pt has been attending outpatient PT for balance and gait deficits secondary to Parkinson's Disease  Pt has made steady progress in PT and has met almost all impairment and functional goals at this time  Pt is independent with HEP  Pt is D/C from PT to HEP continue to progress towards personal goals  Please feel free to call PT in the event of a change in status  Thank you! Goals  STG (2-3 weeks):  1  The patient will be fully compliant with HEP-met  2  The patient will display at least 25% improved heel strike bilaterally during gait without verbal cuing-met  3  The patient will report mild to no difficulty with rolling over in bed at night-met    LTG (4-6 weeks):  1  The patient will increase FOTO score to 66 -met  2  The patient will improve mini-BEST test by at least 5 points-met  3  The patient will display at least 75% improved heel strike during gait without verbal cuing-met  4  The patient will report ability to put on pants independently, without having to hold onto the wall-partially met  5  The patient will be able to ascend/descend stairs with a reciprocal pattern with use of one hand rail-met   6   The patient will be able to perform SLS bilaterally for up to 15 seconds or more each in order to don/doff pants without UE support-partially met     Plan: Discharge to HEP     Precautions: Falls Risk, Bilateral LE Neuropathies, History of Cardiovascular Disease, Hx of bilateral TKR  POC: 5/17  EP; Seated PWR!:posture, weight shift, and trunk rotation  HEP(3/24):standing hip ABD and extension  HEP(4/2): Posture TWY against wall  HEP(5/3): seated contralateral foot reach, seated reach under chair   Manuals 4/19 4/22 4/26  4/29 5/3 5/13                              Neuro Re-Ed         Re-eval 34'     30'   SLS 2 fingers  3 x20" each  2 finger  3 x 20"       Staggered stance + contralateral twist at wall  x10 each 10x ea  2 x10 each x5 each    Neuro priming: backwards step  3 x10 3 x 10       Staggered stance + rotational gait    20 feet x8 20 feet x4    Seated contralateral foot reach     2 x10 HEP   Seated reach under chair     2 x10 HEP   Pt education/HEP review         Ther Ex         Stationary Bike/Nu step  5 min 5 mins  5 min 5 min                      Ther Activity         Standing weight shift      x10    Standing weight shift + contralateral knee touch     x10    Weight shift with fitness [place] (pants simulation)     seated  x5    Floor<>stand transfer                                    Gait Training         Trunk rotation: ronny perry PT assist 20 feet   x6 laps    No prop  20 feet x6     Figure 8 walk nv 12' x4  6' x4  3' x2 12' x 4  6' x 4   3' x 4       4 square agility in walking nv x5 each direction 5x ea direction       Fw<>BW walking   10 feet  3 x10 10 feet   3 x 10       Single leg weight bearing: repeated stance phase    x10 each              Modalities

## 2021-05-14 ENCOUNTER — OFFICE VISIT (OUTPATIENT)
Dept: SPEECH THERAPY | Facility: CLINIC | Age: 76
End: 2021-05-14
Payer: MEDICARE

## 2021-05-14 DIAGNOSIS — R13.12 OROPHARYNGEAL DYSPHAGIA: ICD-10-CM

## 2021-05-14 DIAGNOSIS — G20 PRIMARY PARKINSONISM (HCC): ICD-10-CM

## 2021-05-14 DIAGNOSIS — R48.8 OTHER SYMBOLIC DYSFUNCTIONS: Primary | ICD-10-CM

## 2021-05-14 DIAGNOSIS — R13.13 PHARYNGEAL DYSPHAGIA: ICD-10-CM

## 2021-05-14 PROCEDURE — 92526 ORAL FUNCTION THERAPY: CPT

## 2021-05-14 PROCEDURE — 96125 COGNITIVE TEST BY HC PRO: CPT

## 2021-05-14 NOTE — PROGRESS NOTES
Speech-Language Pathology Re-Evaluation    Today's date: 2021  Patients name: Abhijit Wong  : 1945  MRN: [id number]  Safety measures: NKA  Referring provider: Lucy Cortez MD    Encounter Diagnosis     ICD-10-CM    1  Other symbolic dysfunctions  N01 9    2  Oropharyngeal dysphagia  R13 12    3  Pharyngeal dysphagia  R13 13    4  Primary parkinsonism ([address] )  [address]        Visit tracking:  -Referring provider: Epic  -Billing guidelines: CMS  -Visit #4/10  -Insurance: Medicare ([employer] secondary)  -RE due 2021    Subjective comments: Patient reports swallowing has been going better, does report has been choosing softer food items  Patient also reports changes in cognition at this time, patient is appropriate for re-evaluation to further assess cognitive linguistic status    Patient's goal(s): to improve memory/word finding, swallow better    Assessments      The Repeatable Battery for the Assessment of Neuropsychological Status (RBANS) is a brief, individually-administered assessment which measures attention, language, visuospatial/constructional abilities, and immediate & delayed memory  The RBANS is intended for use with adolescents to adults, ages 15 to 80 years  The following results were obtained during the administration of the assessment  Form: A    Cognitive Domain/Subtest: Index Score: Percentile Rank: Classification:   IMMEDIATE MEMORY 87 19%ile Low Average        1  List Learning ()        2  Story Memory ()       VISUOSPATIAL/  CONSTRUCTIONAL 75 5%ile Borderline        3  Figure Copy ()        4  Line Orientation ()       LANGUAGE 96 39%ile Average        5  Picture Naming (10/10)        6  Semantic Fluency (60/13)       ATTENTION 118 88%ile High Average        7  Digit Span ()        8  Coding (42)       DELAYED MEMORY 90 25%ile Average        9  List Recall (2/10)        10  List Recognition ()        11  Story Recall ()        12   Figure Recall (6/20)         Sum of Index Scores:  466   Total Score:  90   Percentile: 25%ile   Classification: Average       *Patient named 13 concrete category members (zoo animals) in 60 sec (norm=15+)  -- BELOW AVERAGE    *Patient named 12 abstract category members (words beginning with letter 'M') in 60 sec (norm=10+)  -- AVERAGE      Goals      Short-term goals:    1  Patient will perform compensatory strategies (e g , upright positioning, small bites/sips, slow rate, alternation of consistencies, multiple swallows, effortful swallow, chin tuck, head turn, etc ) with 80% accuracy to eliminate overt s/sx penetration/aspiration of least restrictive food/liquid consistencies, to be achieved in 4-6 weeks --CONTINUE  Patient provided education on use of compensatory strategies, med pass strategies to improve toleration  Patient verbalized understanding, states will trial med pass recommendations as has been noting increased difficulties with feels as though a "pill gets stuck"  Also reports planning to go to ENT this coming month  2  Patient will improve HLE/pharyngeal strength and timing with use of HLE exercises and additional appropriate interventions in order to facilitate improved toleration of liquids and solids demonstrating 90% free of overt s/s of aspiration/penetration, to be achieved in 4-6 weeks  -CONTINUE  SLP reviewed previously trained swallow exercises, patient verbalized understanding, reports completing daily  3  Pt will tolerate least restrictive with no overt s/s of aspiration 90% of the time provided mild cues for use of compensatory strategies allowing for optimized hydration/nutrition  -CONTINUE    4  Patient will tolerate therapeutic trials of regular diet textures with 80% free of s/s of aspiration, to be achieved in 4-6 weeks  --DISCONTINUE as no longer appropriate    5   Patient will facilitate planning by completing thought organization tasks (e g , sequencing, deduction puzzles, etc ) with 80% accuracy to facilitate increased executive functioning, working memory, problem solving, and processing skills, to be achieved in 4-6 weeks  6  Patient will complete auditory immediate and short term memory tasks to 80% accuracy to facilitate increased ability to retell narratives and recall information within functional living environment, to be achieved in 4-6 weeks  7  To target mental manipulation and working memory, patient will participate in word finding activity (i e , anagrams) with 80% accuracy, to be achieved in 4-6 weeks  8  Patient will complete concrete and abstract categorization tasks to 80% accuracy to facilitate improved generative naming skills and working memory, to be achieved in 4-6 weeks  9  Patient will be educated on the use of internal and external memory aids and compensatory strategies with 80% accuracy to facilitate increased recall of routine, personal information, and recent events, to be achieved in 4-6 weeks  Long-term goals:  1  Patient will receive a videofluoroscopic swallow study (VFSS) to fully assess physiology and anatomy of the swallow and to determine the appropriate diet/liquid level and appropriate therapeutic interventions by discharge  --GOAL MET    2  Patient will utilize compensatory strategies 90% of the time independently optimizing safety and efficacy of swallowing function on P O  intake without overt s/sx of penetration or aspiration 90% of the time by discharge --CONTINUE    3  Patient will complete higher-level expressive language tasks (e g , word definitions, idioms, synonym/antonyms, etc) with 80% accuracy to improve functional communication skills by discharge  4  Patient will demonstrate cognitive-communication skills consistent with age and education given use of compensatory strategies when needed to resume baseline activities and responsibilities in home, community, and work/school settings by discharge  Impressions/Recommendations    Impressions:   Patient presents with mild cognitive linguistic difficulties at this time, characterized by reduced immediate memory recall, reduced STM recall, reduced word finding accuracy at a conversational level impacting functionality within environment, participation in meaningful conversations at this time  Pt was administered RBANs examination A this date  Pt scored a rating of low average in the area of immediate memory recall, borderline in the area of visuospatial/constructional, average for language function, high average for attention to task, and rating average in the area of delayed memory recall  Pt received a score of average in the overall summation of index scores  Patient also has demonstrated improved toleration of liquids and solids with self selection of softer food items, requiring min to mild cues for use of strategies  Patient would benefit from skilled SLP interventions at this time in order to facilitate improved recall of information, word finding accuracy/naming accuracy, toleration of liquids and solids and HLE/pharyngeal strength/timing and to train in strategies in order to facilitate improved toleration of liquids and solids, improved recall/word finding allowing for improved functionality within environment           Recommendations:  -Patient would benefit from outpatient skilled Speech Therapy services : Speech/ language therapy, Dysphagia therapy and Cognitive-Linguistic therapy    -Frequency: 1-2x weekly  -Duration: 4-6 weeks    -Intervention certification from: 9/32/4528  -Intervention certification to: 2/44/3550

## 2021-05-17 ENCOUNTER — APPOINTMENT (OUTPATIENT)
Dept: PHYSICAL THERAPY | Facility: CLINIC | Age: 76
End: 2021-05-17
Payer: MEDICARE

## 2021-05-17 DIAGNOSIS — I10 ESSENTIAL HYPERTENSION: ICD-10-CM

## 2021-05-17 RX ORDER — BENAZEPRIL/HYDROCHLOROTHIAZIDE 20 MG-25MG
1 TABLET ORAL DAILY
Qty: 90 TABLET | Refills: 1 | Status: SHIPPED | OUTPATIENT
Start: 2021-05-17 | End: 2021-10-04 | Stop reason: SDUPTHER

## 2021-05-20 ENCOUNTER — APPOINTMENT (OUTPATIENT)
Dept: PHYSICAL THERAPY | Facility: CLINIC | Age: 76
End: 2021-05-20
Payer: MEDICARE

## 2021-05-20 NOTE — PROGRESS NOTES
Daily Speech Treatment Note    Today's date: 2021  Patients name: Zofia Lopez  : 1945  MRN: [id number]  Safety measures: NKA  Referring provider: Iveth Gonzalez MD    Encounter Diagnosis     ICD-10-CM    1  Other symbolic dysfunctions  L17 2    2  Oropharyngeal dysphagia  R13 12    3  Pharyngeal dysphagia  R13 13    4  Primary parkinsonism ([address] )  [address]          Visit tracking:  -Referring provider: Epic  -Billing guidelines: CMS  -Visit #2/10  -Insurance: Medicare ([employer] secondary)  -RE due 2021    Subjective/Behavioral:  -Patient reports he is doing well, reports noting some continued word finding difficulties    Objective/Assessment:  -Reviewed testing results and goals in plan care with patient  Patient is in agreement at this time  Short-term goals:      1  Patient will perform compensatory strategies (e g , upright positioning, small bites/sips, slow rate, alternation of consistencies, multiple swallows, effortful swallow, chin tuck, head turn, etc ) with 80% accuracy to eliminate overt s/sx penetration/aspiration of least restrictive food/liquid consistencies, to be achieved in 4-6 weeks --CONTINUE    Patient reports improved tolerating with trained med pass strategies are going well, tolerating well  Patient reports use of strategies within home environment, noting improved toleration  Education provided on use of strategies, self selection of softer food items in order to facilitate improved toleration of liquids and solids  Patient verbalized understanding  2  Patient will improve HLE/pharyngeal strength and timing with use of HLE exercises and additional appropriate interventions in order to facilitate improved toleration of liquids and solids demonstrating 90% free of overt s/s of aspiration/penetration, to be achieved in 4-6 weeks  -CONTINUE    3   Pt will tolerate least restrictive with no overt s/s of aspiration 90% of the time provided mild cues for use of compensatory strategies allowing for optimized hydration/nutrition  -CONTINUE    4  Patient will facilitate planning by completing thought organization tasks (e g , sequencing, deduction puzzles, etc ) with 80% accuracy to facilitate increased executive functioning, working memory, problem solving, and processing skills, to be achieved in 4-6 weeks  5  Patient will complete auditory immediate and short term memory tasks to 80% accuracy to facilitate increased ability to retell narratives and recall information within functional living environment, to be achieved in 4-6 weeks  Patient trained in association techniques this date  Patient was provided auditory list of 5 unrelated words of and asked to recall at intervals throughout session  Patient with 100% accuracy for immediate recall of paired associated words independently with use of strategies, then asked to recall with 5 minute delay providedmild level verbal descriptive cues, and then asked to recall with additional 5 min delay provided moderate level cues in order to facilitate improved encoding for later recall of information/instructions/events  6  To target mental manipulation and working memory, patient will participate in word finding activity (i e , anagrams) with 80% accuracy, to be achieved in 4-6 weeks  Pt completed auditory anagrams with 86% accuracy independently, able to improve to 100% accuracy provided mild level verbal cues  9  Patient will complete concrete and abstract categorization tasks to 80% accuracy to facilitate improved generative naming skills and working memory, to be achieved in 4-6 weeks  Pt completed abstract level generative naming tasks (provided letter and category targets) of moderate level complexity with 80% accuracy independently, able to improve to 100% accuracy provided mild cues in order to facilitate improved word finding during functional conversational tasks      10  Patient will be educated on the use of internal and external memory aids and compensatory strategies with 80% accuracy to facilitate increased recall of routine, personal information, and recent events, to be achieved in 4-6 weeks  Patient trained in association, mental rehearsal techniques in order to facilitate improved recall of information/instructions/events within environment  Patient required mild level cues for improved accuracy during therapeutic tasks  Plan:  -Patient was provided with home exercises/activities to target goals in plan of care at the end of today's session   -Continue with current plan of care

## 2021-05-21 ENCOUNTER — OFFICE VISIT (OUTPATIENT)
Dept: SPEECH THERAPY | Facility: CLINIC | Age: 76
End: 2021-05-21
Payer: MEDICARE

## 2021-05-21 DIAGNOSIS — R13.12 OROPHARYNGEAL DYSPHAGIA: ICD-10-CM

## 2021-05-21 DIAGNOSIS — R13.13 PHARYNGEAL DYSPHAGIA: ICD-10-CM

## 2021-05-21 DIAGNOSIS — G20 PRIMARY PARKINSONISM (HCC): ICD-10-CM

## 2021-05-21 DIAGNOSIS — R48.8 OTHER SYMBOLIC DYSFUNCTIONS: Primary | ICD-10-CM

## 2021-05-21 PROCEDURE — 92507 TX SP LANG VOICE COMM INDIV: CPT

## 2021-05-21 PROCEDURE — 92526 ORAL FUNCTION THERAPY: CPT

## 2021-05-28 ENCOUNTER — OFFICE VISIT (OUTPATIENT)
Dept: SPEECH THERAPY | Facility: CLINIC | Age: 76
End: 2021-05-28
Payer: MEDICARE

## 2021-05-28 DIAGNOSIS — R48.8 OTHER SYMBOLIC DYSFUNCTIONS: Primary | ICD-10-CM

## 2021-05-28 DIAGNOSIS — R13.13 PHARYNGEAL DYSPHAGIA: ICD-10-CM

## 2021-05-28 DIAGNOSIS — R13.12 OROPHARYNGEAL DYSPHAGIA: ICD-10-CM

## 2021-05-28 DIAGNOSIS — G20 PRIMARY PARKINSONISM (HCC): ICD-10-CM

## 2021-05-28 PROCEDURE — 92526 ORAL FUNCTION THERAPY: CPT

## 2021-05-28 PROCEDURE — 92507 TX SP LANG VOICE COMM INDIV: CPT

## 2021-05-28 NOTE — PROGRESS NOTES
Daily Speech Treatment Note    Today's date: 2021  Patients name: Damian Negro  : 1945  MRN: [id number]  Safety measures: NKA  Referring provider: Minnie Coronel MD    Encounter Diagnosis     ICD-10-CM    1  Other symbolic dysfunctions  L02 2    2  Oropharyngeal dysphagia  R13 12    3  Pharyngeal dysphagia  R13 13    4  Primary parkinsonism ([address] )  [address]          Visit tracking:  -Referring provider: Epic  -Billing guidelines: CMS  -Visit #3/10  -Insurance: Medicare ([employer] secondary)  -RE due 2021    Subjective/Behavioral:  -Patient reports he is doing well, reports recent significant coughing episode, x1 during PO consumption, x1 outside of PO consumption within few mins of meal completion  -Reports will have visit with ENT this afternoon    Objective/Assessment:  -Patient education on plan of therapy, patient verbalized understanding  Patient reports consistent completion of trained HLE exercises, reports have been going well  Short-term goals:    1  Patient will perform compensatory strategies (e g , upright positioning, small bites/sips, slow rate, alternation of consistencies, multiple swallows, effortful swallow, chin tuck, head turn, etc ) with 80% accuracy to eliminate overt s/sx penetration/aspiration of least restrictive food/liquid consistencies, to be achieved in 4-6 weeks --CONTINUE    Education provided on use of strategies, self selection of softer food items in order to facilitate improved toleration of liquids and solids  Patient verbalized understanding, reports has been utilizing within environment  2  Patient will improve HLE/pharyngeal strength and timing with use of HLE exercises and additional appropriate interventions in order to facilitate improved toleration of liquids and solids demonstrating 90% free of overt s/s of aspiration/penetration, to be achieved in 4-6 weeks  -CONTINUE    3   Pt will tolerate least restrictive with no overt s/s of aspiration 90% of the time provided mild cues for use of compensatory strategies allowing for optimized hydration/nutrition  -CONTINUE    4  Patient will facilitate planning by completing thought organization tasks (e g , sequencing, deduction puzzles, etc ) with 80% accuracy to facilitate increased executive functioning, working memory, problem solving, and processing skills, to be achieved in 4-6 weeks  Facilitation of attention to task and mental manipulation/organization of information accuracy provided mild level cues during compass activity in order to improve to 100% accuracy  Pt was able to attend to task x5 mins consecutively requiring min level cues  5  Patient will complete auditory immediate and short term memory tasks to 80% accuracy to facilitate increased ability to retell narratives and recall information within functional living environment, to be achieved in 4-6 weeks  Patient trained in association techniques this date  Patient was provided auditory list of 6 unrelated words of and asked to recall at intervals throughout session  Patient with 30% accuracy for immediate recall of paired associated words independently with use of strategies, able to improve to 90% accuracy provided moderate level cues, then asked to recall with 3 minute delay providedmild level verbal descriptive cues with 4/6 accuracy, and then asked to recall with additional 7 min delay provided mild level cues for 90% accuracy in order to facilitate improved encoding for later recall of information/instructions/events  6  To target mental manipulation and working memory, patient will participate in word finding activity (i e , anagrams) with 80% accuracy, to be achieved in 4-6 weeks  9  Patient will complete concrete and abstract categorization tasks to 80% accuracy to facilitate improved generative naming skills and working memory, to be achieved in 4-6 weeks    Pt completed abstract level generative naming tasks (provided letter and category targets) of mild level complexity with 90% accuracy independently, able to improve to 100% accuracy provided mild cues in order to facilitate improved word finding during functional conversational tasks  10  Patient will be educated on the use of internal and external memory aids and compensatory strategies with 80% accuracy to facilitate increased recall of routine, personal information, and recent events, to be achieved in 4-6 weeks  Plan:  -Patient was provided with home exercises/activities to target goals in plan of care at the end of today's session   -Continue with current plan of care

## 2021-06-03 NOTE — PROGRESS NOTES
Daily Speech Treatment Note    Today's date: 2021  Patients name: Claudetta Duel  : 1945  MRN: [id number]  Safety measures: NKA  Referring provider: Shruti Reza MD    Encounter Diagnosis     ICD-10-CM    1  Other symbolic dysfunctions  P66 0    2  Oropharyngeal dysphagia  R13 12    3  Pharyngeal dysphagia  R13 13    4  Primary parkinsonism ([address] )  [address]          Visit tracking:  -Referring provider: Epic  -Billing guidelines: CMS  -Visit #4/10  -Insurance: Medicare ([employer] secondary)  -RE due 2021    Subjective/Behavioral:  -Patient reports he is doing well, reports his ENT visit went well    Objective/Assessment:  -Patient education on plan of therapy, patient verbalized understanding  Short-term goals:    1  Patient will perform compensatory strategies (e g , upright positioning, small bites/sips, slow rate, alternation of consistencies, multiple swallows, effortful swallow, chin tuck, head turn, etc ) with 80% accuracy to eliminate overt s/sx penetration/aspiration of least restrictive food/liquid consistencies, to be achieved in 4-6 weeks --CONTINUE    Continued education provided on use of strategies, self selection of softer food items in order to facilitate improved toleration of liquids and solids  Patient verbalized understanding, reports has been utilizing within environment  However reports does not always choose softer food items, but reports ensures thorough mastication  Education on benefits of selecting softer food items  Patient verbalized understanding  2  Patient will improve HLE/pharyngeal strength and timing with use of HLE exercises and additional appropriate interventions in order to facilitate improved toleration of liquids and solids demonstrating 90% free of overt s/s of aspiration/penetration, to be achieved in 4-6 weeks  -CONTINUE    3   Pt will tolerate least restrictive with no overt s/s of aspiration 90% of the time provided mild cues for use of compensatory strategies allowing for optimized hydration/nutrition  -CONTINUE    4  Patient will facilitate planning by completing thought organization tasks (e g , sequencing, deduction puzzles, etc ) with 80% accuracy to facilitate increased executive functioning, working memory, problem solving, and processing skills, to be achieved in 4-6 weeks  Pt completed deduction tasks with 90 % accuracy independently, able to improve to 100 % accuracy provided mild level verbal cues in order to facilitate improved thought organization, working memory, problem solving/processing skills  5  Patient will complete auditory immediate and short term memory tasks to 80% accuracy to facilitate increased ability to retell narratives and recall information within functional living environment, to be achieved in 4-6 weeks  6  To target mental manipulation and working memory, patient will participate in word finding activity (i e , anagrams) with 80% accuracy, to be achieved in 4-6 weeks  9  Patient will complete concrete and abstract categorization tasks to 80% accuracy to facilitate improved generative naming skills and working memory, to be achieved in 4-6 weeks  Pt completed synonym task, asked to name 3 synonyms for target word in order to faciliate improved generative naming and improved word finding provided mild level descriptive cues for improved accuracy from 85% independently to 100% accuracy (provided cues)  10  Patient will be educated on the use of internal and external memory aids and compensatory strategies with 80% accuracy to facilitate increased recall of routine, personal information, and recent events, to be achieved in 4-6 weeks  Plan:  -Patient was provided with home exercises/activities to target goals in plan of care at the end of today's session   -Continue with current plan of care

## 2021-06-04 ENCOUNTER — OFFICE VISIT (OUTPATIENT)
Dept: SPEECH THERAPY | Facility: CLINIC | Age: 76
End: 2021-06-04
Payer: MEDICARE

## 2021-06-04 DIAGNOSIS — R13.12 OROPHARYNGEAL DYSPHAGIA: ICD-10-CM

## 2021-06-04 DIAGNOSIS — G20 PRIMARY PARKINSONISM (HCC): ICD-10-CM

## 2021-06-04 DIAGNOSIS — R48.8 OTHER SYMBOLIC DYSFUNCTIONS: Primary | ICD-10-CM

## 2021-06-04 DIAGNOSIS — R13.13 PHARYNGEAL DYSPHAGIA: ICD-10-CM

## 2021-06-04 PROCEDURE — 92507 TX SP LANG VOICE COMM INDIV: CPT

## 2021-06-04 PROCEDURE — 92526 ORAL FUNCTION THERAPY: CPT

## 2021-06-07 ENCOUNTER — HOSPITAL ENCOUNTER (OUTPATIENT)
Dept: RADIOLOGY | Facility: HOSPITAL | Age: 76
Discharge: HOME | End: 2021-06-07
Attending: OTOLARYNGOLOGY
Payer: MEDICARE

## 2021-06-07 DIAGNOSIS — R13.14 PHARYNGOESOPHAGEAL DYSPHAGIA: ICD-10-CM

## 2021-06-07 PROCEDURE — 74220 X-RAY XM ESOPHAGUS 1CNTRST: CPT

## 2021-06-10 NOTE — PROGRESS NOTES
Daily Speech Treatment Note    Today's date: 2021  Patients name: Rafa Reynoso  : 1945  MRN: [id number]  Safety measures: NKA  Referring provider: Eli Moreira MD    Encounter Diagnosis     ICD-10-CM    1  Other symbolic dysfunctions  V38 4    2  Oropharyngeal dysphagia  R13 12    3  Pharyngeal dysphagia  R13 13    4  Primary parkinsonism ([address] )  [address]          Visit tracking:  -Referring provider: Epic  -Billing guidelines: CMS  -Visit #5/10  -Insurance: Medicare ([employer] secondary)  -RE due 2021    Subjective/Behavioral:  -Patient reports he is doing well, has been working on swallowing recommendations at home (no episodes of coughing since last visit) and reports working on memory/thinking in home environment  -reports some trouble with STM/DM recall    Objective/Assessment:  -Patient education on plan of therapy, patient verbalized understanding  Short-term goals:    1  Patient will perform compensatory strategies (e g , upright positioning, small bites/sips, slow rate, alternation of consistencies, multiple swallows, effortful swallow, chin tuck, head turn, etc ) with 80% accuracy to eliminate overt s/sx penetration/aspiration of least restrictive food/liquid consistencies, to be achieved in 4-6 weeks --CONTINUE    2  Patient will improve HLE/pharyngeal strength and timing with use of HLE exercises and additional appropriate interventions in order to facilitate improved toleration of liquids and solids demonstrating 90% free of overt s/s of aspiration/penetration, to be achieved in 4-6 weeks  -CONTINUE    3  Pt will tolerate least restrictive with no overt s/s of aspiration 90% of the time provided mild cues for use of compensatory strategies allowing for optimized hydration/nutrition  -CONTINUE    4  Patient will facilitate planning by completing thought organization tasks (e g , sequencing, deduction puzzles, etc ) with 80% accuracy to facilitate increased executive functioning, working memory, problem solving, and processing skills, to be achieved in 4-6 weeks  5  Patient will complete auditory immediate and short term memory tasks to 80% accuracy to facilitate increased ability to retell narratives and recall information within functional living environment, to be achieved in 4-6 weeks  Patient trained in association/mental rehearsal techniques this date  Patient was provided auditory list of 6 unrelated words of  and asked to recall at intervals throughout session  Patient with 33% accuracy for immediate recall of paired associated words requiring moderate level verbal cues, then asked to recall with 8 minute delay providedmild level verbal descriptive cues to improve from 83% to 100% accuracy, and then asked to recall with additional 8 minute delay (16 min total) provided mild level cues in order to facilitate improved encoding for later recall of information/instructions/events  Training in recommendations for home environment in order to continue to target recall (home program tasks), patient verbalized understanding  6  To target mental manipulation and working memory, patient will participate in word finding activity (i e , anagrams) with 80% accuracy, to be achieved in 4-6 weeks  Pt completed unscrambled sentences read aloud with 60% accuracy, improved to 80% accuracy provided repetition and mild level verbal cues in order to target working memory, thought organization, and auditory attention to task  9  Patient will complete concrete and abstract categorization tasks to 80% accuracy to facilitate improved generative naming skills and working memory, to be achieved in 4-6 weeks  10  Patient will be educated on the use of internal and external memory aids and compensatory strategies with 80% accuracy to facilitate increased recall of routine, personal information, and recent events, to be achieved in 4-6 weeks        Plan:  -Patient was provided with home exercises/activities to target goals in plan of care at the end of today's session   -Continue with current plan of care

## 2021-06-11 ENCOUNTER — OFFICE VISIT (OUTPATIENT)
Dept: SPEECH THERAPY | Facility: CLINIC | Age: 76
End: 2021-06-11
Payer: MEDICARE

## 2021-06-11 DIAGNOSIS — R48.8 OTHER SYMBOLIC DYSFUNCTIONS: Primary | ICD-10-CM

## 2021-06-11 DIAGNOSIS — G20 PRIMARY PARKINSONISM (HCC): ICD-10-CM

## 2021-06-11 DIAGNOSIS — R13.13 PHARYNGEAL DYSPHAGIA: ICD-10-CM

## 2021-06-11 DIAGNOSIS — R13.12 OROPHARYNGEAL DYSPHAGIA: ICD-10-CM

## 2021-06-11 PROCEDURE — 92507 TX SP LANG VOICE COMM INDIV: CPT

## 2021-06-18 ENCOUNTER — OFFICE VISIT (OUTPATIENT)
Dept: SPEECH THERAPY | Facility: CLINIC | Age: 76
End: 2021-06-18
Payer: MEDICARE

## 2021-06-18 DIAGNOSIS — R48.8 OTHER SYMBOLIC DYSFUNCTIONS: Primary | ICD-10-CM

## 2021-06-18 DIAGNOSIS — R13.12 OROPHARYNGEAL DYSPHAGIA: ICD-10-CM

## 2021-06-18 DIAGNOSIS — R13.13 PHARYNGEAL DYSPHAGIA: ICD-10-CM

## 2021-06-18 DIAGNOSIS — G20 PRIMARY PARKINSONISM (HCC): ICD-10-CM

## 2021-06-18 PROCEDURE — 92507 TX SP LANG VOICE COMM INDIV: CPT

## 2021-06-18 NOTE — PROGRESS NOTES
Daily Speech Treatment Note    Today's date: 2021  Patients name: Zofia Lopez  : 1945  MRN: [id number]  Safety measures: NKA  Referring provider: Iveth Gonzalez MD    Encounter Diagnosis     ICD-10-CM    1  Other symbolic dysfunctions  L69 5    2  Oropharyngeal dysphagia  R13 12    3  Pharyngeal dysphagia  R13 13    4  Primary parkinsonism ([address] )  [address]          Visit tracking:    -Referring provider: Epic  -Billing guidelines: CMS  -Visit #6/10  -Insurance: Medicare ([employer] secondary)  -RE due 2021    Subjective/Behavioral:  -Patient reports he is doing well, reports recent coughing difficulties with and without PO intake  -reports some trouble with STM/DM recall, however reports has been utilizing trained compensatory strategies outside of skilled SLP interventions    Objective/Assessment:  -Patient education on plan of therapy, patient verbalized understanding  Short-term goals:    1  Patient will perform compensatory strategies (e g , upright positioning, small bites/sips, slow rate, alternation of consistencies, multiple swallows, effortful swallow, chin tuck, head turn, etc ) with 80% accuracy to eliminate overt s/sx penetration/aspiration of least restrictive food/liquid consistencies, to be achieved in 4-6 weeks --CONTINUE    2  Patient will improve HLE/pharyngeal strength and timing with use of HLE exercises and additional appropriate interventions in order to facilitate improved toleration of liquids and solids demonstrating 90% free of overt s/s of aspiration/penetration, to be achieved in 4-6 weeks  -CONTINUE    3  Pt will tolerate least restrictive with no overt s/s of aspiration 90% of the time provided mild cues for use of compensatory strategies allowing for optimized hydration/nutrition  -CONTINUE    4  Patient will facilitate planning by completing thought organization tasks (e g , sequencing, deduction puzzles, etc ) with 80% accuracy to facilitate increased executive functioning, working memory, problem solving, and processing skills, to be achieved in 4-6 weeks  5  Patient will complete auditory immediate and short term memory tasks to 80% accuracy to facilitate increased ability to retell narratives and recall information within functional living environment, to be achieved in 4-6 weeks  Patient trained in association/mental rehearsal techniques this date  Patient was provided auditory list of 8 unrelated words of  and asked to recall at intervals throughout session  Patient with 40% accuracy for immediate recall of words requiring moderate level verbal cues, then asked to recall with 3 minute delay providedmoderate to max level verbal descriptive cues to improve from 75% to 100% accuracy, and then asked to recall with additional 10 minute delay (following training in chunking/associations) provided min level cues for 100% accuracy in order to facilitate improved encoding for later recall of information/instructions/events  Training in recommendations for home environment in order to continue to target recall (home program tasks), patient verbalized understanding  6  To target mental manipulation and working memory, patient will participate in word finding activity (i e , anagrams) with 80% accuracy, to be achieved in 4-6 weeks  Pt completed mental manipulation tasks read aloud with 70% accuracy, improved to 90% accuracy provided repetition and mild level verbal cues in order to target working memory, thought organization, and auditory attention to task  9  Patient will complete concrete and abstract categorization tasks to 80% accuracy to facilitate improved generative naming skills and working memory, to be achieved in 4-6 weeks      10  Patient will be educated on the use of internal and external memory aids and compensatory strategies with 80% accuracy to facilitate increased recall of routine, personal information, and recent events, to be achieved in 4-6 weeks       Plan:  -Patient was provided with home exercises/activities to target goals in plan of care at the end of today's session   -Continue with current plan of care

## 2021-07-02 ENCOUNTER — EVALUATION (OUTPATIENT)
Dept: SPEECH THERAPY | Facility: CLINIC | Age: 76
End: 2021-07-02
Payer: MEDICARE

## 2021-07-02 DIAGNOSIS — G20 PRIMARY PARKINSONISM (HCC): ICD-10-CM

## 2021-07-02 DIAGNOSIS — R13.13 PHARYNGEAL DYSPHAGIA: ICD-10-CM

## 2021-07-02 DIAGNOSIS — R13.12 OROPHARYNGEAL DYSPHAGIA: ICD-10-CM

## 2021-07-02 DIAGNOSIS — R48.8 OTHER SYMBOLIC DYSFUNCTIONS: Primary | ICD-10-CM

## 2021-07-02 PROCEDURE — 92526 ORAL FUNCTION THERAPY: CPT

## 2021-07-02 PROCEDURE — 92507 TX SP LANG VOICE COMM INDIV: CPT

## 2021-07-02 NOTE — PROGRESS NOTES
Speech-Language Pathology Re-Evaluation    Today's date: 2021  Patients name: Julia Couch  : 1945  MRN: [id number]  Safety measures: NKA  Referring provider: Luann Rice MD      Visit tracking:  -Referring provider: Epic  -Billing guidelines: CMS  -Visit #7/10  -Insurance: Medicare ([employer] secondary)      Subjective comments: Patient reports he has noted some improvements outside of skilled SLP interventions, patient reports diligent completion of home program recommendations and feels these have been beneficial, reports swallowing has been going well within home environment, has been following recommendations/trained home exercises program    Patient's goal(s): to improve memory  Assessments      The Repeatable Battery for the Assessment of Neuropsychological Status (RBANS) is a brief, individually-administered assessment which measures attention, language, visuospatial/constructional abilities, and immediate & delayed memory  The RBANS is intended for use with adolescents to adults, ages 15 to 80 years  The following results were obtained during the administration of the assessment  Form: B    Cognitive Domain/Subtest: Index Score: Percentile Rank: Classification: IE: Status:   IMMEDIATE MEMORY 78 7%ile Borderline 87 DECLINE        1  List Learning ()          2  Story Memory ()           VISUOSPATIAL/  CONSTRUCTIONAL 96 39%ile Average 75 IMPROVEMENT        3  Figure Copy ()          4  Line Orientation ()           LANGUAGE 92 30%ile Average 96 NO CHANGE        5  Picture Naming (10/10)          6  Semantic Fluency ()           ATTENTION 109 73%ile Average 118 DECLINE        7  Digit Span ()          8  Coding ()           DELAYED MEMORY 95 37%ile Average 90 NO CHANGE        9  List Recall (4/10)          10  List Recognition ()          11  Story Recall ()          12   Figure Recall ()           Sum of Index Scores:  470  466 NO CHANGE Total Score:  91      Percentile: 27%ile      Classification: Average           IE indicates the scores from the initial evaluation (5/14/2021)  Form: B        Patient tolerating thin liquids this date with no overt s/s of aspiration, demonstrating use of compensatory strategies  Patient reports consistent use of trained compensatory strategies/diet recommendations within home environment, reports has been tolerating well  Goals    Short-term goals:    1  Patient will perform compensatory strategies (e g , upright positioning, small bites/sips, slow rate, alternation of consistencies, multiple swallows, effortful swallow, chin tuck, head turn, etc ) with 80% accuracy to eliminate overt s/sx penetration/aspiration of least restrictive food/liquid consistencies, to be achieved in 4-6 weeks  --GOAL MET    2  Patient will improve HLE/pharyngeal strength and timing with use of HLE exercises and additional appropriate interventions in order to facilitate improved toleration of liquids and solids demonstrating 90% free of overt s/s of aspiration/penetration, to be achieved in 4-6 weeks  -GOAL MET    3  Pt will tolerate least restrictive with no overt s/s of aspiration 90% of the time provided mild cues for use of compensatory strategies allowing for optimized hydration/nutrition  -GOAL MET    4  Patient will facilitate planning by completing thought organization tasks (e g , sequencing, deduction puzzles, etc ) with 80% accuracy to facilitate increased executive functioning, working memory, problem solving, and processing skills, to be achieved in 4-6 weeks  GOAL MET    5  Patient will complete auditory immediate and short term memory tasks to 80% accuracy to facilitate increased ability to retell narratives and recall information within functional living environment, to be achieved in 4-6 weeks  GOAL MET    6   To target mental manipulation and working memory, patient will participate in word finding activity (i e , anagrams) with 80% accuracy, to be achieved in 4-6 weeks  GOAL MET    7  Patient will complete concrete and abstract categorization tasks to 80% accuracy to facilitate improved generative naming skills and working memory, to be achieved in 4-6 weeks  GOAL MET    8  Patient will be educated on the use of internal and external memory aids and compensatory strategies with 80% accuracy to facilitate increased recall of routine, personal information, and recent events, to be achieved in 4-6 weeks  GOAL MET    Long-term goals:    1  Patient will utilize compensatory strategies 90% of the time independently optimizing safety and efficacy of swallowing function on P O  intake without overt s/sx of penetration or aspiration 90% of the time by discharge  --GOAL MET    2  Patient will complete higher-level expressive language tasks (e g , word definitions, idioms, synonym/antonyms, etc) with 80% accuracy to improve functional communication skills by discharge  GOAL MET    3  Patient will demonstrate cognitive-communication skills consistent with age and education given use of compensatory strategies when needed to resume baseline activities and responsibilities in home, community, and work/school settings by discharge  GOAL MET      Impressions/Recommendations    Impressions: Patient presents with min to mild cognitive linguistic deficits at this time characterized by reduced immediate memory recall, demonstrating independent use of trained compensatory strategies to improve accuracy  Patient with min to mild dysphagia at this time, tolerating liquids/solids well with use of trained compensatory strategies  Patient is appropriate at this time for discharge from skilled SLP interventions, as  Patient with all goals met at this time  Patient in agreement with plan of therapy, discharge recommendations to continue independent use of trained home program recommendations       Recommendations:  -Patient to be discharged from outpatient skilled Speech Therapy services: Patient achieved all goals in plan of care  If patient would like to resume therapy in the future, a new prescription will be required   Patient to be discharged to an independent Home Exercise Program

## 2021-07-07 ENCOUNTER — OFFICE VISIT (OUTPATIENT)
Dept: FAMILY MEDICINE CLINIC | Facility: HOSPITAL | Age: 76
End: 2021-07-07
Payer: MEDICARE

## 2021-07-07 ENCOUNTER — LAB (OUTPATIENT)
Dept: LAB | Facility: HOSPITAL | Age: 76
End: 2021-07-07
Attending: INTERNAL MEDICINE
Payer: MEDICARE

## 2021-07-07 VITALS
WEIGHT: 249 LBS | SYSTOLIC BLOOD PRESSURE: 112 MMHG | DIASTOLIC BLOOD PRESSURE: 60 MMHG | HEART RATE: 77 BPM | BODY MASS INDEX: 33 KG/M2 | OXYGEN SATURATION: 95 % | HEIGHT: 73 IN | RESPIRATION RATE: 16 BRPM

## 2021-07-07 DIAGNOSIS — R09.89 DECREASED PEDAL PULSES: ICD-10-CM

## 2021-07-07 DIAGNOSIS — R25.2 LEG CRAMPS: Primary | ICD-10-CM

## 2021-07-07 DIAGNOSIS — R25.2 LEG CRAMPS: ICD-10-CM

## 2021-07-07 LAB
ALBUMIN SERPL BCP-MCNC: 3.7 G/DL (ref 3.5–5)
ALP SERPL-CCNC: 86 U/L (ref 46–116)
ALT SERPL W P-5'-P-CCNC: 20 U/L (ref 12–78)
ANION GAP SERPL CALCULATED.3IONS-SCNC: 3 MMOL/L (ref 4–13)
AST SERPL W P-5'-P-CCNC: 25 U/L (ref 5–45)
BILIRUB SERPL-MCNC: 0.66 MG/DL (ref 0.2–1)
BUN SERPL-MCNC: 19 MG/DL (ref 5–25)
CALCIUM SERPL-MCNC: 9.2 MG/DL (ref 8.3–10.1)
CHLORIDE SERPL-SCNC: 106 MMOL/L (ref 100–108)
CO2 SERPL-SCNC: 29 MMOL/L (ref 21–32)
CREAT SERPL-MCNC: 0.98 MG/DL (ref 0.6–1.3)
GFR SERPL CREATININE-BSD FRML MDRD: 74 ML/MIN/1.73SQ M
GLUCOSE P FAST SERPL-MCNC: 81 MG/DL (ref 65–99)
MAGNESIUM SERPL-MCNC: 2.5 MG/DL (ref 1.6–2.6)
POTASSIUM SERPL-SCNC: 4.1 MMOL/L (ref 3.5–5.3)
PROT SERPL-MCNC: 7.5 G/DL (ref 6.4–8.2)
SODIUM SERPL-SCNC: 138 MMOL/L (ref 136–145)

## 2021-07-07 PROCEDURE — 36415 COLL VENOUS BLD VENIPUNCTURE: CPT

## 2021-07-07 PROCEDURE — 83735 ASSAY OF MAGNESIUM: CPT

## 2021-07-07 PROCEDURE — 99214 OFFICE O/P EST MOD 30 MIN: CPT | Performed by: INTERNAL MEDICINE

## 2021-07-07 PROCEDURE — 80053 COMPREHEN METABOLIC PANEL: CPT

## 2021-07-07 PROCEDURE — 1124F ACP DISCUSS-NO DSCNMKR DOCD: CPT | Performed by: INTERNAL MEDICINE

## 2021-07-07 NOTE — PROGRESS NOTES
Assessment/Plan:    No problem-specific Assessment & Plan notes found for this encounter  Diagnoses and all orders for this visit:    Leg cramps  -     Comprehensive metabolic panel; Future  -     Magnesium; Future    Decreased pedal pulses  -     VAS lower limb arterial duplex, complete bilateral; Future          Subjective:      Patient ID: Linda Silverman is a 67 y o  male  Patient has had intermittent cramps in the calves mainly at night for last 2 weeks  It is better when he repositions his feet  He tries to keep well hydrated and eats a banana daily  Last night he also had cramps in the left hand  He has chronic numbness of both feet but denies any new weakness in the left feet  He denies leg cramps when walking  The leg cramps occur when he is at rest at night  They are worse after he exerts himself during the day  The following portions of the patient's history were reviewed and updated as appropriate: current medications, past family history, past medical history, past social history, past surgical history and problem list     Review of Systems   Constitutional: Negative for fever  Respiratory: Negative for cough and shortness of breath  Cardiovascular: Negative for chest pain, palpitations and leg swelling  Gastrointestinal: Positive for constipation (Chronic)  Negative for abdominal pain and blood in stool  Genitourinary: Negative for hematuria  Musculoskeletal: Positive for gait problem ( due to neuropathy and Parkinson's disease) and myalgias  Negative for joint swelling  Skin: Negative for rash  Neurological: Positive for numbness ( of the feet, chronic)  Negative for weakness  Objective:    /60   Pulse 77   Resp 16   Ht 6' 1" (1 854 m)   Wt 113 kg (249 lb)   SpO2 95%   BMI 32 85 kg/m²      Physical Exam  HENT:      Head: Normocephalic     Eyes:      Conjunctiva/sclera: Conjunctivae normal    Cardiovascular:      Rate and Rhythm: Normal rate and regular rhythm  Heart sounds: No murmur heard  Comments: I could not palpate the dorsalis pedis pulses, tibial posterior pulses were 1+    Radial and ulnar pulses were 2+ bilaterally  Pulmonary:      Effort: No respiratory distress  Breath sounds: No wheezing or rales  Abdominal:      General: Bowel sounds are normal       Palpations: Abdomen is soft  Tenderness: There is no abdominal tenderness  Skin:     General: Skin is warm  Findings: No rash  Neurological:      Mental Status: He is alert and oriented to person, place, and time        Comments: LUE resting tremor   Psychiatric:         Mood and Affect: Mood normal              Ramsey Adan MD

## 2021-07-07 NOTE — RESULT ENCOUNTER NOTE
Call patient: Jonah Mills that his electrolytes are all normal, deficiency of the electrolytes is not a cause for his muscle cramps

## 2021-07-09 ENCOUNTER — APPOINTMENT (OUTPATIENT)
Dept: SPEECH THERAPY | Facility: CLINIC | Age: 76
End: 2021-07-09
Payer: MEDICARE

## 2021-07-26 NOTE — PROGRESS NOTES
7/28/2021    [facility]  1945  [id number]      Assessment  -BPH s/p TURP and cystolitholapaxy (9/2017)  -Nephrolithiasis    Discussion/Plan  Kiana Reza is a 67 y o  male being managed by our office    1  Nephrolithiasis-   We reviewed the results of his recent CT scan performed in January 2021 which showed bilateral nonobstructing renal calculi measuring up to 3 mm  There was no presence of hydronephrosis  Patient currently asymptomatic  Review dietary recommendations and encouraged patient to increase his water intake  He will return to the office in 1 year with KUB and PVR assessment  Patient was instructed to call sooner with any episodes of renal colic     -All questions answered, patient agrees with plan      History of Present Illness  67 y o  male with a history of nephrolithiais and BPH presents today for follow up  Patient previously known to Dr Ryan Johansen  He underwent transurethral resection of prostate and cystolitholapaxy in 2017  Patient experienced an episode of renal colic in January and proceeded with CT renal stone study  Findings identified bilateral nonobstructing renal calculi measuring up to 3 mm without any hydronephrosis  Patient currently asymptomatic  He does report occasional episodes of low back discomfort  He denies any gross hematuria, dysuria, or lower urinary tract symptoms  No changes to his overall health  Review of Systems  Review of Systems   Constitutional: Negative  HENT: Negative  Respiratory: Negative  Cardiovascular: Negative  Gastrointestinal: Negative  Genitourinary: Negative for decreased urine volume, difficulty urinating, dysuria, flank pain, frequency, hematuria and urgency  Musculoskeletal: Negative  Skin: Negative  Neurological: Negative  Psychiatric/Behavioral: Negative        AUA SYMPTOM SCORE      Most Recent Value   AUA SYMPTOM SCORE   How often have you had a sensation of not emptying your bladder completely after you finished urinating? 1   How often have you had to urinate again less than two hours after you finished urinating? 1   How often have you found you stopped and started again several times when you urinate?  0   How often have you found it difficult to postpone urination? 1   How often have you had a weak urinary stream?  0   How often have you had to push or strain to begin urination? 0   How many times did you most typically get up to urinate from the time you went to bed at night until the time you got up in the morning? 1   Quality of Life: If you were to spend the rest of your life with your urinary condition just the way it is now, how would you feel about that?  1   AUA SYMPTOM SCORE  4          Past Medical History  Past Medical History:   Diagnosis Date    Arthritis     Benign familial tremor     Cardiac disease     two cardiac stents    Chest pain     Coronary artery disease     Hyperlipidemia     Hypertension     Kidney disease     Kidney stone     Malignant melanoma of skin of chest ([address] )     Removed 2 years ago   Meniscal injury     Nephrolithiasis     Parkinson's disease ([address] )     Peripheral neuropathy     PONV (postoperative nausea and vomiting)     Tinnitus     [id number] RESOLVED       Past Social History  Past Surgical History:   Procedure Laterality Date    AMPUTATION Left     AMPUTATION OF FINGER WITH NEURECTOMY(EACH) DISTAL LONG  RING AND SMALL FINGER    CARPAL TUNNEL RELEASE Right     CATARACT EXTRACTION      CHOLECYSTECTOMY      CORONARY STENT PLACEMENT      two cardiac stents    FINGER AMPUTATION      AMPUTATION OF MIDDLE FINGER WITH NEURECTOMY(EACH)    HAND SURGERY Left     Traumatic amputation of fingers left hand      JOINT REPLACEMENT Bilateral     knees    KNEE ARTHROPLASTY      TOTAL    MALIGNANT SKIN LESION EXCISION      ANTERIOR CHEST FOR MELAMONIA    AK CYSTO/URETERO W/LITHOTRIPSY &INDWELL STENT INSRT Left 7/27/2017    Procedure: CYSTOSCOPY URETEROSCOPY , RETROGRADE PYELOGRAM AND INSERTION STENT URETERAL;  Surgeon: Hanh Jaquez MD;  [facility];  Service: Urology    DE LAP,CHOLECYSTECTOMY N/A 2/28/2018    Procedure: CHOLECYSTECTOMY LAPAROSCOPIC;  Surgeon: Jose Feliz MD;  [facility];  Service: General    DE REMOVE BLADDER STONE,<2 5 CM N/A 9/15/2017    Procedure: Lenda Jaksch;  Surgeon: Candance Netter, MD;  [facility];  Service: Urology    DE TRANSURETHRAL ELEC-SURG PROSTATECTOM N/A 9/15/2017    Procedure: BIPOLAR TRANSURETHRAL RESECTION OF PROSTATE (TURP); Surgeon: Candance Netter, MD;  [facility];  Service: Urology    TONSILLECTOMY      TRIGGER FINGER RELEASE Right     TUMOR REMOVAL Left     Left foot 20 years ago   WISDOM TOOTH EXTRACTION Bilateral        Past Family History  Family History   Problem Relation Age of Onset    Heart disease Mother     Hypertension Mother     Stroke Mother     Breast cancer Mother     Heart disease Father     Hypertension Father     Stroke Father     Colon cancer Father     Diabetes Sister     Heart disease Brother     Other Family         BACK PAIN    Breast cancer Family     Heart disease Family     Hypertension Family     Stroke Family     Arthritis Family     Mental illness Neg Hx     Substance Abuse Neg Hx        Past Social history  Social History     Socioeconomic History    Marital status: /Civil Union     Spouse name: Not on file    Number of children: Not on file    Years of education: Not on file    Highest education level: Not on file   Occupational History    Not on file   Tobacco Use    Smoking status: Former Smoker     Types: Cigarettes    Smokeless tobacco: Never Used    Tobacco comment: back in college in 1966   Vaping Use    Vaping Use: Never used   Substance and Sexual Activity    Alcohol use:  Yes     Alcohol/week: 4 0 standard drinks     Types: 4 Cans of beer per week    Drug use: No    Sexual activity: Not on file   Other Topics Concern    Not on file   Social History Narrative    Lives with wife  Sees dentist reg  Has living will  Feels safe at home  No mental or sub in self  ACTIVE ADVANCE DIRECTIVE    CAREGIVER:  SPOUSE    EXERCISE HABITS  -  DAILY    GOOD DENTAL HYGIENE     Social Determinants of Health     Financial Resource Strain: Low Risk     Difficulty of Paying Living Expenses: Not hard at all   Food Insecurity: No Food Insecurity    Worried About Running Out of Food in the Last Year: Never true    [place] of Food in the Last Year: Never true   Transportation Needs: No Transportation Needs    Lack of Transportation (Medical): No    Lack of Transportation (Non-Medical):  No   Physical Activity: Inactive    Days of Exercise per Week: 0 days    Minutes of Exercise per Session: 0 min   Stress: No Stress Concern Present    Feeling of Stress : Not at all   Social Connections:     Frequency of Communication with Friends and Family:     Frequency of Social Gatherings with Friends and Family:     Attends Alevism Services:     Active Member of Clubs or Organizations:     Attends Club or Organization Meetings:     Marital Status:    Intimate Partner Violence: Not At Risk    Fear of Current or Ex-Partner: No    Emotionally Abused: No    Physically Abused: No    Sexually Abused: No       Current Medications  Current Outpatient Medications   Medication Sig Dispense Refill    allopurinol (ZYLOPRIM) 300 mg tablet Take 1 tablet (300 mg total) by mouth daily 90 tablet 3    aspirin (ASPIRIN LOW DOSE) 81 MG tablet Take by mouth      atorvastatin (LIPITOR) 20 mg tablet Take 1 tablet (20 mg total) by mouth daily 90 tablet 3    benazepril-hydrochlorthiazide (LOTENSIN HCT) 20-25 MG per tablet Take 1 tablet by mouth daily 90 tablet 1    Calcium Carbonate-Vitamin D (CALTRATE 600+D) 600-400 MG-UNIT per tablet Caltrate 600+D 600-400 MG-UNIT TABS  Take 1 tablet daily   Refills: 0      , M D ; Active      carbidopa-levodopa (SINEMET)  mg per tablet 2 tabs in the AM, 1 before lunch, 1 after lunch, 1 before dinner and 1 after dinner      Cholecalciferol (VITAMIN D-3) 1000 UNITS CAPS 1 daily      glucosamine-chondroitin 500-400 MG tablet Take 1 tablet by mouth daily      metoprolol succinate (TOPROL-XL) 50 mg 24 hr tablet Take 1 tablet (50 mg total) by mouth daily 90 tablet 1    Multiple Vitamin (MULTIVITAMINS PO) Multivitamins Oral once a day Capsule   Refills: 0      , M D ; Active      nitroglycerin (NITROSTAT) 0 4 mg SL tablet Place under the tongue      zolpidem (AMBIEN) 10 mg tablet TAKE 1 TABLET BY MOUTH DAILY AT BEDTIME (Patient taking differently: daily at bedtime as needed ) 30 tablet 3     No current facility-administered medications for this visit  Allergies  No Known Allergies    Past Medical History, Social History, Family History, medications and allergies were reviewed  Vitals  Vitals:    07/28/21 0951   BP: 128/70   BP Location: Left arm   Patient Position: Sitting   Cuff Size: Adult   Pulse: 82   Weight: 112 kg (246 lb)   Height: 6' 1" (1 854 m)       Physical Exam  Physical Exam  Constitutional:       Appearance: Normal appearance  He is well-developed  HENT:      Head: Normocephalic  Eyes:      Pupils: Pupils are equal, round, and reactive to light  Cardiovascular:      Rate and Rhythm: Normal rate  Pulmonary:      Effort: Pulmonary effort is normal    Abdominal:      Palpations: Abdomen is soft  Tenderness: There is no right CVA tenderness or left CVA tenderness  Musculoskeletal:         General: Normal range of motion  Cervical back: Normal range of motion  Skin:     General: Skin is warm and dry  Neurological:      General: No focal deficit present  Mental Status: He is alert and oriented to person, place, and time  Deep Tendon Reflexes: Reflexes are normal and symmetric        Comments: Tremors     Psychiatric:         Mood and Affect: Mood normal          Behavior: Behavior normal          Thought Content: Thought content normal          Judgment: Judgment normal          Results    I have personally reviewed all pertinent lab results and reviewed with patient  Lab Results   Component Value Date    PSA 1 6 04/05/2017    PSA 1 4 10/06/2015     Lab Results   Component Value Date    GLUCOSE 95 10/06/2015    CALCIUM 9 2 07/07/2021     10/06/2015    K 4 1 07/07/2021    CO2 29 07/07/2021     07/07/2021    BUN 19 07/07/2021    CREATININE 0 98 07/07/2021     Lab Results   Component Value Date    WBC 6 52 03/10/2020    HGB 15 5 03/10/2020    HCT 46 2 03/10/2020    MCV 95 03/10/2020     03/10/2020     No results found for this or any previous visit (from the past 1 hour(s))

## 2021-07-28 ENCOUNTER — OFFICE VISIT (OUTPATIENT)
Dept: UROLOGY | Facility: HOSPITAL | Age: 76
End: 2021-07-28
Payer: MEDICARE

## 2021-07-28 VITALS
HEIGHT: 73 IN | BODY MASS INDEX: 32.6 KG/M2 | DIASTOLIC BLOOD PRESSURE: 70 MMHG | WEIGHT: 246 LBS | HEART RATE: 82 BPM | SYSTOLIC BLOOD PRESSURE: 128 MMHG

## 2021-07-28 DIAGNOSIS — N20.0 NEPHROLITHIASIS: Primary | ICD-10-CM

## 2021-07-28 DIAGNOSIS — N40.0 BPH WITHOUT OBSTRUCTION/LOWER URINARY TRACT SYMPTOMS: ICD-10-CM

## 2021-07-28 PROCEDURE — 99213 OFFICE O/P EST LOW 20 MIN: CPT | Performed by: NURSE PRACTITIONER

## 2021-08-04 ENCOUNTER — HOSPITAL ENCOUNTER (OUTPATIENT)
Dept: NON INVASIVE DIAGNOSTICS | Facility: HOSPITAL | Age: 76
Discharge: HOME | End: 2021-08-04
Attending: INTERNAL MEDICINE
Payer: MEDICARE

## 2021-08-04 DIAGNOSIS — R09.89 DECREASED PEDAL PULSES: ICD-10-CM

## 2021-08-04 PROCEDURE — 93922 UPR/L XTREMITY ART 2 LEVELS: CPT | Performed by: SURGERY

## 2021-08-04 PROCEDURE — 93925 LOWER EXTREMITY STUDY: CPT | Performed by: SURGERY

## 2021-08-04 PROCEDURE — 93923 UPR/LXTR ART STDY 3+ LVLS: CPT

## 2021-08-04 PROCEDURE — 93925 LOWER EXTREMITY STUDY: CPT

## 2021-08-06 DIAGNOSIS — I10 ESSENTIAL HYPERTENSION: ICD-10-CM

## 2021-08-06 RX ORDER — METOPROLOL SUCCINATE 50 MG/1
50 TABLET, EXTENDED RELEASE ORAL DAILY
Qty: 90 TABLET | Refills: 1 | Status: SHIPPED | OUTPATIENT
Start: 2021-08-06 | End: 2022-02-15

## 2021-10-04 DIAGNOSIS — I10 ESSENTIAL HYPERTENSION: ICD-10-CM

## 2021-10-04 RX ORDER — BENAZEPRIL/HYDROCHLOROTHIAZIDE 20 MG-25MG
1 TABLET ORAL DAILY
Qty: 90 TABLET | Refills: 1 | Status: SHIPPED | OUTPATIENT
Start: 2021-10-04 | End: 2022-04-17

## 2021-10-13 ENCOUNTER — OFFICE VISIT (OUTPATIENT)
Dept: FAMILY MEDICINE CLINIC | Facility: HOSPITAL | Age: 76
End: 2021-10-13
Payer: MEDICARE

## 2021-10-13 VITALS
HEIGHT: 73 IN | TEMPERATURE: 98.1 F | WEIGHT: 240 LBS | SYSTOLIC BLOOD PRESSURE: 106 MMHG | OXYGEN SATURATION: 96 % | BODY MASS INDEX: 31.81 KG/M2 | DIASTOLIC BLOOD PRESSURE: 64 MMHG | RESPIRATION RATE: 16 BRPM | HEART RATE: 80 BPM

## 2021-10-13 DIAGNOSIS — J06.9 VIRAL UPPER RESPIRATORY INFECTION: Primary | ICD-10-CM

## 2021-10-13 DIAGNOSIS — Z23 NEED FOR INFLUENZA VACCINATION: ICD-10-CM

## 2021-10-13 DIAGNOSIS — R25.2 BILATERAL LEG CRAMPS: ICD-10-CM

## 2021-10-13 PROCEDURE — G0008 ADMIN INFLUENZA VIRUS VAC: HCPCS

## 2021-10-13 PROCEDURE — 99213 OFFICE O/P EST LOW 20 MIN: CPT | Performed by: INTERNAL MEDICINE

## 2021-10-13 PROCEDURE — 90662 IIV NO PRSV INCREASED AG IM: CPT

## 2021-11-08 DIAGNOSIS — M1A.9XX0 CHRONIC GOUT WITHOUT TOPHUS, UNSPECIFIED CAUSE, UNSPECIFIED SITE: ICD-10-CM

## 2021-11-08 RX ORDER — ALLOPURINOL 300 MG/1
300 TABLET ORAL DAILY
Qty: 90 TABLET | Refills: 3 | Status: SHIPPED | OUTPATIENT
Start: 2021-11-08

## 2021-12-15 DIAGNOSIS — G47.09 OTHER INSOMNIA: ICD-10-CM

## 2021-12-15 RX ORDER — ZOLPIDEM TARTRATE 10 MG/1
10 TABLET ORAL
Qty: 30 TABLET | Refills: 3 | Status: CANCELLED | OUTPATIENT
Start: 2021-12-15

## 2022-01-13 ENCOUNTER — OFFICE VISIT (OUTPATIENT)
Dept: FAMILY MEDICINE CLINIC | Facility: HOSPITAL | Age: 77
End: 2022-01-13
Payer: MEDICARE

## 2022-01-13 VITALS
HEART RATE: 83 BPM | SYSTOLIC BLOOD PRESSURE: 130 MMHG | HEIGHT: 73 IN | DIASTOLIC BLOOD PRESSURE: 72 MMHG | OXYGEN SATURATION: 97 % | BODY MASS INDEX: 32.2 KG/M2 | WEIGHT: 243 LBS | RESPIRATION RATE: 16 BRPM

## 2022-01-13 DIAGNOSIS — R25.2 LEG CRAMPS: ICD-10-CM

## 2022-01-13 DIAGNOSIS — G47.09 OTHER INSOMNIA: ICD-10-CM

## 2022-01-13 DIAGNOSIS — G20 PRIMARY PARKINSONISM (HCC): ICD-10-CM

## 2022-01-13 DIAGNOSIS — R39.12 BENIGN PROSTATIC HYPERPLASIA WITH WEAK URINARY STREAM: ICD-10-CM

## 2022-01-13 DIAGNOSIS — I25.10 CORONARY ARTERY DISEASE INVOLVING NATIVE CORONARY ARTERY OF NATIVE HEART WITHOUT ANGINA PECTORIS: ICD-10-CM

## 2022-01-13 DIAGNOSIS — I10 ESSENTIAL HYPERTENSION: Primary | ICD-10-CM

## 2022-01-13 DIAGNOSIS — N40.1 BENIGN PROSTATIC HYPERPLASIA WITH WEAK URINARY STREAM: ICD-10-CM

## 2022-01-13 PROCEDURE — 99214 OFFICE O/P EST MOD 30 MIN: CPT | Performed by: INTERNAL MEDICINE

## 2022-01-13 NOTE — ASSESSMENT & PLAN NOTE
Pt has difficulty maintaining sleep due to racing thoughts 1-2 nights a month     We discussed the fact that ambien and tylenol pm may make his balance worse and cause falls and for this reason will try to avoid these medications  Will try leg cramp pm herbal product that has no hypnotic in it like ambien or benadryl    I reviewed ambien from the medication list

## 2022-01-13 NOTE — ASSESSMENT & PLAN NOTE
Pt has difficulty buttoning his shirts and is not able to reach as far with his arm as before, moving around in bed or getting around in bed is more difficult  Pt loses balance but denies falls  He also has LE neuropathy which is chronic  I'm concerned about pt's gait  Will refer pt to physical therapy for Parkinson's disease, gait/balance/strength training

## 2022-01-13 NOTE — ASSESSMENT & PLAN NOTE
Pt has nocturnal leg cramps that are controlled with tonic water and an herbal preparation 'leg cramps' one pill a night  He has no peripheral vasculare disease as evidenced by normal LE arterial doppler last year  Will recheck electrolytes  Continue above treatments

## 2022-01-13 NOTE — ASSESSMENT & PLAN NOTE
Pt has bph reports weaker urinary stream and nocturia twice  He has constipation  We discussed taking colace twice a day, increasing fiber intake  He drinks tonic water in the evening for leg cramp that makes nocturia worse  We discussed starting tamsulosin but will hold it for now

## 2022-01-13 NOTE — PROGRESS NOTES
Assessment/Plan:    Benign prostatic hyperplasia with weak urinary stream  Pt has bph reports weaker urinary stream and nocturia twice  He has constipation  We discussed taking colace twice a day, increasing fiber intake  He drinks tonic water in the evening for leg cramp that makes nocturia worse  We discussed starting tamsulosin but will hold it for now  Primary Parkinsonism ([address] )  Pt has difficulty buttoning his shirts and is not able to reach as far with his arm as before, moving around in bed or getting around in bed is more difficult  Pt loses balance but denies falls  He also has LE neuropathy which is chronic  I'm concerned about pt's gait  Will refer pt to physical therapy for Parkinson's disease, gait/balance/strength training  Leg cramps  Pt has nocturnal leg cramps that are controlled with tonic water and an herbal preparation 'leg cramps' one pill a night  He has no peripheral vasculare disease as evidenced by normal LE arterial doppler last year  Will recheck electrolytes  Continue above treatments    Other insomnia  Pt has difficulty maintaining sleep due to racing thoughts 1-2 nights a month  We discussed the fact that ambien and tylenol pm may make his balance worse and cause falls and for this reason will try to avoid these medications  Will try leg cramp pm herbal product that has no hypnotic in it like ambien or benadryl    I reviewed ambien from the medication list     Coronary artery disease involving native coronary artery of native heart without angina pectoris  Patient has coronary artery disease  He walks on a treadmill for an hour every day at 2 mics per hour speed  He denies any exertional chest pain or claudication  He will continue metoprolol aspirin and atorvastatin  I will recheck LFTs and cholesterol    Essential hypertension  Patient has chronic hypertension  His blood pressure is controlled  Will continue Lotensin and metoprolol    I will check labs Diagnoses and all orders for this visit:    Essential hypertension  -     Comprehensive metabolic panel; Future  -     TSH, 3rd generation with Free T4 reflex; Future  -     Magnesium; Future    Primary parkinsonism [facility])  -     Ambulatory referral to Physical Therapy; Future    Leg cramps    Benign prostatic hyperplasia with weak urinary stream    Other insomnia    Coronary artery disease involving native coronary artery of native heart without angina pectoris  -     Lipid panel; Future          Subjective:      Patient ID: Zofia Lopez is a 67 y o  male  Patient presents for follow-up of chronic conditions as detailed in the assessment plan        The following portions of the patient's history were reviewed and updated as appropriate: current medications, past family history, past medical history, past social history, past surgical history and problem list     Review of Systems   Respiratory: Negative for cough and shortness of breath  Cardiovascular: Negative for chest pain and palpitations  Gastrointestinal: Positive for constipation  Negative for abdominal pain and blood in stool  Genitourinary: Positive for difficulty urinating  Negative for dysuria and hematuria  Musculoskeletal: Positive for gait problem  Skin: Negative for rash  Psychiatric/Behavioral: Negative for dysphoric mood  Objective:    /72   Pulse 83   Resp 16   Ht 6' 1" (1 854 m)   Wt 110 kg (243 lb)   SpO2 97%   BMI 32 06 kg/m²      Physical Exam  HENT:      Head: Normocephalic  Eyes:      Conjunctiva/sclera: Conjunctivae normal    Cardiovascular:      Rate and Rhythm: Normal rate and regular rhythm  Heart sounds: No murmur heard  Pulmonary:      Effort: No respiratory distress  Breath sounds: No wheezing or rales  Abdominal:      General: Bowel sounds are normal       Tenderness: There is no abdominal tenderness  Musculoskeletal:         General: No tenderness        Cervical back: Neck supple  Skin:     General: Skin is warm and dry  Comments: Patient has no lower extremity edema   Neurological:      Mental Status: He is alert  Comments: Left hand resting tremors present  Bradykinesia and shuffling gait are present   Psychiatric:         Mood and Affect: Mood normal            Depression Screening and Follow-up Plan: Patient was screened for depression during today's encounter  They screened negative with a PHQ-2 score of 0          Hank Cervantes MD

## 2022-01-13 NOTE — ASSESSMENT & PLAN NOTE
Patient has chronic hypertension  His blood pressure is controlled  Will continue Lotensin and metoprolol    I will check labs

## 2022-01-13 NOTE — ASSESSMENT & PLAN NOTE
Patient has coronary artery disease  He walks on a treadmill for an hour every day at 2 mics per hour speed  He denies any exertional chest pain or claudication  He will continue metoprolol aspirin and atorvastatin    I will recheck LFTs and cholesterol

## 2022-01-14 ENCOUNTER — LAB (OUTPATIENT)
Dept: LAB | Facility: HOSPITAL | Age: 77
End: 2022-01-14
Attending: INTERNAL MEDICINE
Payer: MEDICARE

## 2022-01-14 DIAGNOSIS — I25.10 CORONARY ARTERY DISEASE INVOLVING NATIVE CORONARY ARTERY OF NATIVE HEART WITHOUT ANGINA PECTORIS: ICD-10-CM

## 2022-01-14 DIAGNOSIS — I10 ESSENTIAL HYPERTENSION: ICD-10-CM

## 2022-01-14 LAB
ALBUMIN SERPL BCP-MCNC: 3.9 G/DL (ref 3.5–5)
ALP SERPL-CCNC: 85 U/L (ref 46–116)
ALT SERPL W P-5'-P-CCNC: 12 U/L (ref 12–78)
ANION GAP SERPL CALCULATED.3IONS-SCNC: 7 MMOL/L (ref 4–13)
AST SERPL W P-5'-P-CCNC: 25 U/L (ref 5–45)
BILIRUB SERPL-MCNC: 1.12 MG/DL (ref 0.2–1)
BUN SERPL-MCNC: 17 MG/DL (ref 5–25)
CALCIUM SERPL-MCNC: 9 MG/DL (ref 8.3–10.1)
CHLORIDE SERPL-SCNC: 106 MMOL/L (ref 100–108)
CHOLEST SERPL-MCNC: 121 MG/DL
CO2 SERPL-SCNC: 29 MMOL/L (ref 21–32)
CREAT SERPL-MCNC: 1.05 MG/DL (ref 0.6–1.3)
GFR SERPL CREATININE-BSD FRML MDRD: 68 ML/MIN/1.73SQ M
GLUCOSE P FAST SERPL-MCNC: 95 MG/DL (ref 65–99)
HDLC SERPL-MCNC: 47 MG/DL
LDLC SERPL CALC-MCNC: 61 MG/DL (ref 0–100)
MAGNESIUM SERPL-MCNC: 2.3 MG/DL (ref 1.6–2.6)
NONHDLC SERPL-MCNC: 74 MG/DL
POTASSIUM SERPL-SCNC: 3.7 MMOL/L (ref 3.5–5.3)
PROT SERPL-MCNC: 7.1 G/DL (ref 6.4–8.2)
SODIUM SERPL-SCNC: 142 MMOL/L (ref 136–145)
TRIGL SERPL-MCNC: 66 MG/DL
TSH SERPL DL<=0.05 MIU/L-ACNC: 1.31 UIU/ML (ref 0.36–3.74)

## 2022-01-14 PROCEDURE — 80053 COMPREHEN METABOLIC PANEL: CPT

## 2022-01-14 PROCEDURE — 84443 ASSAY THYROID STIM HORMONE: CPT

## 2022-01-14 PROCEDURE — 80061 LIPID PANEL: CPT

## 2022-01-14 PROCEDURE — 83735 ASSAY OF MAGNESIUM: CPT

## 2022-01-14 PROCEDURE — 36415 COLL VENOUS BLD VENIPUNCTURE: CPT

## 2022-01-21 ENCOUNTER — EVALUATION (OUTPATIENT)
Dept: PHYSICAL THERAPY | Facility: CLINIC | Age: 77
End: 2022-01-21
Payer: MEDICARE

## 2022-01-21 DIAGNOSIS — G20 PRIMARY PARKINSONISM (HCC): ICD-10-CM

## 2022-01-21 PROCEDURE — 97163 PT EVAL HIGH COMPLEX 45 MIN: CPT

## 2022-01-21 NOTE — PROGRESS NOTES
PT Evaluation     Today's date: 2022  Patient name: Farhana Juarez  : 1945  MRN: [id number]  Referring provider: Shyann Polanco MD  Dx:   Encounter Diagnosis     ICD-10-CM    1  Primary parkinsonism ([address] )  [address] Ambulatory referral to Physical Therapy       Start Time: 26  Stop Time: 0840  Total time in clinic (min): 50 minutes    Assessment  Assessment details: Patient presents to skilled PT for Parkinsons  Patient reports near falls occurring several times a Week Patient displays decreased muscle strength in hip extensors and abductors with overall grade of 4/5  Patient sensation is Abnormal throughout lower extremities, diminished B/L feet  Patient coordination is Normal per alterate toe tapping and heel to shin test    Patient balance scores are as follows: 51/56 LOYA, 8 07 seconds TUG without Assistive Device with overall results noting only slight limitations  Patient endurance scores are as follows; 1150 feet with  6 minute walk test without  ( Device ), 13 4 seconds with 5 x sit to stand test with overall results noting decrease functional endurance  Patient displays overall reduction in somatosensory/ proprioception awareness secondary to increased loss of stability on compliant surfaces in static holding  Patient subjective report notes the following functional limitation with gait and transitions at home  Significant limitations are also evident in cervical ROM and knee extension B/L  [facility] Patient will benefit from skilled PT to address noted impairments and functional limitations they are causing with overall goal to return patient to highest level possible with reduced risk for falls  Please contact me if you have any questions or recommendations  Thank you for the referral and the opportunity to share in [facility]      Patient verbalized understanding of POC              Impairments: abnormal gait, activity intolerance, impaired balance, impaired physical strength, lacks appropriate home exercise program, safety issue, poor posture  and poor body mechanics  Understanding of Dx/Px/POC: good   Prognosis: good    Goals  Goals  STG 30 days    Patient will improve static balance with feet together Eyes Closed Firm surface  to 30 seconds indicating reduction in fall risk  Patient will achieve 190 feet improvement with overall distance achieved of 1340 feet with 6 minute walk test which is Minimal Detectable Change pre current research standards with endurance to demonstrate enhance functional capacity     Patient will improve FGA scoring attaining 26/30 demonstrating increased stability for community mobility   Patient will perform  5 x sit to stand test with overall reduction by 3 seconds to 10 4  score indicating improvement with functional endurance  Patient will be independent in general balance and strengthening program to promote increased overall stability    LT days   Patient will be able to ambulate 1700 feet without AD during 6 minute walk test   Patient will be able to perform floor transfer without physical assistance   Patient will be able to carry objects without loss of balance  Patient will be able to ambulate outdoors without any loss of balance  Patient will independent in advanced gait and balance program to promote decrease loss of function    Patient will demonstrate independent postural correction to promote improved cervical motion    Cut off score   All date taken from [facility] Neuro Section or Rehab Measures    LOYA test: 46/56                                              5 x STS Test:  MDC: 6 points                                                  MDC: 2 3 seconds   age norms                                                                 Age Norms   61-76 year old = M: 54, F: 55                        62-78 year old: 11 4 seconds   66-77 year old = M 47,  F: 50                       71-76 year old: 12 6 seconds    80-80 year old = M53,   F: 48 80-80 year old: 14 8 seconds     TUG test:                                                                     10 Meter Walk Test:  MDC: 4 14 seconds       MDC:  59 ft/sec  Cut off score for Falls                                                  Age Norms  > 13 5 seconds community dwelling adults                20-29; M: 4 56 ft/sec F: 4 62 ft/sec  > 32 2 Frail Elderly                                                     30-39: M 4 76 ft/sec  F: 4 68 ft/sec          40-49: M: 4 79 ft/sec  F: 4 62 ft/sec  6 Minute Walk Test      50-59: M: 4 76 ft/sec  F: 4 56 ft/sec  MDC: 190 feet       60-69: M: 4 56 ft/sec  F: 4 26 ft/sec  Age Norms       70-+    M: 4 36 ft/sec  F: 4 16 ft/sec  60-69:    M: 1876 F: 6724  56-72:    M: 1729 F: 1015  10-78 +: M: 80 F; 1286     Plan  Patient would benefit from: skilled physical therapy  Planned modality interventions: biofeedback  Planned therapy interventions: manual therapy, motor coordination training, neuromuscular re-education, patient education, postural training, sensory integrative techniques, strengthening, stretching, therapeutic activities, therapeutic exercise, gait training, home exercise program, coordination and balance  Frequency: 2x week  Duration in weeks: 8  Treatment plan discussed with: patient        Subjective Evaluation    History of Present Illness  Mechanism of injury: Patient states he has noted increased heel pain associated with peripheral neuropathy, this improves with activity  He has also noted increased instability with near falls  He presently does not use a device for ambulation, and does notice increased catching of his left foot in gait, particularly with fatigue      Pain  Current pain ratin  At best pain ratin  At worst pain ratin  Location: Left heel   Quality: needle-like    Social Support  Steps to enter house: yes  2  Stairs in house: no   Lives in: multiple-level home  Lives with: spouse    Employment status: not working  Hand dominance: right    Patient Goals  Patient goals for therapy: improved balance  Patient goal: improved head motion, neck stiffness improved walking        Objective     Strength/Myotome Testing     Left Shoulder     Planes of Motion   Flexion: 4+   Abduction: 4+     Right Shoulder     Planes of Motion   Flexion: 4+   Abduction: 4+     Left Elbow   Flexion: 4+  Extension: 4+    Right Elbow   Flexion: 4+  Extension: 4+    Left Wrist/Hand   Wrist extension: 4+  Wrist flexion: 4+    Right Wrist/Hand   Wrist extension: 4+  Wrist flexion: 4+    Left Hip   Planes of Motion   Flexion: 5  Extension: 4  Abduction: 4    Right Hip   Planes of Motion   Flexion: 5  Extension: 4  Abduction: 4    Left Knee   Flexion: 4+  Extension: 4+    Right Knee   Flexion: 4+  Extension: 4+    Left Ankle/Foot   Dorsiflexion: 4  Plantar flexion: 4+    Right Ankle/Foot   Dorsiflexion: 4  Plantar flexion: 4+  Neuro Exam:     Sensation   Light touch LE: left impaired and right impaired    Coordination   Heel to shin: left WNL and right WNL  Finger to nose: left WNL and right WNL  Rapid alternating movements: UE WNL and LE impaired     Functional outcomes   Functional outcome gait comment: Abnormal gait pattern is evident with shuffling pattern, decreased toe clearance left more notable, decreased knee extension t/o gait cycle and increased forward trunk flexion  Increased narrow base of support is evident with slight scissoring pattern               Precautions: h/o tinnitus, h/o PD, h/o peripheral neuropathy,h/o HTN, B/L TKR  EPOC:  3/18/2022      TESTING 1/21            LOYA 51/56            TUG 8 07sec            5x sit to stand 13 4sec            FGA 22/30            6 min walk test 1150'            Neuro Re-Ed             hurdles             Step ups             Sidestepping Firm/foam             Wedge hold             4 square                                       Ther Ex             Cervical SNAGS             Cervical retraction Knee extension stretch             Heel cord stretch             Wall extension stretch stance                                                    Ther Activity                                       Gait Training                                       Modalities

## 2022-01-25 ENCOUNTER — OFFICE VISIT (OUTPATIENT)
Dept: PHYSICAL THERAPY | Facility: CLINIC | Age: 77
End: 2022-01-25
Payer: MEDICARE

## 2022-01-25 DIAGNOSIS — G20 PRIMARY PARKINSONISM (HCC): Primary | ICD-10-CM

## 2022-01-25 PROCEDURE — 97110 THERAPEUTIC EXERCISES: CPT

## 2022-01-25 PROCEDURE — 97112 NEUROMUSCULAR REEDUCATION: CPT

## 2022-01-25 NOTE — PROGRESS NOTES
Daily Note     Today's date: 2022  Patient name: Farhan Agee  : 1945  MRN: [id number]  Referring provider: Guzman Thurston MD  Dx:   Encounter Diagnosis     ICD-10-CM    1  Primary parkinsonism ([address] )  Jeanetta Denver        Start Time: 1520  Stop Time: 1001  Total time in clinic (min): 44 minutes    Subjective: Pt reports having new peripheral neuropathy has him walking very unbalanced  Has a lot of joint stiffness he feels because of parkinsons  Objective: See treatment diary below      Assessment: Pt instructed on cervical and LE stretches, shows good form and provided initial stretching HEP to assist with stiffness  Pt shows fair stability with static balance challenge with moderate sway with foam added  Takes more variable step length and width with hurdles and foam  Somatosensory information appears slowed with stepping on foam  Occasional need for stepping strategy with hurdles  Verbal cues throughout session for posturing and amplified movement patterns  Patient would benefit from continued PT      Plan: Continue per plan of care  Increase balance challenge and LE strength  Assess HEP next visit       Precautions: h/o tinnitus, h/o PD, h/o peripheral neuropathy,h/o HTN, B/L TKR  EPOC:  3/18/2022      TESTING            LOYA 51/56            TUG 8 07sec            5x sit to stand 13 4sec            FGA             6 min walk test 1150'            Neuro Re-Ed             hurdles  Low hurdles with foam 8 laps           Step ups             Sidestepping Firm/foam  20ft x6 laps firm           Wedge hold             4 square             Static balance  Firm HT/HN 30s x1 EC x1 30s    Foam HT/HN 30s x1  EC x1 30s                        Ther Ex             Cervical SNAGS  x5 10'           Cervical retraction             Knee extension stretch  standing 2x 30s ea           Heel cord stretch  2x 30s ea           Wall extension stretch stance                                                    Ther Activity                                       Gait Training                                       Modalities

## 2022-01-28 ENCOUNTER — OFFICE VISIT (OUTPATIENT)
Dept: PHYSICAL THERAPY | Facility: CLINIC | Age: 77
End: 2022-01-28
Payer: MEDICARE

## 2022-01-28 DIAGNOSIS — G20 PRIMARY PARKINSONISM (HCC): Primary | ICD-10-CM

## 2022-01-28 PROCEDURE — 97112 NEUROMUSCULAR REEDUCATION: CPT

## 2022-01-28 NOTE — PROGRESS NOTES
Daily Note     Today's date: 2022  Patient name: Jacob Bardales  : 1945  MRN: [id number]  Referring provider: Virginia Santiago MD  Dx:   Encounter Diagnosis     ICD-10-CM    1  Primary parkinsonism ([address] )  Joanie Esteban        Start Time: 46  Stop Time: 1025  Total time in clinic (min): 40 minutes    Subjective: Pt has some questions regarding his HEP  Went on a walk on his treadmill the other day  Notes he gets cramps when in bed that wake him up  Objective: See treatment diary below      Assessment: Pt tolerated balance challenges well, with instruction on performing with higher intensity  Pt shows good balance with increasing challenge and able to improve head and eye position with verbal cues  One instance of imbalance with stepping on the side of the foam, but able to independently correct  Tendency to take smaller steps with lunges, cues for maintaining longer lunge  Some difficulty with searching for item to point to on the wall  Shows good neck and trunk rotation with lateral lunge  Occasional stepping strategy posteriorly with lateral stepping on foam  Instructed on further stretching techniques for hamstrings and gastroc  Patient would benefit from continued PT      Plan: Continue per plan of care  Increase balance challenge and LE strength  Follow up with seated hamstring + gastroc str       Precautions: h/o tinnitus, h/o PD, h/o peripheral neuropathy,h/o HTN, B/L TKR  EPOC:  3/18/2022      TESTING           LOYA 51/56            TUG 8 07sec            5x sit to stand 13 4sec            FGA /            6 min walk test 1150'            Neuro Re-Ed             hurdles  Low hurdles with foam 8 laps Solo step Hurdles with 2 foam cues for head/eye position & inc pacing 8 laps    Solo step  Hurdles with uneven mat and 6' step up cues for inc pacing 8 laps           Step ups             Sidestepping Firm/foam  20ft x6 laps firm Foam beams 6 laps          Wedge hold             4 square Static balance  Firm HT/HN 30s x1 EC x1 30s    Foam HT/HN 30s x1  EC x1 30s Mod tandem ball toss x10 ea          Lunges   Lateral lunge with point/reach 2x10 ea          Ther Ex             Cervical SNAGS  x5 10'           Cervical retraction             Knee extension stretch  standing 2x 30s ea           Heel cord stretch  2x 30s ea           Wall extension stretch stance                                                    Ther Activity                                       Gait Training                                       Modalities

## 2022-02-01 ENCOUNTER — OFFICE VISIT (OUTPATIENT)
Dept: PHYSICAL THERAPY | Facility: CLINIC | Age: 77
End: 2022-02-01
Payer: MEDICARE

## 2022-02-01 DIAGNOSIS — G20 PRIMARY PARKINSONISM (HCC): Primary | ICD-10-CM

## 2022-02-01 PROCEDURE — 97110 THERAPEUTIC EXERCISES: CPT

## 2022-02-01 PROCEDURE — 97112 NEUROMUSCULAR REEDUCATION: CPT

## 2022-02-01 NOTE — PROGRESS NOTES
Daily Note     Today's date: 2022  Patient name: Erasmo Fan  : 1945  MRN: [id number]  Referring provider: Mikey Antunez MD  Dx:   Encounter Diagnosis     ICD-10-CM    1  Primary parkinsonism ([address] )  Mauro Pedroza        Start Time: 58  Stop Time: 1225  Total time in clinic (min): 50 minutes    Subjective: Pt without present complaints, he indicates leg cramps have significantly decreased over last several days  Objective: See treatment diary below      Assessment:   Patient reviewed stretching with addition of sidelying thoracic rotation stretch  Patient demonstrates well and has been encouraged to continue at home with written instructions done  Bed mobility reviewed with instruction to use step to facilitate scooting further back in bed  Initiated Power exercises with focus on scap retraction, added shoulder retraction stretch in doorway  Windmill rotation with difficulty and occasional stepping to regain balance  Reviewed partial tandem stance with encouragement to perform with HT focused on increased overall extension including knee extension  Plan: Continue per plan of care  Review new stretch for improved bed mobility, progress power exercises and continue to encourage increased trunk extension and scap retraction       Precautions: h/o tinnitus, h/o PD, h/o peripheral neuropathy,h/o HTN, B/L TKR  EPOC:  3/18/2022      TESTING          LOYA 51/56            TUG 8 07sec            5x sit to stand 13 4sec            FGA             6 min walk test 1150'            Neuro Re-Ed             Side outs w/ scap retraction    10x2         Windmill rotation standing    5x2         hurdles  Low hurdles with foam 8 laps Solo step Hurdles with 2 foam cues for head/eye position & inc pacing 8 laps    Solo step  Hurdles with uneven mat and 6' step up cues for inc pacing 8 laps           Step ups             Sidestepping Firm/foam  20ft x6 laps firm Foam beams 6 laps Firm with scap retraction 4 laps         Wedge hold             4 square             Static balance  Firm HT/HN 30s x1 EC x1 30s    Foam HT/HN 30s x1  EC x1 30s Mod tandem ball toss x10 ea Partial tandem EO HT 5x2, continue to encourage          Lunges   Lateral lunge with point/reach 2x10 ea          Ther Ex             Cervical SNAGS  x5 10'  10"x3ea         Cervical retraction             Knee extension stretch  standing 2x 30s ea  standing 10" x4         Heel cord stretch  2x 30s ea  10" x4ea         Wall extension stretch stance    10" x2         Doorway shoulder stretch    10" x4         Strap seated thoracic extension stretch    10" x4         Sidlying thoracic rotation stretch    10" x4ea         Ther Activity                                       Gait Training                                       Modalities

## 2022-02-04 ENCOUNTER — OFFICE VISIT (OUTPATIENT)
Dept: PHYSICAL THERAPY | Facility: CLINIC | Age: 77
End: 2022-02-04
Payer: MEDICARE

## 2022-02-04 DIAGNOSIS — G20 PRIMARY PARKINSONISM (HCC): Primary | ICD-10-CM

## 2022-02-04 PROCEDURE — 97110 THERAPEUTIC EXERCISES: CPT

## 2022-02-04 PROCEDURE — 97112 NEUROMUSCULAR REEDUCATION: CPT

## 2022-02-04 NOTE — PROGRESS NOTES
Daily Note     Today's date: 2022  Patient name: Boris Wells  : 1945  MRN: [id number]  Referring provider: Marsha Gloria MD  Dx:   Encounter Diagnosis     ICD-10-CM    1  Primary parkinsonism ([address] )  G20        Start Time: 1100  Stop Time: 1150  Total time in clinic (min): 50 minutes    Subjective: Pt without present complaints  He continues to notice relief with stretching before sleep  Objective: See treatment diary below      Assessment:   Reviewed HEP with correction on thoracic stretching to allow for more focused stretching of thoracic spine, avoiding shoulder strain  SNAGS with good demonstration  Encouraged corrected posture with review of seat height and supported back  Leukotape with protape applied for postural correction  Patient educated in wear time, skin inspection and proper removal   Remaining treatment focused on increased stride length  Ball kicking on treadmill to increase knee extension and increased alvin  Added weights to knee extension stretch to encourage increased hamstring stretch  Plan: Continue per plan of care  Assess tolerance to postural correction with tape, continue increased stride and progress Power exercises       Precautions: h/o tinnitus, h/o PD, h/o peripheral neuropathy,h/o HTN, B/L TKR  EPOC:  3/18/2022      TESTING         LOYA 51/56            TUG 8 07sec            5x sit to stand 13 4sec            FGA             6 min walk test 1150'            Neuro Re-Ed             Side outs w/ scap retraction    10x2         Windmill rotation standing    5x2         hurdles  Low hurdles with foam 8 laps Solo step Hurdles with 2 foam cues for head/eye position & inc pacing 8 laps    Solo step  Hurdles with uneven mat and 6' step up cues for inc pacing 8 laps           Step ups             Sidestepping Firm/foam  20ft x6 laps firm Foam beams 6 laps Firm with scap retraction 4 laps         Wedge hold             4 square Static balance  Firm HT/HN 30s x1 EC x1 30s    Foam HT/HN 30s x1  EC x1 30s Mod tandem ball toss x10 ea Partial tandem EO HT 5x2, continue to encourage          Walking on treadmill with ball kicking     2' x2        Walking with increased stride and knee extension     50' x4        Lunges   Lateral lunge with point/reach 2x10 ea          Ther Ex             Cervical SNAGS  x5 10'  10"x3ea 10" x3ea        Cervical retraction     5        Knee extension stretch  standing 2x 30s ea  standing 10" x4 seated 10" x4ea w/ 15# weight        Heel cord stretch  2x 30s ea  10" x4ea         Wall extension stretch stance    10" x2         Doorway shoulder stretch    10" x4         Strap seated thoracic extension stretch    10" x4         Postural correction     VM        Sidlying thoracic rotation stretch    10" x4ea 10' x4ea        Ther Activity                                       Gait Training                                       Modalities             Leukotape "X" for postural correction     VM

## 2022-02-08 ENCOUNTER — OFFICE VISIT (OUTPATIENT)
Dept: PHYSICAL THERAPY | Facility: CLINIC | Age: 77
End: 2022-02-08
Payer: MEDICARE

## 2022-02-08 DIAGNOSIS — G20 PRIMARY PARKINSONISM (HCC): Primary | ICD-10-CM

## 2022-02-08 PROCEDURE — 97110 THERAPEUTIC EXERCISES: CPT

## 2022-02-08 PROCEDURE — 97112 NEUROMUSCULAR REEDUCATION: CPT

## 2022-02-08 NOTE — PROGRESS NOTES
Daily Note     Today's date: 2022  Patient name: Paige Bergeron  : 1945  MRN: [id number]  Referring provider: Abdirizak Lozano MD  Dx:   Encounter Diagnosis     ICD-10-CM    1  Primary parkinsonism ([address] )  Yulia Mcduffie        Start Time: 1135  Stop Time: 1230  Total time in clinic (min): 55 minutes    Subjective: Pt without present complaints  He continues to notice relief with stretching before sleep  Objective: See treatment diary below      Assessment:   Reviewed postural correction to assist in increased trunk extension  Removed tape with slight redness noted  Patient indicates tape was assistive in reminding him to extend  Consider re-taping next session if skin favorable  Introduced wall slides for thoracic rotation with cuing  Added hip extension from mat to increase glut firing  Attempted quadriped from padded mat with too much knee pressure noted  Prone lying tolerated well  Introduced headboard hamstring stretch from supine with good tolerance  Continue to encourage frequent stretching  Plan: Continue per plan of care  Assess new HEP additions, consider re-tape, progress thoracic rotation and stepping challenges to increase stride length       Precautions: h/o tinnitus, h/o PD, h/o peripheral neuropathy,h/o HTN, B/L TKR  EPOC:  3/18/2022      TESTING        LOYA 51/56            TUG 8 07sec            5x sit to stand 13 4sec            FGA             6 min walk test 1150'            Neuro Re-Ed             Side outs w/ scap retraction    10x2         Windmill rotation standing    5x2  On wall staggered stance 5x2, 2 sets       hurdles  Low hurdles with foam 8 laps Solo step Hurdles with 2 foam cues for head/eye position & inc pacing 8 laps    Solo step  Hurdles with uneven mat and 6' step up cues for inc pacing 8 laps           Step ups             Sidestepping Firm/foam  20ft x6 laps firm Foam beams 6 laps Firm with scap retraction 4 laps Wedge hold      20" x4       4 square             Static balance  Firm HT/HN 30s x1 EC x1 30s    Foam HT/HN 30s x1  EC x1 30s Mod tandem ball toss x10 ea Partial tandem EO HT 5x2, continue to encourage   partial tandem EO HT 5x2       Walking on treadmill with ball kicking     2' x2 3'        Walking with increased stride and knee extension     50' x4 50' x4       Unilateral stance with weight shift      5x2       Hula step through      5ea       Lunges   Lateral lunge with point/reach 2x10 ea          Ther Ex             Cervical SNAGS  x5 10'  10"x3ea 10" x3ea        Cervical retraction     5        Knee extension stretch  standing 2x 30s ea  standing 10" x4 seated 10" x4ea w/ 15# weight seated 10" x4ea, repeated supine on wall 10"       Heel cord stretch  2x 30s ea  10" x4ea         Wall extension stretch stance    10" x2         Doorway shoulder stretch    10" x4         Strap seated thoracic extension stretch    10" x4         Postural correction     VM reviewed       Hip extension from supported stance      10x2       Prone lying       10" x2       Sidlying thoracic rotation stretch    10" x4ea 10' x4ea        Ther Activity                                       Gait Training                                       Modalities             Leukotape "X" for postural correction     VM

## 2022-02-09 ENCOUNTER — TELEPHONE (OUTPATIENT)
Dept: UROLOGY | Facility: AMBULATORY SURGERY CENTER | Age: 77
End: 2022-02-09

## 2022-02-09 NOTE — TELEPHONE ENCOUNTER
Regarding: Kidney Stones  ----- Message from Kathy Disla RN sent at 2/9/2022  2:19 PM EST -----  Can you see if there is a sooner appt for patient to be seen due to his symptoms, thank you     ----- Message from Radha Chao to [phone] [address] sent at 2/9/2022  2:08 PM -----   This message is being sent by Adeel Taveras on behalf of Brendan Simpson  I am due for a follow-up visit around 7/22/2022  For some time now I've been having pain in my side which I have associated with kidney stones in the past  I'd like to advance the 7/22/2022 visit to something much closer  Can I do that? Currently not an emergency but very concerning given my past episodes

## 2022-02-11 ENCOUNTER — OFFICE VISIT (OUTPATIENT)
Dept: PHYSICAL THERAPY | Facility: CLINIC | Age: 77
End: 2022-02-11
Payer: MEDICARE

## 2022-02-11 DIAGNOSIS — G20 PRIMARY PARKINSONISM (HCC): Primary | ICD-10-CM

## 2022-02-11 PROCEDURE — 97112 NEUROMUSCULAR REEDUCATION: CPT

## 2022-02-11 NOTE — PROGRESS NOTES
Daily Note     Today's date: 2022  Patient name: Kinsey Tsang  : 1945  MRN: [id number]  Referring provider: Sergio Rolle MD  Dx:   Encounter Diagnosis     ICD-10-CM    1  Primary parkinsonism ([address] )  G20        Start Time: 1100  Stop Time: 1145  Total time in clinic (min): 45 minutes    Subjective:  Patient states he has been working on increasing his treadmill walking with focus on increased stride length  No night cramps noted  Patient did notice some increased skin irritation from the tape, will hold for another session  Objective: See treatment diary below      Assessment:   Modified hamstring stretches to improve lengthening  Initiated Power seated reaches to facilitate increased stretch and motion  Corrected wall slide to facilitate increased thoracic rotation  Improved sidelying stretch to allow for shoulder protection and increased rotation  Plan: Continue per plan of care  Consider increased Power exercises for stepping and thoracic rotation away from wall  Patient without complaints at end of session       Precautions: h/o tinnitus, h/o PD, h/o peripheral neuropathy,h/o HTN, B/L TKR  EPOC:  3/18/2022      TESTING       LOYA 51/56            TUG 8 07sec            5x sit to stand 13 4sec            FGA             6 min walk test 1150'            Neuro Re-Ed             Side outs w/ scap retraction    10x2         Windmill rotation standing    5x2  On wall staggered stance 5x2, 2 sets On wall staggered stance 5x2, 2 sets      hurdles  Low hurdles with foam 8 laps Solo step Hurdles with 2 foam cues for head/eye position & inc pacing 8 laps    Solo step  Hurdles with uneven mat and 6' step up cues for inc pacing 8 laps           Step ups             Sidestepping Firm/foam  20ft x6 laps firm Foam beams 6 laps Firm with scap retraction 4 laps         Wedge hold      20" x4       4 square             Static balance  Firm HT/HN 30s x1 EC x1 30s    Foam HT/HN 30s x1  EC x1 30s Mod tandem ball toss x10 ea Partial tandem EO HT 5x2, continue to encourage   partial tandem EO HT 5x2       Walking on treadmill with ball kicking     2' x2 3'  3'      Walking with increased stride and knee extension     50' x4 50' x4 50' x4      Unilateral stance with weight shift      5x2       Hula step through      5ea       Lunges   Lateral lunge with point/reach 2x10 ea          Ther Ex             Cervical SNAGS  x5 10'  10"x3ea 10" x3ea        Cervical retraction     5        Knee extension stretch  standing 2x 30s ea  standing 10" x4 seated 10" x4ea w/ 15# weight seated 10" x4ea, repeated supine on wall 10" seated 10" x4 ea, repeated on wall 10"      Heel cord stretch  2x 30s ea  10" x4ea         Wall extension stretch stance    10" x2         Doorway shoulder stretch    10" x4         Strap seated thoracic extension stretch    10" x4         Postural correction     VM reviewed       Hip extension from supported stance      10x2       Prone lying       10" x2       Sitting forward reach floor to overhead       5x2      Sitting side sweep       5x2      Sidlying thoracic rotation stretch    10" x4ea 10' x4ea  10" x4ea      Ther Activity                                       Gait Training                                       Modalities             Leukotape "X" for postural correction     VM

## 2022-02-14 DIAGNOSIS — I10 ESSENTIAL HYPERTENSION: ICD-10-CM

## 2022-02-15 ENCOUNTER — OFFICE VISIT (OUTPATIENT)
Dept: PHYSICAL THERAPY | Facility: CLINIC | Age: 77
End: 2022-02-15
Payer: MEDICARE

## 2022-02-15 DIAGNOSIS — G20 PRIMARY PARKINSONISM (HCC): Primary | ICD-10-CM

## 2022-02-15 PROCEDURE — 97112 NEUROMUSCULAR REEDUCATION: CPT

## 2022-02-15 PROCEDURE — 97110 THERAPEUTIC EXERCISES: CPT

## 2022-02-15 RX ORDER — METOPROLOL SUCCINATE 50 MG/1
TABLET, EXTENDED RELEASE ORAL
Qty: 90 TABLET | Refills: 1 | Status: SHIPPED | OUTPATIENT
Start: 2022-02-15 | End: 2022-08-06

## 2022-02-15 NOTE — PROGRESS NOTES
Daily Note     Today's date: 2/15/2022  Patient name: Jacob Bardales  : 1945  MRN: [id number]  Referring provider: Virginia Santiago MD  Dx:   Encounter Diagnosis     ICD-10-CM    1  Primary parkinsonism ([address] )  Joanie Esteban        Start Time: 1127  Stop Time: 1213  Total time in clinic (min): 46 minutes    Subjective:  Pt notes that he had cramps last night  The stretches have otherwise been helping previous nights  He notes that he has been having troubles with the windmill stretches  Objective: See treatment diary below      Assessment:   Pt demonstrates good carryover with stretches, shown additional ways of performing stretches to further muscle length  Demonstration needed for thoracic/hip mobility windmill stretch  Shows loss in form with treadmill walking around 2min 30 seconds with slight reduction in knee extension to kick ball  However, does show good endurance with short rests between sets  Pt requires multiple re-corrections during static balance tasks in modified tandem  Improved motor performance with repetitions  Good balance with hula-hoop step through, consistent with maintaining high step length, 1 step noted with reduced height where he stepped on the hoop  Plan: Continue per plan of care  Consider increased Power exercises for stepping and thoracic rotation away from wall  Consider floor<>stand transfer  No complaints post session       Precautions: h/o tinnitus, h/o PD, h/o peripheral neuropathy,h/o HTN, B/L TKR  EPOC:  3/18/2022      TESTING  2/4 2/8 2/11 2/15     LOYA 51/56            TUG 8 07sec            5x sit to stand 13 4sec            FGA             6 min walk test 1150'            Neuro Re-Ed             Side outs w/ scap retraction    10x2         Windmill rotation standing    5x2  On wall staggered stance 5x2, 2 sets On wall staggered stance 5x2, 2 sets On wall staggered stance 2x5     hurdles  Low hurdles with foam 8 laps Solo step Hurdles with 2 foam cues for head/eye position & inc pacing 8 laps    Solo step  Hurdles with uneven mat and 6' step up cues for inc pacing 8 laps           Step ups             Sidestepping Firm/foam  20ft x6 laps firm Foam beams 6 laps Firm with scap retraction 4 laps         Wedge hold      20" x4       4 square             Static balance  Firm HT/HN 30s x1 EC x1 30s    Foam HT/HN 30s x1  EC x1 30s Mod tandem ball toss x10 ea Partial tandem EO HT 5x2, continue to encourage   partial tandem EO HT 5x2  Mod tandem EO HT 10x2    Mod tandem EC 30' x2 ea     Walking on treadmill with ball kicking     2' x2 3'  3' 2min 30' x2 1  5mph      Walking with increased stride and knee extension     50' x4 50' x4 50' x4      Unilateral stance with weight shift      5x2       Hula step through      5ea  10 ea     Lunges   Lateral lunge with point/reach 2x10 ea          Ther Ex             Cervical SNAGS  x5 10'  10"x3ea 10" x3ea        Cervical retraction     5        Knee extension stretch  standing 2x 30s ea  standing 10" x4 seated 10" x4ea w/ 15# weight seated 10" x4ea, repeated supine on wall 10" seated 10" x4 ea, repeated on wall 10" Seated 30" x2  10lb     Heel cord stretch  2x 30s ea  10" x4ea    Standing wall 30' x2 ea    Slant board 20' x2     Wall extension stretch stance    10" x2         Doorway shoulder stretch    10" x4         Strap seated thoracic extension stretch    10" x4         Postural correction     VM reviewed       Hip extension from supported stance      10x2       Prone lying       10" x2       Sitting forward reach floor to overhead       5x2      Sitting side sweep       5x2      Sidlying thoracic rotation stretch    10" x4ea 10' x4ea  10" x4ea      Ther Activity                                       Gait Training                                       Modalities             Leukotape "X" for postural correction     VM

## 2022-02-18 ENCOUNTER — OFFICE VISIT (OUTPATIENT)
Dept: PHYSICAL THERAPY | Facility: CLINIC | Age: 77
End: 2022-02-18
Payer: MEDICARE

## 2022-02-18 DIAGNOSIS — G20 PRIMARY PARKINSONISM (HCC): Primary | ICD-10-CM

## 2022-02-18 PROCEDURE — 97112 NEUROMUSCULAR REEDUCATION: CPT

## 2022-02-18 NOTE — PROGRESS NOTES
PT RE-ASSESSMENT/ DAILY NOTE    Today's date: 2022  Patient name: Joaquin Lozano  : 1945  MRN: [id number]  Referring provider: Morena Simmons MD  Dx:   Encounter Diagnosis     ICD-10-CM    1  Primary parkinsonism ([facility] [address] )  G20        Start Time: 1100  Stop Time: 1145  Total time in clinic (min): 45 minutes    Assessment  Assessment details: Patient presents to skilled PT for Parkinsons  Patient reports near falls occurring several times a Week Patient displays decreased muscle strength in hip extensors and abductors with overall grade of 4/5  Patient sensation is Abnormal throughout lower extremities, diminished B/L feet  Patient coordination is Normal per alterate toe tapping and heel to shin test    Patient balance scores are as follows: 51/56 LOYA, 8 07 seconds TUG without Assistive Device with overall results noting only slight limitations  Patient endurance scores are as follows; 1150 feet with  6 minute walk test without  ( Device ), 13 4 seconds with 5 x sit to stand test with overall results noting decrease functional endurance  Patient displays overall reduction in somatosensory/ proprioception awareness secondary to increased loss of stability on compliant surfaces in static holding  Patient subjective report notes the following functional limitation with gait and transitions at home  Significant limitations are also evident in cervical ROM and knee extension B/L  [employer] Patient will benefit from skilled PT to address noted impairments and functional limitations they are causing with overall goal to return patient to highest level possible with reduced risk for falls  Please contact me if you have any questions or recommendations  Thank you for the referral and the opportunity to share in [facility]  Patient verbalized understanding of POC      Patient has made nice gains with PT increasing scoring on LOYA 20/56 (from 51/56) and FGA scoring 26/30 (from /30)    Endurance scoring has also substantially improved scoring 1330' on 6 min walk test (from 1150') and 5x sit to stand scoring 9sec (from 13sec on IE)  Patient continues to benefit from skilled intervention to facilitate increased stability and mobility  Recommend continued PT per POC  Patient in agreement with treatment plan  Impairments: abnormal gait, activity intolerance, impaired balance, impaired physical strength, lacks appropriate home exercise program, safety issue, poor posture  and poor body mechanics  Understanding of Dx/Px/POC: good   Prognosis: good    Goals  Goals  STG 30 days    Patient will improve static balance with feet together Eyes Closed Firm surface  to 30 seconds indicating reduction in fall risk- MET  Patient will achieve 190 feet improvement with overall distance achieved of 1340 feet with 6 minute walk test which is Minimal Detectable Change pre current research standards with endurance to demonstrate enhance functional capacity  - mostly met   Patient will improve FGA scoring attaining 26/30 demonstrating increased stability for community mobility - MET  Patient will perform  5 x sit to stand test with overall reduction by 3 seconds to 10 4  score indicating improvement with functional endurance- Met and exceeded  Patient will be independent in general balance and strengthening program to promote increased overall stability- MET    LT days   Patient will be able to ambulate 1700 feet without AD during 6 minute walk test   Patient will be able to perform floor transfer without physical assistance   Patient will be able to carry objects without loss of balance  Patient will be able to ambulate outdoors without any loss of balance  Patient will independent in advanced gait and balance program to promote decrease loss of function    Patient will demonstrate independent postural correction to promote improved cervical motion    Cut off score   All date taken from APTA Neuro Section or Rehab Measures    LOYA test: 46/56                                              5 x STS Test:  MDC: 6 points                                                  MDC: 2 3 seconds   age norms                                                                 Age Norms   61-76 year old = M: 54, F: 55                        62-78 year old: 11 4 seconds   66-77 year old = M 47,  F: 50                       71-76 year old: 12 6 seconds    80-80 year old = M46,   F: 53                       80-80 year old: 14 8 seconds     TUG test:                                                                     10 Meter Walk Test:  MDC: 4 14 seconds       MDC:  59 ft/sec  Cut off score for Falls                                                  Age Norms  > 13 5 seconds community dwelling adults                20-29; M: 4 56 ft/sec F: 4 62 ft/sec  > 32 2 Frail Elderly                                                     30-39: M 4 76 ft/sec  F: 4 68 ft/sec          40-49: M: 4 79 ft/sec  F: 4 62 ft/sec  6 Minute Walk Test      50-59: M: 4 76 ft/sec  F: 4 56 ft/sec  MDC: 190 feet       60-69: M: 4 56 ft/sec  F: 4 26 ft/sec  Age Norms       70-+    M: 4 36 ft/sec  F: 4 16 ft/sec  60-69:    M: 1876 F: 1765  70-79:    M: 1729 F: 6036  90-11 +: M: 80 F; 1286     Plan  Patient would benefit from: skilled physical therapy  Planned modality interventions: biofeedback  Planned therapy interventions: manual therapy, motor coordination training, neuromuscular re-education, patient education, postural training, sensory integrative techniques, strengthening, stretching, therapeutic activities, therapeutic exercise, gait training, home exercise program, coordination and balance  Frequency: 2x week  Duration in weeks: 4  Treatment plan discussed with: patient        Subjective Evaluation    History of Present Illness  Mechanism of injury: Patient states he has noted increased heel pain associated with peripheral neuropathy, this improves with activity    He has also noted increased instability with near falls  He presently does not use a device for ambulation, and does notice increased catching of his left foot in gait, particularly with fatigue    Patient has no current complaints of near falls noted  He states he feels he is walking more and has noticed increased stride in his gait    Pain  Current pain ratin  At best pain ratin  At worst pain ratin  Location: Left heel   Quality: needle-like    Social Support  Steps to enter house: yes  2  Stairs in house: no   Lives in: multiple-level home  Lives with: spouse    Employment status: not working  Hand dominance: right    Patient Goals  Patient goals for therapy: improved balance  Patient goal: improved head motion, neck stiffness improved walking        Objective     Strength/Myotome Testing     Left Shoulder     Planes of Motion   Flexion: 4+   Abduction: 4+     Right Shoulder     Planes of Motion   Flexion: 4+   Abduction: 4+     Left Elbow   Flexion: 4+  Extension: 4+    Right Elbow   Flexion: 4+  Extension: 4+    Left Wrist/Hand   Wrist extension: 4+  Wrist flexion: 4+    Right Wrist/Hand   Wrist extension: 4+  Wrist flexion: 4+    Left Hip   Planes of Motion   Flexion: 5  Extension: 4  Abduction: 4    Right Hip   Planes of Motion   Flexion: 5  Extension: 4  Abduction: 4    Left Knee   Flexion: 4+  Extension: 4+    Right Knee   Flexion: 4+  Extension: 4+    Left Ankle/Foot   Dorsiflexion: 4  Plantar flexion: 4+    Right Ankle/Foot   Dorsiflexion: 4  Plantar flexion: 4+  Neuro Exam:     Sensation   Light touch LE: left impaired and right impaired    Coordination   Heel to shin: left WNL and right WNL  Finger to nose: left WNL and right WNL  Rapid alternating movements: UE WNL and LE impaired     Functional outcomes   Functional outcome gait comment: Abnormal gait pattern is evident with shuffling pattern, decreased toe clearance left more notable, decreased knee extension t/o gait cycle and increased forward trunk flexion  Increased narrow base of support is evident with slight scissoring pattern  Precautions: h/o tinnitus, h/o PD, h/o peripheral neuropathy,h/o HTN, B/L TKR  EPOC:  3/18/2022      TESTING 1/21 2/18           LOYA 51/56 52/56           TUG 8 07sec 7  8sec           5x sit to stand 13 4sec 9 04sec           FGA 22/30 26/30           6 min walk test 1150' 1330'           Neuro Re-Ed             hurdles             Step ups             Sidestepping Firm/foam             Wedge hold             4 square                                       Ther Ex             Cervical SNAGS             Cervical retraction             Knee extension stretch             Heel cord stretch             Wall extension stretch stance  10" x4           Wall thoracic rotation stretch  5x2           Walking with increased stride length  45' x4                        Ther Activity                                       Gait Training                                       Modalities

## 2022-02-22 ENCOUNTER — APPOINTMENT (OUTPATIENT)
Dept: PHYSICAL THERAPY | Facility: CLINIC | Age: 77
End: 2022-02-22
Payer: MEDICARE

## 2022-02-24 ENCOUNTER — OFFICE VISIT (OUTPATIENT)
Dept: PHYSICAL THERAPY | Facility: CLINIC | Age: 77
End: 2022-02-24
Payer: MEDICARE

## 2022-02-24 DIAGNOSIS — G20 PRIMARY PARKINSONISM (HCC): Primary | ICD-10-CM

## 2022-02-24 PROCEDURE — 97110 THERAPEUTIC EXERCISES: CPT

## 2022-02-24 PROCEDURE — 97112 NEUROMUSCULAR REEDUCATION: CPT

## 2022-02-24 NOTE — PROGRESS NOTES
Daily Note     Today's date: 2022  Patient name: Elizabeth Vázquez  : 1945  MRN: [id number]  Referring provider: Laurie Koenig MD  Dx:   Encounter Diagnosis     ICD-10-CM    1  Primary parkinsonism ([address] )  Haleigh Graves        Start Time: 1140  Stop Time: 1579  Total time in clinic (min): 48 minutes    Subjective: Pt arrives with postural correction brace  Nothing new to report  Objective: See treatment diary below      Assessment: Verbal cues for windmill stretch at the wall  Otherwise patient shows good carryover with all other stretching techniques  Introduced standing hamstring stretch at parallel bars which patient responds well to  Assisted with fitting of postural brace, and to titrate wearing time to build tolerance  Shows min sway with eyes open balance challenge on foam, mod sway with eyes closed  Shows good ability to maintain balance with dynamic movement, occasional need for stepping strategy  Improved balance with inclusion of blocking vision to watch feet during step up/down  Patient would benefit from continued PT      Plan: Continue per plan of care  Continue stretching/flexibility  Aerobic challenge with focus on knee ext in swing phase as able  Balance challenges  Follow up with postural brace       Precautions: h/o tinnitus, h/o PD, h/o peripheral neuropathy,h/o HTN, B/L TKR  EPOC:  3/18/2022      TESTING 1/21 1/25 1/28 2/1 2/4 2/8 2/11 2/15 2/24    LOYA 51/56            TUG 8 07sec            5x sit to stand 13 4sec            FGA             6 min walk test 1150'            Neuro Re-Ed             Side outs w/ scap retraction    10x2         Windmill rotation standing    5x2  On wall staggered stance 5x2, 2 sets On wall staggered stance 5x2, 2 sets On wall staggered stance 2x5 On wall staggered stance 2x6    hurdles  Low hurdles with foam 8 laps Solo step Hurdles with 2 foam cues for head/eye position & inc pacing 8 laps    Solo step  Hurdles with uneven mat and 6' step up cues for inc pacing 8 laps       CGA Hurdles 1 foam, foam beam side step 8 laps    Step ups         From foam 6' step x8     CGA x10 visuospatial challenge    Sidestepping Firm/foam  20ft x6 laps firm Foam beams 6 laps Firm with scap retraction 4 laps         Wedge hold      20" x4       4 square             Static balance  Firm HT/HN 30s x1 EC x1 30s    Foam HT/HN 30s x1  EC x1 30s Mod tandem ball toss x10 ea Partial tandem EO HT 5x2, continue to encourage   partial tandem EO HT 5x2  Mod tandem EO HT 10x2    Mod tandem EC 30' x2 ea Mod tandem EO 30' x1    30' x1 HT    EC 10" x2, 20" x1    Walking on treadmill with ball kicking     2' x2 3'  3' 2min 30' x2 1  5mph  3min x2 2 0mph    Walking with increased stride and knee extension     50' x4 50' x4 50' x4      Unilateral stance with weight shift      5x2       Hula step through      5ea  10 ea     Lunges   Lateral lunge with point/reach 2x10 ea          Ther Ex             Cervical SNAGS  x5 10'  10"x3ea 10" x3ea        Cervical retraction     5        Knee extension stretch  standing 2x 30s ea  standing 10" x4 seated 10" x4ea w/ 15# weight seated 10" x4ea, repeated supine on wall 10" seated 10" x4 ea, repeated on wall 10" Seated 30" x2  10lb standing //bar 20" x2    Heel cord stretch  2x 30s ea  10" x4ea    Standing wall 30' x2 ea    Slant board 20' x2 Standing step down 20" x2     Wall extension stretch stance    10" x2         Doorway shoulder stretch    10" x4         Strap seated thoracic extension stretch    10" x4         Postural correction     VM reviewed   Reviewed    Hip extension from supported stance      10x2       Prone lying       10" x2       Sitting forward reach floor to overhead       5x2      Sitting side sweep       5x2      Hip flexor stretch         @ wall 20" x1 ea    Sidlying thoracic rotation stretch    10" x4ea 10' x4ea  10" x4ea      Ther Activity                                       Gait Training                                       Modalities Leukotape "X" for postural correction     VM

## 2022-02-25 ENCOUNTER — OFFICE VISIT (OUTPATIENT)
Dept: PHYSICAL THERAPY | Facility: CLINIC | Age: 77
End: 2022-02-25
Payer: MEDICARE

## 2022-02-25 DIAGNOSIS — G20 PRIMARY PARKINSONISM (HCC): Primary | ICD-10-CM

## 2022-02-25 PROCEDURE — 97110 THERAPEUTIC EXERCISES: CPT

## 2022-02-25 PROCEDURE — 97112 NEUROMUSCULAR REEDUCATION: CPT

## 2022-02-25 NOTE — PROGRESS NOTES
Daily Note     Today's date: 2022  Patient name: Kinsey Tsang  : 1945  MRN: [id number]  Referring provider: Sergio Rolle MD  Dx:   Encounter Diagnosis     ICD-10-CM    1  Primary parkinsonism ([address] )  G20        Start Time: 1100  Stop Time: 1145  Total time in clinic (min): 45 minutes    Subjective: Pt noted that he is feeling pretty good  No soreness or fatigue from last visit the day prior  Objective: See treatment diary below      Assessment: Tolerated treatment well  Pt continues to report that the stretches have been reducing his cramps at night  He required cueing throughout session to perform tasks slowly to challenge balance  Patient demonstrated fatigue post treatment but was able to improve balance within tasks  Plan: Continue per plan of care        Precautions: h/o tinnitus, h/o PD, h/o peripheral neuropathy,h/o HTN, B/L TKR  EPOC:  3/18/2022      TESTING 1/21 1/25 1/28 2/1 2/4 2/8 2/11 2/15 2/24 2/25   LOYA 51/56            TUG 8 07sec            5x sit to stand 13 4sec            FGA             6 min walk test 1150'            Neuro Re-Ed             Side outs w/ scap retraction    10x2         Windmill rotation standing    5x2  On wall staggered stance 5x2, 2 sets On wall staggered stance 5x2, 2 sets On wall staggered stance 2x5 On wall staggered stance 2x6 On wall staggered stance 2x6    hurdles  Low hurdles with foam 8 laps Solo step Hurdles with 2 foam cues for head/eye position & inc pacing 8 laps    Solo step  Hurdles with uneven mat and 6' step up cues for inc pacing 8 laps       CGA Hurdles 1 foam, foam beam side step 8 laps Foam beam side steps 8 laps    Step ups         From foam 6' step x8     CGA x10 visuospatial challenge Step up and over with foam on either side x5    Sidestepping Firm/foam  20ft x6 laps firm Foam beams 6 laps Firm with scap retraction 4 laps         Wedge hold      20" x4       4 square             Static balance  Firm HT/HN 30s x1 EC x1 30s    Foam HT/HN 30s x1  EC x1 30s Mod tandem ball toss x10 ea Partial tandem EO HT 5x2, continue to encourage   partial tandem EO HT 5x2  Mod tandem EO HT 10x2    Mod tandem EC 30' x2 ea Mod tandem EO 30' x1    30' x1 HT    EC 10" x2, 20" x1 Mod tandem EC 30' x1    EC NBOS 30' x1    Walking on treadmill with ball kicking     2' x2 3'  3' 2min 30' x2 1  5mph  3min x2 2 0mph 3 min x1 2 0 mph   Walking with increased stride and knee extension     50' x4 50' x4 50' x4      Unilateral stance with weight shift      5x2       Hula step through      5ea  10 ea     Lunges   Lateral lunge with point/reach 2x10 ea          Ther Ex             Cervical SNAGS  x5 10'  10"x3ea 10" x3ea        Cervical retraction     5        Knee extension stretch  standing 2x 30s ea  standing 10" x4 seated 10" x4ea w/ 15# weight seated 10" x4ea, repeated supine on wall 10" seated 10" x4 ea, repeated on wall 10" Seated 30" x2  10lb standing //bar 20" x2 Seated 30"x4    Heel cord stretch  2x 30s ea  10" x4ea    Standing wall 30' x2 ea    Slant board 20' x2 Standing step down 20" x2  Standing wall 30" x2 ea   Wall extension stretch stance    10" x2         Doorway shoulder stretch    10" x4         Strap seated thoracic extension stretch    10" x4         Postural correction     VM reviewed   Reviewed    Hip extension from supported stance      10x2       Prone lying       10" x2       Sitting forward reach floor to overhead       5x2      Sitting side sweep       5x2      Hip flexor stretch         @ wall 20" x1 ea @ wall 30"x1 each    Sidlying thoracic rotation stretch    10" x4ea 10' x4ea  10" x4ea      Ther Activity                                       Gait Training                                       Modalities             Leukotape "X" for postural correction     VM

## 2022-03-01 ENCOUNTER — OFFICE VISIT (OUTPATIENT)
Dept: PHYSICAL THERAPY | Facility: CLINIC | Age: 77
End: 2022-03-01
Payer: MEDICARE

## 2022-03-01 DIAGNOSIS — G20 PRIMARY PARKINSONISM (HCC): Primary | ICD-10-CM

## 2022-03-01 PROCEDURE — 97112 NEUROMUSCULAR REEDUCATION: CPT

## 2022-03-01 NOTE — PROGRESS NOTES
Daily Note     Today's date: 3/1/2022  Patient name: Damian Negro  : 1945  MRN: [id number]  Referring provider: Minnie Coronel MD  Dx:   Encounter Diagnosis     ICD-10-CM    1  Primary parkinsonism ([address] )  [address]        Start Time: 1130  Stop Time: 1215  Total time in clinic (min): 45 minutes    Subjective:   Patient states no leg cramps for 2 weeks until last night, increased leg cramps noted  Objective: See treatment diary below      Assessment:   Introduced LSVT exercises with good tolerance, occasional cuing needed for sequencing and increased velocity  Added hip abduction exercises in Pilates to increase core control with good tolerance  Lateral plank attempted with difficulty performing  Focused on open chain strengthening to allow for increased strength and control  Patient demonstrates all exercises well and has been instructed to continue at home  Written instructions given  Plan: Continue per plan of care  Assess new HEP and advance as needed       Precautions: h/o tinnitus, h/o PD, h/o peripheral neuropathy,h/o HTN, B/L TKR  EPOC:  3/18/2022      TESTING 1/21 2/8 2/11 2/15 2/24 2/25 3/1   LOYA 51/56         TUG 8 07sec         5x sit to stand 13 4sec         FGA          6 min walk test 1150'         Neuro Re-Ed          Side outs w/ scap retraction          Windmill rotation standing  On wall staggered stance 5x2, 2 sets On wall staggered stance 5x2, 2 sets On wall staggered stance 2x5 On wall staggered stance 2x6 On wall staggered stance 2x6  On wall staggered stance 10x2   hurdles     CGA Hurdles 1 foam, foam beam side step 8 laps Foam beam side steps 8 laps     Step ups     From foam 6' step x8     CGA x10 visuospatial challenge Step up and over with foam on either side x5     Sidestepping Firm/foam          Wedge hold  20" x4        4 square          Static balance  partial tandem EO HT 5x2  Mod tandem EO HT 10x2    Mod tandem EC 30' x2 ea Mod tandem EO 30' x1    30' x1 HT    EC 10" x2, 20" x1 Mod tandem EC 30' x1    EC NBOS 30' x1     Walking on treadmill with ball kicking  3'  3' 2min 30' x2 1  5mph  3min x2 2 0mph 3 min x1 2 0 mph    Walking with increased stride and knee extension  50' x4 50' x4       Unilateral stance with weight shift  5x2        Hula step through  5ea  10 ea      Seated floor reach       10x2   Seated rotation stretch       10x2   Sit to stand       10x2   Standing twist       10x2   Alt LE/UE reach       10x2   Lat step outs       10x2   Standing step backs       10x2   Lunges          Ther Ex          Cervical SNAGS          Cervical retraction          Knee extension stretch  seated 10" x4ea, repeated supine on wall 10" seated 10" x4 ea, repeated on wall 10" Seated 30" x2  10lb standing //bar 20" x2 Seated 30"x4     Heel cord stretch    Standing wall 30' x2 ea    Slant board 20' x2 Standing step down 20" x2  Standing wall 30" x2 ea Standing wall 30" x2ea   Wall extension stretch stance          Doorway shoulder stretch          Strap seated thoracic extension stretch          Postural correction  reviewed   Reviewed     Hip extension from supported stance  10x2        Prone lying   10" x2        Sitting forward reach floor to overhead   5x2       Sitting side sweep   5x2       Hip flexor stretch     @ wall 20" x1 ea @ wall 30"x1 each     Lateral plank       attmmpetd 5x2   Sidelying hip circles       10x2   Sidelying hip flex/ext       10x2   Sidlying thoracic rotation stretch   10" x4ea       Ther Activity                              Gait Training                              Modalities          Leukotape "X" for postural correction

## 2022-03-01 NOTE — PROGRESS NOTES
3/2/2022    Nnai Blinks  1945  [id number]      Assessment  -BPH s/p TURP and cystolitholapaxy (2017)  -Nephrolithiasis     Discussion/Plan  Aleshia Carreno is a 65 y o  male being managed by our office    1  BPH s/p TURP and cystolitholapaxy (2017), nephrolithiasis- urine dip in the office today appears negative for infection or blood  He reports mild increase in urinary frequency  Patient otherwise asymptomatic  I recommend obtaining a CT renal stone study to further evaluate his intermittent right-sided flank pain  He has a known history of nephrolithiasis  We will call with his results  Reviewed dietary recommendations and encouraged patient to increase his water intake  ER precautions were discussed  Call with results of CT scan  He was instructed to call sooner with any issues       -All questions answered, patient agrees with plan      History of Present Illness  65 y o  male with a history of BPH and nephrolithiasis presents today for follow up  Patient last seen in the office in July 2021  He underwent TURP and cystolitholapaxy in 2017  Last imaging had identified bilateral nonobstructing renal calculi measuring up to 3mm in size  He reports symptoms of flank pain which began 8 weeks ago  Patient describes as an intermittent ache with increased urinary frequency  He has a longstanding history of renal calculi  He denies any gross hematuria or dysuria  Review of Systems  Review of Systems   Constitutional: Negative  HENT: Negative  Respiratory: Negative  Cardiovascular: Negative  Gastrointestinal: Negative  Genitourinary: Positive for frequency  Negative for decreased urine volume, difficulty urinating, dysuria, flank pain, hematuria and urgency  Musculoskeletal: Negative  Skin: Negative  Neurological: Negative  Psychiatric/Behavioral: Negative        AUA SYMPTOM SCORE      Most Recent Value   AUA SYMPTOM SCORE    How often have you had a sensation of not emptying your bladder completely after you finished urinating? 0   How often have you had to urinate again less than two hours after you finished urinating? 4   How often have you found you stopped and started again several times when you urinate? 0   How often have you found it difficult to postpone urination? 3   How often have you had a weak urinary stream? 1   How often have you had to push or strain to begin urination? 0   How many times did you most typically get up to urinate from the time you went to bed at night until the time you got up in the morning? 2   Quality of Life: If you were to spend the rest of your life with your urinary condition just the way it is now, how would you feel about that? 2   AUA SYMPTOM SCORE 10            Past Medical History  Past Medical History:   Diagnosis Date    Arthritis     Benign familial tremor     Cardiac disease     two cardiac stents    Chest pain     Coronary artery disease     Hyperlipidemia     Hypertension     Kidney disease     Kidney stone     Malignant melanoma of skin of chest [facility] [address] )     Removed 2 years ago   Meniscal injury     Nephrolithiasis     Parkinson's disease ([facility] [facility] )     Peripheral neuropathy     PONV (postoperative nausea and vomiting)     Tinnitus     [id number] RESOLVED       Past Social History  Past Surgical History:   Procedure Laterality Date    AMPUTATION Left     AMPUTATION OF FINGER WITH NEURECTOMY(EACH) DISTAL LONG  RING AND SMALL FINGER    CARPAL TUNNEL RELEASE Right     CATARACT EXTRACTION      CHOLECYSTECTOMY      CORONARY STENT PLACEMENT      two cardiac stents    FINGER AMPUTATION      AMPUTATION OF MIDDLE FINGER WITH NEURECTOMY(EACH)    HAND SURGERY Left     Traumatic amputation of fingers left hand      JOINT REPLACEMENT Bilateral     knees    KNEE ARTHROPLASTY      TOTAL    MALIGNANT SKIN LESION EXCISION      ANTERIOR CHEST FOR MELAMONIA    WV CYSTO/URETERO W/LITHOTRIPSY &INDWELL STENT INSRT Left 7/27/2017 Procedure: CYSTOSCOPY URETEROSCOPY , RETROGRADE PYELOGRAM AND INSERTION STENT URETERAL;  Surgeon: Osmin Mendoza MD;  [facility];  Service: Urology    NM LAP,CHOLECYSTECTOMY N/A 2/28/2018    Procedure: CHOLECYSTECTOMY LAPAROSCOPIC;  Surgeon: Messi Valdez MD;  [facility];  Service: General    NM REMOVE BLADDER STONE,<2 5 CM N/A 9/15/2017    Procedure: Pauleen Adjutant;  Surgeon: Jessenia Carrillo MD;  [facility];  Service: Urology    NM TRANSURETHRAL ELEC-SURG PROSTATECTOM N/A 9/15/2017    Procedure: BIPOLAR TRANSURETHRAL RESECTION OF PROSTATE (TURP); Surgeon: Jessenia Carrillo MD;  [facility];  Service: Urology    TONSILLECTOMY      TRIGGER FINGER RELEASE Right     TUMOR REMOVAL Left     Left foot 20 years ago   WISDOM TOOTH EXTRACTION Bilateral        Past Family History  Family History   Problem Relation Age of Onset    Heart disease Mother     Hypertension Mother     Stroke Mother     Breast cancer Mother     Heart disease Father     Hypertension Father     Stroke Father     Colon cancer Father     Diabetes Sister     Heart disease Brother     Other Family         BACK PAIN    Breast cancer Family     Heart disease Family     Hypertension Family     Stroke Family     Arthritis Family     Mental illness Neg Hx     Substance Abuse Neg Hx        Past Social history  Social History     Socioeconomic History    Marital status: /Civil Union     Spouse name: Not on file    Number of children: Not on file    Years of education: Not on file    Highest education level: Not on file   Occupational History    Not on file   Tobacco Use    Smoking status: Former Smoker     Types: Cigarettes    Smokeless tobacco: Never Used    Tobacco comment: back in college in 1966   Vaping Use    Vaping Use: Never used   Substance and Sexual Activity    Alcohol use:  Yes     Alcohol/week: 4 0 standard drinks     Types: 4 Cans of beer per week    Drug use: No    Sexual activity: Not on file   Other Topics Concern    Not on file   Social History Narrative    Lives with wife  Sees dentist reg  Has living will  Feels safe at home  No mental or sub in self  ACTIVE ADVANCE DIRECTIVE    CAREGIVER:  SPOUSE    EXERCISE HABITS  -  DAILY    GOOD DENTAL HYGIENE     Social Determinants of Health     Financial Resource Strain: Low Risk     Difficulty of Paying Living Expenses: Not hard at all   Food Insecurity: No Food Insecurity    Worried About Running Out of Food in the Last Year: Never true    [place] of Food in the Last Year: Never true   Transportation Needs: No Transportation Needs    Lack of Transportation (Medical): No    Lack of Transportation (Non-Medical):  No   Physical Activity: Inactive    Days of Exercise per Week: 0 days    Minutes of Exercise per Session: 0 min   Stress: No Stress Concern Present    Feeling of Stress : Not at all   Social Connections: Not on file   Intimate Partner Violence: Not At Risk    Fear of Current or Ex-Partner: No    Emotionally Abused: No    Physically Abused: No    Sexually Abused: No   Housing Stability: Not on file       Current Medications  Current Outpatient Medications   Medication Sig Dispense Refill    allopurinol (ZYLOPRIM) 300 mg tablet Take 1 tablet (300 mg total) by mouth daily 90 tablet 3    aspirin (ASPIRIN LOW DOSE) 81 MG tablet Take by mouth 1 daily       atorvastatin (LIPITOR) 20 mg tablet Take 1 tablet (20 mg total) by mouth daily 90 tablet 3    benazepril-hydrochlorthiazide (LOTENSIN HCT) 20-25 MG per tablet Take 1 tablet by mouth daily 90 tablet 1    Calcium Carbonate-Vitamin D (CALTRATE 600+D) 600-400 MG-UNIT per tablet Caltrate 600+D 600-400 MG-UNIT TABS  Take 1 tablet daily   Refills: 0      , M D ; Active      carbidopa-levodopa (SINEMET)  mg per tablet 2 tabs in the AM, 1 before lunch, 1 after lunch, 1 before dinner and 1 after dinner      Cholecalciferol (VITAMIN D-3) 1000 UNITS CAPS 1 daily      glucosamine-chondroitin 500-400 MG tablet Take 1 tablet by mouth daily      metoprolol succinate (TOPROL-XL) 50 mg 24 hr tablet TAKE 1 TABLET BY MOUTH EVERY DAY 90 tablet 1    Multiple Vitamin (MULTIVITAMINS PO) Multivitamins Oral once a day Capsule   Refills: 0      , M D ; Active      nitroglycerin (NITROSTAT) 0 4 mg SL tablet Place under the tongue (Patient not taking: Reported on 1/13/2022 )       No current facility-administered medications for this visit  Allergies  No Known Allergies    Past Medical History, Social History, Family History, medications and allergies were reviewed  Vitals  There were no vitals filed for this visit  Physical Exam  Physical Exam  Constitutional:       Appearance: Normal appearance  He is well-developed  HENT:      Head: Normocephalic  Eyes:      Pupils: Pupils are equal, round, and reactive to light  Pulmonary:      Effort: Pulmonary effort is normal    Abdominal:      Palpations: Abdomen is soft  Tenderness: There is no right CVA tenderness or left CVA tenderness  Musculoskeletal:         General: Normal range of motion  Cervical back: Normal range of motion  Skin:     General: Skin is warm and dry  Neurological:      General: No focal deficit present  Mental Status: He is alert and oriented to person, place, and time  Psychiatric:         Mood and Affect: Mood normal          Behavior: Behavior normal          Thought Content:  Thought content normal          Judgment: Judgment normal          Results    I have personally reviewed all pertinent lab results and reviewed with patient  Lab Results   Component Value Date    PSA 1 6 04/05/2017    PSA 1 4 10/06/2015     Lab Results   Component Value Date    GLUCOSE 95 10/06/2015    CALCIUM 9 0 01/14/2022     10/06/2015    K 3 7 01/14/2022    CO2 29 01/14/2022     01/14/2022    BUN 17 01/14/2022    CREATININE 1 05 01/14/2022     Lab Results   Component Value Date    WBC 6 52 03/10/2020    HGB 15 5 03/10/2020    HCT 46 2 03/10/2020    MCV 95 03/10/2020     03/10/2020     No results found for this or any previous visit (from the past 1 hour(s))

## 2022-03-02 ENCOUNTER — OFFICE VISIT (OUTPATIENT)
Dept: UROLOGY | Facility: HOSPITAL | Age: 77
End: 2022-03-02
Payer: MEDICARE

## 2022-03-02 VITALS
OXYGEN SATURATION: 98 % | SYSTOLIC BLOOD PRESSURE: 128 MMHG | HEIGHT: 73 IN | HEART RATE: 100 BPM | WEIGHT: 244 LBS | BODY MASS INDEX: 32.34 KG/M2 | DIASTOLIC BLOOD PRESSURE: 70 MMHG

## 2022-03-02 DIAGNOSIS — N40.1 BENIGN PROSTATIC HYPERPLASIA WITH LOWER URINARY TRACT SYMPTOMS, SYMPTOM DETAILS UNSPECIFIED: ICD-10-CM

## 2022-03-02 DIAGNOSIS — N20.0 NEPHROLITHIASIS: Primary | ICD-10-CM

## 2022-03-02 LAB
SL AMB  POCT GLUCOSE, UA: NORMAL
SL AMB LEUKOCYTE ESTERASE,UA: NORMAL
SL AMB POCT BILIRUBIN,UA: NORMAL
SL AMB POCT BLOOD,UA: NORMAL
SL AMB POCT CLARITY,UA: CLEAR
SL AMB POCT COLOR,UA: YELLOW
SL AMB POCT KETONES,UA: NORMAL
SL AMB POCT NITRITE,UA: NORMAL
SL AMB POCT PH,UA: 5
SL AMB POCT SPECIFIC GRAVITY,UA: 1.02
SL AMB POCT URINE PROTEIN: NORMAL
SL AMB POCT UROBILINOGEN: 0.2

## 2022-03-02 PROCEDURE — 99213 OFFICE O/P EST LOW 20 MIN: CPT | Performed by: NURSE PRACTITIONER

## 2022-03-02 PROCEDURE — 81002 URINALYSIS NONAUTO W/O SCOPE: CPT | Performed by: NURSE PRACTITIONER

## 2022-03-02 RX ORDER — DOCUSATE SODIUM 100 MG/1
CAPSULE, LIQUID FILLED ORAL
COMMUNITY

## 2022-03-02 RX ORDER — CLINDAMYCIN HYDROCHLORIDE 300 MG/1
CAPSULE ORAL
COMMUNITY
Start: 2021-12-29

## 2022-03-02 RX ORDER — YEAST,DRIED (S. CEREVISIAE)
POWDER (GRAM) ORAL DAILY
COMMUNITY

## 2022-03-04 ENCOUNTER — OFFICE VISIT (OUTPATIENT)
Dept: PHYSICAL THERAPY | Facility: CLINIC | Age: 77
End: 2022-03-04
Payer: MEDICARE

## 2022-03-04 DIAGNOSIS — G20 PRIMARY PARKINSONISM (HCC): Primary | ICD-10-CM

## 2022-03-04 PROCEDURE — 97110 THERAPEUTIC EXERCISES: CPT

## 2022-03-04 PROCEDURE — 97112 NEUROMUSCULAR REEDUCATION: CPT

## 2022-03-04 NOTE — PROGRESS NOTES
Daily Note     Today's date: 3/4/2022  Patient name: Erasmo Fan  : 1945  MRN: [id number]  Referring provider: Mikey Antunez MD  Dx:   Encounter Diagnosis     ICD-10-CM    1  Primary parkinsonism ([address] )  G20        Start Time: 1100  Stop Time: 1145  Total time in clinic (min): 45 minutes    Subjective:   Patient states leg cramps last night with difficulty sleeping  He indicates he was doing well until last night  No present complaints  Objective: See treatment diary below      Assessment:   Reviewed new HEP with focus on increased velocity and amplitude  Difficulty continues to be noted with weight shift in thoracic rotation, alt LE/UE stepping forward and stepping back with intermittent LOB noted- patient is able to self-correct  Recommend performing exercises in carpenter for increased safety  Reviewed mat exercises with difficulty performing leg circles and hip flex/ext without full knee extension noted  Recommended use of mirror to allow for better feedback  New strap stretch for hamstring and ITB stretch with good performance  Patient instructed to continue at home and voiced  Understanding with written instructions given  Plan: Continue per plan of care  No complaints post treatment  Continue to encourage increased amplitude of motion and postural correction to allow for increased cervical retraction and extension       Precautions: h/o tinnitus, h/o PD, h/o peripheral neuropathy,h/o HTN, B/L TKR  EPOC:  3/18/2022      TESTING 1/21 2/15 2/24 2/25 3/1 3/4   LOYA 51/56        TUG 8 07sec        5x sit to stand 13 4sec        FGA         6 min walk test 1150'        Neuro Re-Ed         Side outs w/ scap retraction         Windmill rotation standing  On wall staggered stance 2x5 On wall staggered stance 2x6 On wall staggered stance 2x6  On wall staggered stance 10x2 On wall staggered stance 5x2   hurdles   CGA Hurdles 1 foam, foam beam side step 8 laps Foam beam side steps 8 laps Step ups   From foam 6' step x8     CGA x10 visuospatial challenge Step up and over with foam on either side x5      Sidestepping Firm/foam         Wedge hold         4 square         Static balance  Mod tandem EO HT 10x2    Mod tandem EC 30' x2 ea Mod tandem EO 30' x1    30' x1 HT    EC 10" x2, 20" x1 Mod tandem EC 30' x1    EC NBOS 30' x1      Walking on treadmill with ball kicking  2min 30' x2 1  5mph  3min x2 2 0mph 3 min x1 2 0 mph     Walking with increased stride and knee extension         Unilateral stance with weight shift         Hula step through  10 ea       Seated floor reach     10x2 10x2   Seated rotation stretch     10x2 10x2   Sit to stand     10x2 10x2   Standing twist     10x2 10x2   Alt LE/UE reach     10x2 10x2   Lat step outs     10x2 10x2   Standing step backs     10x2 10x2   Lunges         Ther Ex         Cervical SNAGS      reveiwed   Strap hamstring stretch 2 way      10" x3ea   Cervical retraction      5   Knee extension stretch  Seated 30" x2  10lb standing //bar 20" x2 Seated 30"x4   Prone lying 5' with isometric knee extension   Heel cord stretch  Standing wall 30' x2 ea    Slant board 20' x2 Standing step down 20" x2  Standing wall 30" x2 ea Standing wall 30" x2ea    Wall extension stretch stance         Doorway shoulder stretch         Strap seated thoracic extension stretch         Postural correction   Reviewed      Hip extension from supported stance         Prone lying          Sitting forward reach floor to overhead         Sitting side sweep         Hip flexor stretch   @ wall 20" x1 ea @ wall 30"x1 each      Lateral plank     attmmpetd 5x2    Sidelying hip circles     10x2 10x2   Sidelying hip flex/ext     10x2 10x2   Sidlying thoracic rotation stretch      reviewed   Ther Activity                           Gait Training                           Modalities         Leukotape "X" for postural correction

## 2022-03-08 ENCOUNTER — OFFICE VISIT (OUTPATIENT)
Dept: PHYSICAL THERAPY | Facility: CLINIC | Age: 77
End: 2022-03-08
Payer: MEDICARE

## 2022-03-08 DIAGNOSIS — G20 PRIMARY PARKINSONISM (HCC): Primary | ICD-10-CM

## 2022-03-08 PROCEDURE — 97112 NEUROMUSCULAR REEDUCATION: CPT

## 2022-03-08 PROCEDURE — 97110 THERAPEUTIC EXERCISES: CPT

## 2022-03-08 NOTE — PROGRESS NOTES
Daily Note     Today's date: 3/8/2022  Patient name: Elizabeth Vázquez  : 1945  MRN: [id number]  Referring provider: Laurie Koenig MD  Dx:   Encounter Diagnosis     ICD-10-CM    1  Primary parkinsonism ([address] )  Haleigh Graves        Start Time:   Stop Time: 1230  Total time in clinic (min): 45 minutes    Subjective:   Patient states no leg cramps noted since last visit  Objective: See treatment diary below      Assessment:   Reviewed large amplitude exercises with nice gains in stepping out with control, cuing to push back with improved posterior stepping noted  Hip circles with difficulty maintaining hip abduction  Added hip iliopsoas stretch both in iva test and seated position to allow for improved flexibility  Patient continues to use postural strap intermittently at home to assist with increased trunk extension  Patient progressing nicely towards goals  Plan: Continue per plan of care  No complaints post treatment  Continue to encourage increased amplitude of motion and postural correction to allow for increased cervical retraction and extension       Precautions: h/o tinnitus, h/o PD, h/o peripheral neuropathy,h/o HTN, B/L TKR  EPOC:  3/18/2022      TESTING 1/21 2/24 2/25 3/1 3/4 3/8   LOYA 51/56        TUG 8 07sec        5x sit to stand 13 4sec        FGA         6 min walk test 1150'        Neuro Re-Ed         Side outs w/ scap retraction         Windmill rotation standing  On wall staggered stance 2x6 On wall staggered stance 2x6  On wall staggered stance 10x2 On wall staggered stance 5x2    hurdles  CGA Hurdles 1 foam, foam beam side step 8 laps Foam beam side steps 8 laps       Step ups  From foam 6' step x8     CGA x10 visuospatial challenge Step up and over with foam on either side x5       Sidestepping Firm/foam         Wedge hold         4 square         Static balance  Mod tandem EO 30' x1    30' x1 HT    EC 10" x2, 20" x1 Mod tandem EC 30' x1    EC NBOS 30' x1       Walking on treadmill with ball kicking  3min x2 2 0mph 3 min x1 2 0 mph      Walking with increased stride and knee extension         Unilateral stance with weight shift         Hula step through         Seated floor reach    10x2 10x2 10x2   Seated rotation stretch    10x2 10x2 5x2, 2 sets   Sit to stand    10x2 10x2    Standing twist    10x2 10x2 5x2, 2 sets   Alt LE/UE reach    10x2 10x2 5x2, 2 sets   Lat step outs    10x2 10x2 10x2   Standing step backs    10x2 10x2 5x2, 2 sets   Lunges         Ther Ex         Cervical SNAGS     reveiwed    Strap hamstring stretch 2 way     10" x3ea    Cervical retraction     5 5 on wall   Knee extension stretch  standing //bar 20" x2 Seated 30"x4   Prone lying 5' with isometric knee extension    Heel cord stretch  Standing step down 20" x2  Standing wall 30" x2 ea Standing wall 30" x2ea     Wall extension stretch stance         Doorway shoulder stretch         Strap seated thoracic extension stretch         Postural correction  Reviewed       Hip extension from supported stance         Prone lying          Sitting forward reach floor to overhead         Sitting side sweep         Hip flexor stretch  @ wall 20" x1 ea @ wall 30"x1 each       Lateral plank    attmmpetd 5x2     Sidelying hip circles    10x2 10x2    Sidelying hip flex/ext    10x2 10x2    Sidlying thoracic rotation stretch     reviewed    Ther Exercise         Iliopsoas stretch iva test      10" x4ea   Seated iliopsoas stretch      10" x4ea   Gait Training                           Modalities         Leukotape "X" for postural correction

## 2022-03-11 ENCOUNTER — HOSPITAL ENCOUNTER (OUTPATIENT)
Dept: CT IMAGING | Facility: HOSPITAL | Age: 77
Discharge: HOME | End: 2022-03-11
Payer: MEDICARE

## 2022-03-11 ENCOUNTER — APPOINTMENT (OUTPATIENT)
Dept: PHYSICAL THERAPY | Facility: CLINIC | Age: 77
End: 2022-03-11
Payer: MEDICARE

## 2022-03-11 DIAGNOSIS — N20.0 NEPHROLITHIASIS: ICD-10-CM

## 2022-03-11 PROCEDURE — G1004 CDSM NDSC: HCPCS

## 2022-03-11 PROCEDURE — 74176 CT ABD & PELVIS W/O CONTRAST: CPT

## 2022-03-15 ENCOUNTER — OFFICE VISIT (OUTPATIENT)
Dept: PHYSICAL THERAPY | Facility: CLINIC | Age: 77
End: 2022-03-15
Payer: MEDICARE

## 2022-03-15 DIAGNOSIS — G20 PRIMARY PARKINSONISM (HCC): Primary | ICD-10-CM

## 2022-03-15 PROCEDURE — 97112 NEUROMUSCULAR REEDUCATION: CPT | Performed by: PHYSICAL THERAPY ASSISTANT

## 2022-03-15 PROCEDURE — 97110 THERAPEUTIC EXERCISES: CPT | Performed by: PHYSICAL THERAPY ASSISTANT

## 2022-03-15 NOTE — PROGRESS NOTES
Daily Note     Today's date: 3/15/2022  Patient name: Bárbara Alan  : 1945  MRN: [id number]  Referring provider: Clarence Collado MD  Dx:   Encounter Diagnosis     ICD-10-CM    1  Primary parkinsonism ([address] )  G20                   Subjective:  Pt reports he is doing well  State he continues to have intermittent leg cramps at night  Objective: See treatment diary below      Assessment:   Reviewed entire HEP per pt request with pt demonstrating good independence with most exercises  Verbal and visual cues required for proper hand placement with cervical snags  Hip circles with difficulty maintaining hip abduction  Patient wearing postural strap this session which limits flexibility with some exercises  Patient progressing nicely towards goals  Plan: Continue per plan of care          Precautions: h/o tinnitus, h/o PD, h/o peripheral neuropathy,h/o HTN, B/L TKR  EPOC:  3/18/2022      TESTING 1/21 2/24 2/25 3/1 3/4 3/8 3/15   LOYA 51/56         TUG 8 07sec         5x sit to stand 13 4sec         FGA          6 min walk test 1150'         Neuro Re-Ed          Side outs w/ scap retraction          Windmill rotation standing  On wall staggered stance 2x6 On wall staggered stance 2x6  On wall staggered stance 10x2 On wall staggered stance 5x2  On wall staggered stance  5x2   hurdles  CGA Hurdles 1 foam, foam beam side step 8 laps Foam beam side steps 8 laps        Step ups  From foam 6' step x8     CGA x10 visuospatial challenge Step up and over with foam on either side x5        Sidestepping Firm/foam          Wedge hold          4 square          Static balance  Mod tandem EO 30' x1    30' x1 HT    EC 10" x2, 20" x1 Mod tandem EC 30' x1    EC NBOS 30' x1        Walking on treadmill with ball kicking  3min x2 2 0mph 3 min x1 2 0 mph       Walking with increased stride and knee extension          Unilateral stance with weight shift          Hula step through          Seated floor reach    10x2 10x2 10x2 10x2   Seated rotation stretch    10x2 10x2 5x2, 2 sets 5x2 2 sets   Sit to stand    10x2 10x2     Standing twist    10x2 10x2 5x2, 2 sets 5x2 2 sets   Alt LE/UE reach    10x2 10x2 5x2, 2 sets 5x2  2 sets   Lat step outs    10x2 10x2 10x2 10x2   Standing step backs    10x2 10x2 5x2, 2 sets 5x2 2 sets   Lunges          Ther Ex          Cervical SNAGS     reveiwed  x5 ea   Strap hamstring stretch 2 way     10" x3ea  10"x3 ea   Cervical retraction     5 5 on wall x5 on wall   Knee extension stretch  standing //bar 20" x2 Seated 30"x4   Prone lying 5' with isometric knee extension     Heel cord stretch  Standing step down 20" x2  Standing wall 30" x2 ea Standing wall 30" x2ea   30"x3   Wall extension stretch stance          Doorway shoulder stretch          Strap seated thoracic extension stretch          Postural correction  Reviewed        Hip extension from supported stance          Prone lying           Sitting forward reach floor to overhead          Sitting side sweep          Hip flexor stretch  @ wall 20" x1 ea @ wall 30"x1 each        Lateral plank    attmmpetd 5x2      Sidelying hip circles    10x2 10x2  10x2   Sidelying hip flex/ext    10x2 10x2  10x2   Sidlying thoracic rotation stretch     reviewed     Ther Exercise          Iliopsoas stretch iva test      10" x4ea reviewed   Seated iliopsoas stretch      10" x4ea reviewed   Gait Training                              Modalities          Leukotape "X" for postural correction

## 2022-03-17 ENCOUNTER — TELEPHONE (OUTPATIENT)
Dept: UROLOGY | Facility: AMBULATORY SURGERY CENTER | Age: 77
End: 2022-03-17

## 2022-03-17 NOTE — TELEPHONE ENCOUNTER
Called and provided patient with results of CT scan as noted by the [facility]  Advised to increase his water intake

## 2022-03-17 NOTE — TELEPHONE ENCOUNTER
Please inform patient results of CT scan identified nonobstructing bilateral renal calculi  Stone burden appears unchanged compared to prior imaging in 2021

## 2022-03-18 ENCOUNTER — APPOINTMENT (OUTPATIENT)
Dept: PHYSICAL THERAPY | Facility: CLINIC | Age: 77
End: 2022-03-18
Payer: MEDICARE

## 2022-03-22 ENCOUNTER — OFFICE VISIT (OUTPATIENT)
Dept: PHYSICAL THERAPY | Facility: CLINIC | Age: 77
End: 2022-03-22
Payer: MEDICARE

## 2022-03-22 DIAGNOSIS — G20 PRIMARY PARKINSONISM (HCC): Primary | ICD-10-CM

## 2022-03-22 PROCEDURE — 97112 NEUROMUSCULAR REEDUCATION: CPT

## 2022-03-22 PROCEDURE — 97110 THERAPEUTIC EXERCISES: CPT

## 2022-03-22 NOTE — PROGRESS NOTES
PT DISCHARGE / DAILY NOTE    Today's date: 3/22/2022  Patient name: Maureen Mckeon  : 1945  MRN: [id number]  Referring provider: Mateus Cortez MD  Dx:   Encounter Diagnosis     ICD-10-CM    1  Primary parkinsonism ([address] )  Melissa Harris        Start Time: 315  Stop Time: 0405  Total time in clinic (min): 50 minutes    Assessment  Assessment details: Patient presents to skilled PT for Parkinsons  Patient reports near falls occurring several times a Week Patient displays decreased muscle strength in hip extensors and abductors with overall grade of 4/5  Patient sensation is Abnormal throughout lower extremities, diminished B/L feet  Patient coordination is Normal per alterate toe tapping and heel to shin test    Patient balance scores are as follows: 51/56 LOYA, 8 07 seconds TUG without Assistive Device with overall results noting only slight limitations  Patient endurance scores are as follows; 1150 feet with  6 minute walk test without  ( Device ), 13 4 seconds with 5 x sit to stand test with overall results noting decrease functional endurance  Patient displays overall reduction in somatosensory/ proprioception awareness secondary to increased loss of stability on compliant surfaces in static holding  Patient subjective report notes the following functional limitation with gait and transitions at home  Significant limitations are also evident in cervical ROM and knee extension B/L  Jono Manifold Patient will benefit from skilled PT to address noted impairments and functional limitations they are causing with overall goal to return patient to highest level possible with reduced risk for falls  Please contact me if you have any questions or recommendations  Thank you for the referral and the opportunity to share in [facility]  Patient verbalized understanding of POC      Patient has made nice gains with PT increasing scoring on LOYA 20/56 (from 51/56) and FGA scoring 26/30 (from 30)    Endurance scoring has also substantially improved scoring 1330' on 6 min walk test (from 1150') and 5x sit to stand scoring 9sec (from 13sec on IE)  Patient continues to benefit from skilled intervention to facilitate increased stability and mobility  Recommend continued PT per POC  Patient in agreement with treatment plan  3/22  Patient has achieved maximal gains from PT at this time  He is independent in HEP for large amplitude exercises and balance challenges  Proximal strengthening and cervical retraction in supine reviewed to ensure correct performance and maximal stretch  Minimal changes were noted in LOYA, FGA and endurance testing  He is in agreement with discharge from PT at this time with transition to independent HEP  He has been encouraged to contact PT with any further questions or concerns and voiced understanding to all instructions               Understanding of Dx/Px/POC: good   Prognosis: good    Goals  Goals  STG 30 days    Patient will improve static balance with feet together Eyes Closed Firm surface  to 30 seconds indicating reduction in fall risk- MET  Patient will achieve 190 feet improvement with overall distance achieved of 1340 feet with 6 minute walk test which is Minimal Detectable Change pre current research standards with endurance to demonstrate enhance functional capacity  - mostly met   Patient will improve FGA scoring attaining  demonstrating increased stability for community mobility - MET  Patient will perform  5 x sit to stand test with overall reduction by 3 seconds to 10 4  score indicating improvement with functional endurance- Met and exceeded  Patient will be independent in general balance and strengthening program to promote increased overall stability- MET    LT days   Patient will be able to ambulate 1700 feet without AD during 6 minute walk test - not met  Patient will be able to perform floor transfer without physical assistance - ET  Patient will be able to carry objects without loss of balance-MET  Patient will be able to ambulate outdoors without any loss of balance- MET  Patient will independent in advanced gait and balance program to promote decrease loss of function - MET  Patient will demonstrate independent postural correction to promote improved cervical motion- MET    Cut off score   All date taken from [facility] Neuro Section or Rehab Measures    LOYA test: 46/56                                              5 x STS Test:  MDC: 6 points                                                  MDC: 2 3 seconds   age norms                                                                 Age Norms   61-76 year old = M: 54, F: 54                        61-76 year old: 11 4 seconds   66-77 year old = M 47,  F: 48                       66-77 year old: 12 6 seconds    80-80 year old = M46,   F: 53                       80-80 year old: 14 8 seconds     TUG test:                                                                     10 Meter Walk Test:  MDC: 4 14 seconds       MDC:  59 ft/sec  Cut off score for Falls                                                  Age Norms  > 13 5 seconds community dwelling adults                20-29; M: 4 56 ft/sec F: 4 62 ft/sec  > 32 2 Frail Elderly                                                     30-39: M 4 76 ft/sec  F: 4 68 ft/sec          40-49: M: 4 79 ft/sec  F: 4 62 ft/sec  6 Minute Walk Test      50-59: M: 4 76 ft/sec  F: 4 56 ft/sec  MDC: 190 feet       60-69: M: 4 56 ft/sec  F: 4 26 ft/sec  Age Norms       70-+    M: 4 36 ft/sec  F: 4 16 ft/sec  60-69:    M: 1876 F: 1765  70-79:    M: 1729 F: 1545  80-89 +: M: 8566 F; 1286     Plan  Treatment plan discussed with: patient        Subjective Evaluation    History of Present Illness  Mechanism of injury: Patient states he has noted increased heel pain associated with peripheral neuropathy, this improves with activity  He has also noted increased instability with near falls    He presently does not use a device for ambulation, and does notice increased catching of his left foot in gait, particularly with fatigue    Patient has no current complaints of near falls noted  He states he feels he is walking more and has noticed increased stride in his gait  3/22  Patient states he has been performing his HEP consistently and now feels secure in safety measures to continues to challenge balance at home using wall support for safety  He has no current complaints of pain    Pain  Current pain ratin  At best pain ratin  At worst pain ratin  Location: Left heel   Quality: needle-like    Social Support  Steps to enter house: yes  2  Stairs in house: no   Lives in: multiple-level home  Lives with: spouse    Employment status: not working  Hand dominance: right    Patient Goals  Patient goals for therapy: improved balance  Patient goal: improved head motion, neck stiffness improved walking        Objective     Strength/Myotome Testing     Left Shoulder     Planes of Motion   Flexion: 4+   Abduction: 4+     Right Shoulder     Planes of Motion   Flexion: 4+   Abduction: 4+     Left Elbow   Flexion: 4+  Extension: 4+    Right Elbow   Flexion: 4+  Extension: 4+    Left Wrist/Hand   Wrist extension: 4+  Wrist flexion: 4+    Right Wrist/Hand   Wrist extension: 4+  Wrist flexion: 4+    Left Hip   Planes of Motion   Flexion: 5  Extension: 4+  Abduction: 4+    Right Hip   Planes of Motion   Flexion: 5  Extension: 4+  Abduction: 4+    Left Knee   Flexion: 4+  Extension: 4+    Right Knee   Flexion: 4+  Extension: 4+    Left Ankle/Foot   Dorsiflexion: 4  Plantar flexion: 4+    Right Ankle/Foot   Dorsiflexion: 4  Plantar flexion: 4+  Neuro Exam:     Sensation   Light touch LE: left impaired and right impaired    Coordination   Heel to shin: left WNL and right WNL  Finger to nose: left WNL and right WNL  Rapid alternating movements: UE WNL and LE impaired     Functional outcomes   Functional outcome gait comment: Abnormal gait pattern is evident with shuffling pattern, decreased toe clearance left more notable, decreased knee extension t/o gait cycle and increased forward trunk flexion  Increased narrow base of support is evident with slight scissoring pattern  3/22  Improved alvin with increased stride length noted  Cuing to increase knee extension continues to be needed, improved base of support evident with decreased scissoring noted  Precautions: h/o tinnitus, h/o PD, h/o peripheral neuropathy,h/o HTN, B/L TKR  EPOC:  3/18/2022      TESTING 1/21 2/18 3/22          LOYA 51/56 52/56 52/56          TUG 8 07sec 7  8sec 7 5sec          5x sit to stand 13 4sec 9 04sec 9sec          FGA 22/30 26/30 26/30          6 min walk test 1150' 1330' NT          Neuro Re-Ed             hurdles             Step ups             Sidestepping Firm/foam             Wedge hold             4 square             Big amplitude movement combinations   10ea                       Ther Ex             Cervical SNAGS   reviewed          Cervical retraction             Knee extension stretch   10" x4ea          Heel cord stretch   10" x4ea          Wall extension stretch stance  10" x4           Wall thoracic rotation stretch  5x2 5x2          Walking with increased stride length  45' x4 48'x2          Hip flex/ext pilates   5x2          Hip CW/CCW circles sidleying   5x2                       Ther Activity                                       Gait Training                                       Modalities

## 2022-04-15 DIAGNOSIS — I25.10 CORONARY ARTERY DISEASE INVOLVING NATIVE CORONARY ARTERY OF NATIVE HEART WITHOUT ANGINA PECTORIS: ICD-10-CM

## 2022-04-15 RX ORDER — ATORVASTATIN CALCIUM 20 MG/1
TABLET, FILM COATED ORAL
Qty: 90 TABLET | Refills: 3 | Status: SHIPPED | OUTPATIENT
Start: 2022-04-15

## 2022-04-17 DIAGNOSIS — I10 ESSENTIAL HYPERTENSION: ICD-10-CM

## 2022-04-17 RX ORDER — BENAZEPRIL/HYDROCHLOROTHIAZIDE 20 MG-25MG
TABLET ORAL
Qty: 90 TABLET | Refills: 1 | Status: SHIPPED | OUTPATIENT
Start: 2022-04-17

## 2022-04-28 ENCOUNTER — OFFICE VISIT (OUTPATIENT)
Dept: FAMILY MEDICINE CLINIC | Facility: HOSPITAL | Age: 77
End: 2022-04-28
Payer: MEDICARE

## 2022-04-28 VITALS
OXYGEN SATURATION: 95 % | DIASTOLIC BLOOD PRESSURE: 68 MMHG | HEIGHT: 73 IN | HEART RATE: 83 BPM | WEIGHT: 242 LBS | SYSTOLIC BLOOD PRESSURE: 132 MMHG | BODY MASS INDEX: 32.07 KG/M2 | RESPIRATION RATE: 16 BRPM

## 2022-04-28 DIAGNOSIS — Z00.00 MEDICARE ANNUAL WELLNESS VISIT, SUBSEQUENT: ICD-10-CM

## 2022-04-28 DIAGNOSIS — I25.10 CORONARY ARTERY DISEASE INVOLVING NATIVE CORONARY ARTERY OF NATIVE HEART WITHOUT ANGINA PECTORIS: ICD-10-CM

## 2022-04-28 DIAGNOSIS — R25.2 LEG CRAMPS: ICD-10-CM

## 2022-04-28 DIAGNOSIS — G20 PRIMARY PARKINSONISM (HCC): Primary | ICD-10-CM

## 2022-04-28 PROCEDURE — 99213 OFFICE O/P EST LOW 20 MIN: CPT | Performed by: INTERNAL MEDICINE

## 2022-04-28 PROCEDURE — G0439 PPPS, SUBSEQ VISIT: HCPCS | Performed by: INTERNAL MEDICINE

## 2022-04-28 NOTE — ASSESSMENT & PLAN NOTE
Patient has coronary artery disease  He denies chest pain is typical of angina  At times he has chest pains with radiation to the back if he sits for a long period of time and he stands up and moves around in certain directions  This is noncardiac chest pain  He denies nausea, dysphagia  Patient is physically active and he tries to walk at least 0 5 miles a day on a treadmill  He denies any chest pain or shortness of breath when walking on a treadmill or when he walks down a flight of stairs to his cellar and back  His blood sugar was normal in January and his cholesterol was were normal in January  We reviewed labs    He will continue aspirin, atorvastatin, Toprol

## 2022-04-28 NOTE — PROGRESS NOTES
Assessment and Plan:     Problem List Items Addressed This Visit     None      Visit Diagnoses     Medicare annual wellness visit, subsequent    -  Primary          Depression Screening and Follow-up Plan: Patient was screened for depression during today's encounter  They screened negative with a PHQ-2 score of 0  Preventive health issues were discussed with patient, and age appropriate screening tests were ordered as noted in patient's After Visit Summary  Personalized health advice and appropriate referrals for health education or preventive services given if needed, as noted in patient's After Visit Summary  History of Present Illness:     Patient presents for Welcome to Medicare visit  Patient Care Team:  Cecy Lanza MD as PCP - General  MD Luis Daniel Lackey MD Versa Plume, DO Dierdre Pester, MD Drake Mons, CRNP     Review of Systems:     Review of Systems   Problem List:     Patient Active Problem List   Diagnosis    Essential hypertension    [facility] parkinsonism [facility])    Coronary artery disease involving native coronary artery of native heart without angina pectoris    Degeneration, intervertebral disc, lumbosacral    Gastroesophageal reflux disease without esophagitis    Gout    Idiopathic peripheral neuropathy    Nephrolithiasis    Osteoarthritis of knee    Hypercholesterolemia    Pharyngeal dysphagia    Other insomnia    Leg cramps    Benign prostatic hyperplasia with weak urinary stream      Past Medical and Surgical History:     Past Medical History:   Diagnosis Date    Arthritis     Benign familial tremor     Cardiac disease     two cardiac stents    Chest pain     Coronary artery disease     Hyperlipidemia     Hypertension     Kidney disease     Kidney stone     Malignant melanoma of skin of chest ([facility] [address] )     Removed 2 years ago      Meniscal injury     Nephrolithiasis     Parkinson's disease ([facility] [address] )     Peripheral neuropathy     PONV (postoperative nausea and vomiting)     Tinnitus     [id number] RESOLVED     Past Surgical History:   Procedure Laterality Date    AMPUTATION Left     AMPUTATION OF FINGER WITH NEURECTOMY(EACH) DISTAL LONG  RING AND SMALL FINGER    CARPAL TUNNEL RELEASE Right     CATARACT EXTRACTION      CHOLECYSTECTOMY      CORONARY STENT PLACEMENT      two cardiac stents    FINGER AMPUTATION      AMPUTATION OF MIDDLE FINGER WITH NEURECTOMY(EACH)    HAND SURGERY Left     Traumatic amputation of fingers left hand   JOINT REPLACEMENT Bilateral     knees    KNEE ARTHROPLASTY      TOTAL    MALIGNANT SKIN LESION EXCISION      ANTERIOR CHEST FOR MELAMONIA    AZ CYSTO/URETERO W/LITHOTRIPSY &INDWELL STENT INSRT Left 7/27/2017    Procedure: CYSTOSCOPY URETEROSCOPY , RETROGRADE PYELOGRAM AND INSERTION STENT URETERAL;  Surgeon: Tami Alvarez MD;  [facility];  Service: Urology    AZ LAP,CHOLECYSTECTOMY N/A 2/28/2018    Procedure: CHOLECYSTECTOMY LAPAROSCOPIC;  Surgeon: Nirali Eller MD;  [facility];  Service: General    AZ REMOVE BLADDER STONE,<2 5 CM N/A 9/15/2017    Procedure: Mickie Cook;  Surgeon: Ann Marie Pena MD;  [facility];  Service: Urology    AZ TRANSURETHRAL ELEC-SURG PROSTATECTOM N/A 9/15/2017    Procedure: BIPOLAR TRANSURETHRAL RESECTION OF PROSTATE (TURP); Surgeon: Ann Marie Pena MD;  [facility];  Service: Urology    TONSILLECTOMY      TRIGGER FINGER RELEASE Right     TUMOR REMOVAL Left     Left foot 20 years ago      WISDOM TOOTH EXTRACTION Bilateral       Family History:     Family History   Problem Relation Age of Onset    Heart disease Mother     Hypertension Mother     Stroke Mother     Breast cancer Mother     Heart disease Father     Hypertension Father     Stroke Father     Colon cancer Father     Diabetes Sister     Heart disease Brother     Other Family         BACK PAIN    Breast cancer Family     Heart disease Family  Hypertension Family     Stroke Family     Arthritis Family     Mental illness Neg Hx     Substance Abuse Neg Hx       Social History:     Social History     Socioeconomic History    Marital status: /Civil Union     Spouse name: None    Number of children: None    Years of education: None    Highest education level: None   Occupational History    None   Tobacco Use    Smoking status: Former Smoker     Types: Cigarettes    Smokeless tobacco: Never Used    Tobacco comment: back in college in 1966   Vaping Use    Vaping Use: Never used   Substance and Sexual Activity    Alcohol use: Yes     Alcohol/week: 4 0 standard drinks     Types: 4 Cans of beer per week    Drug use: No    Sexual activity: None   Other Topics Concern    None   Social History Narrative    Lives with wife  Sees dentist reg  Has living will  Feels safe at home  No mental or sub in self  ACTIVE ADVANCE DIRECTIVE    CAREGIVER:  SPOUSE    EXERCISE HABITS  -  DAILY    GOOD DENTAL HYGIENE     Social Determinants of Health     Financial Resource Strain: Low Risk     Difficulty of Paying Living Expenses: Not hard at all   Food Insecurity: No Food Insecurity    Worried About Running Out of Food in the Last Year: Never true    [place] of Food in the Last Year: Never true   Transportation Needs: No Transportation Needs    Lack of Transportation (Medical): No    Lack of Transportation (Non-Medical):  No   Physical Activity: Not on file   Stress: Not on file   Social Connections: Not on file   Intimate Partner Violence: Not on file   Housing Stability: Not on file      Medications and Allergies:     Current Outpatient Medications   Medication Sig Dispense Refill    allopurinol (ZYLOPRIM) 300 mg tablet Take 1 tablet (300 mg total) by mouth daily 90 tablet 3    aspirin (ASPIRIN LOW DOSE) 81 MG tablet Take by mouth 1 daily       atorvastatin (LIPITOR) 20 mg tablet TAKE 1 TABLET BY MOUTH EVERY DAY 90 tablet 3    benazepril-hydrochlorthiazide (LOTENSIN HCT) 20-25 MG per tablet TAKE 1 TABLET BY MOUTH EVERY DAY 90 tablet 1    Calcium Carbonate-Vitamin D (CALTRATE 600+D) 600-400 MG-UNIT per tablet Caltrate 600+D 600-400 MG-UNIT TABS  Take 1 tablet daily   Refills: 0      , M D ; Active      carbidopa-levodopa (SINEMET)  mg per tablet 2 tabs in the AM, 1 before lunch, 1 after lunch, 1 before dinner and 1 after dinner      Cholecalciferol (VITAMIN D-3) 1000 UNITS CAPS 1 daily      clindamycin (CLEOCIN) 300 MG capsule Takes pre dental       docusate sodium (Colace) 100 mg capsule Take by mouth 1-2 daily prn       Glucos-Chond-Hyal Ac-Ca Fructo ([address] 4724) TABS Take by mouth daily      glucosamine-chondroitin 500-400 MG tablet Take 1 tablet by mouth daily      Homeopathic Products (LEG CRAMPS PO) Take by mouth      metoprolol succinate (TOPROL-XL) 50 mg 24 hr tablet TAKE 1 TABLET BY MOUTH EVERY DAY 90 tablet 1    Multiple Vitamin (MULTIVITAMINS PO) Multivitamins Oral once a day Capsule   Refills: 0      , M D ; Active      nitroglycerin (NITROSTAT) 0 4 mg SL tablet Place under the tongue   (Patient not taking: Reported on 4/28/2022 )       No current facility-administered medications for this visit       No Known Allergies   Immunizations:     Immunization History   Administered Date(s) Administered    COVID-19 MODERNA VACC 0 5 ML IM 01/21/2021, 02/19/2021, 10/25/2021    Influenza Split High Dose Preservative Free IM 09/28/2015, 09/14/2016, 10/19/2017    Influenza, high dose seasonal 0 7 mL 09/05/2019, 10/05/2020, 10/13/2021    Influenza, seasonal, injectable 10/03/2013    Pneumococcal Conjugate 13-Valent 10/08/2015    Pneumococcal Conjugate PCV 7 10/08/2015    Pneumococcal Polysaccharide PPV23 08/13/2012      Health Maintenance:         Topic Date Due    Hepatitis C Screening  Never done         Topic Date Due    DTaP,Tdap,and Td Vaccines (1 - Tdap) Never done      Medicare Screening Tests and Risk Assessments:     Colin Patino is here for his Subsequent Wellness visit  Health Risk Assessment:   Patient rates overall health as fair  Patient feels that their physical health rating is slightly worse  Patient is satisfied with their life  Eyesight was rated as same  Hearing was rated as same  Patient feels that their emotional and mental health rating is same  Patients states they are never, rarely angry  Patient states they are sometimes unusually tired/fatigued  Pain experienced in the last 7 days has been some  Patient's pain rating has been 5/10  Patient states that he has experienced no weight loss or gain in last 6 months  Depression Screening:   PHQ-2 Score: 0      Fall Risk Screening: In the past year, patient has experienced: no history of falling in past year      Home Safety:  Patient does not have trouble with stairs inside or outside of their home  Patient has working smoke alarms and has working carbon monoxide detector  Home safety hazards include: loose rugs on the floor  Nutrition:   Pt eats what he wants  Medications:   Patient is currently taking over-the-counter supplements  OTC medications include: see medication list  Patient is able to manage medications  Activities of Daily Living (ADLs)/Instrumental Activities of Daily Living (IADLs):   Walk and transfer into and out of bed and chair?: Yes  Dress and groom yourself?: Yes    Bathe or shower yourself?: Yes    Feed yourself?  Yes  Do your laundry/housekeeping?: Yes  Manage your money, pay your bills and track your expenses?: Yes  Make your own meals?: Yes    Do your own shopping?: Yes    Previous Hospitalizations:   Any hospitalizations or ED visits within the last 12 months?: No      Advance Care Planning:   Living will: Yes    Advanced directive: Yes    Advanced directive counseling given: Yes    Five wishes given: Yes    End of Life Decisions reviewed with patient: Yes      Cognitive Screening:   Provider or family/friend/caregiver concerned regarding cognition?: No    PREVENTIVE SCREENINGS      Cardiovascular Screening:    General: Screening Not Indicated, History Lipid Disorder, Risks and Benefits Discussed and Screening Current      Diabetes Screening:     General: Screening Current and Risks and Benefits Discussed      Colorectal Cancer Screening:     General: Risks and Benefits Discussed and Screening Current      Prostate Cancer Screening:    General: Screening Not Indicated and Risks and Benefits Discussed      Abdominal Aortic Aneurysm (AAA) Screening:    Risk factors include: tobacco use        Lung Cancer Screening:     General: Screening Not Indicated    Screening, Brief Intervention, and Referral to Treatment (SBIRT)    Screening  Typical number of drinks in a day: 0  Typical number of drinks in a week: 1  Interpretation: Low risk drinking behavior  Single Item Drug Screening:  How often have you used an illegal drug (including marijuana) or a prescription medication for non-medical reasons in the past year? never    Single Item Drug Screen Score: 0  Interpretation: Negative screen for possible drug use disorder    Brief Intervention  Alcohol & drug use screenings were reviewed  No concerns regarding substance use disorder identified  Other Counseling Topics:   Regular weightbearing exercise       No exam data present     Physical Exam:     /68   Pulse 83   Resp 16   Ht 6' 1" (1 854 m)   Wt 110 kg (242 lb)   SpO2 95%   BMI 31 93 kg/m²     Physical Exam     Andrey Tucker MD

## 2022-04-28 NOTE — ASSESSMENT & PLAN NOTE
Patient has Parkinson's disease  He sees progression in his tremors, has wearing off phenomenon  He saw his neurologist and his Sinemet dose was not changed  He has difficulty buttoning up shirts  He went for physical therapy that helped his range of motion and he still does his exercises 5-6 days a week  He was advised to use a cane to help with his gait  He denies falls  I referred patient to save  evaluation to make sure that Parkinson's has not affected his driving abilities

## 2022-04-28 NOTE — PROGRESS NOTES
Assessment/Plan:    Primary Parkinsonism [facility])  Patient has Parkinson's disease  He sees progression in his tremors, has wearing off phenomenon  He saw his neurologist and his Sinemet dose was not changed  He has difficulty buttoning up shirts  He went for physical therapy that helped his range of motion and he still does his exercises 5-6 days a week  He was advised to use a cane to help with his gait  He denies falls  I referred patient to save  evaluation to make sure that Parkinson's has not affected his driving abilities  Coronary artery disease involving native coronary artery of native heart without angina pectoris  Patient has coronary artery disease  He denies chest pain is typical of angina  At times he has chest pains with radiation to the back if he sits for a long period of time and he stands up and moves around in certain directions  This is noncardiac chest pain  He denies nausea, dysphagia  Patient is physically active and he tries to walk at least 0 5 miles a day on a treadmill  He denies any chest pain or shortness of breath when walking on a treadmill or when he walks down a flight of stairs to his cellar and back  His blood sugar was normal in January and his cholesterol was were normal in January  We reviewed labs  He will continue aspirin, atorvastatin, Toprol      Leg cramps  Patient has nocturnal leg cramps that he controls with stretching exercises before going to bed, drinking tonic water Gatorade and increasing his water intake  I advised him to decrease systolic water intake to 8 oz per day  Diagnoses and all orders for this visit:    Primary parkinsonism [facility])  -     Ambulatory referral to ot  adaptability evaluation;  Future    Medicare annual wellness visit, subsequent    Coronary artery disease involving native coronary artery of native heart without angina pectoris    Leg cramps          Subjective:      Patient ID: Jhony cameron a 65 y o  male  HPI    Patient presents for follow-up of chronic conditions as detailed in the assessment and plan  The following portions of the patient's history were reviewed and updated as appropriate: current medications, past family history, past medical history, past social history, past surgical history and problem list     Review of Systems   HENT: Negative for hearing loss  Eyes: Negative for visual disturbance  Gastrointestinal: Negative for abdominal pain and blood in stool  Genitourinary: Negative for difficulty urinating and hematuria  Psychiatric/Behavioral: Negative for dysphoric mood  Objective:    /68   Pulse 83   Resp 16   Ht 6' 1" (1 854 m)   Wt 110 kg (242 lb)   SpO2 95%   BMI 31 93 kg/m²      Physical Exam  HENT:      Head: Normocephalic  Right Ear: Tympanic membrane normal  There is no impacted cerumen  Left Ear: Tympanic membrane normal  There is no impacted cerumen  Mouth/Throat:      Mouth: Mucous membranes are moist       Pharynx: No oropharyngeal exudate  Eyes:      Conjunctiva/sclera: Conjunctivae normal    Cardiovascular:      Rate and Rhythm: Normal rate and regular rhythm  Heart sounds: No murmur heard  Pulmonary:      Effort: No respiratory distress  Breath sounds: No wheezing or rales  Abdominal:      General: Bowel sounds are normal       Tenderness: There is no abdominal tenderness  Musculoskeletal:      Cervical back: Neck supple  Skin:     General: Skin is warm and dry  Neurological:      Mental Status: He is alert and oriented to person, place, and time  Cranial Nerves: No cranial nerve deficit  Motor: No weakness  Gait: Gait abnormal (Shuffling gait)  Comments: Motion as face, left hand resting tremors present   Psychiatric:         Mood and Affect: Mood normal          Thought Content:  Thought content normal              Adam Tsai MD

## 2022-04-28 NOTE — PATIENT INSTRUCTIONS

## 2022-04-28 NOTE — ASSESSMENT & PLAN NOTE
Patient has nocturnal leg cramps that he controls with stretching exercises before going to bed, drinking tonic water Gatorade and increasing his water intake  I advised him to decrease systolic water intake to 8 oz per day

## 2022-06-02 ENCOUNTER — TELEPHONE (OUTPATIENT)
Dept: FAMILY MEDICINE CLINIC | Facility: HOSPITAL | Age: 77
End: 2022-06-02

## 2022-06-02 NOTE — TELEPHONE ENCOUNTER
Pt states he has been having chest pains in different degrees for the last 6 weeks  Today they are worse  He doesn't feel it is cardiac issues and doesn't want to go to ER  He would like to know if there is something that can be prescribed     Pt can be reached at [phone]

## 2022-06-03 ENCOUNTER — HOSPITAL ENCOUNTER (OUTPATIENT)
Dept: RADIOLOGY | Facility: HOSPITAL | Age: 77
Discharge: HOME | End: 2022-06-03
Attending: INTERNAL MEDICINE
Payer: MEDICARE

## 2022-06-03 ENCOUNTER — OFFICE VISIT (OUTPATIENT)
Dept: FAMILY MEDICINE CLINIC | Facility: HOSPITAL | Age: 77
End: 2022-06-03
Payer: MEDICARE

## 2022-06-03 VITALS
BODY MASS INDEX: 31.68 KG/M2 | OXYGEN SATURATION: 97 % | HEART RATE: 74 BPM | HEIGHT: 73 IN | SYSTOLIC BLOOD PRESSURE: 116 MMHG | WEIGHT: 239 LBS | DIASTOLIC BLOOD PRESSURE: 66 MMHG | RESPIRATION RATE: 16 BRPM

## 2022-06-03 DIAGNOSIS — R07.9 CHEST PAIN, UNSPECIFIED TYPE: ICD-10-CM

## 2022-06-03 DIAGNOSIS — R07.89 OTHER CHEST PAIN: ICD-10-CM

## 2022-06-03 DIAGNOSIS — R07.9 CHEST PAIN, UNSPECIFIED TYPE: Primary | ICD-10-CM

## 2022-06-03 DIAGNOSIS — I25.10 CORONARY ARTERY DISEASE INVOLVING NATIVE CORONARY ARTERY OF NATIVE HEART WITHOUT ANGINA PECTORIS: ICD-10-CM

## 2022-06-03 LAB — SINUS: 74 CM

## 2022-06-03 PROCEDURE — 93000 ELECTROCARDIOGRAM COMPLETE: CPT | Performed by: INTERNAL MEDICINE

## 2022-06-03 PROCEDURE — 99214 OFFICE O/P EST MOD 30 MIN: CPT | Performed by: INTERNAL MEDICINE

## 2022-06-03 PROCEDURE — 71046 X-RAY EXAM CHEST 2 VIEWS: CPT

## 2022-06-03 RX ORDER — ACETAMINOPHEN 500 MG
500 TABLET ORAL EVERY 6 HOURS PRN
COMMUNITY

## 2022-06-03 NOTE — ASSESSMENT & PLAN NOTE
Patient has coronary artery disease  He is status post stent placements  His chest pain is noncardiac    Advised him to continue taking aspirin, atorvastatin and Toprol

## 2022-06-03 NOTE — PROGRESS NOTES
Assessment/Plan:    Other chest pain  Patient presents with a chief complaint of anterior chest pain  He 1st had an episode 40 years ago and had recurrences twice since then  His chest discomfort this time started after our last appointment on April 28th and has been present almost every day  He describes the chest pain as a sharp feeling which is located most commonly to the left from the sternum anteriorly and radiates to the upper thoracic area on the left side  It is of varying intensity and 3 days ago the pain was very severe he was contemplating going to the ER but then the pain abated and is mild today  Occasionally he has discomfort on the opposite side from the sternum  The discomfort is worse when traveling on a bumpy road and with certain bending and twisting motions of the trunk  Is present at least 80% of the time according to his estimation  He is physically active and walks 0 75 miles on the treadmill 4-5 days a week without worsening of the chest discomfort  He denies dyspnea, pleurisy, cough, nausea, heartburn, acid reflux  He is able to carry groceries in from the car without any worsening the chest discomfort  Discomfort is also worse when he reaches in certain ways in bed to situate himself  Discomfort is improved after taking 2 Tylenols  His lungs are clear to auscultation, heart is regular rhythm with normal S1 and S2 without gallops, rubs  Abdomen is soft and nontender  He has no spinous process tenderness  He has has no left paracervical muscle tenderness  He has some reproducible tenderness to left of the sternum anteriorly  His EKG shows normal sinus rhythm shows no signs of ischemia  Patient's chest discomfort is noncardiac, most likely musculoskeletal   I ordered a chest x-ray to rule out any left-sided infiltrates or rapid lesions      I advised patient to continue range-of-motion exercises for his Parkinson's disease, continue walking on a treadmill and to take Tylenol 500 mg up to 6 pills a day as needed for the pain  Coronary artery disease involving native coronary artery of native heart without angina pectoris  Patient has coronary artery disease  He is status post stent placements  His chest pain is noncardiac  Advised him to continue taking aspirin, atorvastatin and Toprol       Diagnoses and all orders for this visit:    Chest pain, unspecified type  -     POCT ECG  -     XR chest pa & lateral; Future    Other chest pain    Coronary artery disease involving native coronary artery of native heart without angina pectoris    Other orders  -     acetaminophen (TYLENOL) 500 mg tablet; Take 500 mg by mouth every 6 (six) hours as needed for mild pain          Subjective:      Patient ID: Ganesh Garcia is a 65 y o  male  HPI    Patient presents for follow-up of chronic conditions as detailed in the assessment and plan  The following portions of the patient's history were reviewed and updated as appropriate: current medications, past family history, past medical history, past social history, past surgical history and problem list     Review of Systems      Objective:    /66   Pulse 74   Resp 16   Ht 6' 1" (1 854 m)   Wt 108 kg (239 lb)   SpO2 97%   BMI 31 53 kg/m²      Physical Exam  HENT:      Head: Normocephalic  Eyes:      Conjunctiva/sclera: Conjunctivae normal    Cardiovascular:      Rate and Rhythm: Normal rate and regular rhythm  Heart sounds: No murmur heard  No friction rub  No gallop  Pulmonary:      Effort: No respiratory distress  Breath sounds: No wheezing or rales  Abdominal:      General: Bowel sounds are normal       Palpations: Abdomen is soft  Tenderness: There is no abdominal tenderness  Musculoskeletal:         General: Tenderness (Some left-sided anterior chest wall tenderness on palpation) present  Cervical back: Neck supple  Skin:     General: Skin is warm and dry        Findings: No erythema or rash  Neurological:      Mental Status: He is alert and oriented to person, place, and time  Mental status is at baseline     Psychiatric:         Mood and Affect: Mood normal              Juan Jose Marshall MD

## 2022-06-03 NOTE — ASSESSMENT & PLAN NOTE
Patient presents with a chief complaint of anterior chest pain  He 1st had an episode 40 years ago and had recurrences twice since then  His chest discomfort this time started after our last appointment on April 28th and has been present almost every day  He describes the chest pain as a sharp feeling which is located most commonly to the left from the sternum anteriorly and radiates to the upper thoracic area on the left side  It is of varying intensity and 3 days ago the pain was very severe he was contemplating going to the ER but then the pain abated and is mild today  Occasionally he has discomfort on the opposite side from the sternum  The discomfort is worse when traveling on a bumpy road and with certain bending and twisting motions of the trunk  Is present at least 80% of the time according to his estimation  He is physically active and walks 0 75 miles on the treadmill 4-5 days a week without worsening of the chest discomfort  He denies dyspnea, pleurisy, cough, nausea, heartburn, acid reflux  He is able to carry groceries in from the car without any worsening the chest discomfort  Discomfort is also worse when he reaches in certain ways in bed to situate himself  Discomfort is improved after taking 2 Tylenols  His lungs are clear to auscultation, heart is regular rhythm with normal S1 and S2 without gallops, rubs  Abdomen is soft and nontender  He has no spinous process tenderness  He has has no left paracervical muscle tenderness  He has some reproducible tenderness to left of the sternum anteriorly  His EKG shows normal sinus rhythm shows no signs of ischemia  Patient's chest discomfort is noncardiac, most likely musculoskeletal   I ordered a chest x-ray to rule out any left-sided infiltrates or rapid lesions      I advised patient to continue range-of-motion exercises for his Parkinson's disease, continue walking on a treadmill and to take Tylenol 500 mg up to 6 pills a day as needed for the pain

## 2022-06-08 NOTE — PROGRESS NOTES
06/08/22    Yogi Goncalves   1945   [id number]     Assessment  1 BPH s/p TURP and cystolitholapaxy (2017)  2 Nephrolithiasis    Discussion/Plan  1 BPH s/p TURP and cystolitholapaxy (2017)  2 Nephrolithiasis   3/11/22 CT renal stone study: Bilateral nephrolithiasis without obstructive uropathy  Stone burden appears unchanged since 1/2021   Educational packet provided   ER precautions reviewed    Surgical options reviewed   Increase hydration with water      Patient will consider his surgical options and notify the office if he wishes to pursue intervention  All questions answered at this time  Subjective  HPI   Sergio Cruz is a 65year old male with a history of BPH and nephrolithiasis presents today for follow up  He underwent TURP and cystolitholapaxy in 2017  Most recent CT from March 2022 shows non obstructing bilateral stones unchanged from 2021  He reports symptoms of right sided back/flank pain  He has a longstanding history of renal calculi  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  He has been managed on Allopurinol  He denies any gross hematuria or dysuria  He rates his pain 4 out of 10  He strains his urine occasionally  He has not passed stones  He takes over the counter supplements for leg cramps at night and drinks tonic water  Review of Systems - History obtained from chart review and the patient  General ROS: negative  Psychological ROS: negative  Hematological and Lymphatic ROS: negative  Endocrine ROS: negative  Respiratory ROS: no cough, shortness of breath, or wheezing  Cardiovascular ROS: no chest pain or dyspnea on exertion  Gastrointestinal ROS: negative  Genito-Urinary ROS: no dysuria, trouble voiding, or hematuria  negative  Musculoskeletal ROS: positive - right back pain   Neurological ROS: no TIA or stroke symptoms  Dermatological ROS: negative       Objective  Physical Exam  Vitals and nursing note reviewed  Constitutional:       General: He is awake   He is not in acute distress  Appearance: Normal appearance  He is well-developed, well-groomed and normal weight  He is not ill-appearing, toxic-appearing or diaphoretic  Pulmonary:      Effort: Pulmonary effort is normal    Abdominal:      Tenderness: There is no right CVA tenderness or left CVA tenderness  Musculoskeletal:         General: Normal range of motion  Cervical back: Normal range of motion and neck supple  Skin:     General: Skin is warm  Neurological:      General: No focal deficit present  Mental Status: He is alert and oriented to person, place, and time  Mental status is at baseline  Gait: Gait abnormal    Psychiatric:         Attention and Perception: Attention normal          Mood and Affect: Mood normal  Affect is flat  Speech: Speech normal          Behavior: Behavior normal  Behavior is cooperative  Thought Content: Thought content normal          Cognition and Memory: Cognition normal          Judgment: Judgment normal          CT ABDOMEN AND PELVIS WITHOUT IV CONTRAST - LOW DOSE RENAL STONE      INDICATION:   N20 0: Calculus of kidney      COMPARISON:  CT abdomen/pelvis 1/21/2021      TECHNIQUE:  Low radiation dose thin section CT examination of the abdomen and pelvis was performed without intravenous or oral contrast according to a protocol specifically designed to evaluate for urinary tract calculus  Axial, sagittal, and coronal 2D   reformatted images were created from the source data and submitted for interpretation  Evaluation for pathology in the abdomen and pelvis that is unrelated to urinary tract calculi is limited        Radiation dose length product (DLP) for this visit:  626 25 mGy-cm     This examination, like all CT scans performed in the [facility], was performed utilizing techniques to minimize radiation dose exposure, including the use of iterative   reconstruction and automated exposure control      URINARY TRACT FINDINGS:     RIGHT KIDNEY AND URETER:  Normal size and position  No gross mass on noncontrast study  Subcentimeter simple cyst at the lower pole  Multiple nonobstructing calculi in the upper pole, interpolar region, and lower pole, the largest in the lower pole   measuring 4 mm and 3 mm  No hydronephrosis  Ureter within normal limits      LEFT KIDNEY AND URETER:  Normal size and position  No gross mass on noncontrast study  Exophytic upper pole simple appearing cysts measure 7 4 cm anteriorly, 4 3 cm anteriorly  Multiple nonobstructing calculi in the upper pole and interpolar region measuring up to 2 mm  Ureter within normal limits        URINARY BLADDER:  Underdistended limiting evaluation but appearing to be diffusely thick-walled with fat deposition  No perivesical stranding to indicate acute cystitis  No calculi          ADDITIONAL FINDINGS:     LOWER CHEST: Minimal linear scarring/atelectasis  Small hiatal hernia      SOLID VISCERA: There are one or more sharply marginated, homogeneous low-attenuation (-10 to +20 HU) lesions, with benign imaging features that do not require further workup or follow-up  (Reference: Conrad Dynes Radiol 2017; 43:5638-8318)  There is a 1 3   cm short axis left adrenal nodule  Its imaging features are diagnostic of benign adrenal adenoma, and does not need further followup or workup  If there are clinical signs or symptoms of adrenal hyperfunction, biochemical evaluation may be appropriate  Adrenal recommendation based on institutional consensus and Journal of [employer] of Radiology 2017;14:2189-0532  Limited low radiation dose noncontrast CT evaluation demonstrates no clinically significant abnormality of the imaged portions of the   liver, spleen, pancreas, or adrenal glands        BILIARY: No intrahepatic biliary ductal dilatation  Normal caliber common bile duct      GALLBLADDER: Gallbladder is surgically absent      STOMACH AND BOWEL: Stomach is grossly within normal limits   Normal caliber small bowel  Normal caliber large bowel  Colonic diverticulosis without acute diverticulitis  No evidence of active small or large bowel inflammatory process      APPENDIX: Normal air-filled appendix      ABDOMINOPELVIC CAVITY: No ascites  No intraperitoneal free air  No lymphadenopathy  No retroperitoneal hematoma      VESSELS: Normal caliber abdominal aorta with moderate atherosclerotic plaque       REPRODUCTIVE ORGANS:  Enlarged prostate  Symmetric seminal vesicles       ABDOMINAL WALL/INGUINAL REGIONS:  Within normal limits       BONES:  Severe multilevel degenerative changes in the spine  Vertebral body height is maintained  No acute fracture or destructive osseous lesion       IMPRESSION:     Bilateral nephrolithiasis without obstructive uropathy  Stone burden appears unchanged since 1/2021  EDINSON Meléndez     I have spent 15 minutes with Patient  today in which greater than 50% of this time was spent in counseling/coordination of care regarding Diagnostic results

## 2022-06-09 ENCOUNTER — OFFICE VISIT (OUTPATIENT)
Dept: UROLOGY | Facility: HOSPITAL | Age: 77
End: 2022-06-09
Payer: MEDICARE

## 2022-06-09 VITALS
DIASTOLIC BLOOD PRESSURE: 62 MMHG | HEIGHT: 73 IN | HEART RATE: 82 BPM | SYSTOLIC BLOOD PRESSURE: 118 MMHG | OXYGEN SATURATION: 95 % | BODY MASS INDEX: 32.07 KG/M2 | WEIGHT: 242 LBS

## 2022-06-09 DIAGNOSIS — N20.0 NEPHROLITHIASIS: Primary | ICD-10-CM

## 2022-06-09 PROCEDURE — 81002 URINALYSIS NONAUTO W/O SCOPE: CPT | Performed by: NURSE PRACTITIONER

## 2022-06-09 PROCEDURE — 99213 OFFICE O/P EST LOW 20 MIN: CPT | Performed by: NURSE PRACTITIONER

## 2022-08-06 DIAGNOSIS — I10 ESSENTIAL HYPERTENSION: ICD-10-CM

## 2022-08-06 RX ORDER — METOPROLOL SUCCINATE 50 MG/1
TABLET, EXTENDED RELEASE ORAL
Qty: 90 TABLET | Refills: 1 | Status: SHIPPED | OUTPATIENT
Start: 2022-08-06

## 2022-09-12 ENCOUNTER — OFFICE VISIT (OUTPATIENT)
Dept: FAMILY MEDICINE CLINIC | Facility: HOSPITAL | Age: 77
End: 2022-09-12
Payer: MEDICARE

## 2022-09-12 VITALS
BODY MASS INDEX: 30.56 KG/M2 | DIASTOLIC BLOOD PRESSURE: 56 MMHG | WEIGHT: 230.6 LBS | SYSTOLIC BLOOD PRESSURE: 88 MMHG | RESPIRATION RATE: 16 BRPM | OXYGEN SATURATION: 93 % | HEIGHT: 73 IN | HEART RATE: 80 BPM

## 2022-09-12 DIAGNOSIS — I95.1 ORTHOSTATIC HYPOTENSION: ICD-10-CM

## 2022-09-12 DIAGNOSIS — G20 PRIMARY PARKINSONISM (HCC): Primary | ICD-10-CM

## 2022-09-12 DIAGNOSIS — I10 ESSENTIAL HYPERTENSION: ICD-10-CM

## 2022-09-12 DIAGNOSIS — I25.10 CORONARY ARTERY DISEASE INVOLVING NATIVE CORONARY ARTERY OF NATIVE HEART WITHOUT ANGINA PECTORIS: ICD-10-CM

## 2022-09-12 DIAGNOSIS — N20.0 NEPHROLITHIASIS: ICD-10-CM

## 2022-09-12 PROCEDURE — 99214 OFFICE O/P EST MOD 30 MIN: CPT | Performed by: INTERNAL MEDICINE

## 2022-09-12 NOTE — ASSESSMENT & PLAN NOTE
Patient has Parkinson's disease  He complains of his balance and gait getting worse since the last appointment  He uses a cane and frequently he feels unsteady  He has not had a fall  He tries to walk slow and tries to hold onto things  He has a walker as well that he lent to a neighbor who has not returned the walker he had  Patient has orthostatic hypotension in the office today  In addition to Parkinson's disease the orthostatic hypotension also makes his gait worse  I adjusted patient's medications and referred patient to physical therapy for gait and balance training of his Parkinson's disease    Advised him to use the cane at all times

## 2022-09-12 NOTE — ASSESSMENT & PLAN NOTE
Patient has bilateral nonobstructing nephrolithiasis on review of CT scan in March this year  At times he feels discomfort in the flank areas  Denies hematuria, urinary tension, urinary frequency urgency    Will monitor patient closely

## 2022-09-12 NOTE — PATIENT INSTRUCTIONS
Please get the [employer]  specific covid booster! Discontinue benazepril/HCTZ because of low blood pressure when you stand up! Take your blood pressure in a sitting position after resting for 10 minutes once a day for the next 10 days and send me a message in my chart with your blood pressure readings!   If her blood pressure will be high I will resume benazepril at a lower dose 1st!

## 2022-09-12 NOTE — ASSESSMENT & PLAN NOTE
Patient has history of hypertension  His blood pressure is low today  I discontinued benazepril/HCTZ and patient will continue Toprol  He is due for updated labs and I will check his electrolytes, renal function and thyroid function      He will continue Toprol and call me with his blood pressure readings at home in about 2 weeks

## 2022-09-12 NOTE — ASSESSMENT & PLAN NOTE
Patient has coronary disease  He denies any chest pain or shortness of breath    He will continue aspirin, atorvastatin, Toprol

## 2022-09-12 NOTE — PROGRESS NOTES
Assessment/Plan:    Primary Parkinsonism [facility])  Patient has Parkinson's disease  He complains of his balance and gait getting worse since the last appointment  He uses a cane and frequently he feels unsteady  He has not had a fall  He tries to walk slow and tries to hold onto things  He has a walker as well that he lent to a neighbor who has not returned the walker he had  Patient has orthostatic hypotension in the office today  In addition to Parkinson's disease the orthostatic hypotension also makes his gait worse  I adjusted patient's medications and referred patient to physical therapy for gait and balance training of his Parkinson's disease  Advised him to use the cane at all times    Orthostatic hypotension  Patient has orthostatic hypotension today  This is probably multifactorial due to Parkinson's disease, due to antihypertensive medications and the fact that he has been active I last month when his house was painted and he had to move furniture is around  I discontinued benazepril/HCTZ    Essential hypertension  Patient has history of hypertension  His blood pressure is low today  I discontinued benazepril/HCTZ and patient will continue Toprol  He is due for updated labs and I will check his electrolytes, renal function and thyroid function  He will continue Toprol and call me with his blood pressure readings at home in about 2 weeks    Coronary artery disease involving native coronary artery of native heart without angina pectoris  Patient has coronary disease  He denies any chest pain or shortness of breath  He will continue aspirin, atorvastatin, Toprol    Nephrolithiasis  Patient has bilateral nonobstructing nephrolithiasis on review of CT scan in March this year  At times he feels discomfort in the flank areas  Denies hematuria, urinary tension, urinary frequency urgency    Will monitor patient closely       Diagnoses and all orders for this visit:    Primary parkinsonism [facility])  -     Ambulatory Referral to Physical Therapy; Future    Nephrolithiasis    Orthostatic hypotension    Essential hypertension  -     CBC; Future  -     Comprehensive metabolic panel; Future  -     TSH, 3rd generation with Free T4 reflex; Future    Coronary artery disease involving native coronary artery of native heart without angina pectoris          Subjective:      Patient ID: Farhana Juarez is a 65 y o  male  HPI    Patient presents for follow-up of chronic conditions as detailed in the assessment and plan  The following portions of the patient's history were reviewed and updated as appropriate: current medications, past family history, past medical history, past social history, past surgical history and problem list     Review of Systems      Objective:    BP (!) 88/56 (BP Location: Left arm, Patient Position: Standing)   Pulse 80   Resp 16   Ht 6' 1" (1 854 m)   Wt 105 kg (230 lb 9 6 oz)   SpO2 93%   BMI 30 42 kg/m²      Physical Exam  Constitutional:       General: He is not in acute distress  Appearance: He is not ill-appearing or toxic-appearing  Eyes:      Conjunctiva/sclera: Conjunctivae normal    Cardiovascular:      Rate and Rhythm: Normal rate and regular rhythm  Heart sounds: No murmur heard  Pulmonary:      Effort: No respiratory distress  Breath sounds: No wheezing or rales  Abdominal:      General: Bowel sounds are normal       Palpations: Abdomen is soft  There is no mass  Tenderness: There is no abdominal tenderness  Skin:     General: Skin is warm and dry  Neurological:      Mental Status: He is alert and oriented to person, place, and time        Comments: Bradykinesia, shuffling gait   Psychiatric:         Mood and Affect: Mood normal              Shannan Navarro MD

## 2022-09-12 NOTE — ASSESSMENT & PLAN NOTE
Patient has orthostatic hypotension today  This is probably multifactorial due to Parkinson's disease, due to antihypertensive medications and the fact that he has been active I last month when his house was painted and he had to move furniture is around      I discontinued benazepril/HCTZ

## 2022-09-19 ENCOUNTER — APPOINTMENT (OUTPATIENT)
Dept: LAB | Facility: HOSPITAL | Age: 77
End: 2022-09-19
Attending: INTERNAL MEDICINE
Payer: MEDICARE

## 2022-09-19 DIAGNOSIS — I10 ESSENTIAL HYPERTENSION: ICD-10-CM

## 2022-09-19 LAB
ALBUMIN SERPL BCP-MCNC: 3.5 G/DL (ref 3.5–5)
ALP SERPL-CCNC: 75 U/L (ref 46–116)
ALT SERPL W P-5'-P-CCNC: 11 U/L (ref 12–78)
ANION GAP SERPL CALCULATED.3IONS-SCNC: 5 MMOL/L (ref 4–13)
AST SERPL W P-5'-P-CCNC: 21 U/L (ref 5–45)
BILIRUB SERPL-MCNC: 0.81 MG/DL (ref 0.2–1)
BUN SERPL-MCNC: 14 MG/DL (ref 5–25)
CALCIUM SERPL-MCNC: 8.7 MG/DL (ref 8.3–10.1)
CHLORIDE SERPL-SCNC: 108 MMOL/L (ref 96–108)
CO2 SERPL-SCNC: 26 MMOL/L (ref 21–32)
CREAT SERPL-MCNC: 0.9 MG/DL (ref 0.6–1.3)
ERYTHROCYTE [DISTWIDTH] IN BLOOD BY AUTOMATED COUNT: 12.8 % (ref 11.6–15.1)
GFR SERPL CREATININE-BSD FRML MDRD: 81 ML/MIN/1.73SQ M
GLUCOSE P FAST SERPL-MCNC: 98 MG/DL (ref 65–99)
HCT VFR BLD AUTO: 43.2 % (ref 36.5–49.3)
HGB BLD-MCNC: 14 G/DL (ref 12–17)
MCH RBC QN AUTO: 32.3 PG (ref 26.8–34.3)
MCHC RBC AUTO-ENTMCNC: 32.4 G/DL (ref 31.4–37.4)
MCV RBC AUTO: 100 FL (ref 82–98)
PLATELET # BLD AUTO: 150 THOUSANDS/UL (ref 149–390)
PMV BLD AUTO: 10 FL (ref 8.9–12.7)
POTASSIUM SERPL-SCNC: 4 MMOL/L (ref 3.5–5.3)
PROT SERPL-MCNC: 6.7 G/DL (ref 6.4–8.4)
RBC # BLD AUTO: 4.34 MILLION/UL (ref 3.88–5.62)
SODIUM SERPL-SCNC: 139 MMOL/L (ref 135–147)
TSH SERPL DL<=0.05 MIU/L-ACNC: 2.41 UIU/ML (ref 0.45–4.5)
WBC # BLD AUTO: 3.72 THOUSAND/UL (ref 4.31–10.16)

## 2022-09-19 PROCEDURE — 85027 COMPLETE CBC AUTOMATED: CPT

## 2022-09-19 PROCEDURE — 80053 COMPREHEN METABOLIC PANEL: CPT

## 2022-09-19 PROCEDURE — 36415 COLL VENOUS BLD VENIPUNCTURE: CPT

## 2022-09-19 PROCEDURE — 84443 ASSAY THYROID STIM HORMONE: CPT

## 2022-10-03 DIAGNOSIS — I10 ESSENTIAL HYPERTENSION: Primary | ICD-10-CM

## 2022-10-03 RX ORDER — BENAZEPRIL HYDROCHLORIDE 10 MG/1
10 TABLET ORAL DAILY
Qty: 30 TABLET | Refills: 1 | Status: SHIPPED | OUTPATIENT
Start: 2022-10-03 | End: 2022-10-24

## 2022-10-24 DIAGNOSIS — I10 ESSENTIAL HYPERTENSION: ICD-10-CM

## 2022-10-24 RX ORDER — BENAZEPRIL/HYDROCHLOROTHIAZIDE 20 MG-25MG
1 TABLET ORAL DAILY
Qty: 90 TABLET | Refills: 1 | Status: SHIPPED | OUTPATIENT
Start: 2022-10-24

## 2022-11-07 ENCOUNTER — RA CDI HCC (OUTPATIENT)
Dept: OTHER | Facility: HOSPITAL | Age: 77
End: 2022-11-07

## 2022-11-07 NOTE — PROGRESS NOTES
[address]  coding opportunities       Chart reviewed, no opportunity found: CHART REVIEWED, NO OPPORTUNITY FOUND        Patients Insurance     Medicare Insurance: Medicare

## 2022-11-14 ENCOUNTER — OFFICE VISIT (OUTPATIENT)
Dept: FAMILY MEDICINE CLINIC | Facility: HOSPITAL | Age: 77
End: 2022-11-14

## 2022-11-14 VITALS
WEIGHT: 233 LBS | HEIGHT: 73 IN | DIASTOLIC BLOOD PRESSURE: 68 MMHG | BODY MASS INDEX: 30.88 KG/M2 | OXYGEN SATURATION: 95 % | SYSTOLIC BLOOD PRESSURE: 114 MMHG | RESPIRATION RATE: 16 BRPM | HEART RATE: 72 BPM

## 2022-11-14 DIAGNOSIS — I10 ESSENTIAL HYPERTENSION: Primary | ICD-10-CM

## 2022-11-14 DIAGNOSIS — G20 PRIMARY PARKINSONISM (HCC): ICD-10-CM

## 2022-11-14 PROBLEM — I95.1 ORTHOSTATIC HYPOTENSION: Status: RESOLVED | Noted: 2022-09-12 | Resolved: 2022-11-14

## 2022-11-14 NOTE — ASSESSMENT & PLAN NOTE
Patient has Parkinson's disease  He complains of intermittent loss of balance  He has good days and bad days  This has no relation to when he takes his Sinemet  He is to use a cane and also has a walker at home  He denies any falls  He has had resting tremor of the left hand and has bradykinesia  Continue same dose of Sinemet for now and he will follow-up with neurology this month

## 2022-11-14 NOTE — ASSESSMENT & PLAN NOTE
Patient presents for follow-up of hypertension  He was found to have orthostatic hypotension in September  The orthostatic hypotension resolved  His blood pressure at home runs between 315-837 ALDOOMLL/83-06 diastolic  Blood pressure is controlled in the office today as well  He will continue the same dose of Lotensin/HCTZ and Toprol      I will follow-up with him in 3 months

## 2022-11-14 NOTE — PROGRESS NOTES
Assessment/Plan:    Essential hypertension  Patient presents for follow-up of hypertension  He was found to have orthostatic hypotension in September  The orthostatic hypotension resolved  His blood pressure at home runs between 352-379 CHGPIIXT/82-24 diastolic  Blood pressure is controlled in the office today as well  He will continue the same dose of Lotensin/HCTZ and Toprol  I will follow-up with him in 3 months    Primary Parkinsonism [facility])  Patient has Parkinson's disease  He complains of intermittent loss of balance  He has good days and bad days  This has no relation to when he takes his Sinemet  He is to use a cane and also has a walker at home  He denies any falls  He has had resting tremor of the left hand and has bradykinesia  Continue same dose of Sinemet for now and he will follow-up with neurology this month  Diagnoses and all orders for this visit:    Essential hypertension    [facility]          Subjective:      Patient ID: Nila Aden is a 65 y o  male  HPI    Patient presents for follow-up of chronic conditions as detailed in the assessment and plan  The following portions of the patient's history were reviewed and updated as appropriate: current medications, past family history, past medical history, past social history, past surgical history and problem list     Review of Systems      Objective:    /68   Pulse 72   Resp 16   Ht 6' 1" (1 854 m)   Wt 106 kg (233 lb)   SpO2 95%   BMI 30 74 kg/m²      Physical Exam  Constitutional:       General: He is not in acute distress  Appearance: He is not toxic-appearing  Cardiovascular:      Rate and Rhythm: Normal rate and regular rhythm  Pulmonary:      Effort: No respiratory distress  Breath sounds: No wheezing or rales  Neurological:      Mental Status: He is alert  Mental status is at baseline        Comments: Left hand resting tremors present, finger-nose-finger is normal   Psychiatric:         Mood and Affect: Mood normal              Jaida Bello MD

## 2022-12-24 ENCOUNTER — HOSPITAL ENCOUNTER (OUTPATIENT)
Dept: MRI IMAGING | Facility: HOSPITAL | Age: 77
Discharge: HOME | End: 2022-12-24

## 2022-12-24 DIAGNOSIS — R90.89 MRI OF BRAIN ABNORMAL: ICD-10-CM

## 2023-01-31 DIAGNOSIS — I10 ESSENTIAL HYPERTENSION: ICD-10-CM

## 2023-01-31 DIAGNOSIS — I25.10 CORONARY ARTERY DISEASE INVOLVING NATIVE CORONARY ARTERY OF NATIVE HEART WITHOUT ANGINA PECTORIS: ICD-10-CM

## 2023-01-31 DIAGNOSIS — M1A.9XX0 CHRONIC GOUT WITHOUT TOPHUS, UNSPECIFIED CAUSE, UNSPECIFIED SITE: ICD-10-CM

## 2023-01-31 RX ORDER — ALLOPURINOL 300 MG/1
TABLET ORAL
Qty: 90 TABLET | Refills: 3 | Status: SHIPPED | OUTPATIENT
Start: 2023-01-31

## 2023-01-31 RX ORDER — METOPROLOL SUCCINATE 50 MG/1
TABLET, EXTENDED RELEASE ORAL
Qty: 90 TABLET | Refills: 1 | Status: SHIPPED | OUTPATIENT
Start: 2023-01-31

## 2023-01-31 RX ORDER — ATORVASTATIN CALCIUM 20 MG/1
TABLET, FILM COATED ORAL
Qty: 90 TABLET | Refills: 3 | Status: SHIPPED | OUTPATIENT
Start: 2023-01-31

## 2023-02-14 ENCOUNTER — OFFICE VISIT (OUTPATIENT)
Dept: FAMILY MEDICINE CLINIC | Facility: HOSPITAL | Age: 78
End: 2023-02-14

## 2023-02-14 VITALS
RESPIRATION RATE: 16 BRPM | SYSTOLIC BLOOD PRESSURE: 92 MMHG | DIASTOLIC BLOOD PRESSURE: 56 MMHG | WEIGHT: 237 LBS | HEIGHT: 73 IN | OXYGEN SATURATION: 96 % | BODY MASS INDEX: 31.41 KG/M2 | HEART RATE: 72 BPM

## 2023-02-14 DIAGNOSIS — I10 ESSENTIAL HYPERTENSION: ICD-10-CM

## 2023-02-14 DIAGNOSIS — R26.9 NEUROLOGIC GAIT DYSFUNCTION: ICD-10-CM

## 2023-02-14 DIAGNOSIS — G20 PRIMARY PARKINSONISM (HCC): Primary | ICD-10-CM

## 2023-02-14 DIAGNOSIS — I95.1 ORTHOSTATIC HYPOTENSION: ICD-10-CM

## 2023-02-14 PROBLEM — R07.89 OTHER CHEST PAIN: Status: RESOLVED | Noted: 2022-06-03 | Resolved: 2023-02-14

## 2023-02-14 NOTE — ASSESSMENT & PLAN NOTE
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

## 2023-02-14 NOTE — ASSESSMENT & PLAN NOTE
Patient presents with his wife for follow-up of Parkinson disease  He reports increased tremors by 10 AM in the morning after taking his Sinemet resting in the morning  He also complains of balance and gait problems  Has had no falls  He feels fatigued a lot and can only walk about a quarter of a mile on his treadmill before getting tired  He has difficulty standing for a long time and has to sit down because of fatigue  He has some difficulty remembering words but ultimately he cannot recall what he wants to say  He denies any hallucinations  He does have some difficulty swallowing and feels that his therapy did not help him last time  He denies constipation  MRI of the brain showed no masses, strokes, bleeds last December  He will follow-up with neurology in March    Continue Sinemet for now

## 2023-02-14 NOTE — ASSESSMENT & PLAN NOTE
Patient has orthostatic hypotension today  He is asymptomatic  The orthostatic hypotension may contribute to his gait problems, not being able to walk or stand for a long time    I decreased the dose of his benazepril/HCTZ to 10/12 5 and will reassess him in about 4 weeks

## 2023-02-14 NOTE — ASSESSMENT & PLAN NOTE
She has chronic hypertension  Blood pressure is low today  I decreased the dose of benazepril/HCTZ  Continue current dose of Toprol    I will reassess his electrolytes renal function before next visit

## 2023-02-14 NOTE — PROGRESS NOTES
Assessment/Plan:    Primary Parkinsonism [facility])  Patient presents with his wife for follow-up of Parkinson disease  He reports increased tremors by 10 AM in the morning after taking his Sinemet resting in the morning  He also complains of balance and gait problems  Has had no falls  He feels fatigued a lot and can only walk about a quarter of a mile on his treadmill before getting tired  He has difficulty standing for a long time and has to sit down because of fatigue  He has some difficulty remembering words but ultimately he cannot recall what he wants to say  He denies any hallucinations  He does have some difficulty swallowing and feels that his therapy did not help him last time  He denies constipation  MRI of the brain showed no masses, strokes, bleeds last December  He will follow-up with neurology in March  Continue Sinemet for now    Orthostatic hypotension  Patient has orthostatic hypotension today  He is asymptomatic  The orthostatic hypotension may contribute to his gait problems, not being able to walk or stand for a long time  I decreased the dose of his benazepril/HCTZ to 10/12 5 and will reassess him in about 4 weeks    Essential hypertension  She has chronic hypertension  Blood pressure is low today  I decreased the dose of benazepril/HCTZ  Continue current dose of Toprol  I will reassess his electrolytes renal function before next visit    Neurologic gait dysfunction  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, orthostatic hypotension  I referred patient to physical therapy for strengthening, gait and balance training  Diagnoses and all orders for this visit:    Primary parkinsonism [facility])  -     Ambulatory Referral to Physical Therapy; Future    Essential hypertension  -     benazepril-hydrochlorthiazide (LOTENSIN HCT) 10-12 5 MG per tablet; Take 1 tablet by mouth daily  -     Comprehensive metabolic panel;  Future    Neurologic gait dysfunction  - Ambulatory Referral to Physical Therapy; Future    Orthostatic hypotension          Subjective:      Patient ID: Germaine Samayoa is a 77 y o  male  HPI    Patient presents for follow-up of chronic conditions as detailed in the assessment and plan  The following portions of the patient's history were reviewed and updated as appropriate: current medications, past family history, past medical history, past social history, past surgical history and problem list     Review of Systems      Objective:    BP 92/56 (BP Location: Right arm, Patient Position: Standing)   Pulse 72   Resp 16   Ht 6' 1" (1 854 m)   Wt 108 kg (237 lb)   SpO2 96%   BMI 31 27 kg/m²      Physical Exam  Constitutional:       General: He is not in acute distress  Appearance: He is not toxic-appearing  Cardiovascular:      Rate and Rhythm: Normal rate and regular rhythm  Heart sounds: No murmur heard  Pulmonary:      Effort: No respiratory distress  Breath sounds: No wheezing or rales  Abdominal:      General: Bowel sounds are normal       Palpations: Abdomen is soft  Tenderness: There is no abdominal tenderness  Neurological:      Mental Status: He is alert  Mental status is at baseline  Gait: Gait abnormal (Patient has bradykinesia and shuffling gait)  Comments: He had tremors in his legs   Psychiatric:         Mood and Affect: Mood normal          Thought Content: Thought content normal            Depression Screening and Follow-up Plan: Patient was screened for depression during today's encounter  They screened negative with a PHQ-2 score of 0          Sergio Reyes MD

## 2023-02-23 ENCOUNTER — EVALUATION (OUTPATIENT)
Dept: PHYSICAL THERAPY | Facility: CLINIC | Age: 78
End: 2023-02-23

## 2023-02-23 DIAGNOSIS — R26.9 NEUROLOGIC GAIT DYSFUNCTION: ICD-10-CM

## 2023-02-23 DIAGNOSIS — G20 PRIMARY PARKINSONISM (HCC): ICD-10-CM

## 2023-02-23 NOTE — PROGRESS NOTES
PT Evaluation     Today's date: 2023  Patient name: Jacob Bardales  : 1945  MRN: [id number]  Referring provider: Virginia Santiago MD  Dx:   Encounter Diagnosis     ICD-10-CM    1  Primary parkinsonism ([address] )  [address] Ambulatory Referral to Physical Therapy      2  Neurologic gait dysfunction  R26 9 Ambulatory Referral to Physical Therapy          Start Time: 1142  Stop Time: 1234  Total time in clinic (min): 52 minutes    Assessment  Assessment details: Patient is a 77y o  year old male presenting to OPPT s/p diagnosis of Parkinson's disease  Pt is familiar to PT  Notes recent falls due to balance dysfunction due to PD  Pt notes that he has noticed reduction in his overall balance since in PT last   Patient demonstrates decreased strength, endurance, balance, and functional independence  Per the MiniBest and FGA he is at risk for falls, showing more imbalance with motor dual tasking or reduced visual cues most likely from his peripheral neuropathy  Demonstrates fair ability to recover balance  ABC score is 71%, noting more deficits in higher level situations  He exhibits stooped posturing, crouched and shuffled gait with mild arm swing, gait speed of 0 99m/s which is reduced compared to age matched norms  He requires assistance for ADLs and IADLs for some fine motor tasks  He will benefit from skilled PT interventions to maximize return to PLOF and independence as able  Given initial HEP focusing on LE and UE strength as well as aerobic program to begin on the treadmill  Referral for memory issues placed, speech/cognitive therapy  Thank you for this referral and the ability to participate in the care of this patient  Impairments: abnormal gait, activity intolerance, impaired balance, impaired physical strength, lacks appropriate home exercise program, safety issue and weight-bearing intolerance     Prognosis: good    Goals  In 4 weeks, patient will:  1    Demonstrate ability to perform 20 minutes of activity without requiring seated rest break  2   Demonstrate ability to maintain upright balance unsupported by UEs while reaching above shoulder height without resistance to promote stability with ADLs  3  Improve 5xSTS by at least 3 seconds to show improved LE strength for stability during transfers  4  Demonstrate ability to ambulate through doorway/threshold without verbal cues or change in gait quality >80% of occasions  5  Demonstrate ability to roll bilaterally in <7 seconds without use of assistive equipment    In 4-10 weeks, patient will:  1  Meet FOTO predicted score to demonstrate improvement in functional mobility  2   Demonstrate consistent carryover with HEP and walking program   3   Improve gait speed by at least  0 12 m/s to meet MCID value for older adults and reduce fall risk  4   Improve 6MWT by at least 100ft to demonstrate improvement in ability in community ambulation  5   Improve FGA by at least 4 points to meet Alysa Alberto [facility] value for PwPD and show improved balance  6   Improve ABC by at least 16% to meet  reduced risk of falling and improved balance confidence  7   Demonstrate ability to ambulate forward holding item requiring bilateral UEs without LOB or change in gait quality  8  Report walking a mile in his neighborhood with mild-mod fatigue  9   Report improvement in ability to get in/out of bed  10  Improve MiniBest by at least 4 points to meet MCID value for PwPD and demonstrate improved safety      Plan  Patient would benefit from: skilled physical therapy  Planned therapy interventions: neuromuscular re-education, manual therapy, patient education, home exercise program, therapeutic exercise, therapeutic activities and gait training  Frequency: 2x week  Duration in visits: 8  Duration in weeks: 4        Subjective Evaluation    History of Present Illness  Mechanism of injury: Present at PT: Trouble getting in/out of bed   Noticed about 3/4 months his balance become off - SPC use but feels like his balance has gotten slightly better  Lightheaded when getting in/out of bed  Difficulty getting in/out of bed due to mobility issues  Sometimes doesn't feel secure  SPC always in car in case  Sometimes some difficulty getting in/out of car  Kidney stones in the past - urinating more frequently  Goals: Walk longer distance - fatigued quicker  Wants to walk a mile in his neighborhood  11/30 Neurology: Colletta Morgans returned to our office for follow-up accompanied by his wife  He was previously followed by Dr Ed Cook  I reviewed his extensive records  He was diagnosed with polyneuropathy in 2010  In 2011 he developed left upper extremity tremor  He was initially diagnosed with essential tremor and was subsequently diagnosed with Parkinson's in 2013  Since 2019 he has been having mild problem swallowing  He has also been having memory loss  His hand writing has become difficult  He has been having worsening balance especially going up steps  He has leg cramps at night, which is controlled by over-the-counter medication  In September 2022 he woke up with complete loss of balance without other neurologic symptom such as headache or dysarthria  This has gradually improved  He denies other new neurologic symptoms since his previous visit  He is taking Sinemet 25/100, 1, 5-6 times daily  He had a brain MRI in March 2021 which showed a small chronic hemosiderin deposition 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 per the radiologist  His previous brain MRI in October 2018 at another facility also showed serpiginous enhancement in the left occipital region felt possibly representing venous malformation  His B12, Folate and TSH in March 2021 were normal     He was seen by ENT and had testing for problem swallowing  He had physical therapy in 2021  He also had speech and swallow evaluation and treatment       Pain  No pain reported  Location: knee pain    Social Support  Stairs in house: yes   Lives in: multiple-level home  Lives with: spouse    Employment status: not working  Treatments  Previous treatment: physical therapy and speech therapy  Patient Goals  Patient goals for therapy: improved balance, increased strength and independence with ADLs/IADLs          Objective   Neuro Exam:     Functional outcomes   Functional outcome comment: Mini Best  Anticipatory     Sit to Stand    2= Normal:  Comes to stand without use of hands and stabilizes independently     Rise to Toes      1=Moderate:  Heels up, but not full range (smaller than when holding hands) OR noticeable instability for 3 seconds     Stand on one leg      1=Moderate: < 20 seconds      Right:  5 seconds  Left:  4 seconds       Reactive Postural Control     Compensatory Stepping Correction - Forward    2= Normal:  Recovers independently with a single, large step (2nd realignment step is allowed)  Compensatory Stepping Correction - Backward    2= Normal:  Recovers independently with a single, large step (2nd realignment step is allowed)  Compensatory Stepping Correction - Lateral      1=Moderate: Several steps to recover equilibrium    Right: 1  Left: 3  Use the side with the lowest score to calculate score  Sensory Orientation    Stance (Feet Together); Eyes open, Firm surface    2= Normal:  30 seconds     Stance (Feet Together);  Eyes closed, Foam surface    2= Normal:  30 seconds    Incline - Eyes Closed    1=Moderate:  Stands independently <30 sec OR aligns with surface      Dynamic Gait    Change in gait speed    2= Normal:  Significantly changes walking speed without imbalance     Walk with Head Turns - Horizontal      0= Severe:  performs head turns with imbalance   Walk with Pivot Turns      1= Moderate:  Turns with feet close SLOW (> or = to 4 steps) with good balance     Step over obstacles      1= Moderate: step over box but touches box OR displays cautious behavior by slowing gait     Timed up and go with dual task (3 meter walk)    TU 35 Seconds  Dual Task TU 65 Seconds    1= Moderate: Dual Task affects counting OR walking (>10%) when compared to the TUG without Dual Task        Total Score:         Balance Test    6 Minute Walk Test (ft): NV   MiniBest:    Gait Speed (m/s): 0 99   5x Sit To Stand (s): 12 88   TUG/TUGC:  65 50 > 35         MCTSIB Number of Seconds   Feet Together, Eyes Open 30   Feet Together, Eyes Closed 30   Feet Together, Eyes Open Foam 30   Feet Together, Eyes Closed Foam 30       Flowsheet Rows    Flowsheet Row Most Recent Value   Functional Gait Assessment    Gait Level Surface  3   Change in GaiT Speed 3   Gait with horizontal head turns 1   Gait with vertical head turns 2   Gait and pivot tuen  1   Step over obstacle 2   Gait with narrow base of supprt 0   Gait with eyes closed 1   Ambulating backwards 2   Steps 2   Total score  17              Sensation Left Right   Kinesthesia impaired impaired   Light Touch impaired impaired   Sharp/Dull     2 Point Discrimination           Manual Muscle Testing - Hip Left Right   Flexion 4 4   Extension 4 4   Abduction 4 4   External Rotation       Manual Muscle Testing - Knee Left Right   Flexion 4 4   Extension 5 5     Manual Muscle Testing - Ankle Left Right   Doriflexion 4 4   Plantarflexion 3+ 3+        Transfers    Sit To Stand Independent   W/C To Bed Independent   Sit To Supine Independent   Roll Independent         Gait Assessment: Stooped posturing  Smaller step length  Mild arm swing  L sided hand tremor   Minimal rotation             Precautions: h/o tinnitus, h/o PD, h/o peripheral neuropathy,h/o HTN, B/L TKR  Re-eval Date: 3/23/2023    Date        Visit Count 1       FOTO See IE       Pain In See IE       Pain Out                Testing        MiniBest 19       TUG/TUGC  65       5xSTS 12 88       Gait speed 0 99       FGA 17       2/6MWT NV       Neuro Re-Ed        Dynamic balance Static balance        Obstacle course        High amplitude movement        Resisted walking/balance                        Ther Ex        SLR x 4        HR/TR        Mini Squats        Step ups        Quadruped thread needle        Rows                        Ther Activity        ADL                Gait Training        Narrow spaces/busyenv        External cues        Modalities         prn

## 2023-02-28 ENCOUNTER — OFFICE VISIT (OUTPATIENT)
Dept: PHYSICAL THERAPY | Facility: CLINIC | Age: 78
End: 2023-02-28

## 2023-02-28 DIAGNOSIS — R26.9 NEUROLOGIC GAIT DYSFUNCTION: ICD-10-CM

## 2023-02-28 DIAGNOSIS — G20 PRIMARY PARKINSONISM (HCC): Primary | ICD-10-CM

## 2023-02-28 NOTE — PROGRESS NOTES
Daily Note     Today's date: 2023  Patient name: Ethan Koenig  : 1945  MRN: [id number]  Referring provider: Nuha Ortiz MD  Dx:   Encounter Diagnosis     ICD-10-CM    1  Primary parkinsonism ([address] )  G20       2  Neurologic gait dysfunction  R26 9           Start Time: 1106  Stop Time: 1203  Total time in clinic (min): 57 minutes    Subjective: Reports that he feels good  Has been doing his exercises at home  Is using knee pads for some of the home exercises that requires him to kneel and can bring his kneepads if we want to do exercises that incorporate kneeling  Tries to get 1 hour of exercise at least 4 times per week  Notes that one of his bigger problems is that he requires handrails to get up the stairs at his house  Objective: See treatment diary below      Assessment: Tolerated treatment well  Good form with home exercises displayed  Has tendency to fall into shuffling pattern but is able to improve his gait pattern a large amount of improvement with just minimal cuing to improve step height and length via external verbal cuing  Has tendency to perform spinning turns that led to moments of imbalance - patient was able to independently recover his balance without external assistance although required a stepping strategy  Patient had good tolerance to exercise, highest difficulty of exercise reaching 6/10  May benefit from developmental positions strengthening such as 1/2 kneeling chops lunge standing  Incorporating SLS progression as well will benefit patient + resisted ambulation  Performed 6MWT, patient able to ambulate 265 meters - below older adult/geriatric community norms of age group (200 m)  Patient educated on possibility of DOMS - patient verbalized good understanding of muscle soreness and ability to differentiate between possible pain   Patient demonstrated fatigue post treatment, exhibited good technique with therapeutic exercises and would benefit from continued PT      Plan: Continue per plan of care  Progress treatment as tolerated         Precautions: h/o tinnitus, h/o PD, h/o peripheral neuropathy,h/o HTN, B/L TKR  Re-eval Date: 3/23/2023    Date 2/23 2/28      Visit Count 1 2      FOTO See IE       Pain In See IE       Pain Out                Testing  2/28      MiniBest 19       TUG/TUGC 9 39/11 65       5xSTS 12 88       Gait speed 0 99       FGA 17       2/6MWT  feet (265 m)       Neuro Re-Ed        Dynamic balance  Side stepping //bar 4x laps     Backwards walking //bar 4x laps    Goblet carry 2 sets 2x laps CGAx1       Static balance        Obstacle course        High amplitude movement  Sit <> Stand  3x5 #10 KB with press       Resisted walking/balance                        Ther Ex        SLR x 4        HR/TR        Mini Squats        Step ups  x10 fwd and lat 4" step     x10 fwd and lat 6" step       Quadruped thread needle  HEP      Rows  HEP                       Ther Activity        ADL                Gait Training        Narrow spaces/busyenv  3x laps ortho side traversing around people + narrow spaces, CGAx1 gait belt      External cues  VC and tactile cuing for improved arm swing      Modalities         prn

## 2023-03-02 ENCOUNTER — OFFICE VISIT (OUTPATIENT)
Dept: PHYSICAL THERAPY | Facility: CLINIC | Age: 78
End: 2023-03-02

## 2023-03-02 DIAGNOSIS — G20 PRIMARY PARKINSONISM (HCC): Primary | ICD-10-CM

## 2023-03-02 DIAGNOSIS — R26.9 NEUROLOGIC GAIT DYSFUNCTION: ICD-10-CM

## 2023-03-02 NOTE — PROGRESS NOTES
Daily Note     Today's date: 3/2/2023  Patient name: Sánchez Maharaj  : 1945  MRN: [id number]  Referring provider: Jacque Sultana MD  Dx:   Encounter Diagnosis     ICD-10-CM    1  Primary parkinsonism ([address] )  G20       2  Neurologic gait dysfunction  R26 9           Start Time: 08  Stop Time: 928  Total time in clinic (min): 40 minutes    Subjective: HEP going well  Little sore after last PT session but felt good  Walking on treadmill  Objective: See treatment diary below      Assessment: Challenged with reactive balance and visuospatial challenge with mild-moderate instability  Improving balance with lateral step and UE movement with repetition, good response to cues UE power - external cueing  Short duration on treadmill without instability with increasing fatigue more need for UE assist  External cues to inc step length with good response, fair carryover post  1 seated rest due to fatigue  Patient demonstrated fatigue post treatment, exhibited good technique with therapeutic exercises and would benefit from continued PT      Plan: Continue per plan of care  Progress treatment as tolerated  External cues  High intensity  Balance and reactive balance training  Cog motor dual task     Precautions: h/o tinnitus, h/o PD, h/o peripheral neuropathy,h/o HTN, B/L TKR  Re-eval Date: 3/23/2023    Date 2/23 2/28 3/2     Visit Count 1 2 3     FOTO See IE       Pain In See IE       Pain Out                Testing        MiniBest 19       TUG/TUGC 9 39/11 65       5xSTS 12 88       Gait speed 0 99       FGA 17       2/6MWT  feet (265 m)       Neuro Re-Ed        Dynamic balance  Side stepping //bar 4x laps     Backwards walking //bar 4x laps    Goblet carry 2 sets 2x laps CGAx1  TM 1  2mph 2min x3 avoid chalk lines no UE    visuospatial challenge hurdles solo bolster carry +5lb 10 laps    Lat stpe with boomwhacker hitting 2x10 solo         Static balance        Obstacle course        High amplitude movement  Sit <> Stand  3x5 #10 KB with press  Sit<>stand 2x10 5#B UE w high amplitude     Resisted walking/balance                        Ther Ex        SLR x 4        HR/TR        Mini Squats        Step ups  x10 fwd and lat 4" step     x10 fwd and lat 6" step  2x10 lat step 6'     Quadruped thread needle  HEP      Rows  HEP                       Ther Activity        ADL                Gait Training        Narrow spaces/busyenv  3x laps ortho side traversing around people + narrow spaces, CGAx1 gait belt      External cues  VC and tactile cuing for improved arm swing VC for increasing step length 8min     Modalities         prn

## 2023-03-06 ENCOUNTER — APPOINTMENT (OUTPATIENT)
Dept: SPEECH THERAPY | Facility: CLINIC | Age: 78
End: 2023-03-06

## 2023-03-07 ENCOUNTER — OFFICE VISIT (OUTPATIENT)
Dept: PHYSICAL THERAPY | Facility: CLINIC | Age: 78
End: 2023-03-07

## 2023-03-07 DIAGNOSIS — G20 PRIMARY PARKINSONISM (HCC): Primary | ICD-10-CM

## 2023-03-07 DIAGNOSIS — R26.9 NEUROLOGIC GAIT DYSFUNCTION: ICD-10-CM

## 2023-03-07 NOTE — PROGRESS NOTES
Daily Note     Today's date: 3/7/2023  Patient name: Jesus Manuel Kimball  : 1945  MRN: [id number]  Referring provider: Ema Gaitan MD  Dx:   Encounter Diagnosis     ICD-10-CM    1  Primary parkinsonism ([address] )  G20       2  Neurologic gait dysfunction  R26 9           Start Time: 1005  Stop Time: 1052  Total time in clinic (min): 47 minutes    Subjective: Wants to review HEP again  Things going well  Seeing neurology on Monday  Going out to dinner later  Objective: See treatment diary below  Max HR: 107bpm    Assessment: 1 episode of freezing noted during turn, able to break independently - pt uses cue for exaggerated step  Pt tolerated circuit style training well, seated rests provided when needed for LE fatigue  Good balance with obstacle course and working on balance without holding on to treadmill  Responds well to high amplitude movement and against resistance  Max HR 107bpm, between 65%-75% HRmax  Reviewed HEP, pt shows good understanding some cues given for form  Discussed using UE support at home on the treadmill to maintain safety, pt verbalized understanding  Patient demonstrated fatigue post treatment, exhibited good technique with therapeutic exercises and would benefit from continued PT      Plan: Continue per plan of care  Progress treatment as tolerated  External cues  High intensity  Balance and reactive balance training  Cog motor dual task     Precautions: h/o tinnitus, h/o PD, h/o peripheral neuropathy,h/o HTN, B/L TKR  Re-eval Date: 3/23/2023    Date 2/23 2/28 3/2 3/7    Visit Count 1 2 3 4    FOTO See IE       Pain In See IE       Pain Out                Testing        MiniBest 19       TUG/TUGC 9 39/11 65       5xSTS 12 88       Gait speed 0 99       FGA 17       2/6MWT  feet (265 m)       Neuro Re-Ed        Dynamic balance  Side stepping //bar 4x laps     Backwards walking //bar 4x laps    Goblet carry 2 sets 2x laps CGAx1  TM 1  2mph 2min x3 avoid chalk lines no UE    visuospatial challenge hurdles solo bolster carry +5lb 10 laps    Lat stpe with boomwhacker hitting 2x10 solo     Lat step cog task L/R boomhwacker hit cues for powerful hit 2x10 solo    Static balance        Obstacle course    Hurdles + 2 foam blocks solo 9lb db carry 8 laps    High amplitude movement  Sit <> Stand  3x5 #10 KB with press  Sit<>stand 2x10 5#B UE w high amplitude Sit<>Stand 3x10 5#B UE w high amplitude    Resisted walking/balance    Blue TB solo fwd 8 laps                    Ther Ex        SLR x 4        HR/TR        Mini Squats        Step ups  x10 fwd and lat 4" step     x10 fwd and lat 6" step  2x10 lat step 6'     Quadruped thread needle  HEP      Rows  HEP                       Ther Activity        HEP    AF            Gait Training        Narrow spaces/busyenv  3x laps ortho side traversing around people + narrow spaces, CGAx1 gait belt      External cues  VC and tactile cuing for improved arm swing VC for increasing step length 8min Ball kick TM 1  3mph <> 1  9mph Max  1min/off 7min total solo    Modalities         prn

## 2023-03-09 ENCOUNTER — OFFICE VISIT (OUTPATIENT)
Dept: PHYSICAL THERAPY | Facility: CLINIC | Age: 78
End: 2023-03-09

## 2023-03-09 DIAGNOSIS — G20 PRIMARY PARKINSONISM (HCC): Primary | ICD-10-CM

## 2023-03-09 DIAGNOSIS — I10 ESSENTIAL HYPERTENSION: ICD-10-CM

## 2023-03-09 DIAGNOSIS — R26.9 NEUROLOGIC GAIT DYSFUNCTION: ICD-10-CM

## 2023-03-09 NOTE — PROGRESS NOTES
Daily Note     Today's date: 3/9/2023  Patient name: Arlen Phillips  : 1945  MRN: [id number]  Referring provider: Anne-Marie Slaughter MD  Dx:   Encounter Diagnosis     ICD-10-CM    1  Primary parkinsonism ([address] )  G20       2  Neurologic gait dysfunction  R26 9           Start Time: 935  Stop Time: 1020  Total time in clinic (min): 45 minutes    Subjective:   No current complaints and no falls noted  Patient indicates he continues to intermittently experience instability in walking and has difficulty with getting in and out of bed  Objective: See treatment diary below  Max HR: 107bpm    Assessment:   Significant narrow REJI noted with increased instability particularly with turns  Substantial decrease in ROM noted for hip and knee extension  Instructed in iliopsoas stretching off edge of mat and hamstring stretching in sitting utilizing weight for overpressure with good tolerance  Encouraged increased frequency of stretching to allow for improved position  Initiated increased wide base of support in gait with video feedback  Nice gains overall noted with increased extension and increased stability  Patient encouraged to continue  Skilled intervention to increase safety and stability is very beneficial and patient is very motivated tolerating today's session well  Plan: Continue per plan of care  Progress treatment as tolerated  External cues  High intensity  Balance and reactive balance training   Cog motor dual task     Precautions: h/o tinnitus, h/o PD, h/o peripheral neuropathy,h/o HTN, B/L TKR  Re-eval Date: 3/23/2023    Date 2/23 2/28 3/2 3/7 3/9   Visit Count 1 2 3 4 5   FOTO See IE       Pain In See IE       Pain Out                Testing  2/28   3/9   MiniBest 19       TUG/TUGC 9 39/11 65       5xSTS 12 88       Gait speed 0 99       FGA 17       2/6MWT  feet (265 m)       Neuro Re-Ed        Segmental rolling with overpressure      5x2   Dynamic balance  Side stepping //bar 4x laps     Backwards walking //bar 4x laps    Goblet carry 2 sets 2x laps CGAx1  TM 1  2mph 2min x3 avoid chalk lines no UE    visuospatial challenge hurdles solo bolster carry +5lb 10 laps    Lat stpe with boomwhacker hitting 2x10 solo     Lat step cog task L/R boomhwacker hit cues for powerful hit 2x10 solo Wide base of support ambulation with exaggerated turns 5 laps x2   Static balance        Obstacle course    Hurdles + 2 foam blocks solo 9lb db carry 8 laps    High amplitude movement  Sit <> Stand  3x5 #10 KB with press  Sit<>stand 2x10 5#B UE w high amplitude Sit<>Stand 3x10 5#B UE w high amplitude    Resisted walking/balance    Blue TB solo fwd 8 laps    Supine to sidelying to sit     4x2   Standing reach cone to wall/floor; repeated with thoracic rotation      10ea   Ther Ex        SLR x 4        HR/TR        Mini Squats        Step ups  x10 fwd and lat 4" step     x10 fwd and lat 6" step  2x10 lat step 6'     Quadruped thread needle  HEP      Rows  HEP       Hamstring seated stretch     20" x4ea   Iliopsoas stretch supine     20" x4ea   Ther Activity        HEP    AF            Gait Training        Narrow spaces/busyenv  3x laps ortho side traversing around people + narrow spaces, CGAx1 gait belt      External cues  VC and tactile cuing for improved arm swing VC for increasing step length 8min Ball kick TM 1  3mph <> 1  9mph Max  1min/off 7min total solo    Modalities         prn

## 2023-03-10 ENCOUNTER — RA CDI HCC (OUTPATIENT)
Dept: OTHER | Facility: HOSPITAL | Age: 78
End: 2023-03-10

## 2023-03-15 ENCOUNTER — EVALUATION (OUTPATIENT)
Dept: SPEECH THERAPY | Facility: CLINIC | Age: 78
End: 2023-03-15

## 2023-03-15 ENCOUNTER — APPOINTMENT (OUTPATIENT)
Dept: LAB | Facility: HOSPITAL | Age: 78
End: 2023-03-15
Attending: INTERNAL MEDICINE

## 2023-03-15 DIAGNOSIS — K21.9 GASTROESOPHAGEAL REFLUX DISEASE WITHOUT ESOPHAGITIS: ICD-10-CM

## 2023-03-15 DIAGNOSIS — I10 ESSENTIAL HYPERTENSION: ICD-10-CM

## 2023-03-15 DIAGNOSIS — R49.0 DYSPHONIA: ICD-10-CM

## 2023-03-15 DIAGNOSIS — R13.12 OROPHARYNGEAL DYSPHAGIA: ICD-10-CM

## 2023-03-15 DIAGNOSIS — G20 PRIMARY PARKINSONISM (HCC): Primary | ICD-10-CM

## 2023-03-15 LAB
ALBUMIN SERPL BCP-MCNC: 4 G/DL (ref 3.5–5)
ALP SERPL-CCNC: 82 U/L (ref 46–116)
ALT SERPL W P-5'-P-CCNC: 11 U/L (ref 12–78)
ANION GAP SERPL CALCULATED.3IONS-SCNC: 2 MMOL/L (ref 4–13)
AST SERPL W P-5'-P-CCNC: 27 U/L (ref 5–45)
BILIRUB SERPL-MCNC: 1.07 MG/DL (ref 0.2–1)
BUN SERPL-MCNC: 17 MG/DL (ref 5–25)
CALCIUM SERPL-MCNC: 9 MG/DL (ref 8.3–10.1)
CHLORIDE SERPL-SCNC: 110 MMOL/L (ref 96–108)
CO2 SERPL-SCNC: 29 MMOL/L (ref 21–32)
CREAT SERPL-MCNC: 0.94 MG/DL (ref 0.6–1.3)
GFR SERPL CREATININE-BSD FRML MDRD: 76 ML/MIN/1.73SQ M
GLUCOSE P FAST SERPL-MCNC: 118 MG/DL (ref 65–99)
POTASSIUM SERPL-SCNC: 3.7 MMOL/L (ref 3.5–5.3)
PROT SERPL-MCNC: 6.8 G/DL (ref 6.4–8.4)
SODIUM SERPL-SCNC: 141 MMOL/L (ref 135–147)

## 2023-03-15 NOTE — PROGRESS NOTES
Speech-Language Pathology Initial Evaluation    Today's date: 3/15/2023   Patient’s name: Bran Vang  : 1945  MRN: [id number]  Safety measures:   Referring provider: Mehreen Hartmann MD    Encounter Diagnosis     ICD-10-CM    1  Pharyngoesophageal dysphagia  R13 14       2  Primary parkinsonism ([address] )  [address] Ambulatory Referral to Speech Therapy      3  Dysphonia  R49 0         Visit tracking:  -Referring provider: Epic  -Billing guidelines: CMS  -Visit # 1  -Insurance: Medicare  -RE due: 5/15/2023    Subjective comments:  Pleasant/Cooperative    How did the patient hear about us? Physician    Patient's goal(s): "To stop the degradation "    Reason for referral: Difficulty swallowing solids or liquids  Prior functional status: N/A  Clinically complex situations: N/A    History: Patient is a 77 y o  male who was referred to outpatient skilled Speech Therapy services for a dysphagia evaluation  Patient noted with functional oral and mild pharyngeal dysphagia per VFSS in 2019  Patient has since had increased coughing episodes during and outside of eating/drinking  Hearing: WFL  Vision: WFL    Home environment/lifestyle: Has been in this area for 15 years  Originally, from [place]  Highest level of education: Master's degree- [employer]  Degrees from [employer] [employer], [address]  Vocational status: Retired    Mental status: Alert  Behavior status: Cooperative  Patient reported symptoms of: N/A  Communication modalities: Verbal  Rehabilitation prognosis: Good rehab potential to reach the established goals      Assessments    DYSPHAGIA EVALUATION:    -Reason for referral: Signs/symptoms of dysphagia    -Subjective report of swallowing difficulty: Coughing    -Eating Assessment Tool (EAT-10) Swallowing Screening Tool is a patient self-assessment tool that rates the percieved impairment a swallowing disorder has on the Functional, Physical, and Emotional aspects of one's life    EAT-10 score: 9/40    -Difficulty swallowing: Solids and Pills    -Current diet (solids): Regular  -Current diet (liquids): Thin  -Current pill intake method: with liquid   -Alternative Feeding Method?: No    -Facial appearance Symmetrical   -Dentition Adequate   -Labial function WFL   -Lingual function Decreased strength (bilateral) Mildly decreased ROM, tremors on tongue noted   -Velar function Unable to visualize       LIQUID CONSISTENCY TESTIN  Liquid Consistency (Thin) - water   • Administered by: Cup and Straw  • Strategies, attempts, and responses: None    CLINICAL FINDINGS:  • Oral phase impairments: Other (Noted mild anterior leakage with thins straw x 1  Will need to assess labial strength)  • Pharyngeal Phase Impairments: suspected mild swallow initiation delay    *Laryngeal excursion upon palpation: Mildly decreased         SOLID CONSISTENCY TESTIN  Solid Consistency (Regular, Mixed and Puree) -  Applesauce, mandarin oranges with juice, crunchy granola bar  • Administered by: Self-fed  • Strategies, attempts, and responses: None    CLINICAL FINDINGS:  • Oral phase impairments: Anterior-posterior transit, Bolus formation/control, Premature spillage and Stasis/coating/ pocketing  • Pharyngeal Phase Impairments: Swallow initiation Mild delay and Cough, coughing with mixed consistency suspected because of fast rate    *Laryngeal excursion upon palpation: Mildly decreased     Goals    Short Term Goals:    Patient will participate in [address] [place], [place] [address]! Evaluation / Voice in order to complete SPEAK OUT! Program     Patient will participate in tongue and lip strength assessment  Patient will complete VFSS  Patient will complete OME's to increase speed, power and efficiency of swallow  Patient will complete pharyngeal exercises to increase swallow efficiency and decrease risk of aspiration      Patient will independently utilize compensatory strategies to decrease risk of aspiration (liquid wash, slow rate, small bites /sips)  Long Term Goals:    Patient will tolerate least restrictive diet (regular /thins) with minimized risk of aspiration  Patient will complete SPEAK OUT! Program and effectively communicate in social settings with improved vocal quality  Impressions/Recommendations    Impressions:   -Patient presents with mild oral and suspected mild pharyngeal & esophageal dysphagia  Patient recommended to continue regular /thins but with small bites / small single sips, upright positioning, reflux precautions, slow rate and use liquid wash or puree wash as needed  Patient would benefit from updated VFSS since last one completed in 2019 and noted increased coughing episodes with coughing episodes still occurring every 4 days per patient and wife report  Patient may likely benefit from [facility]  Patient would also likely benefit from [facility] Evaluation and therapy program to maximize vocal quality at conversation level  Provided handouts and education to patient and wife regarding the recommendations  Consider OT evaluation in future secondary to decreased feeding strength and efficiency and writing     -Factors affecting performance: Endurance    -Safety concerns: Risk for aspiration and Risk for choking    -Risk factors: Progressive neurological disease    Recommendations:  -Patient would benefit from outpatient skilled Speech Therapy services: Voice therapy and Dysphagia therapy    -Frequency: 3x weekly  -Duration: 4-6 weeks    -Intervention certification from: 2/18/4517  -Intervention certification to: 2/30/7350    -Intervention comments:       SWALLOWING SAFETY PRECAUTIONS:  -Recommended solids: Regular    -Recommended liquids:  Thin    -Recommended medication form: with thins as tolerated or with puree    -Frequent/thorough oral care     -Aspiration precautions   *Monitor for signs/symptoms concerning for aspiration (e g , low grade fever, increase in WBC, change in chest x-ray, increased congestion, increased coughing with P O  intake)    -Reflux precautions     -Strategies: Small sips and bites when eating, Slow rate, swallow between bites and Alternate liquids and solids    -Positioning: Upright position during meals and Upright position at least 30 minutes after P O  intake    -Compensatory strategies: N/A    -Supervision: Independent     -Referrals: Videofluroscopic Swallow Study

## 2023-03-16 ENCOUNTER — APPOINTMENT (OUTPATIENT)
Dept: PHYSICAL THERAPY | Facility: CLINIC | Age: 78
End: 2023-03-16

## 2023-03-17 ENCOUNTER — OFFICE VISIT (OUTPATIENT)
Dept: FAMILY MEDICINE CLINIC | Facility: HOSPITAL | Age: 78
End: 2023-03-17

## 2023-03-17 VITALS
WEIGHT: 237 LBS | OXYGEN SATURATION: 94 % | HEIGHT: 73 IN | RESPIRATION RATE: 16 BRPM | HEART RATE: 72 BPM | DIASTOLIC BLOOD PRESSURE: 60 MMHG | SYSTOLIC BLOOD PRESSURE: 104 MMHG | BODY MASS INDEX: 31.41 KG/M2

## 2023-03-17 DIAGNOSIS — I10 ESSENTIAL HYPERTENSION: Primary | ICD-10-CM

## 2023-03-17 DIAGNOSIS — R13.13 PHARYNGEAL DYSPHAGIA: ICD-10-CM

## 2023-03-17 RX ORDER — CARBIDOPA AND LEVODOPA 25; 100 MG/1; MG/1
TABLET, EXTENDED RELEASE ORAL
COMMUNITY

## 2023-03-17 NOTE — PROGRESS NOTES
Assessment/Plan:    Essential hypertension  Patient has orthostatic hypotension likely due to autonomic dysfunction due to Parkinson's disease  I decreased the dose of benazepril/HCTZ from 20/25-10/12 5 leaving Toprol 50 mg daily same  Patient reports no dizziness or lightheadedness on orthostatic changes  Patient did have orthostasis today but he was asymptomatic  I will continue the same dose of benazepril/HCTZ and Toprol    I asked him to call if he develops orthostatic dizziness, lightheadedness  His blood pressure at home has been higher on review of his blood pressure readings: Systolic blood pressure runs between 140-160    Pharyngeal dysphagia  Patient has dysphagia  He reports less cough and gagging the last 2 weeks  He saw speech therapy who requested video barium swallow  I ordered video barium swallow       Diagnoses and all orders for this visit:    Essential hypertension    Pharyngeal dysphagia  -     FL barium swallow video w speech; Future    Other orders  -     carbidopa-levodopa (SINEMET CR)  mg TBCR per ER tablet; Take by mouth 1 at bedtime          Subjective:      Patient ID: Gabrielle Rodriguez is a 77 y o  male  HPI    Patient presents for follow-up of chronic conditions as detailed in the assessment and plan  The following portions of the patient's history were reviewed and updated as appropriate: current medications, past family history, past medical history, past social history, past surgical history and problem list     Review of Systems      Objective:    /60 (BP Location: Left arm, Patient Position: Standing)   Pulse 72   Resp 16   Ht 6' 1" (1 854 m)   Wt 108 kg (237 lb)   SpO2 94%   BMI 31 27 kg/m²      Physical Exam  Constitutional:       General: He is not in acute distress  Appearance: He is not toxic-appearing  HENT:      Head: Normocephalic  Cardiovascular:      Rate and Rhythm: Normal rate and regular rhythm     Pulmonary:      Effort: No respiratory distress  Breath sounds: No wheezing or rales  Neurological:      Mental Status: He is alert and oriented to person, place, and time        Comments: Patient resting tremor of the left thumb, bradykinesia, stooped posture when walking             Adela Dupree MD

## 2023-03-17 NOTE — ASSESSMENT & PLAN NOTE
Patient has dysphagia  He reports less cough and gagging the last 2 weeks  He saw speech therapy who requested video barium swallow    I ordered video barium swallow

## 2023-03-17 NOTE — ASSESSMENT & PLAN NOTE
Patient has orthostatic hypotension likely due to autonomic dysfunction due to Parkinson's disease  I decreased the dose of benazepril/HCTZ from 20/25-10/12 5 leaving Toprol 50 mg daily same  Patient reports no dizziness or lightheadedness on orthostatic changes  Patient did have orthostasis today but he was asymptomatic  I will continue the same dose of benazepril/HCTZ and Toprol    I asked him to call if he develops orthostatic dizziness, lightheadedness      His blood pressure at home has been higher on review of his blood pressure readings: Systolic blood pressure runs between 140-160

## 2023-03-21 ENCOUNTER — OFFICE VISIT (OUTPATIENT)
Dept: PHYSICAL THERAPY | Facility: CLINIC | Age: 78
End: 2023-03-21

## 2023-03-21 DIAGNOSIS — R26.9 NEUROLOGIC GAIT DYSFUNCTION: ICD-10-CM

## 2023-03-21 DIAGNOSIS — G20 PRIMARY PARKINSONISM (HCC): Primary | ICD-10-CM

## 2023-03-21 NOTE — PROGRESS NOTES
Daily Note     Today's date: 3/21/2023  Patient name: Farhana Juarez  : 1945  MRN: [id number]  Referring provider: Shyann Polanco MD  Dx:   Encounter Diagnosis     ICD-10-CM    1  Primary parkinsonism ([address] )  G20       2  Neurologic gait dysfunction  R26 9           Start Time: 1230  Stop Time: 1315  Total time in clinic (min): 45 minutes    Subjective:   Pt notes nothing new, working on his exercises at home  Down to 18min for a half mile  Objective: See treatment diary below      Assessment:   Shows mild instability with turns while carrying the bolster and with addition of ankle weights  Challenged with addition of ankle weights with stepping and foot clearance  Responds well to increased pacing to increase intensity and to also increase step length  Trials of error augmentation to improve stooped posturing  Verbal cues for hip flexor stretch and for performing bed mobility to get supine, with fair carryover/response  Skilled intervention to increase safety and stability is very beneficial and patient is very motivated tolerating today's session well  Plan: Continue per plan of care  Progress treatment as tolerated  Start with bed mobility  External cues  High intensity  Balance and reactive balance training   Cog motor dual task     Precautions: h/o tinnitus, h/o PD, h/o peripheral neuropathy,h/o HTN, B/L TKR  Re-eval Date: 3/23/2023    Date 3/21       Visit Count 6       FOTO See IE       Pain In See IE       Pain Out                Testing        MiniBest 19       TUG/TUGC 9 39/11 65       5xSTS 12 88       Gait speed 0 99       FGA 17       2/6MWT 874 feet (265 m)        Neuro Re-Ed        Segmental rolling with overpressure         Dynamic balance Hurdles 5#B fwd solo 12 laps    Carrying bolster 5#B timed trials TX :21s short ortho side CGA    2x10 L/R lat lunge and hit boomwhacker solo 5#B solo    Multidirectional stepping with cone tap x15 CGA       Static balance        Obstacle course        High amplitude movement Sit<>stand w OH press 2x10 standing on foam           Resisted walking/balance Solo red/blue TB 8 laps x1 fwd       Supine to sidelying to sit        Standing reach cone to wall/floor; repeated with thoracic rotation         Ther Ex        SLR x 4        HR/TR        Mini Squats        Step ups        Quadruped thread needle        Rows        Hamstring seated stretch        Iliopsoas stretch supine Reviewed and 30" x2       Ther Activity        HEP                Gait Training        Narrow spaces/busyenv        External cues        Modalities         prn

## 2023-03-22 ENCOUNTER — HOSPITAL ENCOUNTER (OUTPATIENT)
Dept: RADIOLOGY | Facility: HOSPITAL | Age: 78
Discharge: HOME | End: 2023-03-22
Attending: INTERNAL MEDICINE

## 2023-03-22 DIAGNOSIS — R13.13 PHARYNGEAL DYSPHAGIA: ICD-10-CM

## 2023-03-22 NOTE — PROCEDURES
Video Swallow Study      Patient Name: Shanda FERRERACIHELEN Date: 3/22/2023        Past Medical History  Past Medical History:   Diagnosis Date   • Arthritis    • Benign familial tremor    • Cardiac disease     two cardiac stents   • Chest pain    • Coronary artery disease    • Hyperlipidemia    • Hypertension    • Kidney disease    • Kidney stone    • Malignant melanoma of skin of chest ([facility] [address] )     Removed 2 years ago  • Meniscal injury    • Nephrolithiasis    • Parkinson's disease ([facility] [address] )    • Peripheral neuropathy    • PONV (postoperative nausea and vomiting)    • Tinnitus     [id number] RESOLVED        Past Surgical History  Past Surgical History:   Procedure Laterality Date   • AMPUTATION Left     AMPUTATION OF FINGER WITH NEURECTOMY(EACH) DISTAL LONG  RING AND SMALL FINGER   • CARPAL TUNNEL RELEASE Right    • CATARACT EXTRACTION     • CHOLECYSTECTOMY     • CORONARY STENT PLACEMENT      two cardiac stents   • FINGER AMPUTATION      AMPUTATION OF MIDDLE FINGER WITH NEURECTOMY(EACH)   • HAND SURGERY Left     Traumatic amputation of fingers left hand     • JOINT REPLACEMENT Bilateral     knees   • KNEE ARTHROPLASTY      TOTAL   • MALIGNANT SKIN LESION EXCISION      ANTERIOR CHEST FOR MELAMONIA   • ME CYSTO/URETERO W/LITHOTRIPSY &INDWELL STENT INSRT Left 7/27/2017    Procedure: CYSTOSCOPY URETEROSCOPY , RETROGRADE PYELOGRAM AND INSERTION STENT URETERAL;  Surgeon: Kosta Isaac MD;  Location: [facility];  Service: Urology   • ME LAPAROSCOPY SURG CHOLECYSTECTOMY N/A 2/28/2018    Procedure: CHOLECYSTECTOMY LAPAROSCOPIC;  Surgeon: Shital Avery MD;  [facility];  Service: General   • ME LITHOLAPAXY SMPL/SM <2 5 CM N/A 9/15/2017    Procedure: Kai Bardales;  Surgeon: Jayne Chicas MD;  [facility];  Service: Urology   • ME TRURL ELECTROSURG RESCJ PROSTATE BLEED COMPLETE N/A 9/15/2017    Procedure: BIPOLAR TRANSURETHRAL RESECTION OF PROSTATE (TURP); Surgeon: Jolie Garcia MD;  Location: [facility];  Service: Urology   • TONSILLECTOMY     • TRIGGER FINGER RELEASE Right    • TUMOR REMOVAL Left     Left foot 20 years ago  • WISDOM TOOTH EXTRACTION Bilateral          Modified (Video) Barium Swallow Study    Summary:  Images are on PACS for review  Pt presents w/ mild-mod oropharyngeal dysphagia  Mild-mod reduced bolus hold and delayed swallow initiation w/ spill spill to at least the valleculae w/ all, to the pyriforms w/ thin and cup/sequential sip nectar thick liquids  Fairly prompt bolus transport w/ mild residue on BOT  Mod reduced pharyngeal stripping wave  Mod reduced anterior hyoid excursion w/ incomplete laryngeal elevation and vestibule closure  Incomplete epiglottic inversion 2/2 protruding bony growth at C4-5  Mild-mod reduced UES relaxation w/ mild-mod pharyngeal residue in the valleculae and pyriforms, intermittent epiglottic undercoating from retention/suspected ambient fluid  Recurrent penetration w/ epiglottic undercoating w/ thin liquids, transient aspiration w/ rapid straw sips thin-wet vocal quality response to penetration/aspiration  Trace aspiration also noted while taking barium tablet w/ thin liquids, barium tablet retained in valleculae and then pyriforms, requiring mult liquid washes to clear-delayed cough response appreciated  VBS findings thoroughly reviewed w/ pt w/ use of images to aid in understanding  Education provided on risks of aspiration w/ each administered consistency  Further education provided on risks vs benefits of liquid/solid texture modifications (potential reduced aspiration risk and subsequent medical implications, increased retention (including identified increased retention and overflow/epiglottic undercoating w/ thickened liquids), potential dehydration, etc )   Education provided on strategies to optimize swallow safety without dysphagia diet modifications, including the importance of oral care "and s/s medical complications associated w/ aspiration to monitor for and notify medical team of should they arise  Pt verbalized understanding and at this time agreeable to continuation of current diet w/ acceptance of risk and plan for ST f/u  Recommendations:  Diet: Regular  Liquids: Thin   Meds: As tolerated   Strategies: No straws, small sips/bite sizes, alternate liquids/solids   Frequent oral care  Upright position  F/u ST tx: Continue OP ST   Therapy Prognosis: Fair   Prognosis considerations: age, current medical status, PMH  Aspiration Precautions  Reflux Precautions  Consider consult with: neurosurgery vs ENT for osteophyte  Results reviewed with: pt, physician  Repeat MBS as necessary  If a dedicated assessment of the esophagus is desired, consider esophagram/barium swallow or EGD  Goals:  Pt will tolerate least restrictive diet w/out s/s aspiration or oral/pharyngeal difficulties  Patient's goal: \"Now I'm back  \"      H&P/pertinent provider notes: (PMH noted above)  3/17/23 Per Dr Tevin Paige, Lukas Chill, 76 y/o M, presents w/ pharyngeal dysphagia  He reports less cough and gagging the last 2 weeks  He saw speech therapy who requested video barium swallow  Special Studies:  -    Previous VBS:  4/28/21  Summary:  Images are on PACS for review       Pt presents w/ wfl oral, mild pharyngeal dysphagia  Swallow initiation is timely, though hyo-laryngeal excursion is reduced  Pt w/ bony protrusion impacting bolus clearance  Retention noted above CP and under suspected osteophyte, pt noted to aspirate x2 on this retention - throat clear response  Liquid wash somewhat helpful, though during swallow w/ significant retention, transient aspiration noted w/ thin as it could not clear through the retention  Pt w/ at least mild aspiration risk, recommend ongoing OP ST to f/u on findings from VBS, diet prep, and strategies to optimize swallow safety   Findings of VBS immediately reviewed w/ pt, " education provided on choosing softer foods, alternating liquids and solids, good oral hygiene, and following up w/ primary SLP - pt verbalized understanding       Recommendations:  Diet: Soft solids   Liquids: Thin liquids   Meds: As tolerated   Strategies: Small bites/sips, alternate bites/liquids       3/20/19  Summary: Pt presents w functional oral, & mild pharyngeal dysphagia characterized by mildly decreased epiglottic inversion, mildly reduced superior laryngeal rise, and mildly reduced pharyngeal constriction  Pt has Parkinson's disease and GERD, primarily c/o gagging w both PO and saliva, often leading to coughing spells  Pt had transient penetration w nectar and thin, did not aspirate during the study  Osteophyte noted at C4-C5, pt had mild regurgitation and small amt of retention noted w all consistencies  Retention of solids near the osteophyte & in CP at times was regurgitated towards the PS  Brief screening of the esophagus was completed, pt had moderate dysmotility and mild reflux         Recommendations:  Diet: Regular  Liquids: Thin  Meds: as tolerated  Strategies: mult swallows,       Does the pt have pain? No  If yes, was nursing made aware/was it addressed? N/A    Swallow Mechanism Exam  Facial: symmetrical  Labial: WFL  Lingual: WFL  Velum: symmetrical  Mandible: adequate ROM  Dentition: adequate  Vocal quality:clear/adequate   Volitional Cough: strong/productive   Respiratory Status: on RA  Tracheostomy: n/a     Swallow Information   Current Risks for Dysphagia & Aspiration: known history of dysphagia, known history of aspiration and Parkinson's disease  Current Symptoms/Concerns: coughing with po and globus sensation  Current Diet: regular diet and thin liquids   Baseline Diet: regular diet and thin liquids      Consistencies Administered:  Pt was viewed sitting upright in the lateral and AP positions   Trials administered were consistent with MBSImP Validated Protocol: 5-mL thin liquid x2, 20-mL cup sip thin, 40-mL sequential swallow thin, 5-mL nectar thick, 20-mL cup sip nectar thick, 40-mL sequential swallow nectar thick, 5-mL Honey thick, 5-mL pudding, ½ cookie coated with 3-mL pudding, 5-mL nectar thick in the AP position, and 5-mL pudding in the AP position  Pt was also given thin liquids by straw, as well as a barium tablet with thin liquids  Oral Impairment:  Lip Closure: Complete  Tongue Control During Bolus Hold: Mild-mod reduced   Bolus Preparation/Mastication: Cohesive rotary mastication   Bolus Transport/Lingual Motion: Fairly prompt  Oral Residue: Mild residue on BOT   Initiation of the Pharyngeal Swallow: Mild-mod w/ spill to at least the valleculae w/ all to the pyriforms w/ thin and cup/sequential sip nectar thick liquids     Pharyngeal Impairment:  Soft Palate Elevation: complete  Laryngeal Elevation: Incomplete  Anterior Hyoid Excursion:  Mod reduced   Epiglottic Movement: Incomplete 2/2 osteophyte   Laryngeal Vestibular Closure: Incomplete  Pharyngeal Stripping Wave: mod reduced pharyngeal stripping wave   Pharyngeal Contraction: Bilateral   PES Opening: mild-mod reduced UES relaxation w/ regurgitation   Tongue Base Retraction: mild reduced   Pharyngeal Residue: mild-mod residue in the valleculae/pyriforms, barium retained in valleculae then retained in pyriforms prior to eventual passage through UES w/ mult sips of thin     Screening of Esophageal Impairment   Esophageal Clearance: Complete clearance       Penetration/Aspiration:  Thin: Transient aspiration w/ rapid straw sips (PAS-6) deep penetration w/ epiglottic undercoating w/ all (PAS-3)  Nectar: Transient penetration w/ cup sip/sequential (PAS-2)  Honey: No penetration or aspiration (PAS-1)  Puree: No penetration or aspiration (PAS-1)  Solid: No penetration or aspiration (PAS-1)  Barium Tablet: Trace aspiration of thin liquids while attempted to clear retained barium tablet   Response to Aspiration: wet vocal quality  Strategies/Efficacy: Chin tuck not effective in eliminating penetration/aspiration    8-Point Penetration-Aspiration Scale   1 Material does not enter the airway   2 Material enters the airway, remains above the vocal folds, and is ejected  from the  airway    3 Material enters the airway, remains above the vocal folds, and is not ejected from the airway   4 Material enters the airway, contacts the vocal folds, and is ejected from the airway   5 Material enters the airway, contacts the vocal folds, and is not ejected from the airway    6 Material enters the airway, passes below the vocal folds and is ejected into the larynx or out of the airway    7 Material enters the airway, passes below the vocal folds, and is not ejected from the trachea despite effort    8 Material enters the airway, passes below the vocal folds, and no effort is made to eject

## 2023-03-23 ENCOUNTER — OFFICE VISIT (OUTPATIENT)
Dept: PHYSICAL THERAPY | Facility: CLINIC | Age: 78
End: 2023-03-23

## 2023-03-23 DIAGNOSIS — R26.9 NEUROLOGIC GAIT DYSFUNCTION: ICD-10-CM

## 2023-03-23 DIAGNOSIS — G20 PRIMARY PARKINSONISM (HCC): Primary | ICD-10-CM

## 2023-03-23 NOTE — PROGRESS NOTES
Daily Note     Today's date: 3/23/2023  Patient name: Charlotte Jones  : 1945  MRN: [id number]  Referring provider: Radha Parsons MD  Dx:   Encounter Diagnosis     ICD-10-CM    1  Primary parkinsonism ([address] )  G20       2  Neurologic gait dysfunction  R26 9           Start Time: 0845  Stop Time: 930  Total time in clinic (min): 45 minutes    Subjective:   Pt arrives with HEP, asking about BIG exercises  Wants to work on bed mobility  Objective: See treatment diary below      Assessment:   Verbal cues for using momentum, increased difficulty of transfer with resistance, will consider adding pillows for softness of surface  Shows good success with resistance, cues for not hooking foot against mat table  Pt with more instability with lateral step over hurdles, more knee flexion noted with fatigue  Shows occasional imbalance with turns with timed trials  Instructed on taking active rest day tomorrow with light treadmill exercise  Pt will continue to benefit from skilled PT to work on balance, strength, and mobility  Plan: Continue per plan of care  Progress treatment as tolerated  Start with bed mobility  External cues  High intensity  Balance and reactive balance training   Cog motor dual task     Precautions: h/o tinnitus, h/o PD, h/o peripheral neuropathy,h/o HTN, B/L TKR  Re-eval Date: 3/23/2023    Date 3/21 3/23      Visit Count 6 7      FOTO See IE       Pain In See IE       Pain Out                Testing        MiniBest 19       TUG/TUGC 9 39/11 65       5xSTS 12 88       Gait speed 0 99       FGA 17       2/6MWT 874 feet (265 m)        Neuro Re-Ed        Segmental rolling with overpressure         Dynamic balance Hurdles 5#B fwd solo 12 laps    Carrying bolster 5#B timed trials NJ :21s short ortho side CGA    2x10 L/R lat lunge and hit boomwhacker solo 5#B solo    Multidirectional stepping with cone tap x15 CGA Hurdles lat 5#B 8 laps    Timed trials solo bolster +5lb carry 6 lap NJ :42 x2 Static balance        Obstacle course        High amplitude movement Sit<>stand w OH press 2x10 standing on foam     Sit<>stand OH press L/R 2x10 firm      Resisted walking/balance Solo red/blue TB 8 laps x1 fwd Solo red/blue TB 8 laps x1 fwd cues for least amt of steps      Supine to sidelying to sit        Standing reach cone to wall/floor; repeated with thoracic rotation         Ther Ex        SLR x 4        HR/TR        Mini Squats        Step ups        Quadruped thread needle        Rows        Hamstring seated stretch        Iliopsoas stretch supine Reviewed and 30" x2       Ther Activity        HEP        Bed mobility  15min vc for momentum, resistance provided on sidelying>sit      Gait Training        Narrow spaces/busyenv        External cues        Modalities         prn

## 2023-03-28 ENCOUNTER — OFFICE VISIT (OUTPATIENT)
Dept: PHYSICAL THERAPY | Facility: CLINIC | Age: 78
End: 2023-03-28

## 2023-03-28 DIAGNOSIS — R26.9 NEUROLOGIC GAIT DYSFUNCTION: ICD-10-CM

## 2023-03-28 DIAGNOSIS — G20 PRIMARY PARKINSONISM (HCC): Primary | ICD-10-CM

## 2023-03-28 NOTE — PROGRESS NOTES
Daily Note     Today's date: 3/28/2023  Patient name: Haylie Santiago  : 1945  MRN: [id number]  Referring provider: Angela Bustillo MD  Dx:   Encounter Diagnosis     ICD-10-CM    1  Primary parkinsonism ([address] )  G20       2  Neurologic gait dysfunction  R26 9           Start Time: 1102  Stop Time: 1145  Total time in clinic (min): 43 minutes    Subjective:   Pt states he changed his mattress to have less foam which has helped measurably  Feels like balance is getting better  Objective: See treatment diary below      Assessment:   Pt challenged with more upright balance challenge with use of bolster to work on balance with changed center of mass  Still demonstrates inadequate knee extension during balance exercises  Tolerates increase in intensity well, responds well to rhythmic cues to keep pace  Good clearance and balance with obstacle negotiation  Improving ROM overhead with good balance during transitional movement  Pt will continue to benefit from skilled PT to work on balance, strength, and mobility  Plan: Continue per plan of care  Progress treatment as tolerated  Start with bed mobility  External cues  High intensity  Balance and reactive balance training  Cog motor dual task  Reassess nv       Precautions: h/o tinnitus, h/o PD, h/o peripheral neuropathy,h/o HTN, B/L TKR  Re-eval Date: 3/23/2023    Date 3/21 3/23 3/28     Visit Count 6 7 8     FOTO See IE       Pain In See IE       Pain Out                Testing        MiniBest 19       TUG/TUGC 9 39/11 65       5xSTS 12 88       Gait speed 0 99       FGA 17       2/6MWT 874 feet (265 m)        Neuro Re-Ed        Segmental rolling with overpressure         Dynamic balance Hurdles 5#B fwd solo 12 laps    Carrying bolster 5#B timed trials SC :21s short ortho side CGA    2x10 L/R lat lunge and hit boomwhacker solo 5#B solo    Multidirectional stepping with cone tap x15 CGA Hurdles lat 5#B 8 laps    Timed trials solo bolster +5lb carry 6 lap SC ":43 x2 Hurdles fwd bolster behind back 6 laps solo    bwd bolster behind back solo 8 laps    Step ups 6' CGA with auditory cues 2x AMRAP     Static balance        Obstacle course        High amplitude movement Sit<>stand w OH press 2x10 standing on foam     Sit<>stand OH press L/R 2x10 firm Sit<>stand OH press max ROM 2x10 foam     Resisted walking/balance Solo red/blue TB 8 laps x1 fwd Solo red/blue TB 8 laps x1 fwd cues for least amt of steps Solo red/blue TB 8 laps x1 fwd, 8 laps lat step     Supine to sidelying to sit        Standing reach cone to wall/floor; repeated with thoracic rotation         Ther Ex        SLR x 4        HR/TR        Mini Squats        Step ups        Quadruped thread needle        Rows   unilat row 20# 3x12 cues for rotation     Hamstring seated stretch        Iliopsoas stretch supine Reviewed and 30\" x2       Gastroc str   30\" x2 cues for knee str     Ther Activity        HEP        Bed mobility  15min vc for momentum, resistance provided on sidelying>sit      Gait Training        Narrow spaces/busyenv        External cues        Modalities         prn                        "

## 2023-03-30 ENCOUNTER — OFFICE VISIT (OUTPATIENT)
Dept: PHYSICAL THERAPY | Facility: CLINIC | Age: 78
End: 2023-03-30

## 2023-03-30 DIAGNOSIS — G20 PRIMARY PARKINSONISM (HCC): Primary | ICD-10-CM

## 2023-03-30 DIAGNOSIS — R26.9 NEUROLOGIC GAIT DYSFUNCTION: ICD-10-CM

## 2023-03-30 NOTE — PROGRESS NOTES
PT Re-Evaluation /Progressnote    Today's date: 3/30/2023  Patient name: Kreg Bumpers  : 1945  MRN: [id number]  Referring provider: Adria Brennan MD  Dx:   Encounter Diagnosis     ICD-10-CM    1  Primary parkinsonism ([address] )  G20       2  Neurologic gait dysfunction  R26 9           Start Time: 1545  Stop Time: 1628  Total time in clinic (min): 43 minutes    Assessment  Assessment details: Patient is a 77y o  year old male presenting to OPPT s/p diagnosis of Parkinson's disease  Margaret Velazquez is showing improvement in his balance  His MiniBest has improved by 2 points, most notably with his stability dual tasking and during ambulatory balance  He shows more controlled gait with head motion and over obstacles  His gait speed is improved by 0 10m/s at, 1 09m/s without an AD working toward meeting MCID value for PwPD  He is consistently performing his HEP at home and is progressing nicely with his treadmill program  Both FGA and MiniBest score place him at risk for falls  Short term PT goals met thus far  Pt with one PT assisted controlled fall during sled pull due to festination walking backward  Able to perform floor<>stand independently with UE assist, pt indicates no pain and was able to continue session without difficulty or pain  Pt will continue to benefit from skilled intervention to improve his balance, postural control, and overall strength/mobility  Continue per plan of care  Impairments: abnormal gait, activity intolerance, impaired balance, impaired physical strength, lacks appropriate home exercise program, safety issue and weight-bearing intolerance     Prognosis: good    Goals  In 4 weeks, patient will:  1  Demonstrate ability to perform 20 minutes of activity without requiring seated rest break  - met  2  Demonstrate ability to maintain upright balance unsupported by UEs while reaching above shoulder height without resistance to promote stability with ADLs - met  3    Improve 5xSTS by at least 3 seconds to show improved LE strength for stability during transfers - met  4  Demonstrate ability to ambulate through doorway/threshold without verbal cues or change in gait quality >80% of occasions - met  5  Demonstrate ability to roll bilaterally in <7 seconds without use of assistive equipment - met    In 4-10 weeks, patient will:  1  Meet FOTO predicted score to demonstrate improvement in functional mobility  2   Demonstrate consistent carryover with HEP and walking program   3   Improve gait speed by at least  0 12 m/s to meet MCID value for older adults and reduce fall risk  4   Improve 6MWT by at least 100ft to demonstrate improvement in ability in community ambulation  5   Improve FGA by at least 4 points to meet [address] value for PwPD and show improved balance  6   Improve ABC by at least 16% to meet  reduced risk of falling and improved balance confidence  7   Demonstrate ability to ambulate forward holding item requiring bilateral UEs without LOB or change in gait quality  8  Report walking a mile in his neighborhood with mild-mod fatigue  9   Report improvement in ability to get in/out of bed  10  Improve MiniBest by at least 4 points to meet MCID value for PwPD and demonstrate improved safety      Plan  Patient would benefit from: skilled physical therapy  Planned therapy interventions: neuromuscular re-education, manual therapy, patient education, home exercise program, therapeutic exercise, therapeutic activities and gait training  Frequency: 2x week  Duration in weeks: 4  Plan of Care beginning date: 3/30/2023  Plan of Care expiration date: 4/29/2023        Subjective Evaluation    History of Present Illness  Mechanism of injury: 3/30: Pt notes he has steadier balance but not where he wants it to be  He is on the treadmill every day and performing strengthening exercises  Feels like he is working on the right things so far  Present at PT: Trouble getting in/out of bed   Noticed about 3/4 months his balance become off - SPC use but feels like his balance has gotten slightly better  Lightheaded when getting in/out of bed  Difficulty getting in/out of bed due to mobility issues  Sometimes doesn't feel secure  SPC always in car in case  Sometimes some difficulty getting in/out of car  Kidney stones in the past - urinating more frequently  Goals: Walk longer distance - fatigued quicker  Wants to walk a mile in his neighborhood  11/30 Neurology: General Orts returned to our office for follow-up accompanied by his wife  He was previously followed by Dr Gaurang Pablo  I reviewed his extensive records  He was diagnosed with polyneuropathy in 2010  In 2011 he developed left upper extremity tremor  He was initially diagnosed with essential tremor and was subsequently diagnosed with Parkinson's in 2013  Since 2019 he has been having mild problem swallowing  He has also been having memory loss  His hand writing has become difficult  He has been having worsening balance especially going up steps  He has leg cramps at night, which is controlled by over-the-counter medication  In September 2022 he woke up with complete loss of balance without other neurologic symptom such as headache or dysarthria  This has gradually improved  He denies other new neurologic symptoms since his previous visit  He is taking Sinemet 25/100, 1, 5-6 times daily  He had a brain MRI in March 2021 which showed a small chronic hemosiderin deposition 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 per the radiologist  His previous brain MRI in October 2018 at another facility also showed serpiginous enhancement in the left occipital region felt possibly representing venous malformation  His B12, Folate and TSH in March 2021 were normal     He was seen by ENT and had testing for problem swallowing  He had physical therapy in 2021  He also had speech and swallow evaluation and treatment       Pain  No pain reported  Location: knee pain    Social Support  Stairs in house: yes   Lives in: multiple-level home  Lives with: spouse    Employment status: not working  Treatments  Previous treatment: physical therapy and speech therapy  Patient Goals  Patient goals for therapy: improved balance, increased strength and independence with ADLs/IADLs          Objective   Neuro Exam:     Functional outcomes   Functional outcome comment: Mini Best  Anticipatory     Sit to Stand    2= Normal:  Comes to stand without use of hands and stabilizes independently     Rise to Toes      1=Moderate:  Heels up, but not full range (smaller than when holding hands) OR noticeable instability for 3 seconds     Stand on one leg      1=Moderate: < 20 seconds      Right:  5 seconds  Left:  4 seconds       Reactive Postural Control     Compensatory Stepping Correction - Forward    2= Normal:  Recovers independently with a single, large step (2nd realignment step is allowed)  Compensatory Stepping Correction - Backward    2= Normal:  Recovers independently with a single, large step (2nd realignment step is allowed)  Compensatory Stepping Correction - Lateral      1=Moderate: Several steps to recover equilibrium    Right: 1  Left: 3  Use the side with the lowest score to calculate score  Sensory Orientation    Stance (Feet Together); Eyes open, Firm surface    2= Normal:  30 seconds     Stance (Feet Together);  Eyes closed, Foam surface    2= Normal:  30 seconds    Incline - Eyes Closed    1=Moderate:  Stands independently <30 sec OR aligns with surface      Dynamic Gait    Change in gait speed    2= Normal:  Significantly changes walking speed without imbalance     Walk with Head Turns - Horizontal    1= Moderate:  Less imbalance vs evaluation     Walk with Pivot Turns      1= Moderate:  Turns with feet close SLOW (> or = to 4 steps) with good balance     Step over obstacles      1= Moderate: step over box but touches box OR displays cautious behavior by slowing gait     Timed up and go with dual task (3 meter walk)    TU 16  Seconds  Dual Task TU 87 Seconds    2= Normal:Normalized cog motor dual task        Total Score:         Balance Test 2/23 3/30   6 Minute Walk Test (ft): NV    MiniBest:    Gait Speed (m/s): 0 99 1 09   5x Sit To Stand (s): 12 88 08 96   TUG/TUGC:  39 /  65 50 > 35 07  87 60 > 39         MCTSIB Number of Seconds   Feet Together, Eyes Open 30   Feet Together, Eyes Closed 30   Feet Together, Eyes Open Foam 30   Feet Together, Eyes Closed Foam 30       Flowsheet Rows    Flowsheet Row Most Recent Value   Functional Gait Assessment    Gait Level Surface  3   Change in GaiT Speed 3   Gait with horizontal head turns 2   Gait with vertical head turns 2   Gait and pivot tuen  2   Step over obstacle 3   Gait with narrow base of supprt 1   Gait with eyes closed 2   Ambulating backwards 2   Steps 2   Total score  22              Sensation Left Right   Kinesthesia impaired impaired   Light Touch impaired impaired   Sharp/Dull     2 Point Discrimination           Manual Muscle Testing - Hip Left Right   Flexion 4 4   Extension 4 4   Abduction 4 4   External Rotation       Manual Muscle Testing - Knee Left Right   Flexion 4 4   Extension 5 5     Manual Muscle Testing - Ankle Left Right   Doriflexion 4 4   Plantarflexion 3+ 3+        Transfers    Sit To Stand Independent   W/C To Bed Independent   Sit To Supine Independent   Roll Independent         Gait Assessment: Stooped posturing  Smaller step length  Mild arm swing  L sided hand tremor   Minimal rotation             Precautions: h/o tinnitus, h/o PD, h/o peripheral neuropathy,h/o HTN, B/L TKR  Re-eval Date: 2023    Date 3/21 3/23 3/28 3/30    Visit Count 6 7 8 9    FOTO See IE       Pain In See IE       Pain Out                Testing        MiniBest 21       TUG/TUGC  87       5xSTS 08 96       Gait speed 1 09       FGA 22       2/6MWT 874 "feet (265 m)        Neuro Re-Ed    MiniBest, 5xSTS, 10mWT, FGA,     Segmental rolling with overpressure         Dynamic balance Hurdles 5#B fwd solo 12 laps    Carrying bolster 5#B timed trials ND :21s short ortho side CGA    2x10 L/R lat lunge and hit boomwhacker solo 5#B solo    Multidirectional stepping with cone tap x15 CGA Hurdles lat 5#B 8 laps    Timed trials solo bolster +5lb carry 6 lap ND :42 x2 Hurdles fwd bolster behind back 6 laps solo    bwd bolster behind back solo 8 laps    Step ups 6' CGA with auditory cues 2x AMRAP Bolster carry behind back 10 laps hurdles solo    bwd stepping behind back bolster 8 laps solo    Static balance        Obstacle course        High amplitude movement Sit<>stand w OH press 2x10 standing on foam     Sit<>stand OH press L/R 2x10 firm Sit<>stand OH press max ROM 2x10 foam Sit<>stand OH press max ROM 2x10 foam wearing harness    Resisted walking/balance Solo red/blue TB 8 laps x1 fwd Solo red/blue TB 8 laps x1 fwd cues for least amt of steps Solo red/blue TB 8 laps x1 fwd, 8 laps lat step Sled push 6 plates 6 laps 38XR, 4 laps 15ft    Supine to sidelying to sit        Standing reach cone to wall/floor; repeated with thoracic rotation         Ther Ex        SLR x 4        HR/TR        Mini Squats        Step ups        Quadruped thread needle        Rows   unilat row 20# 3x12 cues for rotation     Hamstring seated stretch        Iliopsoas stretch supine Reviewed and 30\" x2       Gastroc str   30\" x2 cues for knee str     Ther Activity        HEP        Bed mobility  15min vc for momentum, resistance provided on sidelying>sit      Gait Training        Narrow spaces/busyenv        External cues        Modalities         prn                        "

## 2023-03-31 ENCOUNTER — OFFICE VISIT (OUTPATIENT)
Dept: SPEECH THERAPY | Facility: CLINIC | Age: 78
End: 2023-03-31

## 2023-03-31 DIAGNOSIS — R49.0 DYSPHONIA: ICD-10-CM

## 2023-03-31 DIAGNOSIS — G20 PRIMARY PARKINSONISM (HCC): Primary | ICD-10-CM

## 2023-03-31 DIAGNOSIS — R13.12 OROPHARYNGEAL DYSPHAGIA: ICD-10-CM

## 2023-03-31 NOTE — PROGRESS NOTES
Daily Speech Treatment Note    Today's date: 3/31/2023  Patient’s name: Roxy Hamilton  : 1945  MRN: [id number]  Safety measures:   Referring provider: Jia Rapp MD    Encounter Diagnosis     ICD-10-CM    1  Primary parkinsonism ([address] )  G20       2  Dysphonia  R49 0       3  Oropharyngeal dysphagia  R13 12         Visit trackin    Subjective/Behavioral:  - Pleasant/Cooperative    Objective/Assessment:  Completed structured activities with patient to target goals below  Results as stated below  Short-term goals:  Goals     Short Term Goals:     Patient will participate in [address]! Evaluation / Voice in order to complete SPEAK OUT! Program   3/31: Patient signed up for Webinar  10:30AM   Patient would like to hold off SPEAK OUT! Eval at this time since emphasis is on swallowing      Patient will participate in tongue and lip strength assessment  3/31: Scheduled for next session     Patient will complete VFSS  3/31: Completed VFSS on 3/22/23: Pt presents w/ mild-mod oropharyngeal dysphagia  Mild-mod reduced bolus hold and delayed swallow initiation w/ spill spill to at least the valleculae w/ all, to the pyriforms w/ thin and cup/sequential sip nectar thick liquids  Fairly prompt bolus transport w/ mild residue on BOT  Mod reduced pharyngeal stripping wave  Mod reduced anterior hyoid excursion w/ incomplete laryngeal elevation and vestibule closure  Incomplete epiglottic inversion 2/2 protruding bony growth at C4-5  Mild-mod reduced UES relaxation w/ mild-mod pharyngeal residue in the valleculae and pyriforms, intermittent epiglottic undercoating from retention/suspected ambient fluid  Recurrent penetration w/ epiglottic undercoating w/ thin liquids, transient aspiration w/ rapid straw sips thin-wet vocal quality response to penetration/aspiration   Trace aspiration also noted while taking barium tablet w/ thin liquids, barium tablet retained in valleculae and then pyriforms, requiring mult liquid washes to clear-delayed cough response appreciated          Recommendations:  Diet: Regular  Liquids: Thin   Meds: As tolerated   Strategies: No straws, small sips/bite sizes, alternate liquids/solids   Frequent oral care  Upright position  F/u ST tx: Continue OP ST   Therapy Prognosis: Fair   Prognosis considerations: age, current medical status, PMH  Aspiration Precautions  Reflux Precautions  Consider consult with: neurosurgery vs ENT for osteophyte  Results reviewed with: pt, physician  Repeat MBS as necessary  If a dedicated assessment of the esophagus is desired, consider esophagram/barium swallow or EGD      Patient will complete OME's to increase speed, power and efficiency of swallow      Patient will complete pharyngeal exercises to increase swallow efficiency and decrease risk of aspiration  3/31: Trained in effortful swallow & Mendelsohn Maneuver  Patient able to complete     Patient will independently utilize compensatory strategies to decrease risk of aspiration (liquid wash, slow rate, small bites /sips)  3/31: Explained results to patient from VFSS        Long Term Goals:     Patient will tolerate least restrictive diet (regular /thins) with minimized risk of aspiration      Patient will complete SPEAK OUT! Program and effectively communicate in social settings with improved vocal quality        Plan:  -Continue with current plan of care  IOPI, OME's, EMST, pharyngeal exercises per plan

## 2023-04-04 ENCOUNTER — OFFICE VISIT (OUTPATIENT)
Dept: PHYSICAL THERAPY | Facility: CLINIC | Age: 78
End: 2023-04-04

## 2023-04-04 ENCOUNTER — OFFICE VISIT (OUTPATIENT)
Dept: SPEECH THERAPY | Facility: CLINIC | Age: 78
End: 2023-04-04

## 2023-04-04 DIAGNOSIS — R26.9 NEUROLOGIC GAIT DYSFUNCTION: ICD-10-CM

## 2023-04-04 DIAGNOSIS — R13.12 OROPHARYNGEAL DYSPHAGIA: ICD-10-CM

## 2023-04-04 DIAGNOSIS — G20 PRIMARY PARKINSONISM (HCC): Primary | ICD-10-CM

## 2023-04-04 DIAGNOSIS — R49.0 DYSPHONIA: ICD-10-CM

## 2023-04-04 NOTE — PROGRESS NOTES
Daily Speech Treatment Note    Today's date: 2023  Patient’s name: Maite Woodruff  : 1945  MRN: [id number]  Safety measures:   Referring provider: Mj Whitten MD    No diagnosis found  Visit tracking:  3    Subjective/Behavioral:  - Pleasant/Cooperative    Objective/Assessment:  Completed structured activities with patient to target goals below  Results as stated below  Completed IOPI- completed lingual exercises at 60% of max pressure  Provided EMST 150 Info  Plan 80% IOPI next session  Goals     Short Term Goals:     Patient will participate in [address]! Evaluation / Voice in order to complete SPEAK OUT! Program   3/31: Patient signed up for Webinar  10:30AM   Patient would like to hold off SPEAK OUT! Eval at this time since emphasis is on swallowing      Patient will participate in tongue and lip strength assessment  3/31: Scheduled for next session    [facility]      23           Tongue tip  (Goal = 65) 28, 34, 32           Tongue back  (Goal = 55)  22, 24, 20           Right Lip  (Goal = 35) 16, 20,  21        Left Lip  (Goal = 35) 24, 22, 23            IOPI -- Today's Exercise Targets    Tongue tip  (Goal = 65) Peak Max after 3 trials: 34 Effort level: 60% Target for treatment: 20   Tongue back  (Goal = 55) Peak Max after 3 trials: 24 Effort level: 60% Target for treatment: 14   Right Lip  (Goal = 35) Peak Max after 3 trials: 21 Effort level: 60% Target for treatment: 12   Left Lip  (Goal = 35) Peak Max after 3 trials: 24 Effort level: 60% Target for treatment: 14        2023        Tongue tip  20  X 3        Tongue back 14 x 3        Right Lip 12 x 3         Left Lip 14 x 3             Patient will complete VFSS  3/31: Completed VFSS on 3/22/23: Pt presents w/ mild-mod oropharyngeal dysphagia   Mild-mod reduced bolus hold and delayed swallow initiation w/ spill spill to at least the valleculae w/ all, to the pyriforms w/ thin and cup/sequential sip nectar thick liquids  Fairly prompt bolus transport w/ mild residue on BOT  Mod reduced pharyngeal stripping wave  Mod reduced anterior hyoid excursion w/ incomplete laryngeal elevation and vestibule closure  Incomplete epiglottic inversion 2/2 protruding bony growth at C4-5  Mild-mod reduced UES relaxation w/ mild-mod pharyngeal residue in the valleculae and pyriforms, intermittent epiglottic undercoating from retention/suspected ambient fluid  Recurrent penetration w/ epiglottic undercoating w/ thin liquids, transient aspiration w/ rapid straw sips thin-wet vocal quality response to penetration/aspiration  Trace aspiration also noted while taking barium tablet w/ thin liquids, barium tablet retained in valleculae and then pyriforms, requiring mult liquid washes to clear-delayed cough response appreciated          Recommendations:  Diet: Regular  Liquids: Thin   Meds: As tolerated   Strategies: No straws, small sips/bite sizes, alternate liquids/solids   Frequent oral care  Upright position  F/u ST tx: Continue OP ST   Therapy Prognosis: Fair   Prognosis considerations: age, current medical status, PMH  Aspiration Precautions  Reflux Precautions  Consider consult with: neurosurgery vs ENT for osteophyte  Results reviewed with: pt, physician  Repeat MBS as necessary  If a dedicated assessment of the esophagus is desired, consider esophagram/barium swallow or EGD      Patient will complete OME's to increase speed, power and efficiency of swallow      Patient will complete pharyngeal exercises to increase swallow efficiency and decrease risk of aspiration  3/31: Trained in effortful swallow & Mendelsohn Maneuver  Patient able to complete     Patient will independently utilize compensatory strategies to decrease risk of aspiration (liquid wash, slow rate, small bites /sips)  3/31: Explained results to patient from VFSS           Long Term Goals:     Patient will tolerate least restrictive diet (regular /thins) with minimized risk of aspiration      Patient will complete SPEAK OUT! Program and effectively communicate in social settings with improved vocal quality        Plan:  -Continue with current plan of care  IOPI, OME's, EMST, pharyngeal exercises per plan

## 2023-04-04 NOTE — PROGRESS NOTES
Daily Note     Today's date: 2023  Patient name: Tamara Zarate  : 1945  MRN: [id number]  Referring provider: Georgie Martinez MD  Dx:   Encounter Diagnosis     ICD-10-CM    1  Primary parkinsonism ([address] )  G20       2  Neurologic gait dysfunction  R26 9           Start Time: 1324  Stop Time: 1408  Total time in clinic (min): 44 minutes    Subjective: Wants to try to work on the stairs  Was at his daughter's and carried a 6 pack of beer up 4 stairs  [place] unsteady but did it  Objective: See treatment diary below      Assessment:  Challenged with obstacle negotiation with carrying weighted load  With hurdles noted more crouched gait, increasing with fatigue  Minimal lateral instability with stairs, good overall pacing and consistent steps  1 episode of imbalance able to maintain balance indep with handrail due to foot not clearing step  Discussed using step to pattern when descending stairs for safety if no handrail  Patient would benefit from continued PT      Plan: Continue per plan of care  Work into extension        Precautions: h/o tinnitus, h/o PD, h/o peripheral neuropathy,h/o HTN, B/L TKR  Re-eval Date: 2023    Date 3/21 3/23 3/28 3/30 4/4   Visit Count 6 7 8 9 10   FOTO See IE       Pain In See IE       Pain Out                Testing        MiniBest 21       TUG/TUGC 07 16 / 08 87       5xSTS 08 96       Gait speed 1 09       FGA 22       2/6MWT 874 feet (265 m)        Neuro Re-Ed    MiniBest, 5xSTS, 10mWT, FGA,     Segmental rolling with overpressure         Dynamic balance Hurdles 5#B fwd solo 12 laps    Carrying bolster 5#B timed trials NJ :21s short ortho side CGA    2x10 L/R lat lunge and hit boomwhacker solo 5#B solo    Multidirectional stepping with cone tap x15 CGA Hurdles lat 5#B 8 laps    Timed trials solo bolster +5lb carry 6 lap NJ :42 x2 Hurdles fwd bolster behind back 6 laps solo    bwd bolster behind back solo 8 laps    Step ups 6' CGA with auditory cues 2x AMRAP Bolster "carry behind back 10 laps hurdles solo    bwd stepping behind back bolster 8 laps solo Lat step 7 5lb kb hurdles 6 laps, fwd 6 laps Serge hold    Lat step foam beam with bolster behind 6 laps   Static balance        Obstacle course        High amplitude movement Sit<>stand w OH press 2x10 standing on foam     Sit<>stand OH press L/R 2x10 firm Sit<>stand OH press max ROM 2x10 foam Sit<>stand OH press max ROM 2x10 foam wearing harness    Resisted walking/balance Solo red/blue TB 8 laps x1 fwd Solo red/blue TB 8 laps x1 fwd cues for least amt of steps Solo red/blue TB 8 laps x1 fwd, 8 laps lat step Sled push 6 plates 6 laps 44SB, 4 laps 15ft Solo red TB 15ft 9 laps x2   Supine to sidelying to sit        Standing reach cone to wall/floor; repeated with thoracic rotation         Ther Ex        SLR x 4        HR/TR        Mini Squats        Step ups        Quadruped thread needle        Rows   unilat row 20# 3x12 cues for rotation     Hamstring seated stretch        Iliopsoas stretch supine Reviewed and 30\" x2       Gastroc str   30\" x2 cues for knee str     Ther Activity        HEP        Bed mobility  15min vc for momentum, resistance provided on sidelying>sit      Gait Training        Narrow spaces/busyenv        External cues        Stairs     Step over x4 flights, 5lb kb carry x3 flights   Modalities         prn                          "

## 2023-04-06 ENCOUNTER — OFFICE VISIT (OUTPATIENT)
Dept: PHYSICAL THERAPY | Facility: CLINIC | Age: 78
End: 2023-04-06

## 2023-04-06 DIAGNOSIS — R26.9 NEUROLOGIC GAIT DYSFUNCTION: ICD-10-CM

## 2023-04-06 DIAGNOSIS — G20 PRIMARY PARKINSONISM (HCC): Primary | ICD-10-CM

## 2023-04-06 NOTE — PROGRESS NOTES
Daily Note     Today's date: 2023  Patient name: Haylie Santiago  : 1945  MRN: [id number]  Referring provider: Angela Bustillo MD  Dx:   Encounter Diagnosis     ICD-10-CM    1  Primary parkinsonism ([address] )  G20       2  Neurologic gait dysfunction  R26 9           Start Time: 1502  Stop Time: 1545  Total time in clinic (min): 43 minutes    Subjective: Pt notes nothing new, working on his exercises  Went on TM after PT last time  Did half the TM time he usually does on Wednesday due to fatigue  Objective: See treatment diary below      Assessment:  Pt with x1 near fall, PT min assist x1 and pt able to catch R foot catching  With fatigue he displays more crouched gait with R inadequate foot clearance  Trialed thread the needle in standing with good response, shows fair balance in this position  Challenged with resisted ambulation, cues for more upright posture with good carryover  Some postural instability and imbalance with goblet carry during forward and backward walking, more so with backward  Good balance with consistent pull over obstacles in various directions adjusts appropriately needs some repetition to adapt  Patient would benefit from continued PT      Plan: Continue per plan of care  Work into extension        Precautions: h/o tinnitus, h/o PD, h/o peripheral neuropathy,h/o HTN, B/L TKR  Re-eval Date: 2023    Date        Visit Count 11       FOTO See IE       Pain In See IE       Pain Out                Testing        MiniBest 21       TUG/TUGC 07 16 / 08 87       5xSTS 08 96       Gait speed 1 09       FGA 22       2/6MWT 874 feet (265 m)        Neuro Re-Ed        Segmental rolling with overpressure         Dynamic balance Posterior pull hurdles 8 laps solo    10lb kb ortho side x2    Fwd/bwd 10lb goblet carry solo x6 ea       Static balance        Obstacle course        High amplitude movement        Resisted walking/balance Solo red/blue TB 2x8 laps, lat 2x6 laps       Supine to sidelying to sit        Standing reach cone to wall/floor; repeated with thoracic rotation         Ther Ex        SLR x 4        HR/TR        Mini Squats        Step ups        Quadruped thread needle x5 in standing       Rows 20lb 2x15       Hamstring seated stretch        Iliopsoas stretch supine        Gastroc str        Ther Activity        HEP        Bed mobility        Gait Training        Narrow spaces/busyenv        External cues        Stairs        Modalities

## 2023-04-11 ENCOUNTER — APPOINTMENT (OUTPATIENT)
Dept: PHYSICAL THERAPY | Facility: CLINIC | Age: 78
End: 2023-04-11

## 2023-04-13 ENCOUNTER — APPOINTMENT (OUTPATIENT)
Dept: PHYSICAL THERAPY | Facility: CLINIC | Age: 78
End: 2023-04-13

## 2023-04-25 ENCOUNTER — OFFICE VISIT (OUTPATIENT)
Dept: PHYSICAL THERAPY | Facility: CLINIC | Age: 78
End: 2023-04-25

## 2023-04-25 ENCOUNTER — APPOINTMENT (OUTPATIENT)
Dept: SPEECH THERAPY | Facility: CLINIC | Age: 78
End: 2023-04-25

## 2023-04-25 DIAGNOSIS — G20 PRIMARY PARKINSONISM (HCC): Primary | ICD-10-CM

## 2023-04-25 DIAGNOSIS — R26.9 NEUROLOGIC GAIT DYSFUNCTION: ICD-10-CM

## 2023-04-25 NOTE — PROGRESS NOTES
PT Re-Evaluation /Progressnote    Today's date: 2023  Patient name: Kelsey Roca  : 1945  MRN: [id number]  Referring provider: Kaushik Yuen MD  Dx:   Encounter Diagnosis     ICD-10-CM    1  Primary parkinsonism ([address] )  G20       2  Neurologic gait dysfunction  R26 9           Start Time: 1330  Stop Time: 1415  Total time in clinic (min): 45 minutes    Assessment  Assessment details: Patient is a 77y o  year old male presenting to OPPT s/p diagnosis of Parkinson's disease  Donita Milton is continuing to demonstrate improvement in his balance and overall functional mobility since last progress note  He is still demonstrating imbalance with narrow base of support, reduced visual cues, and with obstacles which are related to his PD and neuropathy  Donita Milton shows most improvement in his endurance, walking over 140ft more than his previous 6MWT  His FGA and gait speed remain unchanged, but his MiniBest test has improved by 1 point  His outcome measures still place him at a risk for falls, FGA  and MiniBest   There is also improvement demonstrated in his 5xSTS and TUG showing improved muscle strength and stability with turns  Given additional exercises for HEP, instructed on performing balance exercises safely in corner  Discussed following up with his PCP about his back pain as it may be related to his prior ED visit and potential kidney stones, which may have influenced his outcome measures as well  Donita Milton needs more time to meet his therapy goals and improve his overall balance and balance confidence  Continue per plan of care  Goals remain appropriate  Impairments: abnormal gait, activity intolerance, impaired balance, impaired physical strength, lacks appropriate home exercise program, safety issue and weight-bearing intolerance     Prognosis: good    Goals  In 4 weeks, patient will:  1  Demonstrate ability to perform 20 minutes of activity without requiring seated rest break  - met  2    Demonstrate ability to maintain upright balance unsupported by UEs while reaching above shoulder height without resistance to promote stability with ADLs - met  3  Improve 5xSTS by at least 3 seconds to show improved LE strength for stability during transfers - met  4  Demonstrate ability to ambulate through doorway/threshold without verbal cues or change in gait quality >80% of occasions - met  5  Demonstrate ability to roll bilaterally in <7 seconds without use of assistive equipment - met    In 4-10 weeks, patient will:  1  Meet FOTO predicted score to demonstrate improvement in functional mobility  2   Demonstrate consistent carryover with HEP and walking program  - progressing  3  Improve gait speed by at least  0 12 m/s to meet MCID value for older adults and reduce fall risk  - partially met  4  Improve 6MWT by at least 100ft to demonstrate improvement in ability in community ambulation  - met  5  Improve FGA by at least 4 points to meet MDC value for PwPD and show improved balance  - nearly met  6  Improve ABC by at least 16% to meet  reduced risk of falling and improved balance confidence  - progressing  7  Demonstrate ability to ambulate forward holding item requiring bilateral UEs without LOB or change in gait quality - met  8  Report walking a mile in his neighborhood with mild-mod fatigue  - met  9  Report improvement in ability to get in/out of bed  - met  10   Improve MiniBest by at least 4 points to meet MCID value for PwPD and demonstrate improved safety - progressing      Plan  Patient would benefit from: skilled physical therapy  Planned therapy interventions: neuromuscular re-education, manual therapy, patient education, home exercise program, therapeutic exercise, therapeutic activities and gait training  Frequency: 2x week  Duration in weeks: 4  Plan of Care beginning date: 4/25/2023  Plan of Care expiration date: 5/25/2023  Treatment plan discussed with: patient        Subjective Evaluation    History of Present Illness  Mechanism of injury: 4/25: Still having intermittent back/side pain on R side  Does not notice any triggers  Wants to see urology  Nothing in PT has negatively affected it  Balance still an issue  Feels like therapy working on the right things  Feels like endurance is the attribute most improved  3/30: Pt notes he has steadier balance but not where he wants it to be  He is on the treadmill every day and performing strengthening exercises  Feels like he is working on the right things so far  Present at PT: Trouble getting in/out of bed  Noticed about 3/4 months his balance become off - SPC use but feels like his balance has gotten slightly better  Lightheaded when getting in/out of bed  Difficulty getting in/out of bed due to mobility issues  Sometimes doesn't feel secure  SPC always in car in case  Sometimes some difficulty getting in/out of car  Kidney stones in the past - urinating more frequently  Goals: Walk longer distance - fatigued quicker  Wants to walk a mile in his neighborhood  11/30 Neurology: Glory Cazares returned to our office for follow-up accompanied by his wife  He was previously followed by Dr Gemma Plaza  I reviewed his extensive records  He was diagnosed with polyneuropathy in 2010  In 2011 he developed left upper extremity tremor  He was initially diagnosed with essential tremor and was subsequently diagnosed with Parkinson's in 2013  Since 2019 he has been having mild problem swallowing  He has also been having memory loss  His hand writing has become difficult  He has been having worsening balance especially going up steps  He has leg cramps at night, which is controlled by over-the-counter medication  In September 2022 he woke up with complete loss of balance without other neurologic symptom such as headache or dysarthria  This has gradually improved  He denies other new neurologic symptoms since his previous visit      He is taking Sinemet 25/100, 1, 5-6 times daily  He had a brain MRI in March 2021 which showed a small chronic hemosiderin deposition 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 per the radiologist  His previous brain MRI in October 2018 at another facility also showed serpiginous enhancement in the left occipital region felt possibly representing venous malformation  His B12, Folate and TSH in March 2021 were normal     He was seen by ENT and had testing for problem swallowing  He had physical therapy in 2021  He also had speech and swallow evaluation and treatment  Pain  No pain reported  Location: knee pain    Social Support  Stairs in house: yes   Lives in: multiple-level home  Lives with: spouse    Employment status: not working  Treatments  Previous treatment: physical therapy and speech therapy  Patient Goals  Patient goals for therapy: improved balance, increased strength and independence with ADLs/IADLs          Objective   Neuro Exam:     Functional outcomes   Functional outcome comment: Mini Best  Anticipatory     Sit to Stand    2= Normal:  Comes to stand without use of hands and stabilizes independently     Rise to Toes      1=Moderate:  Heels up, but not full range (smaller than when holding hands) OR noticeable instability for 3 seconds     Stand on one leg      1=Moderate: < 20 seconds      Right:  5 seconds  Left:  4 seconds       Reactive Postural Control     Compensatory Stepping Correction - Forward    2= Normal:  Recovers independently with a single, large step (2nd realignment step is allowed)  Compensatory Stepping Correction - Backward    2= Normal:  Recovers independently with a single, large step (2nd realignment step is allowed)  Compensatory Stepping Correction - Lateral      1=Moderate: Several steps to recover equilibrium    Right: 1  Left: 3  Use the side with the lowest score to calculate score  Sensory Orientation    Stance (Feet Together);  Eyes open, Firm surface    2= Normal:  30 seconds     Stance (Feet Together);  Eyes closed, Foam surface    2= Normal:  30 seconds    Incline - Eyes Closed    1=Moderate:  Stands independently <30 sec OR aligns with surface      Dynamic Gait    Change in gait speed    2= Normal:  Significantly changes walking speed without imbalance     Walk with Head Turns - Horizontal    1= Moderate:  Less imbalance vs evaluation     Walk with Pivot Turns      1= Moderate:  Turns with feet close SLOW (> or = to 4 steps) with good balance     Step over obstacles      2 Normal     Timed up and go with dual task (3 meter walk)    TU 16  Seconds  Dual Task TU 87 Seconds    2= Normal:Normalized cog motor dual task        Total Score:         Balance Test 2/23 3/30 4/25   6 Minute Walk Test (ft): NV  1008ft   MiniBest:    Gait Speed (m/s): 0 99 1 09 0 98 m/s   5x Sit To Stand (s): 12 88 08 96 08 09   TUG/TUGC:  65 50 > 35 07 16  87 60 > 39 07 01 / 10 38 5 ice cream flavors         MCTSIB Number of Seconds   Feet Together, Eyes Open 30   Feet Together, Eyes Closed 30   Feet Together, Eyes Open Foam 30   Feet Together, Eyes Closed Foam 30       Flowsheet Rows    Flowsheet Row Most Recent Value   Functional Gait Assessment    Gait Level Surface  3   Change in GaiT Speed 3   Gait with horizontal head turns 2   Gait with vertical head turns 2   Gait and pivot tuen  2   Step over obstacle 3   Gait with narrow base of supprt 2   Gait with eyes closed 1   Ambulating backwards 2   Steps 2   Total score  22              Sensation Left Right   Kinesthesia impaired impaired   Light Touch impaired impaired   Sharp/Dull     2 Point Discrimination           Manual Muscle Testing - Hip Left Right   Flexion 4 4   Extension 4 4   Abduction 4 4   External Rotation       Manual Muscle Testing - Knee Left Right   Flexion 4 4   Extension 5 5     Manual Muscle Testing - Ankle Left Right   Doriflexion 4 4   Plantarflexion 3+ "3+        Transfers    Sit To Stand Independent   W/C To Bed Independent   Sit To Supine Independent   Roll Independent         Gait Assessment: Stooped posturing  Smaller step length  Mild arm swing  L sided hand tremor  Minimal rotation    4/25: More upright posturing, still features from last gait assessment + crouched gait  Wider base of support at times  Slightly more stable turns               Precautions: h/o tinnitus, h/o PD, h/o peripheral neuropathy,h/o HTN, B/L TKR  Re-eval Date: 5/25/2023    Date 4/6 4/18 4/20 4/25    Visit Count 11 12 13 14    FOTO See IE       Pain In See IE       Pain Out                Testing        MiniBest 22       TUG/TUGC 07 01 / 10 38 5 ice cream flavors       5xSTS 08 07       Gait speed 0 98       FGA 22       2/6MWT 1008        Neuro Re-Ed    FGA, MiniBest, TUG/C, 5xSTS, 6MWT, 10mWT    Segmental rolling with overpressure         Dynamic balance Posterior pull hurdles 8 laps solo    10lb kb ortho side x2    Fwd/bwd 10lb goblet carry solo x6 ea  Fwd/bwd bolster OH 8 laps ea    Fwd/bwd shuttle run x4 CT :19     Static balance  Seated turn/reach x8 ea direction  EC firm FT 20\" x2    Obstacle course   Narrow space + hurdles 10 laps fwd w dual task, 14 laps x1 lat step     High amplitude movement        Resisted walking/balance Solo red/blue TB 2x8 laps, lat 2x6 laps  Hurdles fwd/lat pull red TB solo 12 laps     Supine to sidelying to sit        Standing reach cone to wall/floor; repeated with thoracic rotation         Ther Ex        SLR x 4        HR/TR        Mini Squats  // bar 2x10      Step ups        Mod bird dog  2x8      Supine LTR  x10 ea      L/s flexion  x10 5'      Bridge  2x12 5'      Quadruped thread needle x5 in standing x8 in standing      Rows 20lb 2x15       Hamstring seated stretch  30\" x2 ea      Iliopsoas stretch supine        Gastroc str        Ther Activity        HEP        Bed mobility        Gait Training        Narrow spaces/busyenv        External cues   " Stairs        Modalities

## 2023-04-26 ENCOUNTER — OFFICE VISIT (OUTPATIENT)
Dept: SPEECH THERAPY | Facility: CLINIC | Age: 78
End: 2023-04-26

## 2023-04-26 DIAGNOSIS — R13.12 OROPHARYNGEAL DYSPHAGIA: ICD-10-CM

## 2023-04-26 DIAGNOSIS — G20 PRIMARY PARKINSONISM (HCC): Primary | ICD-10-CM

## 2023-04-26 DIAGNOSIS — R49.0 DYSPHONIA: ICD-10-CM

## 2023-04-26 NOTE — PROGRESS NOTES
Daily Speech Treatment Note    Today's date: 2023  Patient’s name: Ze Martin  : 1945  MRN: [id number]  Safety measures:   Referring provider: Yolanda Ni MD    Encounter Diagnosis     ICD-10-CM    1  Primary parkinsonism ([address] )  G20       2  Dysphonia  R49 0       3  Oropharyngeal dysphagia  R13 12         Visit trackin    Subjective/Behavioral:  - Pleasant/Cooperative    Objective/Assessment:  Completed structured activities with patient to target goals below  Results as stated below  Spoke with patient about consistency and developed routine for completing swallowing and speech exercises  Goals     Short Term Goals:     Patient will participate in [address]! Evaluation / Voice in order to complete SPEAK OUT! Program   3/31: Patient signed up for Webinar  10:30AM   Patient would like to hold off SPEAK OUT! Eval at this time since emphasis is on swallowing      Patient will participate in tongue and lip strength assessment    3/31: Scheduled for next session    [facility]      23        Tongue tip  (Goal = 65) 28, 34, 32 39 37  45       Tongue back  (Goal = 55)  22, 24, 20 43 32 38        Right Lip  (Goal = 35) 16, 20,  21 23 20 22     Left Lip  (Goal = 35) 24, 22, 23 24 23 23         IOPI -- Today's Exercise Targets     Tongue tip  (Goal = 65) Peak Max after 3 trials: 45 Effort level: 80% Target for treatment: 36   Tongue back  (Goal = 55) Peak Max after 3 trials: 38 Effort level: 80% Target for treatment: 30   Right Lip  (Goal = 35) Peak Max after 3 trials: 22 Effort level: 80% Target for treatment:18   Left Lip  (Goal = 35) Peak Max after 3 trials: 23 Effort level: 80% Target for treatment: 18        2023    Tongue tip  20  X 3 27 x 30 32 x 30 29 x 30 36 x 30    Tongue back 14 x 3 19 x 30 34 x 10, 30 x 5, 28 x 15 26 x 30 30 x 30    Right Lip 12 x 3 16 x 30  18 x 10 16 x 30 18 x 10    Left Lip 14 x 3 19 x 30 19 x5 18 x 30 18 x 10         Patient will complete VFSS  3/31: Completed VFSS on 3/22/23: Pt presents w/ mild-mod oropharyngeal dysphagia  Mild-mod reduced bolus hold and delayed swallow initiation w/ spill spill to at least the valleculae w/ all, to the pyriforms w/ thin and cup/sequential sip nectar thick liquids  Fairly prompt bolus transport w/ mild residue on BOT  Mod reduced pharyngeal stripping wave  Mod reduced anterior hyoid excursion w/ incomplete laryngeal elevation and vestibule closure  Incomplete epiglottic inversion 2/2 protruding bony growth at C4-5  Mild-mod reduced UES relaxation w/ mild-mod pharyngeal residue in the valleculae and pyriforms, intermittent epiglottic undercoating from retention/suspected ambient fluid  Recurrent penetration w/ epiglottic undercoating w/ thin liquids, transient aspiration w/ rapid straw sips thin-wet vocal quality response to penetration/aspiration  Trace aspiration also noted while taking barium tablet w/ thin liquids, barium tablet retained in valleculae and then pyriforms, requiring mult liquid washes to clear-delayed cough response appreciated          Recommendations:  Diet: Regular  Liquids: Thin   Meds: As tolerated   Strategies: No straws, small sips/bite sizes, alternate liquids/solids   Frequent oral care  Upright position  F/u ST tx: Continue OP ST   Therapy Prognosis: Fair   Prognosis considerations: age, current medical status, PMH  Aspiration Precautions  Reflux Precautions  Consider consult with: neurosurgery vs ENT for osteophyte  Results reviewed with: pt, physician  Repeat MBS as necessary  If a dedicated assessment of the esophagus is desired, consider esophagram/barium swallow or EGD      Patient will complete OME's to increase speed, power and efficiency of swallow      Patient will complete pharyngeal exercises to increase swallow efficiency and decrease risk of aspiration    3/31: Trained in effortful swallow & [place] "Maneuver  Patient able to complete  4/14: Utilized effortful swallow with water sips, used NMES Biofeedback next time  4/18: Patient inde  p with Danette Carlos, encouraged practice throughout day  4/21: Decided to focus on effortful swallow instead of Mendelosohn, used NMES to utilize and understand strength and utilization of effortful swallow  Patient expressed understanding  Provided information on EMST for patient       Patient will independently utilize compensatory strategies to decrease risk of aspiration (liquid wash, slow rate, small bites /sips)  3/31: Explained results to patient from VFSS  4/14: Patient with decreased focus/attention to strategies, reviewed and educated on strategies, encouraged patient to execute strategies at mealtime  Patient executed strategies with water independently, minimal verbal cues initially  4/21: Patient had \"incident\" with coughing/choking on popcorn, advised no popcorn and making smart decision in regards to texture choices  4/26: Reviewed importance of strategies         Long Term Goals:     Patient will tolerate least restrictive diet (regular /thins) with minimized risk of aspiration      Patient will complete SPEAK OUT! Program and effectively communicate in social settings with improved vocal quality  4/21/23: Patient practicing SPEAK OUT online and workbook exercises on own at home        Plan:  -Continue with current plan of care  IOPI, OME's, EMST, pharyngeal exercises per plan    "

## 2023-04-27 ENCOUNTER — OFFICE VISIT (OUTPATIENT)
Dept: PHYSICAL THERAPY | Facility: CLINIC | Age: 78
End: 2023-04-27

## 2023-04-27 DIAGNOSIS — R26.9 NEUROLOGIC GAIT DYSFUNCTION: ICD-10-CM

## 2023-04-27 DIAGNOSIS — G20 PRIMARY PARKINSONISM (HCC): Primary | ICD-10-CM

## 2023-04-27 NOTE — PROGRESS NOTES
Daily Note     Today's date: 2023  Patient name: Yvonne Orosco  : 1945  MRN: [id number]  Referring provider: Erick Hernandez MD  Dx:   Encounter Diagnosis     ICD-10-CM    1  Primary parkinsonism ([address] )  G20       2  Neurologic gait dysfunction  R26 9           Start Time: 1330  Stop Time: 1412  Total time in clinic (min): 42 minutes    Subjective: Still experiencing very minimal back pain  Only changes with sidebend to that side  Had to do stadium stairs which were tricky on his balance, had to carry chair pad  Objective: See treatment diary below      Assessment:  With cognitive dual task minimal change in pace with lateral step in narrow space, may slightly slow pace but able to maintain cog task  Experiences imbalance with narrow space forward over hurdles, most instability laterally  Challenged with no UE support on stairs, easier to ascend vs descend - done consecutively to maximize aerobic benefit  Good response to least amount of steps over uneven surfaces, occasional suspensory strategy  Instructed to stretch away from R side to see if that modifies pain  Patient would benefit from continued PT to improve balance, strength, and endurance  Plan: Continue per plan of care  Narrow spaces  Posterior chain strength  Uneven surfaces, stairs  Develop more upright posture w balance       Precautions: h/o tinnitus, h/o PD, h/o peripheral neuropathy,h/o HTN, B/L TKR  Re-eval Date: 2023    Date    Visit Count 11 12 13 14 15   FOTO See IE       Pain In See IE       Pain Out                Testing        MiniBest 22       TUG/TUGC 07 01 / 10 38 5 ice cream flavors       5xSTS 08 07       Gait speed 0 98       FGA 22       2/6MWT 1008        Neuro Re-Ed    FGA, MiniBest, TUG/C, 5xSTS, 6MWT, 10mWT    Segmental rolling with overpressure         Dynamic balance Posterior pull hurdles 8 laps solo    10lb kb ortho side x2    Fwd/bwd 10lb goblet carry solo x6 ea  Fwd/bwd bolster "OH 8 laps ea    Fwd/bwd shuttle run x4 AK :19  Narrow space fwd hurdles 6lb suitcase 8 laps    bwd stepping foam behind back 8 laps solo   Static balance  Seated turn/reach x8 ea direction  EC firm FT 20\" x2    Obstacle course   Narrow space + hurdles 10 laps fwd w dual task, 14 laps x1 lat step  Narrow spaces flipping cards, black = 180* 12 laps   High amplitude movement        Resisted walking/balance Solo red/blue TB 2x8 laps, lat 2x6 laps  Hurdles fwd/lat pull red TB solo 12 laps  Uneven surfaces \"least amt of steps possible\" 8 laps x2 solo   Supine to sidelying to sit        Standing reach cone to wall/floor; repeated with thoracic rotation         Ther Ex        SLR x 4        HR/TR        Mini Squats  // bar 2x10      Step ups        Mod bird dog  2x8      Supine LTR  x10 ea      L/s flexion  x10 5'      Bridge  2x12 5'      Quadruped thread needle x5 in standing x8 in standing      Rows 20lb 2x15       Hamstring seated stretch  30\" x2 ea      Iliopsoas stretch supine        Gastroc str        Ther Activity        HEP        Bed mobility        Gait Training        Narrow spaces/busyenv        External cues        Stairs     x4 7lb suitcase CGA step over   Modalities                                 "

## 2023-05-03 ENCOUNTER — APPOINTMENT (OUTPATIENT)
Dept: SPEECH THERAPY | Facility: CLINIC | Age: 78
End: 2023-05-03
Payer: MEDICARE

## 2023-05-04 ENCOUNTER — OFFICE VISIT (OUTPATIENT)
Dept: PHYSICAL THERAPY | Facility: CLINIC | Age: 78
End: 2023-05-04

## 2023-05-04 DIAGNOSIS — G20 PRIMARY PARKINSONISM (HCC): Primary | ICD-10-CM

## 2023-05-04 DIAGNOSIS — R26.9 NEUROLOGIC GAIT DYSFUNCTION: ICD-10-CM

## 2023-05-04 NOTE — PROGRESS NOTES
Daily Note     Today's date: 2023  Patient name: Gerardo Nguyen  : 1945  MRN: [id number]  Referring provider: Kieran Schmitz MD  Dx:   Encounter Diagnosis     ICD-10-CM    1  Primary parkinsonism ([address] )  G20       2  Neurologic gait dysfunction  R26 9           Start Time: 1606  Stop Time: 1700  Total time in clinic (min): 54 minutes    Subjective: Reports that he is in an off period right now with his medication  Feels that he has had a relapse in his overall balance that he noticed starting at the beginning of last weekend  Stated that he was having a lot of trouble with his balance and was furniture surfing which he never does  Notes that his back is feeling a lot better! Objective: See treatment diary below    BP post: 152/79 mmHg       Assessment: Tolerated treatment well  Secondary to patient feeling like he is in his off period returned to treadmill activities in  Attempt to increase step and stride length with hope for carry over intra-session  Continued stair negotiation with suitcase hold with good effect - appropriate levels of fatigue noted especially considering patient reported today being an off day  Continued posterior chain strengthening and endurance training with goblet carry + STS circuit  Did well with the circuit reporting appropriate levels of difficulty  Evaluate next session if still has off periods with medication  Patient demonstrated fatigue post treatment, exhibited good technique with therapeutic exercises and would benefit from continued PT      Plan: Continue per plan of care  Progress treatment as tolerated         Precautions: h/o tinnitus, h/o PD, h/o peripheral neuropathy,h/o HTN, B/L TKR  Re-eval Date: 2023    Date    Visit Count 16 12 13 14 15   FOTO See IE       Pain In See IE       Pain Out                Testing        MiniBest 22       TUG/TUGC 07 01 / 10 38 5 ice cream flavors       5xSTS 08 07       Gait speed 0 98       FGA 22 "     2/6MWT 1008        Neuro Re-Ed    FGA, MiniBest, TUG/C, 5xSTS, 6MWT, 10mWT    Segmental rolling with overpressure         Dynamic balance Suitcase carry ortho side x2 laps 7# DB (harness as GB)         5x STS --> goblet carry forward/back --> 5x STS circuit #5  (SOLO harness as GB) x3 laps (96% Spo2, 103 bpm)    Fwd/bwd bolster OH 8 laps ea    Fwd/bwd shuttle run x4 [place] :19  Narrow space fwd hurdles 6lb suitcase 8 laps    bwd stepping foam behind back 8 laps solo   Static balance  Seated turn/reach x8 ea direction  EC firm FT 20\" x2    Obstacle course   Narrow space + hurdles 10 laps fwd w dual task, 14 laps x1 lat step  Narrow spaces flipping cards, black = 180* 12 laps   High amplitude movement        Resisted walking/balance   Hurdles fwd/lat pull red TB solo 12 laps  Uneven surfaces \"least amt of steps possible\" 8 laps x2 solo   Supine to sidelying to sit        Standing reach cone to wall/floor; repeated with thoracic rotation         Ther Ex        SLR x 4        HR/TR        Mini Squats  // bar 2x10      Step ups        Mod bird dog  2x8      Supine LTR  x10 ea      L/s flexion  x10 5'      Bridge  2x12 5'      Quadruped thread needle  x8 in standing      Rows        Hamstring seated stretch  30\" x2 ea      Iliopsoas stretch supine        Trial of Elliptical  Trial for 3 mins of elliptical // CGAx1 for set up and starting  TM for endurance 1 5 mph x0% incline  x10 mins tot  VC for increased step amplitude   External cue of ball kick to increase stride length       Gastroc str        Ther Activity        HEP        Bed mobility        Gait Training        Narrow spaces/busyenv        External cues        Stairs x4 7lb suitcase CGA via GB     x4 7lb suitcase CGA step over   Modalities                                   "

## 2023-05-09 ENCOUNTER — OFFICE VISIT (OUTPATIENT)
Dept: PHYSICAL THERAPY | Facility: CLINIC | Age: 78
End: 2023-05-09

## 2023-05-09 DIAGNOSIS — R26.9 NEUROLOGIC GAIT DYSFUNCTION: ICD-10-CM

## 2023-05-09 DIAGNOSIS — G20 PRIMARY PARKINSONISM (HCC): Primary | ICD-10-CM

## 2023-05-09 NOTE — PROGRESS NOTES
Daily Note     Today's date: 2023  Patient name: Lui Record  : 1945  MRN: [id number]  Referring provider: Zulema Jaeger MD  Dx:   Encounter Diagnosis     ICD-10-CM    1  Primary parkinsonism ([address] )  G20       2  Neurologic gait dysfunction  R26 9           Start Time: 845  Stop Time: 929  Total time in clinic (min): 44 minutes    Subjective: Back is good  Feels he is in an off period, having more frequently  Seeing neurologist within the month  Feels like his balance is not good recently after having bout of dysequillibrium  Objective: See treatment diary below        Assessment: Modified HEP with more glute activation, instructed to determine if current exercise feels easier/harder vs one instructed on today  Verbal cues for marching during turns with fair carryover  Good response to circuit style exercises, with reports of high perceived exertion  1 loss of balance with solo step and max PT assist to recover with backward walking, after this he shows good postural adjustment with backward stepping  Has tendency to drift fwd on lateral step up potentially due to forward weight shift  Patient demonstrated fatigue post treatment, exhibited good technique with therapeutic exercises and would benefit from continued PT      Plan: Continue per plan of care  Progress treatment as tolerated         Precautions: h/o tinnitus, h/o PD, h/o peripheral neuropathy,h/o HTN, B/L TKR  Re-eval Date: 2023    Date       Visit Count 16 16      FOTO See IE       Pain In See IE       Pain Out                Testing        MiniBest 22       TUG/TUGC 07 01 / 10 38 5 ice cream flavors       5xSTS 08 07       Gait speed 0 98       FGA 22       2/6MWT 1008        Neuro Re-Ed        Segmental rolling with overpressure         Dynamic balance Suitcase carry ortho side x2 laps 7# DB (harness as GB)         5x STS --> goblet carry forward/back --> 5x STS circuit #5  (SOLO harness as GB) x3 laps (96% Spo2, 103 bpm)   Hula hoop turns 2 laps x3 solo    Hurdles 4 laps x3 solo    Fwd/bwd shuttle 4 dots AL :24s solo    6lb goblet 40s x2 6' step solo      Static balance        Obstacle course        High amplitude movement        Resisted walking/balance        Supine to sidelying to sit        Standing reach cone to wall/floor; repeated with thoracic rotation         Ther Ex        SLR x 4  Donkey kick over high mat 2x8 3' hold      HR/TR        Mini Squats  3x8 UE OH      Step ups        Mod bird dog        Supine LTR        L/s flexion        Bridge        Quadruped thread needle        Rows        Hamstring seated stretch        Iliopsoas stretch supine        Trial of Elliptical  Trial for 3 mins of elliptical // CGAx1 for set up and starting  TM for endurance 1 5 mph x0% incline  x10 mins tot  VC for increased step amplitude   External cue of ball kick to increase stride length       Gastroc str        Ther Activity        HEP        Bed mobility        Gait Training        Narrow spaces/busyenv        External cues        Stairs x4 7lb suitcase CGA via GB        Modalities

## 2023-05-10 ENCOUNTER — OFFICE VISIT (OUTPATIENT)
Dept: SPEECH THERAPY | Facility: CLINIC | Age: 78
End: 2023-05-10

## 2023-05-10 DIAGNOSIS — R49.0 DYSPHONIA: ICD-10-CM

## 2023-05-10 DIAGNOSIS — G20 PRIMARY PARKINSONISM (HCC): Primary | ICD-10-CM

## 2023-05-10 DIAGNOSIS — R13.12 OROPHARYNGEAL DYSPHAGIA: ICD-10-CM

## 2023-05-10 NOTE — PROGRESS NOTES
Daily Speech Treatment Note    Today's date: 5/10/2023  Patient’s name: Vicki Barcenas  : 1945  MRN: [id number]  Safety measures:   Referring provider: Elizabeth Vasquez MD    Encounter Diagnosis     ICD-10-CM    1  Primary parkinsonism ([address] )  G20       2  Dysphonia  R49 0       3  Oropharyngeal dysphagia  R13 12         Visit trackin    Subjective/Behavioral:  - Pleasant/Cooperative    Objective/Assessment:  Completed structured activities with patient to target goals below  Results as stated below  Goals     Short Term Goals:     Patient will participate in [address]! Evaluation / Voice in order to complete SPEAK OUT! Program   3/31: Patient signed up for Webinar  10:30AM   Patient would like to hold off SPEAK OUT! Eval at this time since emphasis is on swallowing      Patient will participate in tongue and lip strength assessment  3/31: Scheduled for next session    [facility]      23        Tongue tip  (Goal = 65) 28, 34, 32 39 37  45       Tongue back  (Goal = 55)  22, 24, 20 43 32 38        Right Lip  (Goal = 35) 16, 20,  21 23 20 22     Left Lip  (Goal = 35) 24, 22, 23 24 23 23         IOPI -- Today's Exercise Targets     Tongue tip  (Goal = 65) Peak Max after 3 trials: 45 Effort level: 80% Target for treatment: 36   Tongue back  (Goal = 55) Peak Max after 3 trials: 38 Effort level: 80% Target for treatment: 30   Right Lip  (Goal = 35) Peak Max after 3 trials: 22 Effort level: 80% Target for treatment:18   Left Lip  (Goal = 35) Peak Max after 3 trials: 23 Effort level: 80% Target for treatment: 18        2023    Tongue tip  20  X 3 27 x 30 32 x 30 29 x 30 36 x 30    Tongue back 14 x 3 19 x 30 34 x 10, 30 x 5, 28 x 15 26 x 30 30 x 30    Right Lip 12 x 3 16 x 30  18 x 10 16 x 30 18 x 10    Left Lip 14 x 3 19 x 30 19 x5 18 x 30 18 x 10         Patient will complete VFSS    3/31: Completed VFSS on 3/22/23: Pt presents w/ mild-mod oropharyngeal dysphagia  Mild-mod reduced bolus hold and delayed swallow initiation w/ spill spill to at least the valleculae w/ all, to the pyriforms w/ thin and cup/sequential sip nectar thick liquids  Fairly prompt bolus transport w/ mild residue on BOT  Mod reduced pharyngeal stripping wave  Mod reduced anterior hyoid excursion w/ incomplete laryngeal elevation and vestibule closure  Incomplete epiglottic inversion 2/2 protruding bony growth at C4-5  Mild-mod reduced UES relaxation w/ mild-mod pharyngeal residue in the valleculae and pyriforms, intermittent epiglottic undercoating from retention/suspected ambient fluid  Recurrent penetration w/ epiglottic undercoating w/ thin liquids, transient aspiration w/ rapid straw sips thin-wet vocal quality response to penetration/aspiration  Trace aspiration also noted while taking barium tablet w/ thin liquids, barium tablet retained in valleculae and then pyriforms, requiring mult liquid washes to clear-delayed cough response appreciated          Recommendations:  Diet: Regular  Liquids: Thin   Meds: As tolerated   Strategies: No straws, small sips/bite sizes, alternate liquids/solids   Frequent oral care  Upright position  F/u ST tx: Continue OP ST   Therapy Prognosis: Fair   Prognosis considerations: age, current medical status, PMH  Aspiration Precautions  Reflux Precautions  Consider consult with: neurosurgery vs ENT for osteophyte  Results reviewed with: pt, physician  Repeat MBS as necessary  If a dedicated assessment of the esophagus is desired, consider esophagram/barium swallow or EGD      Patient will complete OME's to increase speed, power and efficiency of swallow      Patient will complete pharyngeal exercises to increase swallow efficiency and decrease risk of aspiration  3/31: Trained in effortful swallow & Mendelsohn Maneuver  Patient able to complete    4/14: Utilized effortful swallow with water sips, used NMES Biofeedback next "time   4/18: Patient inde  p with Vivi Corrigan, encouraged practice throughout day  4/21: Decided to focus on effortful swallow instead of Mendelosohn, used NMES to utilize and understand strength and utilization of effortful swallow  Patient expressed understanding  Provided information on EMST for patient  5/10: Provided instruction for EMST & reviewed swallowing exercise plan      Patient will independently utilize compensatory strategies to decrease risk of aspiration (liquid wash, slow rate, small bites /sips)  3/31: Explained results to patient from VFSS  4/14: Patient with decreased focus/attention to strategies, reviewed and educated on strategies, encouraged patient to execute strategies at mealtime  Patient executed strategies with water independently, minimal verbal cues initially  4/21: Patient had \"incident\" with coughing/choking on popcorn, advised no popcorn and making smart decision in regards to texture choices  4/26: Reviewed importance of strategies         Long Term Goals:     Patient will tolerate least restrictive diet (regular /thins) with minimized risk of aspiration      Patient will complete SPEAK OUT! Program and effectively communicate in social settings with improved vocal quality  4/21/23: Patient practicing SPEAK OUT online and workbook exercises on own at home        Plan:  -Continue with current plan of care  IOPI, OME's, EMST, pharyngeal exercises per plan    "

## 2023-05-11 ENCOUNTER — OFFICE VISIT (OUTPATIENT)
Dept: PHYSICAL THERAPY | Facility: CLINIC | Age: 78
End: 2023-05-11

## 2023-05-11 ENCOUNTER — RA CDI HCC (OUTPATIENT)
Dept: OTHER | Facility: HOSPITAL | Age: 78
End: 2023-05-11

## 2023-05-11 DIAGNOSIS — R26.9 NEUROLOGIC GAIT DYSFUNCTION: ICD-10-CM

## 2023-05-11 DIAGNOSIS — G20 PRIMARY PARKINSONISM (HCC): Primary | ICD-10-CM

## 2023-05-11 NOTE — PROGRESS NOTES
Daily Note     Today's date: 2023  Patient name: Charissa Bolaños  : 1945  MRN: [id number]  Referring provider: Susanne Stevenson MD  Dx:   Encounter Diagnosis     ICD-10-CM    1  Primary parkinsonism ([address] )  G20       2  Neurologic gait dysfunction  R26 9           Start Time: 1703  Stop Time: 1758  Total time in clinic (min): 55 minutes    Subjective: Stadium stairs are still concerning for him  Listening to PD podcast  Elinor Caitlyn like new addition to HEP was positive  Objective: See treatment diary below  Direct 1:1  - 175      Assessment: Seems to have improved with narrow base of support balance tasks vs last time performed  Very difficult performing compliant surface training most likely due to neuropathy, even with SPC  Discussed with patient about using SPC when performing stadium stairs to improve safety and confidence performing, as these are factors into balance  Slightly less balance with overhead bolster hold with step ups and backward walking  When cued to stabilize backward he shows good response  Patient demonstrated fatigue post treatment, exhibited good technique with therapeutic exercises and would benefit from continued PT      Plan: Continue per plan of care  Progress treatment as tolerated         Precautions: h/o tinnitus, h/o PD, h/o peripheral neuropathy,h/o HTN, B/L TKR  Re-eval Date: 2023    Date      Visit Count 16 17 18     FOTO See IE       Pain In See IE       Pain Out                Testing        MiniBest 22       TUG/TUGC 07 01 / 10 38 5 ice cream flavors       5xSTS 08 07       Gait speed 0 98       FGA 22       2/6MWT 1008        Neuro Re-Ed        Segmental rolling with overpressure         Dynamic balance Suitcase carry ortho side x2 laps 7# DB (harness as GB)         5x STS --> goblet carry forward/back --> 5x STS circuit #5  (SOLO harness as GB) x3 laps (96% Spo2, 103 bpm)   Hula hoop turns 2 laps x3 solo    Hurdles 4 laps x3 solo    Fwd/bwd shuttle 4 dots [place] :24s solo    6lb goblet 40s x2 6' step solo Trial foam beam lat step x1    Bolster OH fwd/bwd 6 laps ea solo    Step up 6' OH bolster 1min x3 solo       Static balance        Obstacle course   Narrow base + hurdles lat step 6 laps, fwd 10 laps, 4 laps w bolster carry fwd     High amplitude movement        Resisted walking/balance   Red/blue TB solo 10 laps     Supine to sidelying to sit        Standing reach cone to wall/floor; repeated with thoracic rotation         Ther Ex        SLR x 4  Donkey kick over high mat 2x8 3' hold      HR/TR        Mini Squats  3x8 UE OH      Step ups        Mod bird dog        Supine LTR        L/s flexion        Bridge        Quadruped thread needle        Rows        Hamstring seated stretch        Iliopsoas stretch supine        Trial of Elliptical  Trial for 3 mins of elliptical // CGAx1 for set up and starting  TM for endurance 1 5 mph x0% incline  x10 mins tot  VC for increased step amplitude   External cue of ball kick to increase stride length  2 0mph 1UE 8min total ball kick cue     Gastroc str        Ther Activity        HEP        Bed mobility        Gait Training        Narrow spaces/busyenv        External cues        Stairs x4 7lb suitcase CGA via GB        Modalities

## 2023-05-16 ENCOUNTER — OFFICE VISIT (OUTPATIENT)
Dept: SPEECH THERAPY | Facility: CLINIC | Age: 78
End: 2023-05-16

## 2023-05-16 ENCOUNTER — OFFICE VISIT (OUTPATIENT)
Dept: PHYSICAL THERAPY | Facility: CLINIC | Age: 78
End: 2023-05-16

## 2023-05-16 DIAGNOSIS — R49.0 DYSPHONIA: ICD-10-CM

## 2023-05-16 DIAGNOSIS — R13.12 OROPHARYNGEAL DYSPHAGIA: ICD-10-CM

## 2023-05-16 DIAGNOSIS — G20 PRIMARY PARKINSONISM (HCC): Primary | ICD-10-CM

## 2023-05-16 DIAGNOSIS — R26.9 NEUROLOGIC GAIT DYSFUNCTION: ICD-10-CM

## 2023-05-16 DIAGNOSIS — R41.841 COGNITIVE COMMUNICATION DEFICIT: ICD-10-CM

## 2023-05-16 DIAGNOSIS — R48.8 OTHER SYMBOLIC DYSFUNCTIONS: ICD-10-CM

## 2023-05-16 NOTE — PROGRESS NOTES
Daily Note     Today's date: 2023  Patient name: Maria M Diop  : 1945  MRN: [id number]  Referring provider: Gail Real MD  Dx:   Encounter Diagnosis     ICD-10-CM    1  Primary parkinsonism ([address] )  G20       2  Neurologic gait dysfunction  R26 9           Start Time: 0845  Stop Time: 09  Total time in clinic (min): 45 minutes    Subjective: Lacrosse is over so no more stadium stairs  Been doing TM at home  Pt is slightly discouraged by how his balance has been and off times  Objective: See treatment diary below      Assessment:  Pt shows imbalance with negotiating obstacles when cued to not touch ground and needing longer step length  Slows gait with head turns more so backward vs forward  Mild/mod sway with eyes closed static balance  Trialing elliptical pt reports more fatigue than usual in quads  Working on postural control and stability with hurdles, foam holds, and weighted carries  Patient demonstrated fatigue post treatment and would benefit from continued PT to improve balance      Plan: Continue per plan of care  Progress treatment as tolerated         Precautions: h/o tinnitus, h/o PD, h/o peripheral neuropathy,h/o HTN, B/L TKR  Re-eval Date: 2023    Date     Visit Count 16 17 18 19    FOTO See IE       Pain In See IE       Pain Out                Testing        MiniBest 22       TUG/TUGC 07 01 / 10 38 5 ice cream flavors       5xSTS 08 07       Gait speed 0 98       FGA 22       2/6MWT 1008        Neuro Re-Ed        Segmental rolling with overpressure         Dynamic balance Suitcase carry ortho side x2 laps 7# DB (harness as GB)         5x STS --> goblet carry forward/back --> 5x STS circuit #5  (SOLO harness as GB) x3 laps (96% Spo2, 103 bpm)   Hula hoop turns 2 laps x3 solo    Hurdles 4 laps x3 solo    Fwd/bwd shuttle 4 dots GA :24s solo    6lb goblet 40s x2 6' step solo Trial foam beam lat step x1    Bolster OH fwd/bwd 6 laps ea solo    Step up 6' [facility] "bolster 1min x3 solo   HT amb solo 15ft x6 fwd/bwd    15lb suitcase carry fwd 4 laps, 8 laps bwd    Foam behind back solo 8 laps     Static balance    EC FT 30\" x2    Obstacle course   Narrow base + hurdles lat step 6 laps, fwd 10 laps, 4 laps w bolster carry fwd Foam + uneven folded mat \"dont touch ground\" 12 laps solo    High amplitude movement        Resisted walking/balance   Red/blue TB solo 10 laps 6 laps red/blue TB solo     Supine to sidelying to sit        Standing reach cone to wall/floor; repeated with thoracic rotation         Ther Ex        SLR x 4  Donkey kick over high mat 2x8 3' hold      HR/TR        Mini Squats  3x8 UE OH      Step ups        Mod bird dog        Supine LTR        L/s flexion        Bridge        Quadruped thread needle        Rows        Hamstring seated stretch    45\" x2 ea    Iliopsoas stretch supine        Trial of Elliptical  Trial for 3 mins of elliptical // CGAx1 for set up and starting  2min CGA x1 on/off >40rpm    TM for endurance 1 5 mph x0% incline  x10 mins tot  VC for increased step amplitude   External cue of ball kick to increase stride length  2 0mph 1UE 8min total ball kick cue     Gastroc str        Ther Activity        HEP        Bed mobility        Gait Training        Narrow spaces/busyenv        External cues        Stairs x4 7lb suitcase CGA via GB        Modalities                                   "

## 2023-05-16 NOTE — PROGRESS NOTES
Speech-Language Pathology Re-Evaluation    Today's date: 2023   Patient’s name: Lui Record  : 1945  MRN: [id number]  Safety measures:   Referring provider: Zulema Jaeger MD    Encounter Diagnosis     ICD-10-CM    1  Primary parkinsonism ([address] )  G20       2  Dysphonia  R49 0       3  Oropharyngeal dysphagia  R13 12       4  Other symbolic dysfunctions  W97 4       5  Cognitive communication deficit  R41  841           Visit tracking:  -Referring provider: Epic  -Billing guidelines: CMS  -Visit # 9  -Insurance: Medicare  -RE due: 2023    Subjective comments: Pleasant/Cooperative    Patient's goal(s): To maximize swallowing, voice & cognition  Assessments    The Repeatable Battery for the Assessment of Neuropsychological Status (RBANS) is a brief, individually-administered assessment which measures attention, language, visuospatial/constructional abilities, and immediate & delayed memory  The RBANS is intended for use with adolescents to adults, ages 15 to 80 years  The following results were obtained during the administration of the assessment  Form: A    Cognitive Domain/Subtest: Index Score: Percentile Rank: Classification:   IMMEDIATE MEMORY 78 7%ile Borderline        1  List Learning (15/40)        2  Story Memory ()       VISUOSPATIAL/  CONSTRUCTIONAL 109 73%ile Average        3  Figure Copy ()        4  Line Orientation ()       LANGUAGE 88 21%ile Low Average        5  Picture Naming (10/10)        6  Semantic Fluency ()       ATTENTION 106 66%ile Average        7  Digit Span (1416)        8  Coding ()       DELAYED MEMORY 95 37%ile Average        9  List Recall (3/10)        10  List Recognition ()        11  Story Recall (10/12)        12  Figure Recall ()         Sum of Index Scores:  476   Total Score:  93   Percentile: 32%ile   Classification: Average       *Patient named 12 concrete category members  in 60 sec (norm=15+)   -- BELOW AVERAGE        Goals    Short Term Goals:     Patient will participate in [address]! Evaluation / Voice in order to complete SPEAK OUT! Program   3/31: Patient signed up for Webinar 5/9 10:30AM   Patient would like to hold off SPEAK OUT! Eval at this time since emphasis is on swallowing  DISCONTINUE  PATIENT PRACTICING ONLINE SPEECH EXERCISES AT HOME FROM [facility] [facility] PROJECT      Patient will participate in tongue and lip strength assessment  3/31: Scheduled for next session ACHIEVED, DISCONTINUE GOAL      IOPI PEAK MEASUREMENT WEEKLY GRID       4/4/23 4/18/23 4/21/23 4/26/23        Tongue tip  (Goal = 65) 28, 34, 32 39 37  45       Tongue back  (Goal = 55)  22, 24, 20 43 32 38        Right Lip  (Goal = 35) 16, 20,  21 23 20 22       Left Lip  (Goal = 35) 24, 22, 23 24 23 23             IOPI -- Today's Exercise Targets      Tongue tip  (Goal = 65) Peak Max after 3 trials: 45 Effort level: 80% Target for treatment: 36   Tongue back  (Goal = 55) Peak Max after 3 trials: 38 Effort level: 80% Target for treatment: 30   Right Lip  (Goal = 35) Peak Max after 3 trials: 22 Effort level: 80% Target for treatment:18   Left Lip  (Goal = 35) Peak Max after 3 trials: 23 Effort level: 80% Target for treatment: 18           4/4/2023 4/14/23 4/18/23 4/21/23 4/26/23     Tongue tip  20  X 3 27 x 30 32 x 30 29 x 30 36 x 30     Tongue back 14 x 3 19 x 30 34 x 10, 30 x 5, 28 x 15 26 x 30 30 x 30     Right Lip 12 x 3 16 x 30  18 x 10 16 x 30 18 x 10     Left Lip 14 x 3 19 x 30 19 x5 18 x 30 18 x 10           Patient will complete VFSS  3/31: Completed VFSS on 3/22/23: Pt presents w/ mild-mod oropharyngeal dysphagia  Mild-mod reduced bolus hold and delayed swallow initiation w/ spill spill to at least the valleculae w/ all, to the pyriforms w/ thin and cup/sequential sip nectar thick liquids  Fairly prompt bolus transport w/ mild residue on BOT  Mod reduced pharyngeal stripping wave   Mod reduced anterior hyoid excursion w/ incomplete laryngeal elevation and vestibule closure  Incomplete epiglottic inversion 2/2 protruding bony growth at C4-5  Mild-mod reduced UES relaxation w/ mild-mod pharyngeal residue in the valleculae and pyriforms, intermittent epiglottic undercoating from retention/suspected ambient fluid  Recurrent penetration w/ epiglottic undercoating w/ thin liquids, transient aspiration w/ rapid straw sips thin-wet vocal quality response to penetration/aspiration  Trace aspiration also noted while taking barium tablet w/ thin liquids, barium tablet retained in valleculae and then pyriforms, requiring mult liquid washes to clear-delayed cough response appreciated  ACHIEVED, DISCONTINUE GOAL        Recommendations:  Diet: Regular  Liquids: Thin   Meds: As tolerated   Strategies: No straws, small sips/bite sizes, alternate liquids/solids   Frequent oral care  Upright position  F/u ST tx: Continue OP ST   Therapy Prognosis: Fair   Prognosis considerations: age, current medical status, PMH  Aspiration Precautions  Reflux Precautions  Consider consult with: neurosurgery vs ENT for osteophyte  Results reviewed with: pt, physician  Repeat MBS as necessary  If a dedicated assessment of the esophagus is desired, consider esophagram/barium swallow or EGD      Patient will complete OME's to increase speed, power and efficiency of swallow  PATIENT PRACTICING IOPI FOR TONGUE STRENGTH  DISCONTINUE GOAL      Patient will complete pharyngeal exercises to increase swallow efficiency and decrease risk of aspiration  3/31: Trained in effortful swallow & Mendelsohn Maneuver  Patient able to complete  4/14: Utilized effortful swallow with water sips, used NMES Biofeedback next time  4/18: Patient saad  p with Sola Patino, encouraged practice throughout day  4/21: Decided to focus on effortful swallow instead of Mendelosohn, used NMES to utilize and understand strength and utilization of effortful swallow  Patient expressed understanding  "Provided information on EMST for patient  5/10: Provided instruction for EMST & reviewed swallowing exercise plan  CONTINUE GOAL      Patient will independently utilize compensatory strategies to decrease risk of aspiration (liquid wash, slow rate, small bites /sips)  3/31: Explained results to patient from VFSS  4/14: Patient with decreased focus/attention to strategies, reviewed and educated on strategies, encouraged patient to execute strategies at mealtime  Patient executed strategies with water independently, minimal verbal cues initially  4/21: Patient had \"incident\" with coughing/choking on popcorn, advised no popcorn and making smart decision in regards to texture choices  4/26: Reviewed importance of strategies  CONTINUE GOAL        Long Term Goals:        Patient will complete SPEAK OUT! Program and effectively communicate in social settings with improved vocal quality  4/21/23: Patient practicing SPEAK OUT online and workbook exercises on own at home DISCONTINUE GOAL  NEW GOALS:    SHORT TERM:    Patient will complete pharyngeal exercises to increase swallow efficiency and decrease risk of aspiration        Patient will independently utilize compensatory strategies to decrease risk of aspiration (liquid wash, slow rate, small bites /sips)  Patient will express understanding of ways to compensate and maximize overall cognition and memory  Patient will achieve 80% accuracy or better with use of min cues to maximize visual and verbal memory via cognitive retraining  Patient will complete SPEAK OUT! Exercises as appropriate  LONG TERM GOALS:    Patient will tolerate least restrictive diet (regular /thins) with minimized risk of aspiration  CONTINUE GOAL  Patient will complete cognitive retraining and express ideas of how to maximize memory & cognition        Impressions/Recommendations    Impressions:   -Patient completed x 9 sessions focused on dysphagia management and strengthening " along with education to maximize voice along with completion of cognitive assessment today  Patient routinely completing swallowing exercises and EMST diligently  Patient also completing daily SPEAK OUT! Exercises via website at home  Patient eager to improve his memory and continue to maximize his swallowing function  Recommend to continue cognitive, voice & dysphagia therapy x 2 days weekly over span of 6-8 weeks       Recommendations:  -Patient would benefit from outpatient skilled Speech Therapy services: Voice therapy, Dysphagia therapy and Cognitive-linguistic therapy    -Frequency: 2x weekly  -Duration: 6-8 weeks    -Intervention certification from: 6/43/7651  -Intervention certification to: 7/72/3866    -Intervention comments:

## 2023-05-17 NOTE — PROGRESS NOTES
Daily Speech Treatment Note    Today's date: 2023  Patient’s name: Mills Ganser  : 1945  MRN: [id number]  Safety measures:   Referring provider: Jarrod Hong MD    Encounter Diagnosis     ICD-10-CM    1  Primary parkinsonism ([address] )  G20       2  Dysphonia  R49 0       3  Oropharyngeal dysphagia  R13 12       4  Other symbolic dysfunctions  Y59 0       5  Cognitive communication deficit  R41  841         Visit tracking:  10    Subjective/Behavioral:  - Patient reports that he is feeling okay  He hasn't been sleeping well and is feeling a little fatigued  He's dealing with peripheral neuropathy  He has noticed improvement in his breath and swallowing following a week of consistent EMST 150 practice  Patient arrived 10 minutes late to session  Objective/Assessment:  Completed structured activities with patient to target goals below  Results as stated below  Goals    Short Term Goals:    Patient will complete pharyngeal exercises to increase swallow efficiency and decrease risk of aspiration  3/31: Trained in effortful swallow & Mendelsohn Maneuver  Patient able to complete  : Utilized effortful swallow with water sips, used NMES Biofeedback next time  : Patient inde  p with Kandace Woodall, encouraged practice throughout day  : Decided to focus on effortful swallow instead of Mendelosohn, used NMES to utilize and understand strength and utilization of effortful swallow  Patient expressed understanding  Provided information on EMST for patient  5/10: Provided instruction for EMST & reviewed swallowing exercise plan   CONTINUE GOAL      IOPI PEAK MEASUREMENT WEEKLY GRID       23       Tongue tip  (Goal = 65) 28, 34, 32 39 37  45  45      Tongue back  (Goal = 55)  22, 24, 20 43 32 38   34     Right Lip  (Goal = 35) 16, 20,  21 23 20 22 24       Left Lip  (Goal = 35) 24, 22, 23 24 23 23 25             IOPI -- Today's Exercise Targets "     Tongue tip  (Goal = 65) Peak Max after 3 trials: 45  Effort level: 80% Target for treatment: 36    Tongue back  (Goal = 55) Peak Max after 3 trials: 34  Effort level: 80% Target for treatment: 27    Right Lip  (Goal = 35) Peak Max after 3 trials: 24  Effort level: 80% Target for treatment:19    Left Lip  (Goal = 35) Peak Max after 3 trials: 25  Effort level: 80% Target for treatment: 19 (should have been a target level of 20 but clinician made an error and did not increase to 20 following right side)          4/4/2023 4/14/23 4/18/23 4/21/23 4/26/23 5/18/23    Tongue tip  20  X 3 27 x 30 32 x 30 29 x 30 36 x 30  36 x 30   Tongue back 14 x 3 19 x 30 34 x 10, 30 x 5, 28 x 15 26 x 30 30 x 30  27 x 30   Right Lip 12 x 3 16 x 30  18 x 10 16 x 30 18 x 10  19 x 20   Left Lip 14 x 3 19 x 30 19 x5 18 x 30 18 x 10  19 x 20        Patient will independently utilize compensatory strategies to decrease risk of aspiration (liquid wash, slow rate, small bites /sips)  3/31: Explained results to patient from VFSS  4/14: Patient with decreased focus/attention to strategies, reviewed and educated on strategies, encouraged patient to execute strategies at mealtime  Patient executed strategies with water independently, minimal verbal cues initially  4/21: Patient had \"incident\" with coughing/choking on popcorn, advised no popcorn and making smart decision in regards to texture choices  4/26: Reviewed importance of strategies  CONTINUE GOAL  Patient will express understanding of ways to compensate and maximize overall cognition and memory  Patient will achieve 80% accuracy or better with use of min cues to maximize visual and verbal memory via cognitive retraining  Patient will complete SPEAK OUT! Exercises as appropriate         Long Term Goals:       Patient will tolerate least restrictive diet (regular /thins) with minimized risk of aspiration  CONTINUE GOAL      Patient will complete cognitive retraining and " express ideas of how to maximize memory & cognition        Plan:  -Continue with current plan of care  IOPI, OME's, EMST, pharyngeal exercises per plan

## 2023-05-18 ENCOUNTER — OFFICE VISIT (OUTPATIENT)
Dept: FAMILY MEDICINE CLINIC | Facility: HOSPITAL | Age: 78
End: 2023-05-18

## 2023-05-18 ENCOUNTER — OFFICE VISIT (OUTPATIENT)
Dept: SPEECH THERAPY | Facility: CLINIC | Age: 78
End: 2023-05-18

## 2023-05-18 ENCOUNTER — OFFICE VISIT (OUTPATIENT)
Dept: PHYSICAL THERAPY | Facility: CLINIC | Age: 78
End: 2023-05-18

## 2023-05-18 VITALS
HEART RATE: 79 BPM | SYSTOLIC BLOOD PRESSURE: 104 MMHG | BODY MASS INDEX: 31.68 KG/M2 | WEIGHT: 239 LBS | DIASTOLIC BLOOD PRESSURE: 62 MMHG | OXYGEN SATURATION: 96 % | HEIGHT: 73 IN

## 2023-05-18 DIAGNOSIS — R49.0 DYSPHONIA: ICD-10-CM

## 2023-05-18 DIAGNOSIS — Z00.00 MEDICARE ANNUAL WELLNESS VISIT, SUBSEQUENT: ICD-10-CM

## 2023-05-18 DIAGNOSIS — Z23 ENCOUNTER FOR IMMUNIZATION: ICD-10-CM

## 2023-05-18 DIAGNOSIS — G20 PRIMARY PARKINSONISM (HCC): Primary | ICD-10-CM

## 2023-05-18 DIAGNOSIS — R26.9 NEUROLOGIC GAIT DYSFUNCTION: ICD-10-CM

## 2023-05-18 DIAGNOSIS — G60.9 IDIOPATHIC PERIPHERAL NEUROPATHY: Primary | ICD-10-CM

## 2023-05-18 DIAGNOSIS — R13.12 OROPHARYNGEAL DYSPHAGIA: ICD-10-CM

## 2023-05-18 DIAGNOSIS — R48.8 OTHER SYMBOLIC DYSFUNCTIONS: ICD-10-CM

## 2023-05-18 DIAGNOSIS — R41.841 COGNITIVE COMMUNICATION DEFICIT: ICD-10-CM

## 2023-05-18 RX ORDER — ZOSTER VACCINE RECOMBINANT, ADJUVANTED 50 MCG/0.5
KIT INTRAMUSCULAR
Qty: 1 EACH | Refills: 1 | Status: SHIPPED | OUTPATIENT
Start: 2023-05-18

## 2023-05-18 RX ORDER — GABAPENTIN 100 MG/1
100 CAPSULE ORAL 3 TIMES DAILY
Qty: 30 CAPSULE | Refills: 3 | Status: SHIPPED | OUTPATIENT
Start: 2023-05-18

## 2023-05-18 NOTE — PROGRESS NOTES
Daily Note     Today's date: 2023  Patient name: Tommie Bates  : 1945  MRN: [id number]  Referring provider: Lexis Velazquez MD  Dx:   Encounter Diagnosis     ICD-10-CM    1  Primary parkinsonism ([address] )  G20       2  Neurologic gait dysfunction  R26 9           Start Time: 0850  Stop Time: 0930  Total time in clinic (min): 40 minutes    Subjective: pt not sleeping well due to neuropathy  Seeing GP later today  Objective: See treatment diary below      Assessment:  Began session on TM, external cues for inc step length  Appropriately adjusts posture independently with minimal cues  With overhead press during ambulation slightly more imbalance bwd and some drift downward overtime with held position  Attempts to inc SLS with dot tap over hurdles, min issues with clearance  Some loss of rotation with 7lb ball rotation  Mild imbalance with ball toss with lat step, 1 near loss of balance with indep maintaining balance with stepping strategy  Patient demonstrated fatigue post treatment and would benefit from continued PT to improve balance      Plan: Continue per plan of care  Progress treatment as tolerated  Pt going on vacation next week       Precautions: h/o tinnitus, h/o PD, h/o peripheral neuropathy,h/o HTN, B/L TKR  Re-eval Date: 2023    Date    Visit Count 16 17 18 19 20   FOTO See IE       Pain In See IE       Pain Out                Testing        MiniBest 22       TUG/TUGC 07 01 / 10 38 5 ice cream flavors       5xSTS 08 07       Gait speed 0 98       FGA 22       2/6MWT 1008        Neuro Re-Ed        Segmental rolling with overpressure         Dynamic balance Suitcase carry ortho side x2 laps 7# DB (harness as GB)         5x STS --> goblet carry forward/back --> 5x STS circuit #5  (SOLO harness as GB) x3 laps (96% Spo2, 103 bpm)   Hula hoop turns 2 laps x3 solo    Hurdles 4 laps x3 solo    Fwd/bwd shuttle 4 dots NE :24s solo    6lb goblet 40s x2 6' step solo Trial "foam beam lat step x1    Bolster OH fwd/bwd 6 laps ea solo    Step up 6' OH bolster 1min x3 solo   HT amb solo 15ft x6 fwd/bwd    15lb suitcase carry fwd 4 laps, 8 laps bwd    Foam behind back solo 8 laps  Fwd/bwd foam OH solo 2 x2 laps dot shuttle run    Hurdles + lat dot tap 12 laps     Lat step + 7lb med ball toss 8 laps total solo   Static balance    EC FT 30\" x2 7lb med ball rotation solo x14   Obstacle course   Narrow base + hurdles lat step 6 laps, fwd 10 laps, 4 laps w bolster carry fwd Foam + uneven folded mat \"dont touch ground\" 12 laps solo    High amplitude movement        Resisted walking/balance   Red/blue TB solo 10 laps 6 laps red/blue TB solo     Supine to sidelying to sit        Standing reach cone to wall/floor; repeated with thoracic rotation         Ther Ex        SLR x 4  Donkey kick over high mat 2x8 3' hold      HR/TR        Mini Squats  3x8 UE OH      Step ups        Mod bird dog        Supine LTR        L/s flexion        Bridge        Quadruped thread needle        Rows        Hamstring seated stretch    45\" x2 ea    Iliopsoas stretch supine        Trial of Elliptical  Trial for 3 mins of elliptical // CGAx1 for set up and starting  2min CGA x1 on/off >40rpm    TM for endurance 1 5 mph x0% incline  x10 mins tot  VC for increased step amplitude  External cue of ball kick to increase stride length  2 0mph 1UE 8min total ball kick cue  8min 1  7mph 2UE cue for kicking plastic + min cue for posture   Gastroc str        Ther Activity        HEP        Bed mobility        Gait Training        Narrow spaces/busyenv        External cues        Stairs x4 7lb suitcase CGA via GB        Modalities                                   "

## 2023-05-18 NOTE — PATIENT INSTRUCTIONS
Please notify me in a week after your started taking gabapentin to see how your left heal burning sensation is! Medicare Preventive Visit Patient Instructions  Thank you for completing your Welcome to Medicare Visit or Medicare Annual Wellness Visit today  Your next wellness visit will be due in one year (5/18/2024)  The screening/preventive services that you may require over the next 5-10 years are detailed below  Some tests may not apply to you based off risk factors and/or age  Screening tests ordered at today's visit but not completed yet may show as past due  Also, please note that scanned in results may not display below  Preventive Screenings:  Service Recommendations Previous Testing/Comments   Colorectal Cancer Screening  · Colonoscopy    · Fecal Occult Blood Test (FOBT)/Fecal Immunochemical Test (FIT)  · Fecal DNA/Cologuard Test  · Flexible Sigmoidoscopy Age: 39-70 years old   Colonoscopy: every 10 years (May be performed more frequently if at higher risk)  OR  FOBT/FIT: every 1 year  OR  Cologuard: every 3 years  OR  Sigmoidoscopy: every 5 years  Screening may be recommended earlier than age 39 if at higher risk for colorectal cancer  Also, an individualized decision between you and your healthcare provider will decide whether screening between the ages of 74-80 would be appropriate   Colonoscopy: 04/27/2021  FOBT/FIT: Not on file  Cologuard: Not on file  Sigmoidoscopy: Not on file          Prostate Cancer Screening Individualized decision between patient and health care provider in men between ages of 53-78   Medicare will cover every 12 months beginning on the day after your 50th birthday PSA: No results in last 5 years     Screening Not Indicated     Hepatitis C Screening Once for adults born between [facility]  More frequently in patients at high risk for Hepatitis C Hep C Antibody: Not on file        Diabetes Screening 1-2 times per year if you're at risk for diabetes or have pre-diabetes Fasting glucose: 118 mg/dL (3/15/2023)  A1C: No results in last 5 years (No results in last 5 years)  Screening Current   Cholesterol Screening Once every 5 years if you don't have a lipid disorder  May order more often based on risk factors  Lipid panel: 01/14/2022  Screening Not Indicated  History Lipid Disorder      Other Preventive Screenings Covered by Medicare:  1  Abdominal Aortic Aneurysm (AAA) Screening: covered once if your at risk  You're considered to be at risk if you have a family history of AAA or a male between the age of 73-68 who smoking at least 100 cigarettes in your lifetime  2  Lung Cancer Screening: covers low dose CT scan once per year if you meet all of the following conditions: (1) Age 50-69; (2) No signs or symptoms of lung cancer; (3) Current smoker or have quit smoking within the last 15 years; (4) You have a tobacco smoking history of at least 20 pack years (packs per day x number of years you smoked); (5) You get a written order from a healthcare provider  3  Glaucoma Screening: covered annually if you're considered high risk: (1) You have diabetes OR (2) Family history of glaucoma OR (3)  aged 48 and older OR (3)  American aged 72 and older  3  Osteoporosis Screening: covered every 2 years if you meet one of the following conditions: (1) Have a vertebral abnormality; (2) On glucocorticoid therapy for more than 3 months; (3) Have primary hyperparathyroidism; (4) On osteoporosis medications and need to assess response to drug therapy  5  HIV Screening: covered annually if you're between the age of 12-76  Also covered annually if you are younger than 13 and older than 72 with risk factors for HIV infection  For pregnant patients, it is covered up to 3 times per pregnancy      Immunizations:  Immunization Recommendations   Influenza Vaccine Annual influenza vaccination during flu season is recommended for all persons aged >= 6 months who do not have contraindications   Pneumococcal Vaccine   * Pneumococcal conjugate vaccine = PCV13 (Prevnar 13), PCV15 (Vaxneuvance), PCV20 (Prevnar 20)  * Pneumococcal polysaccharide vaccine = PPSV23 (Pneumovax) Adults 2364 years old: 1-3 doses may be recommended based on certain risk factors  Adults 72 years old: 1-2 doses may be recommended based off what pneumonia vaccine you previously received   Hepatitis B Vaccine 3 dose series if at intermediate or high risk (ex: diabetes, end stage renal disease, liver disease)   Tetanus (Td) Vaccine - COST NOT COVERED BY MEDICARE PART B Following completion of primary series, a booster dose should be given every 10 years to maintain immunity against tetanus  Td may also be given as tetanus wound prophylaxis  Tdap Vaccine - COST NOT COVERED BY MEDICARE PART B Recommended at least once for all adults  For pregnant patients, recommended with each pregnancy  Shingles Vaccine (Shingrix) - COST NOT COVERED BY MEDICARE PART B  2 shot series recommended in those aged 48 and above     Health Maintenance Due:      Topic Date Due   • Hepatitis C Screening  Never done     Immunizations Due:  There are no preventive care reminders to display for this patient  Advance Directives   What are advance directives? Advance directives are legal documents that state your wishes and plans for medical care  These plans are made ahead of time in case you lose your ability to make decisions for yourself  Advance directives can apply to any medical decision, such as the treatments you want, and if you want to donate organs  What are the types of advance directives? There are many types of advance directives, and each state has rules about how to use them  You may choose a combination of any of the following:  · Living will: This is a written record of the treatment you want  You can also choose which treatments you do not want, which to limit, and which to stop at a certain time   This includes surgery, medicine, IV fluid, and tube feedings  · Durable power of  for healthcare [facility]): This is a written record that states who you want to make healthcare choices for you when you are unable to make them for yourself  This person, called a proxy, is usually a family member or a friend  You may choose more than 1 proxy  · Do not resuscitate (DNR) order:  A DNR order is used in case your heart stops beating or you stop breathing  It is a request not to have certain forms of treatment, such as CPR  A DNR order may be included in other types of advance directives  · Medical directive: This covers the care that you want if you are in a coma, near death, or unable to make decisions for yourself  You can list the treatments you want for each condition  Treatment may include pain medicine, surgery, blood transfusions, dialysis, IV or tube feedings, and a ventilator (breathing machine)  · Values history: This document has questions about your views, beliefs, and how you feel and think about life  This information can help others choose the care that you would choose  Why are advance directives important? An advance directive helps you control your care  Although spoken wishes may be used, it is better to have your wishes written down  Spoken wishes can be misunderstood, or not followed  Treatments may be given even if you do not want them  An advance directive may make it easier for your family to make difficult choices about your care  Fall Prevention    Fall prevention  includes ways to make your home and other areas safer  It also includes ways you can move more carefully to prevent a fall  Health conditions that cause changes in your blood pressure, vision, or muscle strength and coordination may increase your risk for falls  Medicines may also increase your risk for falls if they make you dizzy, weak, or sleepy  Fall prevention tips:   · Stand or sit up slowly  · Use assistive devices as directed  · Wear shoes that fit well and have soles that   · Wear a personal alarm  · Stay active  · Manage your medical conditions  Home Safety Tips:  · Add items to prevent falls in the bathroom  · Keep paths clear  · Install bright lights in your home  · Keep items you use often on shelves within reach  · Paint or place reflective tape on the edges of your stairs  Weight Management   Why it is important to manage your weight:  Being overweight increases your risk of health conditions such as heart disease, high blood pressure, type 2 diabetes, and certain types of cancer  It can also increase your risk for osteoarthritis, sleep apnea, and other respiratory problems  Aim for a slow, steady weight loss  Even a small amount of weight loss can lower your risk of health problems  How to lose weight safely:  A safe and healthy way to lose weight is to eat fewer calories and get regular exercise  You can lose up about 1 pound a week by decreasing the number of calories you eat by 500 calories each day  Healthy meal plan for weight management:  A healthy meal plan includes a variety of foods, contains fewer calories, and helps you stay healthy  A healthy meal plan includes the following:  · Eat whole-grain foods more often  A healthy meal plan should contain fiber  Fiber is the part of grains, fruits, and vegetables that is not broken down by your body  Whole-grain foods are healthy and provide extra fiber in your diet  Some examples of whole-grain foods are whole-wheat breads and pastas, oatmeal, brown rice, and bulgur  · Eat a variety of vegetables every day  Include dark, leafy greens such as spinach, kale, sachin greens, and mustard greens  Eat yellow and orange vegetables such as carrots, sweet potatoes, and winter squash  · Eat a variety of fruits every day  Choose fresh or canned fruit (canned in its own juice or light syrup) instead of juice   Fruit juice has very little or no fiber   · Eat low-fat dairy foods  Drink fat-free (skim) milk or 1% milk  Eat fat-free yogurt and low-fat cottage cheese  Try low-fat cheeses such as mozzarella and other reduced-fat cheeses  · Choose meat and other protein foods that are low in fat  Choose beans or other legumes such as split peas or lentils  Choose fish, skinless poultry (chicken or turkey), or lean cuts of red meat (beef or pork)  Before you cook meat or poultry, cut off any visible fat  · Use less fat and oil  Try baking foods instead of frying them  Add less fat, such as margarine, sour cream, regular salad dressing and mayonnaise to foods  Eat fewer high-fat foods  Some examples of high-fat foods include french fries, doughnuts, ice cream, and cakes  · Eat fewer sweets  Limit foods and drinks that are high in sugar  This includes candy, cookies, regular soda, and sweetened drinks  Exercise:  Exercise at least 30 minutes per day on most days of the week  Some examples of exercise include walking, biking, dancing, and swimming  You can also fit in more physical activity by taking the stairs instead of the elevator or parking farther away from stores  Ask your healthcare provider about the best exercise plan for you  © Copyright [employer] 2018 Information is for End User's use only and may not be sold, redistributed or otherwise used for commercial purposes  All illustrations and images included in CareNotes® are the copyrighted property of [employer]  or [facility] Preventive Visit Patient Instructions  Thank you for completing your Welcome to Medicare Visit or Medicare Annual Wellness Visit today  Your next wellness visit will be due in one year (5/18/2024)  The screening/preventive services that you may require over the next 5-10 years are detailed below  Some tests may not apply to you based off risk factors and/or age   Screening tests ordered at today's visit but not completed yet may show as past due  Also, please note that scanned in results may not display below  Preventive Screenings:  Service Recommendations Previous Testing/Comments   Colorectal Cancer Screening  · Colonoscopy    · Fecal Occult Blood Test (FOBT)/Fecal Immunochemical Test (FIT)  · Fecal DNA/Cologuard Test  · Flexible Sigmoidoscopy Age: 39-70 years old   Colonoscopy: every 10 years (May be performed more frequently if at higher risk)  OR  FOBT/FIT: every 1 year  OR  Cologuard: every 3 years  OR  Sigmoidoscopy: every 5 years  Screening may be recommended earlier than age 39 if at higher risk for colorectal cancer  Also, an individualized decision between you and your healthcare provider will decide whether screening between the ages of 74-80 would be appropriate  Colonoscopy: 04/27/2021  FOBT/FIT: Not on file  Cologuard: Not on file  Sigmoidoscopy: Not on file          Prostate Cancer Screening Individualized decision between patient and health care provider in men between ages of 53-78   Medicare will cover every 12 months beginning on the day after your 50th birthday PSA: No results in last 5 years     Screening Not Indicated     Hepatitis C Screening Once for adults born between [facility]  More frequently in patients at high risk for Hepatitis C Hep C Antibody: Not on file        Diabetes Screening 1-2 times per year if you're at risk for diabetes or have pre-diabetes Fasting glucose: 118 mg/dL (3/15/2023)  A1C: No results in last 5 years (No results in last 5 years)  Screening Current   Cholesterol Screening Once every 5 years if you don't have a lipid disorder  May order more often based on risk factors  Lipid panel: 01/14/2022  Screening Not Indicated  History Lipid Disorder      Other Preventive Screenings Covered by Medicare:  1  Abdominal Aortic Aneurysm (AAA) Screening: covered once if your at risk   You're considered to be at risk if you have a family history of AAA or a male between the age of 73-68 who smoking at least 100 cigarettes in your lifetime  2  Lung Cancer Screening: covers low dose CT scan once per year if you meet all of the following conditions: (1) Age 50-69; (2) No signs or symptoms of lung cancer; (3) Current smoker or have quit smoking within the last 15 years; (4) You have a tobacco smoking history of at least 20 pack years (packs per day x number of years you smoked); (5) You get a written order from a healthcare provider  3  Glaucoma Screening: covered annually if you're considered high risk: (1) You have diabetes OR (2) Family history of glaucoma OR (3)  aged 48 and older OR (3)  American aged 72 and older  3  Osteoporosis Screening: covered every 2 years if you meet one of the following conditions: (1) Have a vertebral abnormality; (2) On glucocorticoid therapy for more than 3 months; (3) Have primary hyperparathyroidism; (4) On osteoporosis medications and need to assess response to drug therapy  5  HIV Screening: covered annually if you're between the age of 12-76  Also covered annually if you are younger than 13 and older than 72 with risk factors for HIV infection  For pregnant patients, it is covered up to 3 times per pregnancy      Immunizations:  Immunization Recommendations   Influenza Vaccine Annual influenza vaccination during flu season is recommended for all persons aged >= 6 months who do not have contraindications   Pneumococcal Vaccine   * Pneumococcal conjugate vaccine = PCV13 (Prevnar 13), PCV15 (Vaxneuvance), PCV20 (Prevnar 20)  * Pneumococcal polysaccharide vaccine = PPSV23 (Pneumovax) Adults 25-60 years old: 1-3 doses may be recommended based on certain risk factors  Adults 72 years old: 1-2 doses may be recommended based off what pneumonia vaccine you previously received   Hepatitis B Vaccine 3 dose series if at intermediate or high risk (ex: diabetes, end stage renal disease, liver disease)   Tetanus (Td) Vaccine - COST NOT COVERED BY MEDICARE PART B Following completion of primary series, a booster dose should be given every 10 years to maintain immunity against tetanus  Td may also be given as tetanus wound prophylaxis  Tdap Vaccine - COST NOT COVERED BY MEDICARE PART B Recommended at least once for all adults  For pregnant patients, recommended with each pregnancy  Shingles Vaccine (Shingrix) - COST NOT COVERED BY MEDICARE PART B  2 shot series recommended in those aged 48 and above     Health Maintenance Due:      Topic Date Due   • Hepatitis C Screening  Never done     Immunizations Due:  There are no preventive care reminders to display for this patient  Advance Directives   What are advance directives? Advance directives are legal documents that state your wishes and plans for medical care  These plans are made ahead of time in case you lose your ability to make decisions for yourself  Advance directives can apply to any medical decision, such as the treatments you want, and if you want to donate organs  What are the types of advance directives? There are many types of advance directives, and each state has rules about how to use them  You may choose a combination of any of the following:  · Living will: This is a written record of the treatment you want  You can also choose which treatments you do not want, which to limit, and which to stop at a certain time  This includes surgery, medicine, IV fluid, and tube feedings  · Durable power of  for healthcare [place]): This is a written record that states who you want to make healthcare choices for you when you are unable to make them for yourself  This person, called a proxy, is usually a family member or a friend  You may choose more than 1 proxy  · Do not resuscitate (DNR) order:  A DNR order is used in case your heart stops beating or you stop breathing  It is a request not to have certain forms of treatment, such as CPR   A DNR order may be included in other types of advance directives  · Medical directive: This covers the care that you want if you are in a coma, near death, or unable to make decisions for yourself  You can list the treatments you want for each condition  Treatment may include pain medicine, surgery, blood transfusions, dialysis, IV or tube feedings, and a ventilator (breathing machine)  · Values history: This document has questions about your views, beliefs, and how you feel and think about life  This information can help others choose the care that you would choose  Why are advance directives important? An advance directive helps you control your care  Although spoken wishes may be used, it is better to have your wishes written down  Spoken wishes can be misunderstood, or not followed  Treatments may be given even if you do not want them  An advance directive may make it easier for your family to make difficult choices about your care  Fall Prevention    Fall prevention  includes ways to make your home and other areas safer  It also includes ways you can move more carefully to prevent a fall  Health conditions that cause changes in your blood pressure, vision, or muscle strength and coordination may increase your risk for falls  Medicines may also increase your risk for falls if they make you dizzy, weak, or sleepy  Fall prevention tips:   · Stand or sit up slowly  · Use assistive devices as directed  · Wear shoes that fit well and have soles that   · Wear a personal alarm  · Stay active  · Manage your medical conditions  Home Safety Tips:  · Add items to prevent falls in the bathroom  · Keep paths clear  · Install bright lights in your home  · Keep items you use often on shelves within reach  · Paint or place reflective tape on the edges of your stairs      Weight Management   Why it is important to manage your weight:  Being overweight increases your risk of health conditions such as heart disease, high blood pressure, type 2 diabetes, and certain types of cancer  It can also increase your risk for osteoarthritis, sleep apnea, and other respiratory problems  Aim for a slow, steady weight loss  Even a small amount of weight loss can lower your risk of health problems  How to lose weight safely:  A safe and healthy way to lose weight is to eat fewer calories and get regular exercise  You can lose up about 1 pound a week by decreasing the number of calories you eat by 500 calories each day  Healthy meal plan for weight management:  A healthy meal plan includes a variety of foods, contains fewer calories, and helps you stay healthy  A healthy meal plan includes the following:  · Eat whole-grain foods more often  A healthy meal plan should contain fiber  Fiber is the part of grains, fruits, and vegetables that is not broken down by your body  Whole-grain foods are healthy and provide extra fiber in your diet  Some examples of whole-grain foods are whole-wheat breads and pastas, oatmeal, brown rice, and bulgur  · Eat a variety of vegetables every day  Include dark, leafy greens such as spinach, kale, sachin greens, and mustard greens  Eat yellow and orange vegetables such as carrots, sweet potatoes, and winter squash  · Eat a variety of fruits every day  Choose fresh or canned fruit (canned in its own juice or light syrup) instead of juice  Fruit juice has very little or no fiber  · Eat low-fat dairy foods  Drink fat-free (skim) milk or 1% milk  Eat fat-free yogurt and low-fat cottage cheese  Try low-fat cheeses such as mozzarella and other reduced-fat cheeses  · Choose meat and other protein foods that are low in fat  Choose beans or other legumes such as split peas or lentils  Choose fish, skinless poultry (chicken or turkey), or lean cuts of red meat (beef or pork)  Before you cook meat or poultry, cut off any visible fat  · Use less fat and oil  Try baking foods instead of frying them   Add less fat, such as margarine, sour cream, regular salad dressing and mayonnaise to foods  Eat fewer high-fat foods  Some examples of high-fat foods include french fries, doughnuts, ice cream, and cakes  · Eat fewer sweets  Limit foods and drinks that are high in sugar  This includes candy, cookies, regular soda, and sweetened drinks  Exercise:  Exercise at least 30 minutes per day on most days of the week  Some examples of exercise include walking, biking, dancing, and swimming  You can also fit in more physical activity by taking the stairs instead of the elevator or parking farther away from stores  Ask your healthcare provider about the best exercise plan for you  © Copyright [employer] 2018 Information is for End User's use only and may not be sold, redistributed or otherwise used for commercial purposes   All illustrations and images included in CareNotes® are the copyrighted property of [employer]  or [address] [place] [address]

## 2023-05-18 NOTE — PROGRESS NOTES
Assessment and Plan:     Problem List Items Addressed This Visit        Nervous and Auditory    Idiopathic peripheral neuropathy - Primary     Pt experiences severe burning sensation of the left heel area only at night interfering with sleep resolving with getting up and walking  Pt has decreased sensation to light touch of the left sole on exam  Skin is intact, heel is not tender, tibialis posterior pulse is well palpable  Doubt plantar fasciitis  Suspect paresthesias are a manifestation of neuropathy  Will try gabapentin and pt will call in a week  Relevant Medications    gabapentin (Neurontin) 100 mg capsule   Other Visit Diagnoses     Medicare annual wellness visit, subsequent        Encounter for immunization        Relevant Medications    Zoster Vac Recomb Adjuvanted (Shingrix) 50 MCG/0 5ML SUSR          Depression Screening and Follow-up Plan: Patient was screened for depression during today's encounter  They screened negative with a PHQ-2 score of 0  Preventive health issues were discussed with patient, and age appropriate screening tests were ordered as noted in patient's After Visit Summary  Personalized health advice and appropriate referrals for health education or preventive services given if needed, as noted in patient's After Visit Summary  History of Present Illness:     Patient presents for a Medicare Wellness Visit    HPI   Patient Care Team:  Lm Love MD as PCP - MD Otilio Donohue MD Murlene Lever, DO Stuart Pickett, MD Gorden Locks, CRNP     Review of Systems:     Review of Systems   Constitutional: Negative for fever  HENT: Positive for hearing loss (worsening hearing)  Eyes: Positive for visual disturbance (frequent floaters, sees ophthalmology)  Respiratory: Negative for cough and shortness of breath  Cardiovascular: Negative for chest pain and palpitations     Gastrointestinal: Negative for abdominal pain, blood in stool and constipation  Dysphagia is improving with speech therapy! Endocrine: Negative for polydipsia  Genitourinary: Positive for frequency  Negative for dysuria and hematuria  Musculoskeletal: Positive for gait problem (improving with PT)  Negative for arthralgias and myalgias  Skin: Negative for rash  Neurological: Positive for numbness  Negative for headaches  Psychiatric/Behavioral: Negative for dysphoric mood  All other systems reviewed and are negative  Problem List:     Patient Active Problem List   Diagnosis   • Essential hypertension   • Primary parkinsonism ([facility] )   • Coronary artery disease involving native coronary artery of native heart without angina pectoris   • Degeneration, intervertebral disc, lumbosacral   • Gastroesophageal reflux disease without esophagitis   • Gout   • Idiopathic peripheral neuropathy   • Nephrolithiasis   • Osteoarthritis of knee   • Hypercholesterolemia   • Pharyngeal dysphagia   • Other insomnia   • Leg cramps   • Benign prostatic hyperplasia with weak urinary stream   • Orthostatic hypotension   • Neurologic gait dysfunction      Past Medical and Surgical History:     Past Medical History:   Diagnosis Date   • Arthritis    • Benign familial tremor    • Cardiac disease     two cardiac stents   • Chest pain    • Coronary artery disease    • HL (hearing loss) 01/01/2022   • Hyperlipidemia    • Hypertension    • Kidney disease    • Kidney stone    • Malignant melanoma of skin of chest ([facility] )     Removed 2 years ago     • Meniscal injury    • Nephrolithiasis    • Parkinson's disease ([facility] )    • Peripheral neuropathy    • PONV (postoperative nausea and vomiting)    • Tinnitus     [id number] RESOLVED     Past Surgical History:   Procedure Laterality Date   • AMPUTATION Left     AMPUTATION OF FINGER WITH NEURECTOMY(EACH) DISTAL LONG  RING AND SMALL FINGER   • CARPAL TUNNEL RELEASE Right    • CATARACT EXTRACTION     • CHOLECYSTECTOMY     • CORONARY STENT PLACEMENT two cardiac stents   • EYE SURGERY     • FINGER AMPUTATION      AMPUTATION OF MIDDLE FINGER WITH NEURECTOMY(EACH)   • HAND SURGERY Left     Traumatic amputation of fingers left hand  • JOINT REPLACEMENT Bilateral     knees   • KNEE ARTHROPLASTY      TOTAL   • MALIGNANT SKIN LESION EXCISION      ANTERIOR CHEST FOR MELAMONIA   • RI CYSTO/URETERO W/LITHOTRIPSY &INDWELL STENT INSRT Left 07/27/2017    Procedure: CYSTOSCOPY URETEROSCOPY , RETROGRADE PYELOGRAM AND INSERTION STENT URETERAL;  Surgeon: Vanessa Wooten MD;  Location: [facility];  Service: Urology   • RI LAPAROSCOPY SURG CHOLECYSTECTOMY N/A 02/28/2018    Procedure: CHOLECYSTECTOMY LAPAROSCOPIC;  Surgeon: Claudia Resendiz MD;  [facility];  Service: General   • RI LITHOLAPAXY SMPL/SM <2 5 CM N/A 09/15/2017    Procedure: Mary Kate Spear;  Surgeon: Naman Brantley MD;  [facility];  Service: Urology   • RI TRURL ELECTROSURG RESCJ PROSTATE BLEED COMPLETE N/A 09/15/2017    Procedure: BIPOLAR TRANSURETHRAL RESECTION OF PROSTATE (TURP); Surgeon: Naman Brantley MD;  Location[facility];  Service: Urology   • TONSILLECTOMY     • TRIGGER FINGER RELEASE Right    • TUMOR REMOVAL Left     Left foot 20 years ago     • WISDOM TOOTH EXTRACTION Bilateral       Family History:     Family History   Problem Relation Age of Onset   • Heart disease Mother    • Hypertension Mother    • Stroke Mother    • Breast cancer Mother    • Heart disease Father    • Hypertension Father    • Stroke Father    • Colon cancer Father    • Diabetes Sister    • Heart disease Brother    • Other Family         BACK PAIN   • Breast cancer Family    • Heart disease Family    • Hypertension Family    • Stroke Family    • Arthritis Family    • Mental illness Neg Hx    • Substance Abuse Neg Hx       Social History:     Social History     Socioeconomic History   • Marital status: /Civil Union     Spouse name: None   • Number of children: None   • Years of education: None   • Highest education level: None   Occupational History   • None   Tobacco Use   • Smoking status: Former     Types: Cigarettes   • Smokeless tobacco: Never   • Tobacco comments:     back in college in 1966   Vaping Use   • Vaping Use: Never used   Substance and Sexual Activity   • Alcohol use: Yes     Alcohol/week: 4 0 standard drinks     Types: 4 Cans of beer per week   • Drug use: No   • Sexual activity: Not Currently     Partners: Female   Other Topics Concern   • None   Social History Narrative    Lives with wife  Sees dentist reg  Has living will  Feels safe at home  No mental or sub in self  ACTIVE ADVANCE DIRECTIVE    CAREGIVER:  SPOUSE    EXERCISE HABITS  -  DAILY    GOOD DENTAL HYGIENE     Social Determinants of Health     Financial Resource Strain: Low Risk    • Difficulty of Paying Living Expenses: Not very hard   Food Insecurity: Not on file   Transportation Needs: No Transportation Needs   • Lack of Transportation (Medical): No   • Lack of Transportation (Non-Medical):  No   Physical Activity: Not on file   Stress: Not on file   Social Connections: Not on file   Intimate Partner Violence: Not on file   Housing Stability: Not on file      Medications and Allergies:     Current Outpatient Medications   Medication Sig Dispense Refill   • acetaminophen (TYLENOL) 500 mg tablet Take 500 mg by mouth every 6 (six) hours as needed for mild pain     • allopurinol (ZYLOPRIM) 300 mg tablet TAKE 1 TABLET BY MOUTH EVERY DAY 90 tablet 3   • aspirin 81 MG tablet Take by mouth 1 daily      • atorvastatin (LIPITOR) 20 mg tablet TAKE 1 TABLET BY MOUTH EVERY DAY 90 tablet 3   • benazepril-hydrochlorthiazide (LOTENSIN HCT) 10-12 5 MG per tablet TAKE 1 TABLET BY MOUTH EVERY DAY 90 tablet 1   • Calcium Carbonate-Vitamin D 600-400 MG-UNIT per tablet Caltrate 600+D 600-400 MG-UNIT TABS  Take 1 tablet daily   Refills: 0      , M D ; Active     • carbidopa-levodopa (SINEMET CR)  mg TBCR per ER tablet Take by mouth 1 at bedtime     • carbidopa-levodopa (SINEMET)  mg per tablet 2 tabs in the AM, 2 at lunch & 2 at dinner     • Cholecalciferol (VITAMIN D-3) 1000 UNITS CAPS 1 daily     • clindamycin (CLEOCIN) 300 MG capsule Takes pre dental      • docusate sodium (COLACE) 100 mg capsule Take by mouth 1-2 daily prn      • gabapentin (Neurontin) 100 mg capsule Take 1 capsule (100 mg total) by mouth 3 (three) times a day 30 capsule 3   • Glucos-Chond-Hyal Ac-Ca Fructo ([address]) TABS Take by mouth daily 1 daily     • glucosamine-chondroitin 500-400 MG tablet Take 1 tablet by mouth daily     • Homeopathic Products (LEG CRAMPS PO) Take by mouth 1 daily     • metoprolol succinate (TOPROL-XL) 50 mg 24 hr tablet TAKE 1 TABLET BY MOUTH EVERY DAY 90 tablet 1   • Multiple Vitamin (MULTIVITAMINS PO) Multivitamins Oral once a day Capsule   Refills: 0      , M D ; Active     • Zoster Vac Recomb Adjuvanted (Shingrix) 50 MCG/0 5ML SUSR 0 5mL IM for one dose, followed by 0 5mL IM 2-6 months after first dose 1 each 1   • nitroglycerin (NITROSTAT) 0 4 mg SL tablet Place under the tongue (Patient not taking: Reported on 9/12/2022)       No current facility-administered medications for this visit       No Known Allergies   Immunizations:     Immunization History   Administered Date(s) Administered   • COVID-19 MODERNA VACC 0 5 ML IM 01/21/2021, 02/19/2021, 10/25/2021   • COVID-19 Pfizer Vac BIVALENT [place]-sucrose 12 Yr+ IM (BOOSTER ONLY) 09/15/2022   • Influenza Split High Dose Preservative Free IM 09/28/2015, 09/14/2016, 10/19/2017   • Influenza, high dose seasonal 0 7 mL 09/05/2019, 10/05/2020, 10/13/2021   • Influenza, seasonal, injectable 10/03/2013   • Pneumococcal Conjugate 13-Valent 10/08/2015   • Pneumococcal Conjugate PCV 7 10/08/2015   • Pneumococcal Polysaccharide PPV23 08/13/2012      Health Maintenance:         Topic Date Due   • Hepatitis C Screening  Never done     There are no preventive care reminders to display for this patient  Medicare Screening Tests and Risk Assessments:     Giselle Farrar is here for his Subsequent Wellness visit  Health Risk Assessment:   Patient rates overall health as fair  Patient feels that their physical health rating is same  Patient is satisfied with their life  Eyesight was rated as slightly worse  Hearing was rated as slightly worse  Patient feels that their emotional and mental health rating is same  Patients states they are never, rarely angry  Patient states they are never, rarely unusually tired/fatigued  Pain experienced in the last 7 days has been some  Patient's pain rating has been 10/10  Patient states that he has experienced no weight loss or gain in last 6 months  Patient has been having pain at the bottom of his foot    Depression Screening:   PHQ-2 Score: 0      Fall Risk Screening: In the past year, patient has experienced: history of falling in past year    Number of falls: 1  Injured during fall?: No    Feels unsteady when standing or walking?: Yes    Worried about falling?: Yes      Home Safety:  Patient does not have trouble with stairs inside or outside of their home  Patient has working smoke alarms and has working carbon monoxide detector  Home safety hazards include: loose rugs on the floor  Nutrition:   Current diet is Regular  Medications:   Patient is currently taking over-the-counter supplements  OTC medications include: see medication list  Patient is able to manage medications  Activities of Daily Living (ADLs)/Instrumental Activities of Daily Living (IADLs):   Walk and transfer into and out of bed and chair?: Yes  Dress and groom yourself?: Yes    Bathe or shower yourself?: Yes    Feed yourself?  Yes  Do your laundry/housekeeping?: Yes  Manage your money, pay your bills and track your expenses?: Yes  Make your own meals?: Yes    Do your own shopping?: No    Previous Hospitalizations:   Any hospitalizations or ED visits within the "last 12 months?: Yes    How many hospitalizations have you had in the last year?: 1-2    Hospitalization Comments: Patient was in the hospital once with flank pain and a kidney stone    Advance Care Planning:   Living will: Yes    Advanced directive: Yes    Advanced directive counseling given: Yes    End of Life Decisions reviewed with patient: Yes    Provider agrees with end of life decisions: Yes      PREVENTIVE SCREENINGS      Cardiovascular Screening:    General: Screening Not Indicated, History Lipid Disorder, Risks and Benefits Discussed and Screening Current      Diabetes Screening:     General: Screening Current and Risks and Benefits Discussed      Colorectal Cancer Screening:     General: Risks and Benefits Discussed and Screening Current      Prostate Cancer Screening:    General: Screening Not Indicated and Risks and Benefits Discussed      Abdominal Aortic Aneurysm (AAA) Screening:    Risk factors include: tobacco use        Lung Cancer Screening:     General: Screening Not Indicated    Screening, Brief Intervention, and Referral to Treatment (SBIRT)    Screening  Typical number of drinks in a day: 0  Typical number of drinks in a week: 0  Interpretation: Low risk drinking behavior  Single Item Drug Screening:  How often have you used an illegal drug (including marijuana) or a prescription medication for non-medical reasons in the past year? never    Single Item Drug Screen Score: 0  Interpretation: Negative screen for possible drug use disorder    Brief Intervention  Alcohol & drug use screenings were reviewed  No concerns regarding substance use disorder identified  Other Counseling Topics:   Regular weightbearing exercise  No results found  Physical Exam:     /62   Pulse 79   Ht 6' 1\" (1 854 m)   Wt 108 kg (239 lb)   SpO2 96%   BMI 31 53 kg/m²     Physical Exam  Constitutional:       General: He is not in acute distress  Appearance: He is well-developed   He is not " toxic-appearing  HENT:      Head: Normocephalic  Right Ear: Tympanic membrane normal  There is no impacted cerumen  Left Ear: Tympanic membrane normal  There is no impacted cerumen  Eyes:      Conjunctiva/sclera: Conjunctivae normal    Cardiovascular:      Rate and Rhythm: Normal rate and regular rhythm  Heart sounds: No murmur heard  Pulmonary:      Effort: No respiratory distress  Breath sounds: No wheezing or rales  Abdominal:      General: Bowel sounds are normal       Palpations: Abdomen is soft  Tenderness: There is no abdominal tenderness  Musculoskeletal:         General: No tenderness  Cervical back: Neck supple  Skin:     General: Skin is warm and dry  Neurological:      Mental Status: He is alert and oriented to person, place, and time  Cranial Nerves: No cranial nerve deficit  Motor: No weakness        Gait: Gait abnormal    Psychiatric:         Mood and Affect: Mood normal           Tyrone Her MD

## 2023-05-18 NOTE — ASSESSMENT & PLAN NOTE
Pt experiences severe burning sensation of the left heel area only at night interfering with sleep resolving with getting up and walking  Pt has decreased sensation to light touch of the left sole on exam  Skin is intact, heel is not tender, tibialis posterior pulse is well palpable  Doubt plantar fasciitis  Suspect paresthesias are a manifestation of neuropathy  Will try gabapentin and pt will call in a week

## 2023-05-23 ENCOUNTER — OFFICE VISIT (OUTPATIENT)
Dept: SPEECH THERAPY | Facility: CLINIC | Age: 78
End: 2023-05-23

## 2023-05-23 ENCOUNTER — OFFICE VISIT (OUTPATIENT)
Dept: PHYSICAL THERAPY | Facility: CLINIC | Age: 78
End: 2023-05-23

## 2023-05-23 DIAGNOSIS — R41.841 COGNITIVE COMMUNICATION DEFICIT: ICD-10-CM

## 2023-05-23 DIAGNOSIS — G20 PRIMARY PARKINSONISM (HCC): Primary | ICD-10-CM

## 2023-05-23 DIAGNOSIS — R26.9 NEUROLOGIC GAIT DYSFUNCTION: ICD-10-CM

## 2023-05-23 DIAGNOSIS — R13.12 OROPHARYNGEAL DYSPHAGIA: ICD-10-CM

## 2023-05-23 DIAGNOSIS — R49.0 DYSPHONIA: ICD-10-CM

## 2023-05-23 NOTE — PROGRESS NOTES
Daily Note     Today's date: 2023  Patient name: Jazmine Overton  : 1945  MRN: [id number]  Referring provider: Monserrat Mora MD  Dx:   Encounter Diagnosis     ICD-10-CM    1  Primary parkinsonism ([address] )  G20       2  Neurologic gait dysfunction  R26 9           Start Time: 0145  Stop Time: 0230  Total time in clinic (min): 45 minutes    Subjective:   Good report from his PCP  He received new medication for neuropathy, Gabapentin, with no present changes to date  Objective: See treatment diary below      Assessment:  Initiated session in SOLO step with focus on increased stride length, extended trunk extension and rotation in gait  Difficulty with rotation to right, after ball pass from behind improving rotation noted  Forward flexed position continues to be noted, discussed at length prolonged positioning at home in recliner  Recommended increasing seat height and use of lumbar roll to increase extended position with good demonstration  Hands in back pockets with nice gains in extension noted  Plan: Continue per plan of care  Progress treatment as tolerated  No complaints post treatment  Continue to progress balance and gait challenges with focus on postural correction       Precautions: h/o tinnitus, h/o PD, h/o peripheral neuropathy,h/o HTN, B/L TKR  Re-eval Date: 2023    Date    Visit Count 17 18 19 20 21   FOTO        Pain In        Pain Out                Testing        MiniBest        TUG/TUGC        5xSTS        Gait speed        FGA        2/6MWT        Neuro Re-Ed        Segmental rolling with overpressure         Dynamic balance Hula hoop turns 2 laps x3 solo    Hurdles 4 laps x3 solo    Fwd/bwd shuttle 4 dots AR :24s solo    6lb goblet 40s x2 6' step solo Trial foam beam lat step x1    Bolster OH fwd/bwd 6 laps ea solo    Step up 6' OH bolster 1min x3 solo   HT amb solo 15ft x6 fwd/bwd    15lb suitcase carry fwd 4 laps, 8 laps bwd    Foam "behind back solo 8 laps  Fwd/bwd foam OH solo 2 x2 laps dot shuttle run    Hurdles + lat dot tap 12 laps     Lat step + 7lb med ball toss 8 laps total solo FWD SOLO laps with rapid turns,& backward stepping- 6 laps;  Hurdles 6 laps foot over foot with focus on increased stride; Ball pass from behind in gait to increase trunk rotation 6 laps ea; Ambulation in solo with trunk extension, UE supported posteriorly 6 laps   Static balance   EC FT 30\" x2 7lb med ball rotation solo x14 Ball pass from behind solo 10x   Obstacle course  Narrow base + hurdles lat step 6 laps, fwd 10 laps, 4 laps w bolster carry fwd Foam + uneven folded mat \"dont touch ground\" 12 laps solo     High amplitude movement        Resisted walking/balance  Red/blue TB solo 10 laps 6 laps red/blue TB solo      Supine to sidelying to sit        Standing reach cone to wall/floor; repeated with thoracic rotation         Ther Ex        SLR x 4 Donkey kick over high mat 2x8 3' hold       Postural education     VM   HR/TR        Mini Squats 3x8 UE OH       Step ups        Mod bird dog        Supine LTR        L/s flexion        Bridge        Quadruped thread needle        Rows        Hamstring seated stretch   45\" x2 ea  30\" x3ea   Iliopsoas stretch supine        Trial of Elliptical    2min CGA x1 on/off >40rpm     TM for endurance  2 0mph 1UE 8min total ball kick cue  8min 1  7mph 2UE cue for kicking plastic + min cue for posture    Gastroc str        Ther Activity        HEP        Bed mobility        Gait Training        Narrow spaces/busyenv        External cues        Stairs        Modalities                                   "

## 2023-05-23 NOTE — PROGRESS NOTES
Daily Speech Treatment Note    Today's date: 2023  Patient’s name: Loyola Holter  : 1945  MRN: [id number]  Safety measures:   Referring provider: Yulissa Maynard MD    Encounter Diagnosis     ICD-10-CM    1  Primary parkinsonism ([address] )  G20       2  Cognitive communication deficit  R41 841       3  Dysphonia  R49 0       4  Oropharyngeal dysphagia  R13 12         Visit trackin    Subjective/Behavioral:  - Patient reports continual difficulty with sleeping secondary to pain left foot secondary to Peripheral Neuropathy  Patient has started Gabapentin - on low dose currently  Patient has completed 2 5 weeks of EMST and reports that it's going well  Objective/Assessment:  Completed structured activities with patient to target goals below  Results as stated below  WORK ON WORD RETRIEVAL AND MEMORY  Goals    Short Term Goals:       Patient will complete pharyngeal exercises to increase swallow efficiency and decrease risk of aspiration        Patient will independently utilize compensatory strategies to decrease risk of aspiration (liquid wash, slow rate, small bites /sips)  : EDUCATED ON REFLUX, PROVIDED HANDOUT      Patient will express understanding of ways to compensate and maximize overall cognition and memory  : REVIEWED RBANS RESULTS, PROVIDED EDUCATION ON WORD RETRIEVAL AND WILL REVIEW MEMORY STRATEGIES NEXT SESSION      Patient will achieve 80% accuracy or better with use of min cues to maximize visual and verbal memory via cognitive retraining      Patient will complete SPEAK OUT! Exercises as appropriate      LONG TERM GOALS:     Patient will tolerate least restrictive diet (regular /thins) with minimized risk of aspiration  CONTINUE GOAL      Patient will complete cognitive retraining and express ideas of how to maximize memory & cognition              Plan:  -Continue with current plan of care  IOPI, OME's, EMST, pharyngeal exercises per plan

## 2023-05-25 ENCOUNTER — OFFICE VISIT (OUTPATIENT)
Dept: PHYSICAL THERAPY | Facility: CLINIC | Age: 78
End: 2023-05-25

## 2023-05-25 ENCOUNTER — OFFICE VISIT (OUTPATIENT)
Dept: SPEECH THERAPY | Facility: CLINIC | Age: 78
End: 2023-05-25

## 2023-05-25 DIAGNOSIS — G20 PRIMARY PARKINSONISM (HCC): Primary | ICD-10-CM

## 2023-05-25 DIAGNOSIS — R26.9 NEUROLOGIC GAIT DYSFUNCTION: ICD-10-CM

## 2023-05-25 DIAGNOSIS — R41.841 COGNITIVE COMMUNICATION DEFICIT: ICD-10-CM

## 2023-05-25 DIAGNOSIS — R48.8 OTHER SYMBOLIC DYSFUNCTIONS: ICD-10-CM

## 2023-05-25 DIAGNOSIS — R49.0 DYSPHONIA: ICD-10-CM

## 2023-05-25 NOTE — PROGRESS NOTES
Daily Speech Treatment Note    Today's date: 2023  Patient’s name: Jenny Salinas  : 1945  MRN: [id number]  Safety measures:   Referring provider: Lokesh Quan MD    Encounter Diagnosis     ICD-10-CM    1  Primary parkinsonism ([address] )  G20       2  Cognitive communication deficit  R41 841       3  Dysphonia  R49 0       4  Other symbolic dysfunctions  W47 9           Visit trackin    Subjective/Behavioral:  -  Pleasant/Cooperative  Objective/Assessment:  Completed structured activities with patient to target goals below  Results as stated below  Educated on memory strategies today  Patient interested in [employer]  Plan to educate on apps/games/workbooks to maximize cognition and complete word finding and cognitive drills in future sessions  Goals    Short Term Goals:       Patient will complete pharyngeal exercises to increase swallow efficiency and decrease risk of aspiration        Patient will independently utilize compensatory strategies to decrease risk of aspiration (liquid wash, slow rate, small bites /sips)  : EDUCATED ON REFLUX, PROVIDED HANDOUT      Patient will express understanding of ways to compensate and maximize overall cognition and memory  : REVIEWED RBANS RESULTS, PROVIDED EDUCATION ON WORD RETRIEVAL AND WILL REVIEW MEMORY STRATEGIES NEXT SESSION  : Educated on memory strategies, patient interested in trying nameorize linda and will be actively using association and out loud strategies        Patient will achieve 80% accuracy or better with use of min cues to maximize visual and verbal memory via cognitive retraining       Patient will complete SPEAK OUT! Exercises as appropriate      LONG TERM GOALS:     Patient will tolerate least restrictive diet (regular /thins) with minimized risk of aspiration   CONTINUE GOAL      Patient will complete cognitive retraining and express ideas of how to maximize memory & cognition              Plan:  -Continue with current plan of care  IOPI, OME's, EMST, pharyngeal exercises per plan

## 2023-05-25 NOTE — PROGRESS NOTES
Daily Note     Today's date: 2023  Patient name: Lisbet Cruz  : 1945  MRN: [id number]  Referring provider: Tisha Gutierres MD  Dx:   Encounter Diagnosis     ICD-10-CM    1  Primary parkinsonism ([address] )  G20       2  Neurologic gait dysfunction  R26 9           Start Time: 1100  Stop Time: 1145  Total time in clinic (min): 45 minutes    Subjective:   Patient states he has not been sleeping well with present neuropathy waking him  He does not feel the medication is helping and has plans to contact the MD to discuss further  Objective: See treatment diary below      Assessment:   Encouraged thoracic rotation with improved gait with ball pass from behind  Thoracic twist with improved stability  Weight shift A/P on board with nice gains in extension B/L knees  Increased iliopsoas tightness continues to contribute to forward flexion, assess prone lying with increased lumbar tightness and soreness  Cheryle Stank test attempted, but per report, he has difficulty performing at home due to soft sides of his bed  Instructed in lunge stretch on steps with good tolerance and stability, encouraged to perform at home with good demonstration and voiced understanding  Plan: Continue per plan of care  Progress treatment as tolerated  No complaints post treatment  Continue to progress balance and gait challenges with focus on postural correction  Assess tolerance to new HEP      Precautions: h/o tinnitus, h/o PD, h/o peripheral neuropathy,h/o HTN, B/L TKR  Re-eval Date: 2023    Date    Visit Count 18 19 20 21 22   FOTO        Pain In        Pain Out                Testing       MiniBest        TUG/TUGC        5xSTS        Gait speed        FGA        2/6MWT        Neuro Re-Ed        Segmental rolling with overpressure         Dynamic balance Trial foam beam lat step x1    Bolster OH fwd/bwd 6 laps ea solo    Step up 6' OH bolster 1min x3 solo   HT amb solo 15ft x6 "fwd/bwd    15lb suitcase carry fwd 4 laps, 8 laps bwd    Foam behind back solo 8 laps  Fwd/bwd foam OH solo 2 x2 laps dot shuttle run    Hurdles + lat dot tap 12 laps     Lat step + 7lb med ball toss 8 laps total solo FWD SOLO laps with rapid turns,& backward stepping- 6 laps;  Hurdles 6 laps foot over foot with focus on increased stride; Ball pass from behind in gait to increase trunk rotation 6 laps ea; Ambulation in solo with trunk extension, UE supported posteriorly 6 laps SOLO- thoracic twist 10x2, ball pass from behind static 10x2, in gait 4 laps;  Standing on balance board, A/P transition 10x2 in bars   Static balance  EC FT 30\" x2 7lb med ball rotation solo x14 Ball pass from behind solo 10x    Obstacle course Narrow base + hurdles lat step 6 laps, fwd 10 laps, 4 laps w bolster carry fwd Foam + uneven folded mat \"dont touch ground\" 12 laps solo      High amplitude movement        Resisted walking/balance Red/blue TB solo 10 laps 6 laps red/blue TB solo       Supine to sidelying to sit        Standing reach cone to wall/floor; repeated with thoracic rotation         Ther Ex        SLR x 4        Postural education    VM    HR/TR        Mini Squats        Step ups        Mod bird dog        Supine LTR        L/s flexion        Bridge        Quadruped thread needle        Rows        Hamstring seated stretch  45\" x2 ea  30\" x3ea    Iliopsoas stretch supine        Trial of Elliptical   2min CGA x1 on/off >40rpm      TM for endurance 2 0mph 1UE 8min total ball kick cue  8min 1  7mph 2UE cue for kicking plastic + min cue for posture     Prone lying     2'   Thad test stretch     10\" x3ea   Stair lunge stretch     20\" x4ea   Gastroc str        Ther Activity        HEP        Bed mobility        Gait Training        Narrow spaces/busyenv        External cues        Stairs        Modalities                                   "

## 2023-05-30 DIAGNOSIS — I10 ESSENTIAL HYPERTENSION: ICD-10-CM

## 2023-05-30 RX ORDER — METOPROLOL SUCCINATE 50 MG/1
TABLET, EXTENDED RELEASE ORAL
Qty: 90 TABLET | Refills: 1 | Status: SHIPPED | OUTPATIENT
Start: 2023-05-30

## 2023-05-31 ENCOUNTER — OFFICE VISIT (OUTPATIENT)
Dept: PHYSICAL THERAPY | Facility: CLINIC | Age: 78
End: 2023-05-31

## 2023-05-31 ENCOUNTER — OFFICE VISIT (OUTPATIENT)
Dept: SPEECH THERAPY | Facility: CLINIC | Age: 78
End: 2023-05-31

## 2023-05-31 ENCOUNTER — TELEPHONE (OUTPATIENT)
Dept: FAMILY MEDICINE CLINIC | Facility: HOSPITAL | Age: 78
End: 2023-05-31

## 2023-05-31 DIAGNOSIS — R26.9 NEUROLOGIC GAIT DYSFUNCTION: ICD-10-CM

## 2023-05-31 DIAGNOSIS — R13.12 OROPHARYNGEAL DYSPHAGIA: ICD-10-CM

## 2023-05-31 DIAGNOSIS — R41.841 COGNITIVE COMMUNICATION DEFICIT: ICD-10-CM

## 2023-05-31 DIAGNOSIS — R49.0 DYSPHONIA: ICD-10-CM

## 2023-05-31 DIAGNOSIS — G20 PRIMARY PARKINSONISM (HCC): Primary | ICD-10-CM

## 2023-05-31 NOTE — PROGRESS NOTES
"Daily Note     Today's date: 2023  Patient name: Otis Bryson  : 1945  MRN: [id number]  Referring provider: Justin Smith MD  Dx:   Encounter Diagnosis     ICD-10-CM    1  Primary parkinsonism ([address] )  G20       2  Neurologic gait dysfunction  R26 9                      Subjective: Continues to have difficulty w/ sleeping due to neuropathy, has recently started gabapentin but current dose does not last through the whole night  Advised to contact prescribing doctor  Objective: See treatment diary below      Assessment: Tolerated treatment well  Pt w/ requests for handouts for hip flexor stretches; reports he hasn't been completing because he can't remember how to \"get it set up\"  Following cardiovascular training on TM, reviewed stretches and provided handout for HEP  Balance tasks completed at end of session w/ greatest difficulty w/ rockerboard  Patient demonstrated fatigue post treatment, exhibited good technique with therapeutic exercises and would benefit from continued PT      Plan: Continue per plan of care        Precautions: h/o tinnitus, h/o PD, h/o peripheral neuropathy,h/o HTN, B/L TKR  Re-eval Date: 2023    Date    Visit Count 16 17 18 19 20 21   FOTO See IE        Pain In See IE        Pain Out               Testing       MiniBest         TUG/TUGC         5xSTS         Gait speed         FGA         2/6MWT         Neuro Re-Ed         Segmental rolling with overpressure          Dynamic balance Trial foam beam lat step x1    Bolster OH fwd/bwd 6 laps ea solo    Step up 6' OH bolster 1min x3 solo   HT amb solo 15ft x6 fwd/bwd    15lb suitcase carry fwd 4 laps, 8 laps bwd    Foam behind back solo 8 laps  Fwd/bwd foam OH solo 2 x2 laps dot shuttle run    Hurdles + lat dot tap 12 laps     Lat step + 7lb med ball toss 8 laps total solo FWD SOLO laps with rapid turns,& backward stepping- 6 laps;  Hurdles 6 laps foot over foot with focus on " "increased stride; Ball pass from behind in gait to increase trunk rotation 6 laps ea; Ambulation in solo with trunk extension, UE supported posteriorly 6 laps SOLO- thoracic twist 10x2, ball pass from behind static 10x2, in gait 4 laps;  Standing on balance board, A/P transition 10x2 in bars thoracic twist 10x2, ball pass from behind static 10x2     Static balance  EC FT 30\" x2 7lb med ball rotation solo x14 Ball pass from behind solo 10x  FT EC on foam 2x30\"     Rockerboard A/P and lateral 2x30\" ea    Obstacle course Narrow base + hurdles lat step 6 laps, fwd 10 laps, 4 laps w bolster carry fwd Foam + uneven folded mat \"dont touch ground\" 12 laps solo       High amplitude movement         Resisted walking/balance Red/blue TB solo 10 laps 6 laps red/blue TB solo        Supine to sidelying to sit         Standing reach cone to wall/floor; repeated with thoracic rotation          Ther Ex         SLR x 4         Postural education    VM     HR/TR         Mini Squats         Step ups         Mod bird dog         Supine LTR         Cervical rot SNAGs      15\"x5 HEP   L/s flexion         Bridge         Quadruped thread needle         Rows         Hamstring seated stretch  45\" x2 ea  30\" x3ea  Standing @ step 3x30\" HEP   Iliopsoas stretch supine         Trial of Elliptical   2min CGA x1 on/off >40rpm       TM for endurance 2 0mph 1UE 8min total ball kick cue  8min 1  7mph 2UE cue for kicking plastic + min cue for posture   9min 1  7mph 2UE cue for kicking plastic + min cue for posture   Prone lying     2'    Thad test stretch     10\" x3ea    Stair lunge stretch     20\" x4ea 3x30\" B HEP   Gastroc str         Ther Activity         HEP         Bed mobility         Gait Training         Narrow spaces/busyenv         External cues         Stairs         Modalities                                      "

## 2023-05-31 NOTE — PROGRESS NOTES
Daily Speech Treatment Note    Today's date: 2023  Patient’s name: Onesimo Aldrich  : 1945  MRN: [id number]  Safety measures:   Referring provider: Jono Cramer MD    Encounter Diagnosis     ICD-10-CM    1  Primary parkinsonism ([address] )  G20       2  Cognitive communication deficit  R41 841       3  Dysphonia  R49 0       4  Oropharyngeal dysphagia  R13 12           Visit trackin    Subjective/Behavioral:  -  Pleasant/Cooperative  Objective/Assessment:  Completed structured activities with patient to target goals below  Results as stated below  Patient noted great decrease in legibility in hand writing, encouraged OT evaluation, will put in referral      Plan to work on memory exercises next session  Goals    Short Term Goals:       Patient will complete pharyngeal exercises to increase swallow efficiency and decrease risk of aspiration   : Educated on need to elicit volitional swallow of saliva during the day more to increase saliva management  Patient with x 3 coughing episodes due to tickle in throat erupting to coughing  Provided education  : Completed IOPI tongue exercises: 45 anterior tip, 33 middle/posterior, 25 right lip, 23 left lip  Completed exercises anterior lip at 10 x 34 target and 10 x 36 target, mid tongue at 20 x 26 & right lip x 16 target x 10, left lip 15 target x 10      Patient will independently utilize compensatory strategies to decrease risk of aspiration (liquid wash, slow rate, small bites /sips)  : EDUCATED ON REFLUX, PROVIDED HANDOUT      Patient will express understanding of ways to compensate and maximize overall cognition and memory  : REVIEWED RBANS RESULTS, PROVIDED EDUCATION ON WORD RETRIEVAL AND WILL REVIEW MEMORY STRATEGIES NEXT SESSION  : Educated on memory strategies, patient interested in trying nameorize linda and will be actively using association and out loud strategies    : Reviewed apps/education to maximize memory, timers        Patient will achieve 80% accuracy or better with use of min cues to maximize visual and verbal memory via cognitive retraining       Patient will complete SPEAK OUT! Exercises as appropriate      LONG TERM GOALS:     Patient will tolerate least restrictive diet (regular /thins) with minimized risk of aspiration  CONTINUE GOAL      Patient will complete cognitive retraining and express ideas of how to maximize memory & cognition              Plan:  -Continue with current plan of care  IOPI, OME's, EMST, pharyngeal exercises per plan

## 2023-06-02 ENCOUNTER — OFFICE VISIT (OUTPATIENT)
Dept: SPEECH THERAPY | Facility: CLINIC | Age: 78
End: 2023-06-02

## 2023-06-02 ENCOUNTER — OFFICE VISIT (OUTPATIENT)
Dept: PHYSICAL THERAPY | Facility: CLINIC | Age: 78
End: 2023-06-02

## 2023-06-02 DIAGNOSIS — R26.9 NEUROLOGIC GAIT DYSFUNCTION: ICD-10-CM

## 2023-06-02 DIAGNOSIS — R49.0 DYSPHONIA: ICD-10-CM

## 2023-06-02 DIAGNOSIS — R41.841 COGNITIVE COMMUNICATION DEFICIT: ICD-10-CM

## 2023-06-02 DIAGNOSIS — G20 PRIMARY PARKINSONISM (HCC): Primary | ICD-10-CM

## 2023-06-02 DIAGNOSIS — R48.8 OTHER SYMBOLIC DYSFUNCTIONS: ICD-10-CM

## 2023-06-02 NOTE — PROGRESS NOTES
Daily Speech Treatment Note    Today's date: 2023  Patient’s name: Beto Shine  : 1945  MRN: [id number]  Safety measures:   Referring provider: Tacy Cabot, MD    Encounter Diagnosis     ICD-10-CM    1  Primary parkinsonism ([address] )  G20       2  Cognitive communication deficit  R41 841       3  Dysphonia  R49 0       4  Other symbolic dysfunctions  L50 6           Visit trackin    Subjective/Behavioral:  -  Pleasant/Cooperative  Objective/Assessment:  Completed structured activities with patient to target goals below  Results as stated below  Patient noted great decrease in legibility in hand writing, encouraged OT evaluation, will put in referral      Cooperative with cognitive tasks / memory tasks  Patient feels satisfied that activities such as coding and addition/subtraction puzzles working on executive functioning            Goals    Short Term Goals:       Patient will complete pharyngeal exercises to increase swallow efficiency and decrease risk of aspiration   : Educated on need to elicit volitional swallow of saliva during the day more to increase saliva management  Patient with x 3 coughing episodes due to tickle in throat erupting to coughing  Provided education  : Completed IOPI tongue exercises: 45 anterior tip, 33 middle/posterior, 25 right lip, 23 left lip  Completed exercises anterior lip at 10 x 34 target and 10 x 36 target, mid tongue at 20 x 26 & right lip x 16 target x 10, left lip 15 target x 10      Patient will independently utilize compensatory strategies to decrease risk of aspiration (liquid wash, slow rate, small bites /sips)  : EDUCATED ON REFLUX, PROVIDED HANDOUT      Patient will express understanding of ways to compensate and maximize overall cognition and memory  : REVIEWED RBANS RESULTS, PROVIDED EDUCATION ON WORD RETRIEVAL AND WILL REVIEW MEMORY STRATEGIES NEXT SESSION    : Educated on memory strategies, patient interested in trying nameorize linda and will be actively using association and out loud strategies  5/31: Reviewed apps/education to maximize memory, timers        Patient will achieve 80% accuracy or better with use of min cues to maximize visual and verbal memory via cognitive retraining  6/2: Hearing 4-6 words and putting into pairs  Also, formulation of sentences with 2-4 words, and recalling words: 100% achievement with words  , 70% accuracy in part/whole coding, Completed 2 full lines / bubbles with math coding at 1 minute 11 seconds with errors x 2        Patient will complete SPEAK OUT! Exercises as appropriate      LONG TERM GOALS:     Patient will tolerate least restrictive diet (regular /thins) with minimized risk of aspiration  CONTINUE GOAL      Patient will complete cognitive retraining and express ideas of how to maximize memory & cognition              Plan:  -Continue with current plan of care  IOPI, OME's, EMST, pharyngeal exercises per plan  Executive functioning and memory tasks with and without distraction

## 2023-06-02 NOTE — PROGRESS NOTES
"PT Re-evaluation    Today's date: 2023  Patient name: Jeremie Jules  : 1945  MRN: [id number]  Referring provider: Zuleika Don MD  Dx:   Encounter Diagnosis     ICD-10-CM    1  Primary parkinsonism ([address] )  G20       2  Neurologic gait dysfunction  R26 9           Start Time: 7558  Stop Time: 0849  Total time in clinic (min): 51 minutes    Subjective: 23: \"I think things are going relatively well  The endurance is building up  I walk the treadmill at home, I can get up to 3 laps and 3/4 of a mile which is good between 1 5-2 0 mph  Goal is to be able to be able to get up to a full mile  That is proceeding well  I've walked several laps several times  Slowly but surely endurance is getting better  Balance is another thing, I have considerable difficulties walking in a straight line without UE support  I am veering and walking off the path  I have a fear of falling  For example, I attend some sporting events, sitting in the stadium seats where I have to walk down without handrails  I have a hard time walking down that, I need someone around holding my hand to give me the stability  : Still having intermittent back/side pain on R side  Does not notice any triggers  Wants to see urology  Nothing in PT has negatively affected it  Balance still an issue  Feels like therapy working on the right things  Feels like endurance is the attribute most improved  3/30: Pt notes he has steadier balance but not where he wants it to be  He is on the treadmill every day and performing strengthening exercises  Feels like he is working on the right things so far       23: \"Present at PT: Trouble getting in/out of bed  Noticed about 3/4 months his balance become off - SPC use but feels like his balance has gotten slightly better  Lightheaded when getting in/out of bed  Difficulty getting in/out of bed due to mobility issues  Sometimes doesn't feel secure  SPC always in car in case   Sometimes some difficulty " "getting in/out of car  \"    Objective: See treatment diary below     Balance Test 2/23 3/30 4/25 6/2/23   6 Minute Walk Test (ft): NV   1008ft 1141 ft    MiniBest: 19/28 21/28 22/28 23/28    Gait Speed (m/s): 0 99 1 09 0 98 m/s 1 08 m/s    5x Sit To Stand (s): 12 88 08 96 08 09 8 8 s   TUG/TUGC: 09 39 / 11 65 50 > 35 07 16 / 08 87 60 > 39 07 01 / 10 38 5 ice cream flavors 7 2 // 10 18 serial counting from 100 by 6   100 > 70        Assessment: Shana Garcia is a 77year old patient presenting to OPPT for re-evaluation with complaints of decreased balance, endurance, mobility, and strength  This is Justen's 24th visit during this episode that has been focusing on endurance, balance , mobility and gait training  Patient has good subjective improvement of endurance but continues to have high degree of apprehension regarding his balance capabilities and having a fear of falling  Objectively, shows good improvement in endurance aligning with subjective report, improving 133 feet  Patient also has 0 09 m/s improvement in gait speed compared to IE on 2/23, demonstrating good improvement although not to level of 1 2 m/s indicative that patient could cross sidewalk safely  TUG/TUG-C and 5x STS continue to remain steady below fall risk cut offs compared to prior re-evaluations  Without continued skilled PT services, Shana Garcia will continue to have increased truncal rigidity therefore limiting future gait quality with decreased arm swing and trunk/hip dissociation decreasing gait speed further and increasing fall risk  Important to continue building off improvements made thus far in therapy with future focus on dynamic balance and trunk/hip mobility  In 4 weeks, patient will:  1  Demonstrate ability to perform 20 minutes of activity without requiring seated rest break  - met  2    Demonstrate ability to maintain upright balance unsupported by UEs while reaching above shoulder height without resistance to promote stability with ADLs - " met  3  Improve 5xSTS by at least 3 seconds to show improved LE strength for stability during transfers - met  4  Demonstrate ability to ambulate through doorway/threshold without verbal cues or change in gait quality >80% of occasions - met  5  Demonstrate ability to roll bilaterally in <7 seconds without use of assistive equipment - met    In 4-10 weeks, patient will:  1  Meet FOTO predicted score to demonstrate improvement in functional mobility  2   Demonstrate consistent carryover with HEP and walking program  - progressing  3  Improve gait speed by at least  0 12 m/s to meet MCID value for older adults and reduce fall risk  - NEARLY MET   4  Improve 6MWT by at least 100ft to demonstrate improvement in ability in community ambulation  - MET   5  Improve FGA by at least 4 points to meet MDC value for PwPD and show improved balance  - nearly met  6  Improve ABC by at least 16% to meet  reduced risk of falling and improved balance confidence  - progressing  7  Demonstrate ability to ambulate forward holding item requiring bilateral UEs without LOB or change in gait quality - met  8  Report walking a mile in his neighborhood with mild-mod fatigue  - met  9  Report improvement in ability to get in/out of bed  - met  10  Improve MiniBest by at least 4 points to meet MCID value for PwPD and demonstrate improved safety - MET     NEW GOALS as of 6/2/23 (8 weeks):   1  Improve 6WMT by 100 ft indicative of improved endurance  2  Improve MIniBest greater than or equal to 26/28 indicative of improved balance and righting reactions   3  Patient will be independent with correct performance of home exercise and mobility   4  Improve gait speed to > 1 2 m/s to be able to safely cross sidewalk     Plan: Continue per plan of care  Progress treatment as tolerated    Continue POC 2x/week with focus on balance, strengthening, and mobility for 8 weeks to continue to build on progress made during this episode as well as "to improve overall balance and mobility confidence  Precautions: h/o tinnitus, h/o PD, h/o peripheral neuropathy,h/o HTN, B/L TKR  Re-eval Date: 5/25/2023    Date 5/4 5/9 5/11 5/16 5/18 5/31   Visit Count 16 17 18 19 20 21   FOTO See IE        Pain In See IE        Pain Out           New Establish POC: 6/2/23   STG Expiration (RE-eval/progress note date): 7/2/23  POC expiration: 8/2/23     Testing 6/2 5/23 5/25 5/31   MiniBest         TUG/TUGC         5xSTS         Gait speed         FGA         2/6MWT         Neuro Re-Ed Functional Outcome measures see above        Segmental rolling with overpressure          Dynamic balance   On foam -> static boomwhacker hold thoracic twist 20x Leon Amira //bar    Thoracic ext bhoomwacker 2x10 on foam //bar     HT amb solo 15ft x6 fwd/bwd    15lb suitcase carry fwd 4 laps, 8 laps bwd    Foam behind back solo 8 laps  Fwd/bwd foam OH solo 2 x2 laps dot shuttle run    Hurdles + lat dot tap 12 laps     Lat step + 7lb med ball toss 8 laps total solo FWD SOLO laps with rapid turns,& backward stepping- 6 laps;  Hurdles 6 laps foot over foot with focus on increased stride; Ball pass from behind in gait to increase trunk rotation 6 laps ea;   Ambulation in solo with trunk extension, UE supported posteriorly 6 laps SOLO- thoracic twist 10x2, ball pass from behind static 10x2, in gait 4 laps;  Standing on balance board, A/P transition 10x2 in bars thoracic twist 10x2, ball pass from behind static 10x2     Static balance FT EC on foam 2x30\"  EC FT 30\" x2 7lb med ball rotation solo x14 Ball pass from behind solo 10x  FT EC on foam 2x30\"     Rockerboard A/P and lateral 2x30\" ea    Obstacle course  Foam + uneven folded mat \"dont touch ground\" 12 laps solo       High amplitude movement         Resisted walking/balance  6 laps red/blue TB solo        Supine to sidelying to sit         Standing reach cone to wall/floor; repeated with thoracic rotation          Ther Ex         SLR x 4       " "  Postural education    VM     HR/TR         Mini Squats         Step ups         Mod bird dog         Supine LTR         Cervical rot SNAGs      15\"x5 HEP   L/s flexion         Bridge         Quadruped thread needle         Rows         Hamstring seated stretch Standing @ step 3x30\" ea    Hip flexor stretch standing @ step 5x20\" ea 45\" x2 ea  30\" x3ea  Standing @ step 3x30\" HEP   Iliopsoas stretch supine         Trial of Elliptical   2min CGA x1 on/off >40rpm       TM for endurance 8 min 1 7 mph  BUE  Cuing for increased step length, smoothness of gait   8min 1  7mph 2UE cue for kicking plastic + min cue for posture   9min 1  7mph 2UE cue for kicking plastic + min cue for posture   Prone lying     2'    Thad test stretch     10\" x3ea    Stair lunge stretch     20\" x4ea 3x30\" B HEP   Gastroc str         Ther Activity         HEP         Bed mobility         Gait Training         Narrow spaces/busyenv         External cues         Stairs         Modalities                                           "

## 2023-06-05 ENCOUNTER — OFFICE VISIT (OUTPATIENT)
Dept: PHYSICAL THERAPY | Facility: CLINIC | Age: 78
End: 2023-06-05
Payer: MEDICARE

## 2023-06-05 ENCOUNTER — OFFICE VISIT (OUTPATIENT)
Dept: SPEECH THERAPY | Facility: CLINIC | Age: 78
End: 2023-06-05
Payer: MEDICARE

## 2023-06-05 DIAGNOSIS — R13.12 OROPHARYNGEAL DYSPHAGIA: ICD-10-CM

## 2023-06-05 DIAGNOSIS — G20 PRIMARY PARKINSONISM (HCC): Primary | ICD-10-CM

## 2023-06-05 DIAGNOSIS — R49.0 DYSPHONIA: ICD-10-CM

## 2023-06-05 DIAGNOSIS — R41.841 COGNITIVE COMMUNICATION DEFICIT: ICD-10-CM

## 2023-06-05 DIAGNOSIS — R26.9 NEUROLOGIC GAIT DYSFUNCTION: ICD-10-CM

## 2023-06-05 PROCEDURE — 92507 TX SP LANG VOICE COMM INDIV: CPT

## 2023-06-05 PROCEDURE — 97110 THERAPEUTIC EXERCISES: CPT

## 2023-06-05 PROCEDURE — 97112 NEUROMUSCULAR REEDUCATION: CPT

## 2023-06-05 NOTE — PROGRESS NOTES
Daily Note     Today's date: 2023  Patient name: Ross De Los Santos  : 1945  MRN: [id number]  Referring provider: Carolina Thurston MD  Dx:   Encounter Diagnosis     ICD-10-CM    1  Primary parkinsonism ([address] )  G20       2  Neurologic gait dysfunction  R26 9           Start Time: 014  Stop Time: 0230  Total time in clinic (min): 45 minutes    Subjective:   Patient states he has noticed improved time on the treadmill with 20 min with general ease  He has noticed continued imbalance  No falls noted  Objective: See treatment diary below      Assessment:   Stepping with alternating LE/UE with intermittent difficulty, improving with repetition  Hurdles in [place] with improved stride length and clearance  Step up with turns with intermittent cuing to march for improved clearance  Cuing for arm swing to increase thoracic motion  Nice gains overall with improved extension  Patient has been performing LE strengthening at home with UE support  Due to instability with unilateral stance, patient educated in hover exercises at kitchen sink to allow for improved unilateral stability  Patient demonstrates well and voiced understanding  Plan: Continue per plan of care  Assess tolerance to HEP, continue to progress stepping and turning challenges       Precautions: h/o tinnitus, h/o PD, h/o peripheral neuropathy,h/o HTN, B/L TKR  Re-eval Date: 2023      Date    Visit Count 18 19 20 21 22   FOTO        Pain In        Pain Out              Testing     MiniBest        TUG/TUGC        5xSTS        Gait speed        FGA        2/6MWT        Neuro Re-Ed        Segmental rolling with overpressure         Dynamic balance Fwd/bwd foam OH solo 2 x2 laps dot shuttle run    Hurdles + lat dot tap 12 laps     Lat step + 7lb med ball toss 8 laps total solo FWD SOLO laps with rapid turns,& backward stepping- 6 laps;  Hurdles 6 laps foot over foot with focus on increased "stride; Ball pass from behind in gait to increase trunk rotation 6 laps ea; Ambulation in solo with trunk extension, UE supported posteriorly 6 laps SOLO- thoracic twist 10x2, ball pass from behind static 10x2, in gait 4 laps;  Standing on balance board, A/P transition 10x2 in bars thoracic twist 10x2, ball pass from behind static 10x2   Large amplitude stepping alt UE/LE 10ea, sidestepping 10ea, forward stepping 10ea and standing twist 10ea; Hurdles step over step 6 hurdles x6 laps in SOLO; step ups with turns 10x2; Forward/backward stepping with arm swing in solo 6 laps   Static balance 7lb med ball rotation solo x14 Ball pass from behind solo 10x  FT EC on foam 2x30\"     Rockerboard A/P and lateral 2x30\" ea     Obstacle course        High amplitude movement        Resisted walking/balance        Supine to sidelying to sit        Standing reach cone to wall/floor; repeated with thoracic rotation         Ther Ex        SLR x 4        Postural education  VM      HR/TR        Mini Squats        Step ups        Mod bird dog        Supine LTR        Cervical rot SNAGs    15\"x5 HEP    L/s flexion        Bridge        Quadruped thread needle        Rows        Hamstring seated stretch  30\" x3ea  Standing @ step 3x30\" HEP    Iliopsoas stretch supine     Hip flex/abd/extension with hover 10 ea   Trial of Elliptical         TM for endurance 8min 1  7mph 2UE cue for kicking plastic + min cue for posture   9min 1  7mph 2UE cue for kicking plastic + min cue for posture    Prone lying   2'     Thad test stretch   10\" x3ea     Stair lunge stretch   20\" x4ea 3x30\" B HEP    Gastroc str        Ther Activity        HEP        Bed mobility        Gait Training        Narrow spaces/busyenv        External cues        Stairs        Modalities                                   "

## 2023-06-05 NOTE — PROGRESS NOTES
Daily Speech Treatment Note    Today's date: 2023  Patient’s name: Aly Shah  : 1945  MRN: [id number]  Safety measures:   Referring provider: Viki Hanna MD    Encounter Diagnosis     ICD-10-CM    1  Primary parkinsonism ([address] )  G20       2  Cognitive communication deficit  R41 841       3  Dysphonia  R49 0       4  Oropharyngeal dysphagia  R13 12           Visit tracking:  15    Subjective/Behavioral:  -  Pleasant/Cooperative  Objective/Assessment:  Completed structured activities with patient to target goals below  Results as stated below  Cooperative with cognitive tasks / memory tasks  Work on card games, deduction puzzles, etc            Goals    Short Term Goals:       Patient will complete pharyngeal exercises to increase swallow efficiency and decrease risk of aspiration   : Educated on need to elicit volitional swallow of saliva during the day more to increase saliva management  Patient with x 3 coughing episodes due to tickle in throat erupting to coughing  Provided education  : Completed IOPI tongue exercises: 45 anterior tip, 33 middle/posterior, 25 right lip, 23 left lip  Completed exercises anterior lip at 10 x 34 target and 10 x 36 target, mid tongue at 20 x 26 & right lip x 16 target x 10, left lip 15 target x 10      Patient will independently utilize compensatory strategies to decrease risk of aspiration (liquid wash, slow rate, small bites /sips)  : EDUCATED ON REFLUX, PROVIDED HANDOUT      Patient will express understanding of ways to compensate and maximize overall cognition and memory  : REVIEWED RBANS RESULTS, PROVIDED EDUCATION ON WORD RETRIEVAL AND WILL REVIEW MEMORY STRATEGIES NEXT SESSION  : Educated on memory strategies, patient interested in trying nameorize linda and will be actively using association and out loud strategies    : Reviewed apps/education to maximize memory, timers        Patient will achieve 80% accuracy or better with use of min cues to maximize visual and verbal memory via cognitive retraining  6/2: Hearing 4-6 words and putting into pairs  Also, formulation of sentences with 2-4 words, and recalling words: 100% achievement with words  , 70% accuracy in part/whole coding, Completed 2 full lines / bubbles with math coding at 1 minute 11 seconds with errors x 2   6/5: Completed Alternating Attention exercises with patient- completed 2 rows of addition/ subtraction problems, multiple digits: 100% accuracy, out loud / processing, occasional mistakes but patient feels he is at his baseline  Patient with ? Hearing loss, recommended audiological evaluation  Patient noted that he tends to produce test answers first and then recheck  Completed reverse order 3-4 words 100% accuracy and able to organize mental flexibility exercises with 3-4 words with min cues occasionally  Mental opposites with delay- min cues, start with next session      Patient will complete SPEAK OUT! Exercises as appropriate      LONG TERM GOALS:     Patient will tolerate least restrictive diet (regular /thins) with minimized risk of aspiration  CONTINUE GOAL      Patient will complete cognitive retraining and express ideas of how to maximize memory & cognition              Plan:  -Continue with current plan of care  IOPI, OME's, EMST, pharyngeal exercises per plan  Executive functioning and memory tasks with and without distraction

## 2023-06-08 ENCOUNTER — OFFICE VISIT (OUTPATIENT)
Dept: PHYSICAL THERAPY | Facility: CLINIC | Age: 78
End: 2023-06-08
Payer: MEDICARE

## 2023-06-08 ENCOUNTER — OFFICE VISIT (OUTPATIENT)
Dept: SPEECH THERAPY | Facility: CLINIC | Age: 78
End: 2023-06-08
Payer: MEDICARE

## 2023-06-08 DIAGNOSIS — R48.8 OTHER SYMBOLIC DYSFUNCTIONS: ICD-10-CM

## 2023-06-08 DIAGNOSIS — I10 ESSENTIAL HYPERTENSION: ICD-10-CM

## 2023-06-08 DIAGNOSIS — G20 PRIMARY PARKINSONISM (HCC): Primary | ICD-10-CM

## 2023-06-08 DIAGNOSIS — R41.841 COGNITIVE COMMUNICATION DEFICIT: ICD-10-CM

## 2023-06-08 DIAGNOSIS — R26.9 NEUROLOGIC GAIT DYSFUNCTION: ICD-10-CM

## 2023-06-08 DIAGNOSIS — R49.0 DYSPHONIA: ICD-10-CM

## 2023-06-08 PROCEDURE — 97116 GAIT TRAINING THERAPY: CPT

## 2023-06-08 PROCEDURE — 92507 TX SP LANG VOICE COMM INDIV: CPT

## 2023-06-08 PROCEDURE — 97110 THERAPEUTIC EXERCISES: CPT

## 2023-06-08 PROCEDURE — 97112 NEUROMUSCULAR REEDUCATION: CPT

## 2023-06-08 NOTE — PROGRESS NOTES
Daily Speech Treatment Note    Today's date: 2023  Patient’s name: Meredith Fisher  : 1945  MRN: [id number]  Safety measures:   Referring provider: Hussain Taylor MD    Encounter Diagnosis     ICD-10-CM    1  Primary parkinsonism ([address] )  G20       2  Other symbolic dysfunctions  J56 0       3  Cognitive communication deficit  R41 841       4  Dysphonia  R49 0           Visit trackin    Subjective/Behavioral:  -  Pleasant/Cooperative  Objective/Assessment:  Completed structured activities with patient to target goals below  Results as stated below  Cooperative with cognitive tasks / memory tasks  Work on card games, deduction puzzles, etc  Paragraph recall  Educate on activities with word finding, techniques  Goals    Short Term Goals:       Patient will complete pharyngeal exercises to increase swallow efficiency and decrease risk of aspiration   : Educated on need to elicit volitional swallow of saliva during the day more to increase saliva management  Patient with x 3 coughing episodes due to tickle in throat erupting to coughing  Provided education  : Completed IOPI tongue exercises: 45 anterior tip, 33 middle/posterior, 25 right lip, 23 left lip  Completed exercises anterior lip at 10 x 34 target and 10 x 36 target, mid tongue at 20 x 26 & right lip x 16 target x 10, left lip 15 target x 10      Patient will independently utilize compensatory strategies to decrease risk of aspiration (liquid wash, slow rate, small bites /sips)  : EDUCATED ON REFLUX, PROVIDED HANDOUT      Patient will express understanding of ways to compensate and maximize overall cognition and memory  : REVIEWED RBANS RESULTS, PROVIDED EDUCATION ON WORD RETRIEVAL AND WILL REVIEW MEMORY STRATEGIES NEXT SESSION  : Educated on memory strategies, patient interested in trying nameorize linda and will be actively using association and out loud strategies    : Reviewed apps/education to maximize memory, timers        Patient will achieve 80% accuracy or better with use of min cues to maximize visual and verbal memory via cognitive retraining  6/2: Hearing 4-6 words and putting into pairs  Also, formulation of sentences with 2-4 words, and recalling words: 100% achievement with words  , 70% accuracy in part/whole coding, Completed 2 full lines / bubbles with math coding at 1 minute 11 seconds with errors x 2   6/5: Completed Alternating Attention exercises with patient- completed 2 rows of addition/ subtraction problems, multiple digits: 100% accuracy, out loud / processing, occasional mistakes but patient feels he is at his baseline  Patient with ? Hearing loss, recommended audiological evaluation  Patient noted that he tends to produce test answers first and then recheck  Completed reverse order 3-4 words 100% accuracy and able to organize mental flexibility exercises with 3-4 words with min cues occasionally  Mental opposites with delay- min cues, start with next session  6/8: Completed mental opposites with delay exercise, occasional repetition of beginning word when distracted, overall 90% accuracy  Visual recall of images via questions after 3 minutes: 75% accuracy, another visual image via questions after 5 minutes: 90% accuracy, recall of 4-7 pictures- recalled 4 & 5 pictures Indep  7 pictures with slight cue following 5 minutes       Patient will complete SPEAK OUT! Exercises as appropriate      LONG TERM GOALS:     Patient will tolerate least restrictive diet (regular /thins) with minimized risk of aspiration  CONTINUE GOAL      Patient will complete cognitive retraining and express ideas of how to maximize memory & cognition              Plan:  -Continue with current plan of care  IOPI, OME's, EMST, pharyngeal exercises per plan  Executive functioning and memory tasks with and without distraction

## 2023-06-08 NOTE — PROGRESS NOTES
Daily Note     Today's date: 2023  Patient name: Brandy Anderson  : 1945  MRN: [id number]  Referring provider: Loulou Adam MD  Dx:   Encounter Diagnosis     ICD-10-CM    1  Primary parkinsonism ([address] )  G20       2  Neurologic gait dysfunction  R26 9           Start Time: 1500  Stop Time: 1545  Total time in clinic (min): 45 minutes    Subjective:   Pt notes things are going okay  Doing his exercises at home  Got stiches in his upper back due to mole removal   said PT OK, but avoid too much UE movement  Objective: See treatment diary below      Assessment:   Avoidance of UE movement during session due to incision site and stitches in upper back  Shows minimal path deviation with fwd/bwd stepping, some with head movement, requires some upper t/s compensation for ROM  Challenged with fitter board weight shift, LE fatigue building up as duration progressed - sustain ~10s balance/control karely time  Good clearance with some errors present during hurdles  TM progression with more crouched stance and smaller step length, able to recorrect with postural cues and external cue of kicking ball  He will continue to benefit from skilled PT to address his gait and balance  Plan: Continue per plan of care  Assess tolerance to HEP, continue to progress stepping and turning challenges       Precautions: h/o tinnitus, h/o PD, h/o peripheral neuropathy,h/o HTN, B/L TKR  Re-eval Date: 2023      Date        Visit Count 24       FOTO        Pain In        Pain Out              Testing        MiniBest        TUG/TUGC        5xSTS        Gait speed        FGA        2/6MWT        Neuro Re-Ed        Segmental rolling with overpressure         Dynamic balance Med hurdles step through 8 laps    HT gait 20ft x6    Fwd/bwd stepping 20ft x6 ea       Static balance Lat + a/p weight shift 3min ea fitter board       Obstacle course        High amplitude movement        Resisted walking/balance        Supine to "sidelying to sit        Standing reach cone to wall/floor; repeated with thoracic rotation         Ther Ex        SLR x 4 // bar x10 fwd/bwd kicks       Postural education        HR/TR        Mini Squats        Step ups        Mod bird dog        Supine LTR        Cervical rot SNAGs        L/s flexion        Bridge        Quadruped thread needle        Rows        Hamstring seated stretch        Iliopsoas stretch supine Standing 30\" x3       Trial of Elliptical         TM for endurance        Prone lying        Thad test stretch        Stair lunge stretch        Gastroc str        Ther Activity        HEP        Bed mobility        Gait Training        Narrow spaces/busyenv        External cues TM 1  5mph 9min physioball kick       Stairs        Modalities                                   "

## 2023-06-13 ENCOUNTER — OFFICE VISIT (OUTPATIENT)
Dept: SPEECH THERAPY | Facility: CLINIC | Age: 78
End: 2023-06-13
Payer: MEDICARE

## 2023-06-13 ENCOUNTER — OFFICE VISIT (OUTPATIENT)
Dept: PHYSICAL THERAPY | Facility: CLINIC | Age: 78
End: 2023-06-13
Payer: MEDICARE

## 2023-06-13 DIAGNOSIS — G20 PRIMARY PARKINSONISM (HCC): Primary | ICD-10-CM

## 2023-06-13 DIAGNOSIS — R48.8 OTHER SYMBOLIC DYSFUNCTIONS: ICD-10-CM

## 2023-06-13 DIAGNOSIS — R26.9 NEUROLOGIC GAIT DYSFUNCTION: ICD-10-CM

## 2023-06-13 DIAGNOSIS — R41.841 COGNITIVE COMMUNICATION DEFICIT: ICD-10-CM

## 2023-06-13 DIAGNOSIS — R13.12 OROPHARYNGEAL DYSPHAGIA: ICD-10-CM

## 2023-06-13 PROCEDURE — 97112 NEUROMUSCULAR REEDUCATION: CPT

## 2023-06-13 PROCEDURE — 97110 THERAPEUTIC EXERCISES: CPT

## 2023-06-13 PROCEDURE — 97116 GAIT TRAINING THERAPY: CPT

## 2023-06-13 PROCEDURE — 92507 TX SP LANG VOICE COMM INDIV: CPT

## 2023-06-13 NOTE — PROGRESS NOTES
Daily Speech Treatment Note    Today's date: 2023  Patient’s name: Meghann Lovelace  : 1945  MRN: [id number]  Safety measures:   Referring provider: Jimenez Hughes MD    Encounter Diagnosis     ICD-10-CM    1  Primary parkinsonism ([address] )  G20       2  Other symbolic dysfunctions  O17 0       3  Cognitive communication deficit  R41 841       4  Oropharyngeal dysphagia  R13 12           Visit trackin    Subjective/Behavioral:  -  Pleasant/Cooperative  Objective/Assessment:  Completed structured activities with patient to target goals below  Results as stated below  Cooperative with cognitive tasks / memory tasks  Work on Paragraph recall  Educate on activities with word finding, techniques  Goals    Short Term Goals:       Patient will complete pharyngeal exercises to increase swallow efficiency and decrease risk of aspiration   : Educated on need to elicit volitional swallow of saliva during the day more to increase saliva management  Patient with x 3 coughing episodes due to tickle in throat erupting to coughing  Provided education  : Completed IOPI tongue exercises: 45 anterior tip, 33 middle/posterior, 25 right lip, 23 left lip  Completed exercises anterior lip at 10 x 34 target and 10 x 36 target, mid tongue at 20 x 26 & right lip x 16 target x 10, left lip 15 target x 10      Patient will independently utilize compensatory strategies to decrease risk of aspiration (liquid wash, slow rate, small bites /sips)  : EDUCATED ON REFLUX, PROVIDED HANDOUT   : Patient noting he needs to use liquids to wash down pills  Encouraged liquid wash         Patient will express understanding of ways to compensate and maximize overall cognition and memory  : REVIEWED RBANS RESULTS, PROVIDED EDUCATION ON WORD RETRIEVAL AND WILL REVIEW MEMORY STRATEGIES NEXT SESSION    : Educated on memory strategies, patient interested in trying nameorize linda and will be actively using association and out loud strategies  5/31: Reviewed apps/education to maximize memory, timers        Patient will achieve 80% accuracy or better with use of min cues to maximize visual and verbal memory via cognitive retraining  6/2: Hearing 4-6 words and putting into pairs  Also, formulation of sentences with 2-4 words, and recalling words: 100% achievement with words  , 70% accuracy in part/whole coding, Completed 2 full lines / bubbles with math coding at 1 minute 11 seconds with errors x 2   6/5: Completed Alternating Attention exercises with patient- completed 2 rows of addition/ subtraction problems, multiple digits: 100% accuracy, out loud / processing, occasional mistakes but patient feels he is at his baseline  Patient with ? Hearing loss, recommended audiological evaluation  Patient noted that he tends to produce test answers first and then recheck  Completed reverse order 3-4 words 100% accuracy and able to organize mental flexibility exercises with 3-4 words with min cues occasionally  Mental opposites with delay- min cues, start with next session  6/8: Completed mental opposites with delay exercise, occasional repetition of beginning word when distracted, overall 90% accuracy  Visual recall of images via questions after 3 minutes: 75% accuracy, another visual image via questions after 5 minutes: 90% accuracy, recall of 4-7 pictures- recalled 4 & 5 pictures Indep  7 pictures with slight cue following 5 minutes  6/13: Completing deduction puzzles & memory card games, repetition and visualization assisted with broken heart story recall, completed 100% of story after rep x 1, flip flops, 100% accuracy, continue next session  Patient noting difficulty with recall of names at social situation, provided strategies, education       Patient will complete SPEAK OUT!  Exercises as appropriate      LONG TERM GOALS:     Patient will tolerate least restrictive diet (regular /thins) with minimized risk of aspiration  CONTINUE GOAL      Patient will complete cognitive retraining and express ideas of how to maximize memory & cognition              Plan:  -Continue with current plan of care  IOPI, OME's, EMST, pharyngeal exercises per plan  Executive functioning and memory tasks with and without distraction

## 2023-06-13 NOTE — PROGRESS NOTES
Daily Note     Today's date: 2023  Patient name: Rajesh Robledo  : 1945  MRN: [id number]  Referring provider: Rupesh Ledesma MD  Dx:   Encounter Diagnosis     ICD-10-CM    1  Primary parkinsonism ([address] )  G20       2  Neurologic gait dysfunction  R26 9           Start Time: 1503  Stop Time: 1545  Total time in clinic (min): 42 minutes    Subjective:   Pt did ok with walking around graduation party, did uneven surfaces with SPC  Some trouble with stairs  [place] like he could have done better  Objective: See treatment diary below      Assessment: Reviewed HEP with good return demonstration  Pt with minimal instability ascending/descending stairs with use of handrail lightly  Min additional instability while stepping over hurdles with cognitive motor dual tasking - he does demonstrate smaller step length and frequently steps on top of hurdles  Ended session with resisted ambulation with patient indicating fatigue post session  He will continue to benefit from skilled PT to address his gait and balance  Plan: Continue per plan of care  Assess tolerance to HEP, continue to progress stepping and turning challenges       Precautions: h/o tinnitus, h/o PD, h/o peripheral neuropathy,h/o HTN, B/L TKR  Re-eval Date: 2023      Date       Visit Count 24 25      FOTO        Pain In        Pain Out              Testing        MiniBest        TUG/TUGC        5xSTS        Gait speed        FGA        2/6MWT        Neuro Re-Ed        Segmental rolling with overpressure         Dynamic balance Med hurdles step through 8 laps    HT gait 20ft x6    Fwd/bwd stepping 20ft x6 ea Fwd cog-motor task 8 laps small/med hurdles step-to/through    Lat hurdles 8 laps small hurdles cog task       Static balance Lat + a/p weight shift 3min ea fitter board       Obstacle course        High amplitude movement        Resisted walking/balance  10 laps solo red TB PT resist      Supine to sidelying to sit        Standing "reach cone to wall/floor; repeated with thoracic rotation         Ther Ex        SLR x 4 // bar x10 fwd/bwd kicks // bar x10 fwd/bwd kicks      Postural education        HR/TR        Mini Squats        Step ups        Mod bird dog        Supine LTR        Cervical rot SNAGs        L/s flexion        Bridge        Quadruped thread needle        Rows        Hamstring seated stretch        Iliopsoas stretch supine Standing 30\" x3 20\" x2      Trial of Elliptical         TM for endurance        Prone lying        Thad test stretch        Stair lunge stretch        Gastroc str        Ther Activity        HEP        Bed mobility        Gait Training        Narrow spaces/busyenv        External cues TM 1  5mph 9min physioball kick       Stairs  Light handrail 2 flights, 7lb carry light handrail 2 flights      Modalities                                   "

## 2023-06-14 NOTE — PROGRESS NOTES
6/15/2023    Samuel Ivy  1945  [id number]      Assessment  -BPH s/p TURP and cystolitholapaxy (2017)  -Nephrolithiasis     Discussion/Plan  Hermann anderson is a 77 y o  male being managed by our office    1  BPH s/p TURP and cystolitholapaxy (2017), nephrolithiasis- urine dip in the office today appears negative for infection or blood  We discussed results of his CT scan from 4/12/2023 which identified multiple bilateral nonobstructing renal calculi, largest measuring 4 mm in right kidney  He is currently asymptomatic  We will continue to monitor stone burden, reviewed dietary recommendations  He will remain on allopurinol daily  Plan to repeat renal ultrasound next year  We discussed his increased urinary frequency  Patient is not interested in medication management secondary to polypharmacy and potential side effects  Reviewed dietary and behavioral modifications  Follow-up in 6 months to reevaluate his urinary symptoms and check PVR assessment  He was advised to call sooner with any questions or issues     -All questions answered, patient agrees with plan      History of Present Illness  77 y o  male with a history of nephrolithiasis and BPH presents today for follow up  Patient was last seen in the office in June 2022  He underwent TURP and cystolitholopaxy in 2017  More recent CT scan from March 2022 identified unchanged, bilateral nonobstructing renal calculi  He remains on allopurinol daily  He more recently presented to the emergency department on 4/12/2023 with symptoms of right-sided flank pain and dysuria  CT renal stone study again noted multiple bilateral nonobstructing renal calculi  Largest stone measuring 4 mm in right kidney  There is no evidence of hydronephrosis  Additionally noted were 2 left simple renal cyst, largest measuring 7 8 cm  Patient states he took several days to weeks for discomfort to completely resolve  He denies any gross hematuria or dysuria    Patient also reports symptoms of increased urinary frequency and nocturia 1-2 times per night  He does note progressing Parkinson's disease and neuropathy, which he feels is causing him to wake up at night  Patient following with neurology  Review of Systems  Review of Systems   Constitutional: Negative  HENT: Negative  Respiratory: Negative  Cardiovascular: Negative  Gastrointestinal: Negative  Genitourinary: Positive for frequency  Negative for decreased urine volume, difficulty urinating, dysuria, flank pain, hematuria and urgency  Musculoskeletal: Negative  Skin: Negative  Neurological: Negative  Psychiatric/Behavioral: Negative  AUA SYMPTOM SCORE    Flowsheet Row Most Recent Value   AUA SYMPTOM SCORE    How often have you had a sensation of not emptying your bladder completely after you finished urinating? 0   How often have you had to urinate again less than two hours after you finished urinating? 1   How often have you found you stopped and started again several times when you urinate? 0   How often have you found it difficult to postpone urination? 0   How often have you had a weak urinary stream? 2   How often have you had to push or strain to begin urination? 0   How many times did you most typically get up to urinate from the time you went to bed at night until the time you got up in the morning? 2   Quality of Life: If you were to spend the rest of your life with your urinary condition just the way it is now, how would you feel about that? 1   AUA SYMPTOM SCORE 5          Past Medical History  Past Medical History:   Diagnosis Date   • Arthritis    • Benign familial tremor    • Cardiac disease     two cardiac stents   • Chest pain    • Coronary artery disease    • HL (hearing loss) 01/01/2022   • Hyperlipidemia    • Hypertension    • Kidney disease    • Kidney stone    • Malignant melanoma of skin of chest ([facility] [place] [address] )     Removed 2 years ago     • Meniscal injury    • Nephrolithiasis    • Parkinson's disease ([facility] [address] )    • Peripheral neuropathy    • PONV (postoperative nausea and vomiting)    • Tinnitus     [id number] RESOLVED       Past Social History  Past Surgical History:   Procedure Laterality Date   • AMPUTATION Left     AMPUTATION OF FINGER WITH NEURECTOMY(EACH) DISTAL LONG  RING AND SMALL FINGER   • CARPAL TUNNEL RELEASE Right    • CATARACT EXTRACTION     • CHOLECYSTECTOMY     • CORONARY STENT PLACEMENT      two cardiac stents   • EYE SURGERY     • FINGER AMPUTATION      AMPUTATION OF MIDDLE FINGER WITH NEURECTOMY(EACH)   • HAND SURGERY Left     Traumatic amputation of fingers left hand  • JOINT REPLACEMENT Bilateral     knees   • KNEE ARTHROPLASTY      TOTAL   • MALIGNANT SKIN LESION EXCISION      ANTERIOR CHEST FOR MELAMONIA   • DE CYSTO/URETERO W/LITHOTRIPSY &INDWELL STENT INSRT Left 07/27/2017    Procedure: CYSTOSCOPY URETEROSCOPY , RETROGRADE PYELOGRAM AND INSERTION STENT URETERAL;  Surgeon: Patricia Garcia MD;  Location[facility];  Service: Urology   • DE LAPAROSCOPY SURG CHOLECYSTECTOMY N/A 02/28/2018    Procedure: CHOLECYSTECTOMY LAPAROSCOPIC;  Surgeon: Eduardo Jett MD;  [facility];  Service: General   • DE LITHOLAPAXY SMPL/SM <2 5 CM N/A 09/15/2017    Procedure: Bunch Speak;  Surgeon: Parul Bateman MD;  [facility];  Service: Urology   • DE TRURL ELECTROSURG RESCJ PROSTATE BLEED COMPLETE N/A 09/15/2017    Procedure: BIPOLAR TRANSURETHRAL RESECTION OF PROSTATE (TURP); Surgeon: Parul Bateman MD;  [facility];  Service: Urology   • TONSILLECTOMY     • TRIGGER FINGER RELEASE Right    • TUMOR REMOVAL Left     Left foot 20 years ago     • WISDOM TOOTH EXTRACTION Bilateral        Past Family History  Family History   Problem Relation Age of Onset   • Heart disease Mother    • Hypertension Mother    • Stroke Mother    • Breast cancer Mother    • Heart disease Father    • Hypertension Father    • Stroke Father    • Colon cancer Father    • Diabetes Sister    • Heart disease Brother    • Other Family         BACK PAIN   • Breast cancer Family    • Heart disease Family    • Hypertension Family    • Stroke Family    • Arthritis Family    • Mental illness Neg Hx    • Substance Abuse Neg Hx        Past Social history  Social History     Socioeconomic History   • Marital status: /Civil Union     Spouse name: Not on file   • Number of children: Not on file   • Years of education: Not on file   • Highest education level: Not on file   Occupational History   • Not on file   Tobacco Use   • Smoking status: Former     Types: Cigarettes   • Smokeless tobacco: Never   • Tobacco comments:     back in college in 1966   Vaping Use   • Vaping Use: Never used   Substance and Sexual Activity   • Alcohol use: Yes     Alcohol/week: 4 0 standard drinks of alcohol     Types: 4 Cans of beer per week   • Drug use: No   • Sexual activity: Not Currently     Partners: Female   Other Topics Concern   • Not on file   Social History Narrative    Lives with wife  Sees dentist reg  Has living will  Feels safe at home  No mental or sub in self  ACTIVE ADVANCE DIRECTIVE    CAREGIVER:  SPOUSE    EXERCISE HABITS  -  DAILY    GOOD DENTAL HYGIENE     Social Determinants of Health     Financial Resource Strain: Low Risk  (5/18/2023)    Overall Financial Resource Strain (CARDIA)    • Difficulty of Paying Living Expenses: Not very hard   Food Insecurity: No Food Insecurity (5/14/2021)    Hunger Vital Sign    • Worried About Running Out of Food in the Last Year: Never true    • Ran Out of Food in the Last Year: Never true   Transportation Needs: No Transportation Needs (5/18/2023)    PRAPARE - Transportation    • Lack of Transportation (Medical): No    • Lack of Transportation (Non-Medical):  No   Physical Activity: Inactive (4/2/2021)    Exercise Vital Sign    • Days of Exercise per Week: 0 days    • Minutes of Exercise per Session: 0 min   Stress: No Stress Concern Present (4/2/2021)    [address]    • Feeling of Stress : Not at all   Social Connections: Not on file   Intimate Partner Violence: Not At Risk (4/2/2021)    Humiliation, Afraid, Rape, and Kick questionnaire    • Fear of Current or Ex-Partner: No    • Emotionally Abused: No    • Physically Abused: No    • Sexually Abused: No   Housing Stability: Not on file       Current Medications  Current Outpatient Medications   Medication Sig Dispense Refill   • acetaminophen (TYLENOL) 500 mg tablet Take 500 mg by mouth every 6 (six) hours as needed for mild pain     • allopurinol (ZYLOPRIM) 300 mg tablet TAKE 1 TABLET BY MOUTH EVERY DAY 90 tablet 3   • aspirin 81 MG tablet Take by mouth 1 daily      • atorvastatin (LIPITOR) 20 mg tablet TAKE 1 TABLET BY MOUTH EVERY DAY 90 tablet 3   • benazepril-hydrochlorthiazide (LOTENSIN HCT) 10-12 5 MG per tablet TAKE 1 TABLET BY MOUTH EVERY DAY 90 tablet 1   • Calcium Carbonate-Vitamin D 600-400 MG-UNIT per tablet Caltrate 600+D 600-400 MG-UNIT TABS  Take 1 tablet daily   Refills: 0      , M D ; Active     • carbidopa-levodopa (SINEMET CR)  mg TBCR per ER tablet Take by mouth 1 at bedtime     • carbidopa-levodopa (SINEMET)  mg per tablet 2 tabs in the AM, 2 at lunch & 2 at dinner     • Cholecalciferol (VITAMIN D-3) 1000 UNITS CAPS 1 daily     • clindamycin (CLEOCIN) 300 MG capsule Takes pre dental      • docusate sodium (COLACE) 100 mg capsule Take by mouth 1-2 daily prn      • gabapentin (Neurontin) 100 mg capsule Take 1 capsule (100 mg total) by mouth 3 (three) times a day 30 capsule 3   • Glucos-Chond-Hyal Ac-Ca Fructo (Move Free Joint Health Advance) TABS Take by mouth daily 1 daily     • glucosamine-chondroitin 500-400 MG tablet Take 1 tablet by mouth daily     • Homeopathic Products (LEG CRAMPS PO) Take by mouth 1 daily     • metoprolol succinate (TOPROL-XL) 50 mg 24 hr tablet TAKE 1 TABLET BY MOUTH EVERY DAY 90 tablet 1   • Multiple Vitamin (MULTIVITAMINS PO) Multivitamins Oral once a day Capsule   Refills: 0      , M D ; Active     • Zoster Vac Recomb Adjuvanted (Shingrix) 50 MCG/0 5ML SUSR 0 5mL IM for one dose, followed by 0 5mL IM 2-6 months after first dose 1 each 1   • nitroglycerin (NITROSTAT) 0 4 mg SL tablet Place under the tongue (Patient not taking: Reported on 9/12/2022)       No current facility-administered medications for this visit  Allergies  No Known Allergies    Past Medical History, Social History, Family History, medications and allergies were reviewed  Vitals  Vitals:    06/15/23 0852   Weight: 109 kg (240 lb)       Physical Exam  Physical Exam  Constitutional:       Appearance: Normal appearance  He is well-developed  HENT:      Head: Normocephalic  Eyes:      Pupils: Pupils are equal, round, and reactive to light  Pulmonary:      Effort: Pulmonary effort is normal    Abdominal:      Palpations: Abdomen is soft  Tenderness: There is no right CVA tenderness or left CVA tenderness  Musculoskeletal:         General: Normal range of motion  Cervical back: Normal range of motion  Skin:     General: Skin is warm and dry  Neurological:      General: No focal deficit present  Mental Status: He is alert and oriented to person, place, and time  Comments: Mild tremors, shuffling gait   Psychiatric:         Mood and Affect: Mood normal          Behavior: Behavior normal          Thought Content:  Thought content normal          Judgment: Judgment normal          Results    I have personally reviewed all pertinent lab results and reviewed with patient  Lab Results   Component Value Date    PSA 1 6 04/05/2017    PSA 1 4 10/06/2015     Lab Results   Component Value Date    BUN 17 04/12/2023    CALCIUM 8 4 04/12/2023     04/12/2023    CO2 25 04/12/2023    CREATININE 0 91 04/12/2023    GLUCOSE 95 10/06/2015    K 4 0 04/12/2023     10/06/2015     Lab Results Component Value Date    HCT 44 3 04/12/2023    HGB 14 3 04/12/2023     (H) 04/12/2023     04/12/2023    WBC 4 76 04/12/2023     Recent Results (from the past 1 hour(s))   POCT urine dip    Collection Time: 06/15/23  8:53 AM   Result Value Ref Range    LEUKOCYTE ESTERASE,UA -     NITRITE,UA -     SL AMB POCT UROBILINOGEN 0 2     POCT URINE PROTEIN -      PH,UA 6 0     BLOOD,UA -     SPECIFIC GRAVITY,UA 1 015     KETONES,UA -     BILIRUBIN,UA -     GLUCOSE, UA -      COLOR,UA straw/dinora     CLARITY,UA clear

## 2023-06-15 ENCOUNTER — OFFICE VISIT (OUTPATIENT)
Dept: PHYSICAL THERAPY | Facility: CLINIC | Age: 78
End: 2023-06-15
Payer: MEDICARE

## 2023-06-15 ENCOUNTER — OFFICE VISIT (OUTPATIENT)
Dept: SPEECH THERAPY | Facility: CLINIC | Age: 78
End: 2023-06-15
Payer: MEDICARE

## 2023-06-15 ENCOUNTER — OFFICE VISIT (OUTPATIENT)
Dept: UROLOGY | Facility: HOSPITAL | Age: 78
End: 2023-06-15
Payer: MEDICARE

## 2023-06-15 VITALS
BODY MASS INDEX: 31.66 KG/M2 | SYSTOLIC BLOOD PRESSURE: 126 MMHG | HEART RATE: 78 BPM | WEIGHT: 240 LBS | DIASTOLIC BLOOD PRESSURE: 76 MMHG

## 2023-06-15 DIAGNOSIS — R48.8 OTHER SYMBOLIC DYSFUNCTIONS: ICD-10-CM

## 2023-06-15 DIAGNOSIS — R49.0 DYSPHONIA: ICD-10-CM

## 2023-06-15 DIAGNOSIS — R41.841 COGNITIVE COMMUNICATION DEFICIT: ICD-10-CM

## 2023-06-15 DIAGNOSIS — G20 PRIMARY PARKINSONISM (HCC): Primary | ICD-10-CM

## 2023-06-15 DIAGNOSIS — N20.0 NEPHROLITHIASIS: Primary | ICD-10-CM

## 2023-06-15 DIAGNOSIS — R26.9 NEUROLOGIC GAIT DYSFUNCTION: ICD-10-CM

## 2023-06-15 DIAGNOSIS — N40.0 BPH WITHOUT OBSTRUCTION/LOWER URINARY TRACT SYMPTOMS: ICD-10-CM

## 2023-06-15 LAB
SL AMB  POCT GLUCOSE, UA: NORMAL
SL AMB LEUKOCYTE ESTERASE,UA: NORMAL
SL AMB POCT BILIRUBIN,UA: NORMAL
SL AMB POCT BLOOD,UA: NORMAL
SL AMB POCT CLARITY,UA: CLEAR
SL AMB POCT COLOR,UA: NORMAL
SL AMB POCT KETONES,UA: NORMAL
SL AMB POCT NITRITE,UA: NORMAL
SL AMB POCT PH,UA: 6
SL AMB POCT SPECIFIC GRAVITY,UA: 1.01
SL AMB POCT URINE PROTEIN: NORMAL
SL AMB POCT UROBILINOGEN: 0.2

## 2023-06-15 PROCEDURE — 81002 URINALYSIS NONAUTO W/O SCOPE: CPT | Performed by: NURSE PRACTITIONER

## 2023-06-15 PROCEDURE — 97116 GAIT TRAINING THERAPY: CPT

## 2023-06-15 PROCEDURE — 99213 OFFICE O/P EST LOW 20 MIN: CPT | Performed by: NURSE PRACTITIONER

## 2023-06-15 PROCEDURE — 92507 TX SP LANG VOICE COMM INDIV: CPT

## 2023-06-15 PROCEDURE — 97112 NEUROMUSCULAR REEDUCATION: CPT

## 2023-06-15 NOTE — PROGRESS NOTES
Daily Note     Today's date: 6/15/2023  Patient name: Quinten Davis  : 1945  MRN: [id number]  Referring provider: Daniel Jimenes MD  Dx:   Encounter Diagnosis     ICD-10-CM    1  Primary parkinsonism ([address] )  G20       2  Neurologic gait dysfunction  R26 9           Start Time: 1505  Stop Time: 5411  Total time in clinic (min): 44 minutes    Subjective:   Saw urologist  Melissa Vieira him to drink more water  Incision still present, precaution with UE movement  Feels like balance is stagnant, endurance is improving  Objective: See treatment diary below      Assessment: Fatigues slightly quicker with uphill treadmill trials, able to recorrect posturally indep when needed  Notable shortness of breath and workload with treadmill and stairs  Trials of ambulating with foam behind back with good response in posture  Less cues for higher steps during turns  Increasing sway with EC and narrowing base of support  Added EC balance in corner to HEP, discussed to perform after seen by dermatologist in case he were to fall back into wall, pt verbalized understanding  Discussed how situational balance practice is beneficial as well as how his peripheral neuropathy factors into his balance in conjunction with PD  He will continue to benefit from skilled PT to address his gait and balance  Plan: Continue per plan of care  Assess tolerance to HEP, continue to progress stepping and turning challenges       Precautions: h/o tinnitus, h/o PD, h/o peripheral neuropathy,h/o HTN, B/L TKR  Re-eval Date: 2023      Date 6/8 6/13 6/15     Visit Count 24 25 26     FOTO        Pain In        Pain Out              Testing        MiniBest        TUG/TUGC        5xSTS        Gait speed        FGA        2/6MWT        Neuro Re-Ed        Segmental rolling with overpressure         Dynamic balance Med hurdles step through 8 laps    HT gait 20ft x6    Fwd/bwd stepping 20ft x6 ea Fwd cog-motor task 8 laps small/med hurdles "step-to/through    Lat hurdles 8 laps small hurdles cog task  Fwd/bwd foam behind back 4 laps ea    Foam beam 6 laps lat step solo     Static balance Lat + a/p weight shift 3min ea fitter board  EC static firm 30\" x2    EC mod tandem 30\" x1 ea     Obstacle course        High amplitude movement        Resisted walking/balance  10 laps solo red TB PT resist      Supine to sidelying to sit        Standing reach cone to wall/floor; repeated with thoracic rotation         Ther Ex        SLR x 4 // bar x10 fwd/bwd kicks // bar x10 fwd/bwd kicks      Postural education        HR/TR        Mini Squats        Step ups        Mod bird dog        Supine LTR        Cervical rot SNAGs        L/s flexion        Bridge        Quadruped thread needle        Rows        Hamstring seated stretch        Iliopsoas stretch supine Standing 30\" x3 20\" x2      Trial of Elliptical         TM for endurance        Prone lying        Thad test stretch        Stair lunge stretch        Gastroc str        Ther Activity        HEP        Bed mobility        Gait Training        Narrow spaces/busyenv        External cues TM 1  5mph 9min physioball kick  TM 1  3mph 3 5% cues for inc step length     Stairs  Light handrail 2 flights, 7lb carry light handrail 2 flights Light handrail 3 flights 7lb     Modalities                                   "

## 2023-06-15 NOTE — PROGRESS NOTES
Daily Speech Treatment Note    Today's date: 6/15/2023  Patient’s name: Kena Aldridge  : 1945  MRN: [id number]  Safety measures:   Referring provider: Gail Jovel MD    Encounter Diagnosis     ICD-10-CM    1  Primary parkinsonism ([address] )  G20       2  Other symbolic dysfunctions  J49 8       3  Cognitive communication deficit  R41 841       4  Dysphonia  R49 0             Visit trackin    Subjective/Behavioral:  -  Pleasant/Cooperative  Objective/Assessment:  Completed structured activities with patient to target goals below  Results as stated below  Cooperative with cognitive tasks / memory tasks  Work on Paragraph recall  Educate on activities with word finding, techniques  Goals    Short Term Goals:       Patient will complete pharyngeal exercises to increase swallow efficiency and decrease risk of aspiration   : Educated on need to elicit volitional swallow of saliva during the day more to increase saliva management  Patient with x 3 coughing episodes due to tickle in throat erupting to coughing  Provided education  : Completed IOPI tongue exercises: 45 anterior tip, 33 middle/posterior, 25 right lip, 23 left lip  Completed exercises anterior lip at 10 x 34 target and 10 x 36 target, mid tongue at 20 x 26 & right lip x 16 target x 10, left lip 15 target x 10      Patient will independently utilize compensatory strategies to decrease risk of aspiration (liquid wash, slow rate, small bites /sips)  : EDUCATED ON REFLUX, PROVIDED HANDOUT   : Patient noting he needs to use liquids to wash down pills  Encouraged liquid wash         Patient will express understanding of ways to compensate and maximize overall cognition and memory  : REVIEWED RBANS RESULTS, PROVIDED EDUCATION ON WORD RETRIEVAL AND WILL REVIEW MEMORY STRATEGIES NEXT SESSION    : Educated on memory strategies, patient interested in trying nameLevels Beyondze linda and will be actively using association and out loud strategies  5/31: Reviewed apps/education to maximize memory, timers        Patient will achieve 80% accuracy or better with use of min cues to maximize visual and verbal memory via cognitive retraining  6/2: Hearing 4-6 words and putting into pairs  Also, formulation of sentences with 2-4 words, and recalling words: 100% achievement with words  , 70% accuracy in part/whole coding, Completed 2 full lines / bubbles with math coding at 1 minute 11 seconds with errors x 2   6/5: Completed Alternating Attention exercises with patient- completed 2 rows of addition/ subtraction problems, multiple digits: 100% accuracy, out loud / processing, occasional mistakes but patient feels he is at his baseline  Patient with ? Hearing loss, recommended audiological evaluation  Patient noted that he tends to produce test answers first and then recheck  Completed reverse order 3-4 words 100% accuracy and able to organize mental flexibility exercises with 3-4 words with min cues occasionally  Mental opposites with delay- min cues, start with next session  6/8: Completed mental opposites with delay exercise, occasional repetition of beginning word when distracted, overall 90% accuracy  Visual recall of images via questions after 3 minutes: 75% accuracy, another visual image via questions after 5 minutes: 90% accuracy, recall of 4-7 pictures- recalled 4 & 5 pictures Indep  7 pictures with slight cue following 5 minutes  6/13: Completing deduction puzzles & memory card games, repetition and visualization assisted with broken heart story recall, completed 100% of story after rep x 1, flip flops, 100% accuracy, continue next session  Patient noting difficulty with recall of names at social situation, provided strategies, education  6/15: Completed deduction puzzles and schedules with min cues / cues for strategies, + 80% accuracy  Patient still has more to complete for home   Patient completed math computations visually and out loud, some difficulty noted with correctly noting addition computation; otherwise 100% accuracy  Word finding able to practice description technique with concrete and abstract words effectively, 100% accuracy        Patient will complete SPEAK OUT! Exercises as appropriate      LONG TERM GOALS:     Patient will tolerate least restrictive diet (regular /thins) with minimized risk of aspiration  CONTINUE GOAL      Patient will complete cognitive retraining and express ideas of how to maximize memory & cognition              Plan:  -Continue with current plan of care  IOPI, OME's, EMST, pharyngeal exercises per plan  Executive functioning and memory tasks with and without distraction

## 2023-06-20 ENCOUNTER — OFFICE VISIT (OUTPATIENT)
Dept: SPEECH THERAPY | Facility: CLINIC | Age: 78
End: 2023-06-20
Payer: MEDICARE

## 2023-06-20 ENCOUNTER — OFFICE VISIT (OUTPATIENT)
Dept: PHYSICAL THERAPY | Facility: CLINIC | Age: 78
End: 2023-06-20
Payer: MEDICARE

## 2023-06-20 DIAGNOSIS — R26.9 NEUROLOGIC GAIT DYSFUNCTION: ICD-10-CM

## 2023-06-20 DIAGNOSIS — R13.12 OROPHARYNGEAL DYSPHAGIA: ICD-10-CM

## 2023-06-20 DIAGNOSIS — R41.841 COGNITIVE COMMUNICATION DEFICIT: ICD-10-CM

## 2023-06-20 DIAGNOSIS — R48.8 OTHER SYMBOLIC DYSFUNCTIONS: ICD-10-CM

## 2023-06-20 DIAGNOSIS — G20 PRIMARY PARKINSONISM (HCC): Primary | ICD-10-CM

## 2023-06-20 PROCEDURE — 92526 ORAL FUNCTION THERAPY: CPT

## 2023-06-20 PROCEDURE — 97112 NEUROMUSCULAR REEDUCATION: CPT

## 2023-06-20 PROCEDURE — 97110 THERAPEUTIC EXERCISES: CPT

## 2023-06-20 PROCEDURE — 92507 TX SP LANG VOICE COMM INDIV: CPT

## 2023-06-20 PROCEDURE — 97116 GAIT TRAINING THERAPY: CPT

## 2023-06-20 NOTE — PROGRESS NOTES
"Speech-Language Pathology Re-Evaluation    Today's date: 2023   Patient’s name: Bhavani Paniagua  : 1945  MRN: [id number]  Safety measures:   Referring provider: Rina Jon MD    Encounter Diagnosis     ICD-10-CM    1  Primary parkinsonism ([address] )  G20       2  Other symbolic dysfunctions  Q84 8       3  Cognitive communication deficit  R41 841       4  Oropharyngeal dysphagia  R13 12         Visit tracking:  -Referring provider: Epic  -Billing guidelines: CMS  -Visit #19   -Insurance: Medicare  -RE due:     Subjective comments: Pleasant/Cooperative    Patient's goal(s): \"To limit any degradation of voice  Larraine Santana \" \"To stop the gagging  \"       Patient noting saliva leakage once every couple of hours       + Starting to have \"tickle\" in throat leading to coughing episode with gagging normally  Advised patient to take a small sip of water when the beginning symptoms of this occurs  Patient felt this was helpful during session  Goals      Patient will complete pharyngeal exercises to increase swallow efficiency and decrease risk of aspiration   : Educated on need to elicit volitional swallow of saliva during the day more to increase saliva management  Patient with x 3 coughing episodes due to tickle in throat erupting to coughing  Provided education  : Completed IOPI tongue exercises: 45 anterior tip, 33 middle/posterior, 25 right lip, 23 left lip  Completed exercises anterior lip at 10 x 34 target and 10 x 36 target, mid tongue at 20 x 26 & right lip x 16 target x 10, left lip 15 target x 10  ACHIEVED     Patient will independently utilize compensatory strategies to decrease risk of aspiration (liquid wash, slow rate, small bites /sips)  : EDUCATED ON REFLUX, PROVIDED HANDOUT   : Patient noting he needs to use liquids to wash down pills  Encouraged liquid wash    : Patient independent in utilization of these strategies which were effective   ACHIEVED     Patient will express " understanding of ways to compensate and maximize overall cognition and memory  5/23: REVIEWED RBANS RESULTS, PROVIDED EDUCATION ON WORD RETRIEVAL AND WILL REVIEW MEMORY STRATEGIES NEXT SESSION  5/25: Educated on memory strategies, patient interested in trying nameorize linda and will be actively using association and out loud strategies  5/31: Reviewed apps/education to maximize memory, timers  6/20: Patient able to utilize word finding strategies  6/20: Provided list of apps, workbooks, etc  Patient expressed understanding  ACHIEVED     Patient will achieve 80% accuracy or better with use of min cues to maximize visual and verbal memory via cognitive retraining  6/2: Hearing 4-6 words and putting into pairs  Also, formulation of sentences with 2-4 words, and recalling words: 100% achievement with words  , 70% accuracy in part/whole coding, Completed 2 full lines / bubbles with math coding at 1 minute 11 seconds with errors x 2   6/5: Completed Alternating Attention exercises with patient- completed 2 rows of addition/ subtraction problems, multiple digits: 100% accuracy, out loud / processing, occasional mistakes but patient feels he is at his baseline  Patient with ? Hearing loss, recommended audiological evaluation  Patient noted that he tends to produce test answers first and then recheck  Completed reverse order 3-4 words 100% accuracy and able to organize mental flexibility exercises with 3-4 words with min cues occasionally  Mental opposites with delay- min cues, start with next session  6/8: Completed mental opposites with delay exercise, occasional repetition of beginning word when distracted, overall 90% accuracy  Visual recall of images via questions after 3 minutes: 75% accuracy, another visual image via questions after 5 minutes: 90% accuracy, recall of 4-7 pictures- recalled 4 & 5 pictures Indep  7 pictures with slight cue following 5 minutes   6/13: Completing deduction puzzles & memory card games, repetition and visualization assisted with broken heart story recall, completed 100% of story after rep x 1, flip flops, 100% accuracy, continue next session  Patient noting difficulty with recall of names at social situation, provided strategies, education  6/15: Completed deduction puzzles and schedules with min cues / cues for strategies, + 80% accuracy  Patient still has more to complete for home  Patient completed math computations visually and out loud, some difficulty noted with correctly noting addition computation; otherwise 100% accuracy  Word finding able to practice description technique with concrete and abstract words effectively, 100% accuracy  CONTINUE     Patient will express understanding of home program exercises independently       LONG TERM GOALS:     Patient will tolerate least restrictive diet (regular /thins) with minimized risk of aspiration  ACHIEVED     Patient will complete cognitive retraining and express ideas of how to maximize memory & cognition  CONTINUE        NEW Long Term Goal:    Patient will express understanding of home program exercises independently  Impressions/Recommendations    Impressions:   -Patient with history of PD and with noted progress as noted above  Patient works hard and practicing safe, swallow strategies and exercises along with cognitive and vocal exercises  Patient would continue to benefit from cognitive therapy services       Recommendations:  -Patient would benefit from outpatient skilled Speech Therapy services: Cognitive-linguistic therapy    -Frequency: 1-2x weekly  -Duration: 4-6 weeks    -Intervention certification from: 9/18/9493  -Intervention certification to: 2/0/19    -Intervention comments:   Memory card games, cognitive exercises

## 2023-06-20 NOTE — PROGRESS NOTES
Daily Note     Today's date: 2023  Patient name: Breonna Wong  : 1945  MRN: [id number]  Referring provider: Ashley Kee MD  Dx:   Encounter Diagnosis     ICD-10-CM    1  Primary parkinsonism ([address] )  G20       2  Neurologic gait dysfunction  R26 9           Start Time: 1148  Stop Time: 1230  Total time in clinic (min): 42 minutes    Subjective:   Pt reports a fall, able to get up with wife pushing chair over to him  Uninjured  Was trying to pick something up from ground  Seeing dermatology tomorrow to see about stitch removal       Objective: See treatment diary below      Assessment: has some difficulty with hip hinge, reporting some glute activation - used wall as cue for hip/glute placement and cues for anterior weight shift  With different shoes shows improved compliant surface, still needs stepping strategy off foam at times  More squat position vs deadlift when picking up items due to mobility restrictions  Trials of focusing more on propulsion and glute strength with good response in uphill climb  Challenged with stair climbing with only intermittent UE allowance, 1 near fall using handrail to self-correct  Weighted vest appears to assist with posture, most likely due to inc sensory information  He will continue to benefit from skilled PT to address his gait and balance  Plan: Continue per plan of care  Assess tolerance to HEP, continue to progress stepping and turning challenges       Precautions: h/o tinnitus, h/o PD, h/o peripheral neuropathy,h/o HTN, B/L TKR  Re-eval Date: 2023      Date 6/8 6/13 6/15 6/20    Visit Count 24 25 26 27    FOTO        Pain In        Pain Out              Testing        MiniBest        TUG/TUGC        5xSTS        Gait speed        FGA        2/6MWT        Neuro Re-Ed        Segmental rolling with overpressure         Dynamic balance Med hurdles step through 8 laps    HT gait 20ft x6    Fwd/bwd stepping 20ft x6 ea Fwd cog-motor task 8 laps small/med "hurdles step-to/through    Lat hurdles 8 laps small hurdles cog task  Fwd/bwd foam behind back 4 laps ea    Foam beam 6 laps lat step solo Foam beam weighted vest CGA lat step 6 laps    Static balance Lat + a/p weight shift 3min ea fitter board  EC static firm 30\" x2    EC mod tandem 30\" x1 ea Cone  2x6    Obstacle course        High amplitude movement        Resisted walking/balance  10 laps solo red TB PT resist      Supine to sidelying to sit        Standing reach cone to wall/floor; repeated with thoracic rotation         Ther Ex        SLR x 4 // bar x10 fwd/bwd kicks // bar x10 fwd/bwd kicks      Hip hinge    2x12 against wall    Postural education        HR/TR        Mini Squats        Step ups        Mod bird dog        Supine LTR        Cervical rot SNAGs        L/s flexion        Bridge        Quadruped thread needle        Rows        Hamstring seated stretch        Iliopsoas stretch supine Standing 30\" x3 20\" x2      Trial of Elliptical         TM for endurance        Prone lying        Thad test stretch        Stair lunge stretch        Gastroc str        Ther Activity        HEP        Bed mobility        Gait Training        Narrow spaces/busyenv        External cues TM 1  5mph 9min physioball kick  TM 1  3mph 3 5% cues for inc step length TM 7% 0 9mph 3min x2    Stairs  Light handrail 2 flights, 7lb carry light handrail 2 flights Light handrail 3 flights 7lb Light handrail weighted vest 5 flights    Modalities                                   "

## 2023-06-22 ENCOUNTER — OFFICE VISIT (OUTPATIENT)
Dept: PHYSICAL THERAPY | Facility: CLINIC | Age: 78
End: 2023-06-22
Payer: MEDICARE

## 2023-06-22 ENCOUNTER — OFFICE VISIT (OUTPATIENT)
Dept: SPEECH THERAPY | Facility: CLINIC | Age: 78
End: 2023-06-22
Payer: MEDICARE

## 2023-06-22 DIAGNOSIS — R48.8 OTHER SYMBOLIC DYSFUNCTIONS: ICD-10-CM

## 2023-06-22 DIAGNOSIS — G20 PRIMARY PARKINSONISM (HCC): Primary | ICD-10-CM

## 2023-06-22 DIAGNOSIS — R41.841 COGNITIVE COMMUNICATION DEFICIT: ICD-10-CM

## 2023-06-22 DIAGNOSIS — R49.0 DYSPHONIA: ICD-10-CM

## 2023-06-22 DIAGNOSIS — R26.9 NEUROLOGIC GAIT DYSFUNCTION: ICD-10-CM

## 2023-06-22 PROCEDURE — 97112 NEUROMUSCULAR REEDUCATION: CPT

## 2023-06-22 PROCEDURE — 97116 GAIT TRAINING THERAPY: CPT

## 2023-06-22 PROCEDURE — 92507 TX SP LANG VOICE COMM INDIV: CPT

## 2023-06-22 NOTE — PROGRESS NOTES
Daily Note     Today's date: 2023  Patient name: Adeel Gregory  : 1945  MRN: [id number]  Referring provider: Nina Renae MD  Dx:   Encounter Diagnosis     ICD-10-CM    1  Primary parkinsonism ([address] )  G20       2  Neurologic gait dysfunction  R26 9           Start Time: 1400  Stop Time: 1440  Total time in clinic (min): 40 minutes    Subjective: Notes that he isn't feeling very good today  Neuropathy in his feet has been getting worse, noting some burning pain in his L heel  Neurologist appt scheduled for July  Has told his GP about this, was prescribed gabaptentin that helps it, but it lessens the frequency - but I still get a least one attack every night where it wakes him up at 2am  Able to find a cool spot in the bed or walking on a cool surface relieves the pain enough to where he can go back to sleep  Brought cane to session, would like to receive some training regarding stairs, curbs, and sequencing  Objective: See treatment diary below      Assessment: Tolerated treatment well  Tolerated gait and stair negotiation with SPC outdoors and on 3 flights of stairs well  Had some imbalance going up curbs and on grassy/uneven surfaces but was able to correct imbalance independently  Able to traverse grassy areas outdoors well, minor balance checks but able to recover independently  Increased duration with high incline treadmill activity during today's session  Much more steady performing step to pattern per step for stair negotiation when not using rail - strongly advised patient to not perform alternating pattern when not having a handrail available when on the bleachers, but to use two feet to a step pattern  Patient demonstrated fatigue post treatment, exhibited good technique with therapeutic exercises and would benefit from continued PT      Plan: Continue per plan of care  Progress treatment as tolerated         Precautions: h/o tinnitus, h/o PD, h/o peripheral neuropathy,h/o HTN, B/L "TKR  Re-eval Date: 5/25/2023      Date 6/8 6/13 6/15 6/20 6/22   Visit Count 24 25 26 27 28    FOTO        Pain In        Pain Out              Testing        MiniBest        TUG/TUGC        5xSTS        Gait speed        FGA        2/6MWT        Neuro Re-Ed        Segmental rolling with overpressure         Dynamic balance Med hurdles step through 8 laps    HT gait 20ft x6    Fwd/bwd stepping 20ft x6 ea Fwd cog-motor task 8 laps small/med hurdles step-to/through    Lat hurdles 8 laps small hurdles cog task  Fwd/bwd foam behind back 4 laps ea    Foam beam 6 laps lat step solo Foam beam weighted vest CGA lat step 6 laps    Static balance Lat + a/p weight shift 3min ea fitter board  EC static firm 30\" x2    EC mod tandem 30\" x1 ea Cone  2x6 Cone  on foam 3x6   Obstacle course        High amplitude movement        Resisted walking/balance  10 laps solo red TB PT resist      Supine to sidelying to sit        Standing reach cone to wall/floor; repeated with thoracic rotation         Ther Ex        SLR x 4 // bar x10 fwd/bwd kicks // bar x10 fwd/bwd kicks      Hip hinge    2x12 against wall    Postural education        HR/TR        Mini Squats        Step ups        Mod bird dog        Supine LTR        Cervical rot SNAGs        L/s flexion        Bridge        Quadruped thread needle        Rows        Hamstring seated stretch        Iliopsoas stretch supine Standing 30\" x3 20\" x2      Trial of Elliptical         TM for endurance        Prone lying        Thad test stretch        Stair lunge stretch        Gastroc str        Ther Activity        HEP        Bed mobility        Gait Training        Narrow spaces/busyenv        External cues TM 1  5mph 9min physioball kick  TM 1  3mph 3 5% cues for inc step length TM 7% 0 9mph 3min x2 TM 7% 0 9 mph 4 mph x2 seated    Outdoor amb in windy condition [facility] training   Grass/Gravel      Stairs  Light handrail 2 flights, 7lb carry light handrail 2 flights Light " handrail 3 flights 7lb Light handrail weighted vest 5 flights [address] CGA   5 flights - education on [address] sequencing   No rail but SPC Recirprocal vs step to pattern   Modalities

## 2023-06-22 NOTE — PROGRESS NOTES
"Daily Speech Treatment Note    Today's date: 2023  Patient’s name: Carlitos Mendoza  : 1945  MRN: [id number]  Safety measures:   Referring provider: Lizzeth Mcduffie MD    Encounter Diagnosis     ICD-10-CM    1  Primary parkinsonism ([address] )  G20       2  Other symbolic dysfunctions  E98 7       3  Cognitive communication deficit  R41 841       4  Dysphonia  R49 0         Visit trackin    Subjective/Behavioral:  -  Pleasant/Cooperative    Objective/Assessment:  Completed structured activities with patient to target goals below  Results as stated below  Short-term goals:    Patient will achieve 80% accuracy or better with use of min cues to maximize visual and verbal memory via cognitive retraining  : Practiced \"Tappel\" word finding game and \"Rush Hour\" game  Completed \"memory\" card games with patient and patient did well with all games and enjoyed the higher level games working in improved processing          Patient will express understanding of home program exercises independently       LONG TERM GOALS:        Patient will complete cognitive retraining and express ideas of how to maximize memory & cognition      Patient will express understanding of home program exercises independently  Plan:  -Continue with current plan of care    "

## 2023-06-27 ENCOUNTER — APPOINTMENT (OUTPATIENT)
Dept: PHYSICAL THERAPY | Facility: CLINIC | Age: 78
End: 2023-06-27
Payer: MEDICARE

## 2023-06-27 ENCOUNTER — APPOINTMENT (OUTPATIENT)
Dept: SPEECH THERAPY | Facility: CLINIC | Age: 78
End: 2023-06-27
Payer: MEDICARE

## 2023-06-29 ENCOUNTER — APPOINTMENT (OUTPATIENT)
Dept: SPEECH THERAPY | Facility: CLINIC | Age: 78
End: 2023-06-29
Payer: MEDICARE

## 2023-06-29 ENCOUNTER — APPOINTMENT (OUTPATIENT)
Dept: PHYSICAL THERAPY | Facility: CLINIC | Age: 78
End: 2023-06-29
Payer: MEDICARE

## 2023-07-05 ENCOUNTER — OFFICE VISIT (OUTPATIENT)
Facility: CLINIC | Age: 78
End: 2023-07-05
Payer: MEDICARE

## 2023-07-05 ENCOUNTER — OFFICE VISIT (OUTPATIENT)
Dept: SPEECH THERAPY | Facility: CLINIC | Age: 78
End: 2023-07-05
Payer: MEDICARE

## 2023-07-05 DIAGNOSIS — R26.9 NEUROLOGIC GAIT DYSFUNCTION: ICD-10-CM

## 2023-07-05 DIAGNOSIS — G20 PRIMARY PARKINSONISM (HCC): Primary | ICD-10-CM

## 2023-07-05 DIAGNOSIS — R41.841 COGNITIVE COMMUNICATION DEFICIT: ICD-10-CM

## 2023-07-05 DIAGNOSIS — R48.8 OTHER SYMBOLIC DYSFUNCTIONS: ICD-10-CM

## 2023-07-05 DIAGNOSIS — R13.12 OROPHARYNGEAL DYSPHAGIA: ICD-10-CM

## 2023-07-05 PROCEDURE — 92507 TX SP LANG VOICE COMM INDIV: CPT

## 2023-07-05 PROCEDURE — 97116 GAIT TRAINING THERAPY: CPT

## 2023-07-05 PROCEDURE — 97112 NEUROMUSCULAR REEDUCATION: CPT

## 2023-07-05 NOTE — PROGRESS NOTES
Daily Note     Today's date: 2023  Patient name: Diego Cartwright  : 1945  MRN: [id number]  Referring provider: Eris Rivera MD  Dx:   Encounter Diagnosis     ICD-10-CM    1. Primary parkinsonism ([address])  G20       2. Neurologic gait dysfunction  R26.9           Start Time: 1015  Stop Time: 1100  Total time in clinic (min): 45 minutes    Subjective: Gabapentin seems to be working. Had full nights sleep, first in over a month. Less burning sensation at night. Arrives with [facility], feels like balance has gotten better but does want to practice using cane, especially on stairs. Working on his home program.    Objective: See treatment diary below      Assessment:  Challenged with uneven surfaces and compliant surfaces. Seems to have improved stability with this, occasional shuffled steps over these surfaces. Good foot clearance during stair climbing and sequences well with SPC, mild verbal cues needed for sequence. Able to increase pace of stepping with agility ladder tasks with good response and no issues with stability. Discussed potential transition to HEP after nv, pt in agreement. Patient demonstrated fatigue post treatment, exhibited good technique with therapeutic exercises and would benefit from continued PT      Plan: Continue per plan of care. Progress treatment as tolerated. Progress note nv.      Precautions: h/o tinnitus, h/o PD, h/o peripheral neuropathy,h/o HTN, B/L TKR  Re-eval Date: 2023      Date        Visit Count 29       FOTO        Pain In        Pain Out              Testing        MiniBest        TUG/TUGC        5xSTS        Gait speed        FGA        2/6MWT        Neuro Re-Ed        Segmental rolling with overpressure         Dynamic balance Low hurdles w bolster behind back 8 laps solo    Agility ladder step through 4 laps solo    Agility ladder 6 laps in/out solo       Static balance        Obstacle course Uneven surface + foam wedge 8 laps solo       High amplitude movement Resisted walking/balance        Supine to sidelying to sit        Standing reach cone to wall/floor; repeated with thoracic rotation         Ther Ex        SLR x 4        Hip hinge        Postural education        HR/TR        Mini Squats        Step ups        Mod bird dog        Supine LTR        Cervical rot SNAGs        L/s flexion        Bridge        Quadruped thread needle        Rows        Hamstring seated stretch        Iliopsoas stretch supine        Trial of Elliptical         TM for endurance        Prone lying        Thad test stretch        Stair lunge stretch        Gastroc str        Ther Activity        HEP        Bed mobility        Gait Training        Narrow spaces/busyenv        External cues 1. 5mph 4% 5min UE support       Stairs 7 flights [facility] CGA cues for sequencing       Modalities

## 2023-07-05 NOTE — PROGRESS NOTES
Daily Speech Treatment Note    Today's date: 2023  Patient’s name: Jason Lauren  : 1945  MRN: [id number]  Safety measures:   Referring provider: Param Burr MD    Encounter Diagnosis     ICD-10-CM    1. Primary parkinsonism ([address])  G20       2. Other symbolic dysfunctions  C65.9       3. Cognitive communication deficit  R41.841       4. Oropharyngeal dysphagia  R13.12         Visit trackin    Subjective/Behavioral:  -  Pleasant/Cooperative    Objective/Assessment:  Completed structured activities with patient to target goals below. Results as stated below. Short-term goals:    Patient will achieve 80% accuracy or better with use of min cues to maximize visual and verbal memory via cognitive retraining. : Practiced "Tappel" word finding game and "Kehinde-Hill game. Completed "memory" card games with patient and patient did well with all games and enjoyed the higher level games working in improved processing. : Completed divided attention letter-number exercises, mistakes x 3. On 3rd try, completed task in 1.05 seconds, 1 month prior completed in 1.22 seconds. Next session, complete story recall and compare with last time. Provide newest list of apps.         Patient will express understanding of home program exercises independently.      LONG TERM GOALS:        Patient will complete cognitive retraining and express ideas of how to maximize memory & cognition.     Patient will express understanding of home program exercises independently. Plan:  -Continue with current plan of care.

## 2023-07-07 ENCOUNTER — OFFICE VISIT (OUTPATIENT)
Facility: CLINIC | Age: 78
End: 2023-07-07
Payer: MEDICARE

## 2023-07-07 ENCOUNTER — OFFICE VISIT (OUTPATIENT)
Dept: SPEECH THERAPY | Facility: CLINIC | Age: 78
End: 2023-07-07
Payer: MEDICARE

## 2023-07-07 DIAGNOSIS — R26.9 NEUROLOGIC GAIT DYSFUNCTION: ICD-10-CM

## 2023-07-07 DIAGNOSIS — G20 PRIMARY PARKINSONISM (HCC): Primary | ICD-10-CM

## 2023-07-07 DIAGNOSIS — R48.8 OTHER SYMBOLIC DYSFUNCTIONS: ICD-10-CM

## 2023-07-07 DIAGNOSIS — R41.841 COGNITIVE COMMUNICATION DEFICIT: ICD-10-CM

## 2023-07-07 PROCEDURE — 97112 NEUROMUSCULAR REEDUCATION: CPT

## 2023-07-07 PROCEDURE — 92507 TX SP LANG VOICE COMM INDIV: CPT

## 2023-07-07 PROCEDURE — 97530 THERAPEUTIC ACTIVITIES: CPT

## 2023-07-07 NOTE — PROGRESS NOTES
Discharge Report    Today's date: 2023  Patient’s name: Spencer Garza  : 1945  MRN: [id number]  Safety measures:   Referring provider: Dat Carrillo MD    Encounter Diagnosis     ICD-10-CM    1. Primary parkinsonism ([address])  G20       2. Other symbolic dysfunctions  R43.4       3. Cognitive communication deficit  R41. 841         Visit trackin    Subjective/Behavioral:  -  Pleasant/Cooperative    Objective/Assessment:  Completed structured activities with patient to target goals below. Results as stated below. The Repeatable Battery for the Assessment of Neuropsychological Status (RBANS) is a brief, individually-administered assessment which measures attention, language, visuospatial/constructional abilities, and immediate & delayed memory. The RBANS is intended for use with adolescents to adults, ages 15 to 80 years. The following results were obtained during the administration of the assessment. Form: B    Cognitive Domain/Subtest: Index Score: Percentile Rank: Classification: RE Index Score: Status:   IMMEDIATE MEMORY 106 66%ile Average 78 IMPROVEMENT        1. List Learning ()          2. Story Memory ()           Short-term goals:    Patient will achieve 80% accuracy or better with use of min cues to maximize visual and verbal memory via cognitive retraining. : Practiced "Tappel" word finding game and "Kehinde-Hill game. Completed "memory" card games with patient and patient did well with all games and enjoyed the higher level games working in improved processing. : Completed divided attention letter-number exercises, mistakes x 3. On 3rd try, completed task in 1.05 seconds, 1 month prior completed in 1.22 seconds. Next session, complete story recall and compare with last time. Provide newest list of apps.   : Patient achieved great improvement in RBANS-Form B Immediate memory section in comparison to 23, improved from 78 to 106 indicated "average", provided and demonstrated facial exercises for patient to combat the masked facial expression. Provided additional apps for patient to work on cognition. ACHIEVED         Patient will express understanding of home program exercises independently. 7/7: Patient expressed understanding of home program recommendations and will plan on coming back for Reevaluation as warranted. (Likely October / November)      LONG TERM GOALS:        Patient will complete cognitive retraining and express ideas of how to maximize memory & cognition. ACHIEVED    Patient will express understanding of home program exercises independently. ACHIEVED          Plan:  Discharge from Speech/Cognitive Therapy at this time. Patient in agreement.

## 2023-07-07 NOTE — PROGRESS NOTES
PT Discharge    Today's date: 2023  Patient name: Mary Figueroa  : 1945  MRN: [id number]  Referring provider: Blu Gaytan MD  Dx:   Encounter Diagnosis     ICD-10-CM    1. Primary parkinsonism ([address])  G20       2. Neurologic gait dysfunction  R26.9           Start Time: 932  Stop Time: 1015  Total time in clinic (min): 43 minutes    Subjective: : wife notices at home pt not walking as well as he does in therapy. Pt feels that he is ready to transition to HEP for now and he is confident he will continue with routine of exercises. Still has concerns about his balance, especially in more challenging situations such as bleacher stairs. Occasional difficulyt sleeping still despite inc gabapentin. Feels his balance and stamina have improved from PT but not sure if it is at where he wants it. Seeing neurologist in 3 mos. 23: "I think things are going relatively well. The endurance is building up. I walk the treadmill at home, I can get up to 3 laps and 3/4 of a mile which is good between 1.5-2.0 mph. Goal is to be able to be able to get up to a full mile. That is proceeding well. I've walked several laps several times. Slowly but surely endurance is getting better. Balance is another thing, I have considerable difficulties walking in a straight line without UE support. I am veering and walking off the path. I have a fear of falling. For example, I attend some sporting events, sitting in the stadium seats where I have to walk down without handrails. I have a hard time walking down that, I need someone around holding my hand to give me the stability. : Still having intermittent back/side pain on R side. Does not notice any triggers. Wants to see urology. Nothing in PT has negatively affected it. Balance still an issue. Feels like therapy working on the right things. Feels like endurance is the attribute most improved. 3/30: Pt notes he has steadier balance but not where he wants it to be. He is on the treadmill every day and performing strengthening exercises. Feels like he is working on the right things so far.      2/23/23: "Present at PT: Trouble getting in/out of bed. Noticed about 3/4 months his balance become off - SPC use but feels like his balance has gotten slightly better. Lightheaded when getting in/out of bed. Difficulty getting in/out of bed due to mobility issues. Sometimes doesn't feel secure. SPC always in car in case. Sometimes some difficulty getting in/out of car. "    Objective: See treatment diary below     Balance Test 2/23 3/30 4/25 6/2/23 7/7/23   6 Minute Walk Test (ft): NV   1008ft 1141 ft  1058ft   MiniBest: 19/28 21/28 22/28 23/28 23/28   Gait Speed (m/s): 0.99 1.09 0.98 m/s 1.08 m/s  1.03 m/s   5x Sit To Stand (s): 12.88 08.96 08.09 8.8 s 08.13   TUG/TUGC: 09.39 / 11.65 50 > 35 07.16 / 08.87 60 > 39 07.01 / 10.38 5 ice cream flavors 7.2 // 10.18 serial counting from 100 by 6   100 > 70 7.18 / 8.20 50 > 32        Assessment: Alex Smith is a 77year old patient presenting to OPPT for re-evaluation with complaints of decreased balance, endurance, mobility, and strength. Alex Smith has attended 32 visits of OPPT. Pt reports some improvement in his overall endurance and balance. He is now using a SPC at times to improve his confidence in his community mobility and he demonstrates good sequencing during stair negotiation and overground. His gait speed is above 1.00m/s and his balance per the MiniBest shows appropriate right reactions, he is still limited balance wise which may be partly due to his history of neuropathy. His MiniBest is currently 23/28. We reviewed his HEP and he demonstrates good understanding of his program and why/how he is performing the exercises he is doing. Discussed with patient and wife about returning to PT in future to re-assess mobility, strength, and balance. They are in agreement with plan. In 4 weeks, patient will:  1.   Demonstrate ability to perform 20 minutes of activity without requiring seated rest break. - met  2. Demonstrate ability to maintain upright balance unsupported by UEs while reaching above shoulder height without resistance to promote stability with ADLs - met  3. Improve 5xSTS by at least 3 seconds to show improved LE strength for stability during transfers - met  4. Demonstrate ability to ambulate through doorway/threshold without verbal cues or change in gait quality >80% of occasions - met  5. Demonstrate ability to roll bilaterally in <7 seconds without use of assistive equipment - met    In 4-10 weeks, patient will:  1. Meet FOTO predicted score to demonstrate improvement in functional mobility. 2.  Demonstrate consistent carryover with HEP and walking program. - progressing  3. Improve gait speed by at least  0.12 m/s to meet MCID value for older adults and reduce fall risk. - NEARLY MET   4. Improve 6MWT by at least 100ft to demonstrate improvement in ability in community ambulation. - MET   5. Improve FGA by at least 4 points to meet MDC value for PwPD and show improved balance. - nearly met  6. Improve ABC by at least 16% to meet  reduced risk of falling and improved balance confidence. - progressing  7. Demonstrate ability to ambulate forward holding item requiring bilateral UEs without LOB or change in gait quality - met  8. Report walking a mile in his neighborhood with mild-mod fatigue. - met  9. Report improvement in ability to get in/out of bed. - met  10. Improve MiniBest by at least 4 points to meet MCID value for PwPD and demonstrate improved safety - MET     NEW GOALS as of 6/2/23 (8 weeks):   1. Improve 6WMT by 100 ft indicative of improved endurance - not met  2. Improve MIniBest greater than or equal to 26/28 indicative of improved balance and righting reactions  - not met  3. Patient will be independent with correct performance of home exercise and mobility - met  4.  Improve gait speed to > 1.2 m/s to be able to safely cross sidewalk - nearly met    Plan: Evaluation scheduled in Fall to determine decline in balance/function. Pt discharged to [facility]. Precautions: h/o tinnitus, h/o PD, h/o peripheral neuropathy,h/o HTN, B/L TKR  Re-eval Date: 7/7/2023      Date 7/5 7/7      Visit Count 29 30      FOTO        Pain In        Pain Out              Testing        MiniBest        TUG/TUGC        5xSTS        Gait speed        FGA        2/6MWT        Neuro Re-Ed  MiniBest, 6MWT, 10mWT, 5xSTS, TUG/C      Segmental rolling with overpressure         Dynamic balance Low hurdles w bolster behind back 8 laps solo    Agility ladder step through 4 laps solo    Agility ladder 6 laps in/out solo       Static balance        Obstacle course Uneven surface + foam wedge 8 laps solo       High amplitude movement        Resisted walking/balance        Supine to sidelying to sit        Standing reach cone to wall/floor; repeated with thoracic rotation         Ther Ex        SLR x 4        Hip hinge        Postural education        HR/TR        Mini Squats        Step ups        Mod bird dog        Supine LTR        Cervical rot SNAGs        L/s flexion        Bridge        Quadruped thread needle        Rows        Hamstring seated stretch        Iliopsoas stretch supine        Trial of Elliptical         TM for endurance        Prone lying        Thad test stretch        Stair lunge stretch        Gastroc str        Ther Activity        HEP  AF 10min      Bed mobility        Gait Training        Narrow spaces/busyenv        External cues 1. 5mph 4% 5min UE support       Stairs 7 flights [facility] CGA cues for sequencing       Modalities

## 2023-07-21 DIAGNOSIS — G60.9 IDIOPATHIC PERIPHERAL NEUROPATHY: ICD-10-CM

## 2023-07-21 RX ORDER — GABAPENTIN 100 MG/1
100 CAPSULE ORAL 3 TIMES DAILY
Qty: 90 CAPSULE | Refills: 1 | Status: SHIPPED | OUTPATIENT
Start: 2023-07-21

## 2023-09-05 ENCOUNTER — RA CDI HCC (OUTPATIENT)
Dept: OTHER | Facility: HOSPITAL | Age: 78
End: 2023-09-05

## 2023-09-13 ENCOUNTER — LAB (OUTPATIENT)
Dept: LAB | Facility: HOSPITAL | Age: 78
End: 2023-09-13
Payer: MEDICARE

## 2023-09-13 ENCOUNTER — OFFICE VISIT (OUTPATIENT)
Dept: FAMILY MEDICINE CLINIC | Facility: HOSPITAL | Age: 78
End: 2023-09-13
Payer: MEDICARE

## 2023-09-13 VITALS
HEIGHT: 73 IN | SYSTOLIC BLOOD PRESSURE: 100 MMHG | WEIGHT: 232 LBS | DIASTOLIC BLOOD PRESSURE: 58 MMHG | HEART RATE: 74 BPM | OXYGEN SATURATION: 93 % | RESPIRATION RATE: 16 BRPM | BODY MASS INDEX: 30.75 KG/M2

## 2023-09-13 DIAGNOSIS — I10 ESSENTIAL HYPERTENSION: ICD-10-CM

## 2023-09-13 DIAGNOSIS — E53.8 B12 DEFICIENCY: ICD-10-CM

## 2023-09-13 DIAGNOSIS — G60.9 IDIOPATHIC PERIPHERAL NEUROPATHY: ICD-10-CM

## 2023-09-13 DIAGNOSIS — Z23 NEED FOR INFLUENZA VACCINATION: ICD-10-CM

## 2023-09-13 DIAGNOSIS — G60.9 IDIOPATHIC PERIPHERAL NEUROPATHY: Primary | ICD-10-CM

## 2023-09-13 LAB
ALBUMIN SERPL BCP-MCNC: 4.1 G/DL (ref 3.5–5)
ALP SERPL-CCNC: 76 U/L (ref 34–104)
ALT SERPL W P-5'-P-CCNC: 12 U/L (ref 7–52)
ANION GAP SERPL CALCULATED.3IONS-SCNC: 9 MMOL/L
AST SERPL W P-5'-P-CCNC: 32 U/L (ref 13–39)
BILIRUB SERPL-MCNC: 0.92 MG/DL (ref 0.2–1)
BUN SERPL-MCNC: 22 MG/DL (ref 5–25)
CALCIUM SERPL-MCNC: 9.1 MG/DL (ref 8.4–10.2)
CHLORIDE SERPL-SCNC: 104 MMOL/L (ref 96–108)
CO2 SERPL-SCNC: 30 MMOL/L (ref 21–32)
CREAT SERPL-MCNC: 0.99 MG/DL (ref 0.6–1.3)
ERYTHROCYTE [DISTWIDTH] IN BLOOD BY AUTOMATED COUNT: 12.7 % (ref 11.6–15.1)
GFR SERPL CREATININE-BSD FRML MDRD: 72 ML/MIN/1.73SQ M
GLUCOSE SERPL-MCNC: 86 MG/DL (ref 65–140)
HCT VFR BLD AUTO: 42.4 % (ref 36.5–49.3)
HGB BLD-MCNC: 14.4 G/DL (ref 12–17)
MCH RBC QN AUTO: 32.7 PG (ref 26.8–34.3)
MCHC RBC AUTO-ENTMCNC: 34 G/DL (ref 31.4–37.4)
MCV RBC AUTO: 96 FL (ref 82–98)
PLATELET # BLD AUTO: 173 THOUSANDS/UL (ref 149–390)
PMV BLD AUTO: 10.9 FL (ref 8.9–12.7)
POTASSIUM SERPL-SCNC: 4 MMOL/L (ref 3.5–5.3)
PROT SERPL-MCNC: 6.6 G/DL (ref 6.4–8.4)
RBC # BLD AUTO: 4.41 MILLION/UL (ref 3.88–5.62)
SODIUM SERPL-SCNC: 143 MMOL/L (ref 135–147)
TSH SERPL DL<=0.05 MIU/L-ACNC: 0.99 UIU/ML (ref 0.45–4.5)
VIT B12 SERPL-MCNC: 510 PG/ML (ref 180–914)
WBC # BLD AUTO: 5.71 THOUSAND/UL (ref 4.31–10.16)

## 2023-09-13 PROCEDURE — 85027 COMPLETE CBC AUTOMATED: CPT

## 2023-09-13 PROCEDURE — 84443 ASSAY THYROID STIM HORMONE: CPT

## 2023-09-13 PROCEDURE — 82607 VITAMIN B-12: CPT

## 2023-09-13 PROCEDURE — 36415 COLL VENOUS BLD VENIPUNCTURE: CPT

## 2023-09-13 PROCEDURE — G0008 ADMIN INFLUENZA VIRUS VAC: HCPCS

## 2023-09-13 PROCEDURE — 99213 OFFICE O/P EST LOW 20 MIN: CPT | Performed by: INTERNAL MEDICINE

## 2023-09-13 PROCEDURE — 90662 IIV NO PRSV INCREASED AG IM: CPT

## 2023-09-13 PROCEDURE — 80053 COMPREHEN METABOLIC PANEL: CPT

## 2023-09-13 RX ORDER — GABAPENTIN 100 MG/1
300 CAPSULE ORAL 3 TIMES DAILY
Qty: 90 CAPSULE | Refills: 3 | Status: SHIPPED | OUTPATIENT
Start: 2023-09-13 | End: 2023-09-13

## 2023-09-13 RX ORDER — CARBIDOPA AND LEVODOPA 50; 200 MG/1; MG/1
1 TABLET, EXTENDED RELEASE ORAL
COMMUNITY
Start: 2023-06-30

## 2023-09-13 RX ORDER — GABAPENTIN 100 MG/1
300 CAPSULE ORAL
Qty: 90 CAPSULE | Refills: 3 | Status: SHIPPED | OUTPATIENT
Start: 2023-09-13

## 2023-09-13 NOTE — ASSESSMENT & PLAN NOTE
Pt has left heel burning sensation that has been getting worse, can't sleep at night. Pt has severe pain. Gabapentin helped for two nights only. Pt slept in a recliner and raised the legs that alleviated the neuropathic pain. His pain is tolerable currently and is able to walk, walking makes it better actually along with keeping the foot cold. Sleeping with his left knee bent also relieves the pain. The discomfort doesn't bother him during the day. He has also been taking gabapentin 200mg at night. Skin exam is normal, he has no skin ulcers, he has no tenderness of the heel, Achilles tendon or ankle. Increase dose of gabapentin to 300mg at night and continue the above measures to mitigate pain.     I told pt that he could go up to 400mg at night if 300mg is not sufficient

## 2023-09-13 NOTE — PROGRESS NOTES
Assessment/Plan:    Idiopathic peripheral neuropathy  Pt has left heel burning sensation that has been getting worse, can't sleep at night. Pt has severe pain. Gabapentin helped for two nights only. Pt slept in a recliner and raised the legs that alleviated the neuropathic pain. His pain is tolerable currently and is able to walk, walking makes it better actually along with keeping the foot cold. Sleeping with his left knee bent also relieves the pain. The discomfort doesn't bother him during the day. He has also been taking gabapentin 200mg at night. Skin exam is normal, he has no skin ulcers, he has no tenderness of the heel, Achilles tendon or ankle. Increase dose of gabapentin to 300mg at night and continue the above measures to mitigate pain. I told pt that he could go up to 400mg at night if 300mg is not sufficient            Essential hypertension  htn is controlled  Denies dizziness, lightheadedness  Continue lotensin, toprol  Check labs       Diagnoses and all orders for this visit:    Idiopathic peripheral neuropathy  -     gabapentin (NEURONTIN) 100 mg capsule; Take 3 capsules (300 mg total) by mouth 3 (three) times a day    Essential hypertension  -     Comprehensive metabolic panel; Future  -     TSH, 3rd generation with Free T4 reflex; Future  -     CBC; Future    B12 deficiency  -     Vitamin B12; Future    Other orders  -     carbidopa-levodopa (SINEMET CR)  mg per tablet; Take 1 tablet by mouth daily at bedtime          Subjective:      Patient ID: Deepthi Silver is a 77 y.o. male. HPI    Patient presents for follow-up of chronic conditions as detailed in the assessment and plan.       The following portions of the patient's history were reviewed and updated as appropriate: current medications, past family history, past medical history, past social history, past surgical history and problem list.    Review of Systems      Objective:    /58   Pulse 74   Resp 16 Ht 6' 1" (1.854 m)   Wt 105 kg (232 lb)   SpO2 93%   BMI 30.61 kg/m²      Physical Exam  Constitutional:       General: He is not in acute distress. Appearance: He is not toxic-appearing. Cardiovascular:      Rate and Rhythm: Normal rate and regular rhythm. Heart sounds: No murmur heard. Pulmonary:      Effort: No respiratory distress. Breath sounds: No wheezing or rales. Musculoskeletal:         General: No tenderness. Skin:     General: Skin is warm and dry. Findings: No bruising, erythema or lesion. Neurological:      Mental Status: He is alert.       Comments: Left hand resting tremor is present, bradykinesia is present             Hal Benitez MD

## 2023-09-15 ENCOUNTER — NURSE TRIAGE (OUTPATIENT)
Age: 78
End: 2023-09-15

## 2023-09-15 NOTE — TELEPHONE ENCOUNTER
Regarding: Kidney pain  ----- Message from Anaid Bullock sent at 9/15/2023 11:44 AM EDT -----  Pt has severe pain in the lower back and in the groin area in the front on the right hand side. Pt is not urinating very much. This has been going on since this morning.  Getting worse

## 2023-09-15 NOTE — TELEPHONE ENCOUNTER
Patient had back and groin pain this morning of a level 8, pain seems to have subsided. He is not having any urinary complaints. States he is drinking and voiding well, but not voiding large amounts. No difficulty passing urine and no penile pain, no hematuria visualized. No nausea vomiting or fevers. I gave care advice to hydrate, avoid bladder irritants and utilize a heating pad. ER and UC precautions reviewed. I explained to the patient that I would check with the provider for further recommendations or if any tests were recommended, since last cat scan in April showed punctate, non obstructing stones, and also follow up appointment if needed. Please advise on recommendations and follow up. Reason for Disposition  • Pain radiates into groin, scrotum    Answer Assessment - Initial Assessment Questions  1. LOCATION: "Where does it hurt?" (e.g., left, right)      RIGHT SIDE TO GROIN  2. ONSET: "When did the pain start?"      THIS MORNING   3. SEVERITY: "How bad is the pain?" (e.g., Scale 1-10; mild, moderate, or severe)    - MILD (1-3): doesn't interfere with normal activities     - MODERATE (4-7): interferes with normal activities or awakens from sleep     - SEVERE (8-10): excruciating pain and patient unable to do normal activities (stays in bed)        NO PAIN NOW  4. PATTERN: "Does the pain come and go, or is it constant?"       COME AND GONE  5. CAUSE: "What do you think is causing the pain?"      HISTORY OF KIDNEY STONES  6. OTHER SYMPTOMS:  "Do you have any other symptoms?" (e.g., fever, abdominal pain, vomiting, leg weakness, burning with urination, blood in urine)      NO TO ALL OF THESE    Protocols used:  FLANK PAIN-ADULT-[place]

## 2023-09-18 NOTE — TELEPHONE ENCOUNTER
No further recommendations if his discomfort has resolved.   Should his symptoms return he should call the office or depending on the severity of his discomfort report to the nearest emergency department

## 2023-10-26 DIAGNOSIS — M1A.9XX0 CHRONIC GOUT WITHOUT TOPHUS, UNSPECIFIED CAUSE, UNSPECIFIED SITE: ICD-10-CM

## 2023-10-26 DIAGNOSIS — I10 ESSENTIAL HYPERTENSION: ICD-10-CM

## 2023-10-26 RX ORDER — METOPROLOL SUCCINATE 50 MG/1
TABLET, EXTENDED RELEASE ORAL
Qty: 90 TABLET | Refills: 1 | Status: SHIPPED | OUTPATIENT
Start: 2023-10-26

## 2023-10-26 RX ORDER — ALLOPURINOL 300 MG/1
TABLET ORAL
Qty: 90 TABLET | Refills: 3 | Status: SHIPPED | OUTPATIENT
Start: 2023-10-26

## 2023-11-01 ENCOUNTER — EVALUATION (OUTPATIENT)
Dept: SPEECH THERAPY | Facility: CLINIC | Age: 78
End: 2023-11-01
Payer: MEDICARE

## 2023-11-01 ENCOUNTER — EVALUATION (OUTPATIENT)
Facility: CLINIC | Age: 78
End: 2023-11-01
Payer: MEDICARE

## 2023-11-01 DIAGNOSIS — G20.A1 PARKINSON'S DISEASE, UNSPECIFIED WHETHER DYSKINESIA PRESENT, UNSPECIFIED WHETHER MANIFESTATIONS FLUCTUATE: Primary | ICD-10-CM

## 2023-11-01 DIAGNOSIS — G20.C PRIMARY PARKINSONISM: Primary | ICD-10-CM

## 2023-11-01 DIAGNOSIS — G20.C PRIMARY PARKINSONISM: ICD-10-CM

## 2023-11-01 DIAGNOSIS — R49.0 DYSPHONIA: ICD-10-CM

## 2023-11-01 PROCEDURE — 92610 EVALUATE SWALLOWING FUNCTION: CPT

## 2023-11-01 PROCEDURE — 97163 PT EVAL HIGH COMPLEX 45 MIN: CPT

## 2023-11-01 PROCEDURE — 92524 BEHAVRAL QUALIT ANALYS VOICE: CPT

## 2023-11-01 NOTE — PROGRESS NOTES
PT Evaluation     Today's date: 2023  Patient name: Amanda Stevenson  : 1945  MRN: [id number]  Referring provider: Joanie Miller MD  Dx:   Encounter Diagnosis     ICD-10-CM    1. Primary parkinsonism  G20. C Ambulatory Referral to Physical Therapy          Start Time:   Stop Time: 1100  Total time in clinic (min): 45 minutes    Assessment  Assessment details: Patient is a 77y.o. year old male presenting to OPPT s/p diagnosis of Parkinsons disease and peripheral neuropathy. Pt returns after hiatus from PT. In the mean time he reports being not as consistent with exercise program as he could've been due to life stressors/events. Since last visit he is more frequently using his [facility] and he reports more imbalance. Pt ambulates at gait speed of 0.90 m/s with no AD and 0.82m/s with his SPC, classifying them as a unlimited community ambulator. Since last assessment, this is a decline in his gait speed. The quality of his gait is crouched with minimal arm swing, occasional stepping strategy needed to regain balance during turns or narrow base of support tasks. ABC reveals low balance confidence at 64%. His FGA and MiniBest test show he is at high risk for falls, / and  respectively. His MiniBest also shows reduction in balance with a 3 point drop since Anup Ojeda was last seen. Will perform 6MWT to assess overall endurance, since patient reports decline in this as well. Patient requires assistance for ADLs and IADLs. Given written HEP with focus on LE strength. He will benefit from skilled PT interventions to maximize return to PLOF and independence as able. Thank you for this referral and the ability to participate in the care of this patient.     Impairments: abnormal coordination, abnormal gait, abnormal muscle tone, abnormal or restricted ROM, abnormal movement, activity intolerance, impaired balance, impaired physical strength, lacks appropriate home exercise program, safety issue and weight-bearing intolerance     Prognosis: good    Goals  In 4 weeks, patient will:  1. Demonstrate ability to perform 20 minutes of activity without requiring seated rest break. 2.  Demonstrate ability to maintain upright balance unsupported by UEs while reaching above shoulder height without resistance to promote stability with ADLs  3. Improve 30s chair stand by at least 2 stands to show improved LE strength for stability during transfers  4. Demonstrate ability to ambulate through doorway/threshold without verbal cues or change in gait quality >80% of occasions  5. Demonstrate ability to roll bilaterally in <7 seconds without use of assistive equipment    In 4-10 weeks, patient will:  1. Demonstrate consistent carryover with HEP and walking program.  2.  Improve gait speed by at least  0.18 m/s to meet MDC value for PwPD and reduce fall risk. 3.  Improve 6MWT by at least 270ft to meet MDC value and demonstrate improvement in ability in community ambulation. 4.  Improve FGA by at least 4 points to meet [place] value for PwPD and show improved balance. 5.  Improve ABC by at least 16% to meet reduced risk of falling and improved balance confidence. 6.  Demonstrate ability to ambulate forward holding item requiring bilateral UEs without LOB or change in gait quality. 7.  Report improvement in ability to get in/out of bed. 8. Improve MiniBest by at least 4 points to meet MCID value for PwPD and demonstrate improved safety. 9. Improve fastest gait speed by at least 0.25m/s to meet MDC value for fast pace gait.     Patient Specific Functional Scale: balance 7/10, endurance 5/10, 12/20       Plan  Patient would benefit from: skilled physical therapy  Planned therapy interventions: neuromuscular re-education, manual therapy, patient education, home exercise program, therapeutic exercise, therapeutic activities and gait training  Frequency: 2x week  Duration in weeks: 12  Plan of Care beginning date: 11/1/2023  Plan of Care expiration date: 2024  Treatment plan discussed with: patient      Subjective Evaluation    History of Present Illness  Mechanism of injury: Present at PT: More excruciating burning pain due to neuropathy. Using [facility] for distances. Had episode of complete loss of balance. Lasted a day, but has not had an episode since. Does nto have burning now though. Has not been as faithful exercise wise due to life stressors or events. Notices a decline in his balance and feels like he is on his way to recovering. Wants to work on his balance and endurance. Patient Specific Functional Scale: Balance 10, Endurance 5/10,     Patient Goals  Patient goals for therapy: improved balance, independence with ADLs/IADLs, increased strength and increased motion    Pain  Current pain ratin  At best pain ratin  At worst pain rating: 10  Location: neuopathy pain  Relieving factors: change in position    Social Support  Stairs in house: yes   Lives in: multiple-level home  Lives with: spouse    Employment status: not working    Diagnostic Tests  No diagnostic tests performed  Treatments  Previous treatment: physical therapy and speech therapy      Objective      Balance Test 2023   6 Minute Walk Test (ft): 1058ft NV   FGA:     ABC  64%   MiniBest    Gait Speed (m/s): 1.03 m/s SPC 0.82m/s / no AD 0.90m/s  Fast: 1.45m/s no AD   30s chair stand:   14   5x Sit To Stand (s): 8.13 9.74   TUG / Nishants Gissell: 7.18 / 8.20 50 > 32 9.15 / 10.60 50 > 41 (37 > 28 by 4's error)         MCTSIB Number of Seconds   Feet Together, Eyes Open 30   Feet Together, Eyes Closed 30   Feet Together, Eyes Open Foam 30   Feet Together, Eyes Closed Foam 30          Gait Assessment: Croched gait minimal arm swing, footflat strike - lacking proper IC, Stooped posture, inadequate knee ext in terminal swing.            Precautions: h/o tinnitus, h/o PD, h/o peripheral neuropathy,h/o HTN, B/L TKR  Re-eval Date:  2023    Date  Visit Count 1       FOTO See IE       Pain In See IE       Pain Out                Testing        TUG/C 9.15 / 10.60 50 > 41 (37 > 28 by 4's error)       30s chair stand 14       5xSTS 9.74       Gait speed 0.90 no AD / 0.82 SPC       ABC 64       MiniBest 20       FGA 16       6MWT NV       Neuro Re-Ed        Dynamic balance        Static balance        Obstacle course        Cognitive dual task                                Ther Ex        SLR x 4        HR/TR        Mini Squats        Step ups                                        Ther Activity        ADL                Gait Training        Divided Attention        Head turns        Modalities         prn

## 2023-11-01 NOTE — PROGRESS NOTES
Speech-Language Pathology Initial Evaluation    Today's date: 2023   Patient’s name: Mary Sifuentes  : 1945  MRN: [id number]  Safety measures:   Referring provider: Kim Dewitt MD    Encounter Diagnosis     ICD-10-CM    1. Parkinson's disease, unspecified whether dyskinesia present, unspecified whether manifestations fluctuate  G20. A1       2. Dysphonia  R49.0           Visit tracking:  -Referring provider: Epic  -Billing guidelines: CMS  -Visit # 1  -Insurance: Medicare  -RE due: 6-8 weeks: 2023    Subjective comments: Pleasant/Cooperative    How did the patient hear about us? Prior patient    Patient's goal(s): "To get rid of the Neuropathy in foot. ."    Reason for referral: Parent/caregiver concern: Reevaluation  Prior functional status: N/A  Clinically complex situations: N/A    History: Patient is a 77 y.o. male who was referred to outpatient skilled Speech Therapy services for a voice evaluation. Patient notes that he had worsened balance issues since discontinuing PT due to continual Neuropathy issues. Patient has had consistent pain on pad of left heel (sharp, shooting pain). Hearing: WFL  Vision: WFL with glasses    Home environment/lifestyle: Home with wife  Highest level of education: B.S- [employer], Executive Business Degree from [employer] status: Retired    Mental status: Alert  Behavior status: Cooperative  Patient reported symptoms of: anxiety, fatigue, and frustration  Communication modalities: Verbal  Rehabilitation prognosis: Good rehab potential to reach the established goals    Assessments      DYSPHAGIA EVALUATION:    --Difficulty swallowing: Patient has noted prior gagging but does note this swallowing feels "stronger" than before. Reevaluation today due to ongoing monitoring of dysphagia, cognition & voice in this patient with PD.      -Current diet (solids): Regular  -Current diet (liquids):  Thin  -Current pill intake method: with liquid   -Alternative Feeding Method?: No    -Facial appearance Symmetrical   -Dentition Adequate   -Labial function WFL   -Lingual function WFL   -Velar function Symmetrical       LIQUID CONSISTENCY TESTIN. Liquid Consistency (Thin) - water  Administered by: Cup, Straw, and Self-fed  Strategies, attempts, and responses: None    CLINICAL FINDINGS:  Oral phase impairments: WFL  Pharyngeal Phase Impairments: WFL     *Laryngeal excursion upon palpation: Appeared WFL    SPEAK OUT ASSESSMENT:    MAXIMUM PHONATION /A/:  AVERAGE 10.14 SECONDS, 79 dB    SPEAKING AT CONVERSATION LEEL: 79 dB LEVEL AVERAGE    READING GRANDFATHER PASSAGE: 81 dB. PATIENT REPORTS HE IS CONTINUING TO DO SPEAK OUT! EXERCISES     WILL REASSESS COGNITION VIA FORMAL ASSESSMENT IN 6 MONTHS. Goals    Short-term goals:  Patient will express understanding of home exercise program.   ACHIEVED PATIENT EXPRESSED UNDERSTANDING AND WILL CONTINUE TO COMPLETE SWALLOWING AND VOICE EXERCISES AND MAINTAIN COGNITIVE TASKS. Long-term goals:  Patient will maximize cognition, voice & swallowing function. ACHIEVED. Impressions/Recommendations    Impressions:   -Patient presents with functional swallowing, voice & cognition (appears at baseline per Reevaluation). Recommend home exercise program as reviewed with patient and reevaluation in 6 months. Recommendations:  -Patient would benefit from outpatient skilled Speech Therapy services: Reevaluation in 6 months.     -Frequency: No treatment warranted at this time.  -Duration: Reevaluation in 6 months.    -Intervention certification from: 69/3/1075  -Intervention certification to:     -Intervention comments:

## 2023-11-07 ENCOUNTER — OFFICE VISIT (OUTPATIENT)
Facility: CLINIC | Age: 78
End: 2023-11-07
Payer: MEDICARE

## 2023-11-07 DIAGNOSIS — G20.C PRIMARY PARKINSONISM: Primary | ICD-10-CM

## 2023-11-07 PROCEDURE — 97112 NEUROMUSCULAR REEDUCATION: CPT

## 2023-11-07 NOTE — PROGRESS NOTES
Daily Note     Today's date: 2023  Patient name: Lydia Dumont  : 1945  MRN: [id number]  Referring provider: George Cornell MD  Dx:   Encounter Diagnosis     ICD-10-CM    1. Primary parkinsonism  G20. C           Start Time: 1100  Stop Time: 1140  Total time in clinic (min): 40 minutes    Subjective: Was at 's earlier today with his wife, so he waws doing a lot of walking. Objective: See treatment diary below  6MWT: 1053ft [facility]  LUE seated post session:     Assessment:  Medicine ball to incorporate large amplitude movement coupled with power. Occasional imbalance with medicine ball slam, usually in attempts to reach anteriorly for ball. With visual cues and external cues able to take larger steps, still in slightly crouched posture. Occasional stepping strategy needed to regain balance. Patient would benefit from continued PT      Plan: Continue per plan of care. Large amplitude power. Dynamic balance.       Precautions: h/o tinnitus, h/o PD, h/o peripheral neuropathy,h/o HTN, B/L TKR  Re-eval Date:  2023    Date       Visit Count 1 2      FOTO See IE       Pain In See IE       Pain Out                Testing        TUG/C 9.15 / 10.60 50 > 41 (37 > 28 by 4's error)       30s chair stand 14       5xSTS 9.74       Gait speed 0.90 no AD / 0.82 [facility] [place]       ABC 64       MiniBest 20       FGA 16       6MWT 1053ft [facility]       Neuro Re-Ed        Dynamic balance  Bwd stepping 6 laps agility ladder CGA    Low hurdles lat step 8 laps      Static balance        Obstacle course        Cognitive dual task        Large amplitude movement  8lb med ball slam 2x8    8lb med ball sit<>stand + OH press 2x10 foam on chair                      Ther Ex        SLR x 4        HR/TR        Mini Squats        Step ups                                        Ther Activity        ADL                Gait Training        Divided Attention        Head turns        Modalities         prn

## 2023-11-10 ENCOUNTER — OFFICE VISIT (OUTPATIENT)
Facility: CLINIC | Age: 78
End: 2023-11-10
Payer: MEDICARE

## 2023-11-10 DIAGNOSIS — G20.C PRIMARY PARKINSONISM: Primary | ICD-10-CM

## 2023-11-10 PROCEDURE — 97112 NEUROMUSCULAR REEDUCATION: CPT

## 2023-11-10 PROCEDURE — 97116 GAIT TRAINING THERAPY: CPT

## 2023-11-10 PROCEDURE — 97110 THERAPEUTIC EXERCISES: CPT

## 2023-11-10 NOTE — PROGRESS NOTES
Daily Note     Today's date: 11/10/2023  Patient name: Yulia Sotelo  : 1945  MRN: [id number]  Referring provider: Kelby Guevara MD  Dx:   Encounter Diagnosis     ICD-10-CM    1. Primary parkinsonism  G20. C           Start Time: 6443  Stop Time: 1358  Total time in clinic (min): 45 minutes    Subjective: Pt reports nothing new. Felt good after last session. Objective: See treatment diary below  Max HR: 108bpm during stairs    Assessment:  Good alvin on stairs, good balance with 1 UE support. Backward stepping was most difficult when stepping over hurdles, stepping strategies needed to keep balance. Agility ladder used to increase step length, 2 box lengths are too far requires patient to perform leap like ambulation with moderate instability. Good power generation from lower chair than prior session. Cues for form for UE and scapular strengthening with good form, slight diminish in ROM most likely due to fatigue. Patient would benefit from continued PT      Plan: Continue per plan of care. Large amplitude power. Dynamic balance. Precautions: h/o tinnitus, h/o PD, h/o peripheral neuropathy,h/o HTN, B/L TKR  Re-eval Date:  2023    Date 11/1 11/7 11/10     Visit Count 1 2 3     FOTO See IE       Pain In See IE       Pain Out                Testing        TUG/C 9.15 / 10.60 50 > 41 (37 > 28 by 4's error)       30s chair stand 14       5xSTS 9.74       Gait speed 0.90 no AD / 0.82 SPC       ABC 64       MiniBest 20       FGA 16       6MWT 1053ft SPC       Neuro Re-Ed        Dynamic balance  Bwd stepping 6 laps agility ladder CGA    Low hurdles lat step 8 laps Cw/ccw hurdles multidirectional step x4 ea direction         Static balance        Obstacle course        Cognitive dual task        Large amplitude movement  8lb med ball slam 2x8    8lb med ball sit<>stand + OH press 2x10 foam on chair Agility ladder 1-2 boxes 3x5 8lb med ball slams.      White chair sit<>stand UE raise 2x10 Ther Ex        SLR x 4        HR/TR        Mini Squats        Step ups        Unilat row   25lb 2x10     Upright Y   YTB 3x10                     Ther Activity        ADL                Gait Training        Divided Attention        Head turns        Stairs   3 flights x2, 2 flights x1 HR 108bpm     Modalities         prn

## 2023-11-14 ENCOUNTER — OFFICE VISIT (OUTPATIENT)
Facility: CLINIC | Age: 78
End: 2023-11-14
Payer: MEDICARE

## 2023-11-14 DIAGNOSIS — G20.C PRIMARY PARKINSONISM: Primary | ICD-10-CM

## 2023-11-14 PROCEDURE — 97112 NEUROMUSCULAR REEDUCATION: CPT

## 2023-11-14 PROCEDURE — 97110 THERAPEUTIC EXERCISES: CPT

## 2023-11-17 ENCOUNTER — OFFICE VISIT (OUTPATIENT)
Facility: CLINIC | Age: 78
End: 2023-11-17
Payer: MEDICARE

## 2023-11-17 DIAGNOSIS — G20.C PRIMARY PARKINSONISM: Primary | ICD-10-CM

## 2023-11-17 PROCEDURE — 97112 NEUROMUSCULAR REEDUCATION: CPT

## 2023-11-17 PROCEDURE — 97110 THERAPEUTIC EXERCISES: CPT

## 2023-11-17 NOTE — PROGRESS NOTES
Daily Note     Today's date: 2023  Patient name: Amanda Stevenson  : 1945  MRN: [id number]  Referring provider: Joanie Miller MD  Dx:   Encounter Diagnosis     ICD-10-CM    1. Primary parkinsonism  G20. C           Start Time: 1230  Stop Time: 1315  Total time in clinic (min): 45 minutes    Subjective: Pt reports nothing new. Felt good a little sore after last session. Been using the TM at home. Objective: See treatment diary below      Assessment:  Increasing intensity as [place] as able to tolerate. Increasing in repetition, load, and speed. Added more overhead holds to further challenge postural muscles during balance activity. More instability and inaccurate stepping laterally vs forward. Short steps while ambulating backward, shorter on L vs R. Demonstrates good ROM with rotational movement and using medicine ball. Patient would benefit from continued PT      Plan: Continue per plan of care. Large amplitude power. Dynamic balance.       Precautions: h/o tinnitus, h/o PD, h/o peripheral neuropathy,h/o HTN, B/L TKR  Re-eval Date:  2023    Date 11/1 11/7 11/10 11/14 11/17   Visit Count 1 2 3 4 5   FOTO See IE       Pain In See IE       Pain Out                Testing        TUG/C 9.15 / 10.60 50 > 41 (37 > 28 by 4's error)       30s chair stand 14       5xSTS 9.74       Gait speed 0.90 no AD / 0.82 SPC       ABC 64       MiniBest 20       FGA 16       6MWT 1053ft SPC       Neuro Re-Ed        Dynamic balance  Bwd stepping 6 laps agility ladder CGA    Low hurdles lat step 8 laps Cw/ccw hurdles multidirectional step x4 ea direction      Bwd stepping solo 8 laps cues for posture   Static balance    11lb med ball rotation toss cues for power 2x10 11lb med ball rotation toss cues for power 2x10    11lb med ball "kb swing" toss 2x10   Obstacle course        Cognitive dual task        Large amplitude movement  8lb med ball slam 2x8    8lb med ball sit<>stand + OH press 2x10 foam on chair Agility ladder 1-2 boxes 3x5 8lb med ball slams.      White chair sit<>stand UE raise 2x10 Agility ladder 1 box 2 laps x4 + 8lb med ball slam 4x8    Regular chair 11lb med ball 2x10, 15kb hold 1x10 Low hurdles 8lb med ball OH hold 4 laps fwd, 4 laps lat                   Ther Ex        SLR x 4        HR/TR        Mini Squats        Step ups        Unilat row   25lb 2x10 2x14 35lb 3x12 35lb   Upright Y   YTB 3x10 OTB 3x14 OTB 3x14   Leg Press    155lb 1x14, 1x20 165lb 3x12           Ther Activity        ADL                Gait Training        Divided Attention        Head turns        Stairs   3 flights x2, 2 flights x1 HR 108bpm     Modalities         prn

## 2023-11-21 ENCOUNTER — OFFICE VISIT (OUTPATIENT)
Facility: CLINIC | Age: 78
End: 2023-11-21
Payer: MEDICARE

## 2023-11-21 DIAGNOSIS — G20.C PRIMARY PARKINSONISM: Primary | ICD-10-CM

## 2023-11-21 PROCEDURE — 97112 NEUROMUSCULAR REEDUCATION: CPT

## 2023-11-21 PROCEDURE — 97110 THERAPEUTIC EXERCISES: CPT

## 2023-11-21 NOTE — PROGRESS NOTES
Daily Note     Today's date: 2023  Patient name: Marsha Ozuna  : 1945  MRN: [id number]  Referring provider: Constantine Phalen, MD  Dx:   Encounter Diagnosis     ICD-10-CM    1. Primary parkinsonism  G20. C           Start Time: 698  Stop Time: 584  Total time in clinic (min): 44 minutes    Subjective: Pt states he felt good after therapy last time. No soreness or pain. Objective: See treatment diary below      Assessment:  Increasing resistance training as able, pt shows increasing workload capacity. Small step length noted with KB carry on the R > L. Positive step length overall, but shortened. No issues with clearance over hurdles. Increased steps with turns especially in narrow spaces. With faster paced movement seemed to improve step length with step ups. Verbal cues for form during single arm row + rotation. Patient would benefit from continued PT      Plan: Continue per plan of care. Large amplitude power. Dynamic balance.       Precautions: h/o tinnitus, h/o PD, h/o peripheral neuropathy,h/o HTN, B/L TKR  Re-eval Date:  2023    Date        Visit Count 6       FOTO See IE       Pain In See IE       Pain Out                Testing        TUG/C 9.15 / 10.60 50 > 41 (37 > 28 by 4's error)       30s chair stand 14       5xSTS 9.74       Gait speed 0.90 no AD / 0.82 SPC       ABC 64       MiniBest 20       FGA 16       6MWT 1053ft SPC       Neuro Re-Ed        Dynamic balance 20lb kb carry 15ft x3 laps ea bwd stepping       Static balance        Obstacle course 8lb med ball slam low hurdles 3 laps in a row       Cognitive dual task        Large amplitude movement // bar 1min fast as possible x2 8' step lat                       Ther Ex        SLR x 4        HR/TR        Mini Squats STS 20lb 2x8       Step ups        Unilat row 35lb 3x15       Upright Y BTB 3x12       Leg Press 165lb 3x12               Ther Activity        ADL                Gait Training        Divided Attention Head turns        Stairs        Modalities

## 2023-11-24 ENCOUNTER — APPOINTMENT (OUTPATIENT)
Facility: CLINIC | Age: 78
End: 2023-11-24
Payer: MEDICARE

## 2023-11-24 ENCOUNTER — TELEPHONE (OUTPATIENT)
Facility: CLINIC | Age: 78
End: 2023-11-24

## 2023-11-28 ENCOUNTER — OFFICE VISIT (OUTPATIENT)
Facility: CLINIC | Age: 78
End: 2023-11-28
Payer: MEDICARE

## 2023-11-28 DIAGNOSIS — G20.C PRIMARY PARKINSONISM: Primary | ICD-10-CM

## 2023-11-28 PROCEDURE — 97110 THERAPEUTIC EXERCISES: CPT

## 2023-11-28 PROCEDURE — 97112 NEUROMUSCULAR REEDUCATION: CPT

## 2023-11-28 NOTE — PROGRESS NOTES
Daily Note     Today's date: 2023  Patient name: Adilene Byrne  : 1945  MRN: [id number]  Referring provider: Hoda Butler MD  Dx:   Encounter Diagnosis     ICD-10-CM    1. Primary parkinsonism  G20. C             Start Time: 1501  Stop Time: 1545  Total time in clinic (min): 44 minutes    Subjective: Pt states he messed up his appts last week. Otherwise things are going alright. Wants to work on balance exercises next time. Objective: See treatment diary below      Assessment:  Required a few stepping strategies with medicine ball related exercises, but able to independently regain in <3 steps. Over time of repetitive movement occasional need for verbal cues to increase amplitude of LE stepping. Minimal cues for therapeutic exercise. Reports some discomfort with WB shoulder exercises but reduced once stimulus removed. Shortened ROM achieved with KB deadlift with good form. Patient would benefit from continued PT      Plan: Continue per plan of care. Large amplitude power. Dynamic balance.       Precautions: h/o tinnitus, h/o PD, h/o peripheral neuropathy,h/o HTN, B/L TKR  Re-eval Date:  2023    Date       Visit Count 6 7      FOTO See IE       Pain In See IE       Pain Out                Testing        TUG/C 9.15 / 10.60 50 > 41 (37 > 28 by 4's error)       30s chair stand 14       5xSTS 9.74       Gait speed 0.90 no AD / 0.82 SPC       ABC 64       MiniBest 20       FGA 16       6MWT 1053ft SPC       Neuro Re-Ed        Dynamic balance 20lb kb carry 15ft x3 laps ea bwd stepping       Static balance        Obstacle course 8lb med ball slam low hurdles 3 laps in a row       Cognitive dual task        Large amplitude movement // bar 1min fast as possible x2 8' step lat // bar 1min fast as possible x2 8' lat step    8lb med ball OH toss x12 solo                      Ther Ex        SLR x 4        HR/TR        Mini Squats STS 20lb 2x8       Step ups        Unilat row 35lb 3x15 35lb 3x15 Upright Y BTB 3x12 BTB 3x12      Leg Press 165lb 3x12 165lb 3x12      Mod quadruped KB pull through  Mat table x10 ea direction 7.5#      KB deadlift  10lb 2x12      Ther Activity        ADL                Gait Training        Divided Attention        Head turns        Stairs        Modalities

## 2023-12-01 ENCOUNTER — OFFICE VISIT (OUTPATIENT)
Facility: CLINIC | Age: 78
End: 2023-12-01
Payer: MEDICARE

## 2023-12-01 DIAGNOSIS — G20.C PRIMARY PARKINSONISM: Primary | ICD-10-CM

## 2023-12-01 PROCEDURE — 97112 NEUROMUSCULAR REEDUCATION: CPT

## 2023-12-01 NOTE — PROGRESS NOTES
Daily Note     Today's date: 2023  Patient name: Lam Simpson  : 1945  MRN: [id number]  Referring provider: Emily Arana MD  Dx:   Encounter Diagnosis     ICD-10-CM    1. Primary parkinsonism  G20. C           Start Time:   Stop Time: 1350  Total time in clinic (min): 45 minutes    Subjective: Reports that he is doing well. Would like to work on balance. Feels that like balance and his endurance are his weaknesses currently. Has a hard time balancing on one leg to put his trousers on. As well as having time with stadium seats - notes that the narrowness of the step makes it difficult. Objective: See treatment diary below      Assessment: Tolerated treatment well. SOLO used for overground and uneven surface for fall prevention. Continued balance training on uneven ground. One LOB during session recovered by multiple stepping strategies. Retropulsion noted with obstacle course most frequently but able to recover with stepping strategy. Has compensation to go into crouched knees and trunk flexion to try and lower COG to recover and keep balance during stressful activities. Some ankle instability with compliant surface - that patient attributed to shoe wear that didn't have much lateral plane support. Patient inquired about further balance exercises to perform at home - workshopped some exercises at kitchen counter such as standing march, and side stepping to supplement exercise program for the time being. Patient demonstrated fatigue post treatment, exhibited good technique with therapeutic exercises, and would benefit from continued PT      Plan: Continue per plan of care. Progress treatment as tolerated.        Precautions: h/o tinnitus, h/o PD, h/o peripheral neuropathy,h/o HTN, B/L TKR  Re-eval Date:  2023    Date      Visit Count 6 7 8     FOTO See IE       Pain In See IE       Pain Out                Testing        TUG/C 9.15 / 10.60 50 > 41 (37 > 28 by 4's error)       30s chair stand 14       5xSTS 9.74       Gait speed 0.90 no AD / 0.82 SPC       ABC 64       MiniBest 20       FGA 16       6MWT 1053ft SPC       Neuro Re-Ed        Dynamic balance 20lb kb carry 15ft x3 laps ea bwd stepping       Static balance   SLS 20x3" ea     Tandem EC 3x10" ea (7/10 RPE)     Modified SLS 6" step 3x30" ea (5/10 RPE)     Obstacle course 8lb med ball slam low hurdles 3 laps in a row  Uneven mat, colored dots, 3 med hurdles with airex pad, 6" step narrow 20x throughout (SOLO)     Cognitive dual task        Large amplitude movement // bar 1min fast as possible x2 8' step lat // bar 1min fast as possible x2 8' lat step    8lb med ball OH toss x12 solo Large amplitude on uneven mat - step forward 10x ea (9/10 RPE)                     Ther Ex        SLR x 4        HR/TR        Mini Squats STS 20lb 2x8       Step ups        Unilat row 35lb 3x15 35lb 3x15      Upright Y BTB 3x12 BTB 3x12      Leg Press 165lb 3x12 165lb 3x12      Mod quadruped KB pull through  Mat table x10 ea direction 7.5#      KB deadlift  10lb 2x12      Ther Activity        ADL                Gait Training        Divided Attention        Head turns        Stairs        Modalities

## 2023-12-05 ENCOUNTER — OFFICE VISIT (OUTPATIENT)
Facility: CLINIC | Age: 78
End: 2023-12-05
Payer: MEDICARE

## 2023-12-05 DIAGNOSIS — G20.C PRIMARY PARKINSONISM: Primary | ICD-10-CM

## 2023-12-05 PROCEDURE — 97112 NEUROMUSCULAR REEDUCATION: CPT

## 2023-12-05 NOTE — PROGRESS NOTES
PT Re-Evaluation /Progress note    Today's date: 2023  Patient name: Allyson Raza  : 1945  MRN: [id number]  Referring provider: Yaneth Bah MD  Dx:   Encounter Diagnosis     ICD-10-CM    1. Primary parkinsonism  G20. C           Start Time: 1140  Stop Time: 1228  Total time in clinic (min): 48 minutes    Assessment  Assessment details: Patient is a 77y.o. year old male presenting to OPPT s/p diagnosis of Parkinsons disease and peripheral neuropathy. Hussain Max is showing overall improvement in his mobility. He reports being more active at home which he feels is helpful. His outcome measures demonstrate slight improvement as well. Showing more strength and balance vs initial assessment. His gait speed shows light improvement as well, 0.94m/s which is 0.04m/s improved from IE. ABC is now >70% showing an improvement in his balance confidence. Progress in balance region is slower due to his neuropathy. He is progressing toward short term and long term goals and will continue to improve from skilled PT. Continue per plan of care. Impairments: abnormal coordination, abnormal gait, abnormal muscle tone, abnormal or restricted ROM, abnormal movement, activity intolerance, impaired balance, impaired physical strength, lacks appropriate home exercise program, safety issue and weight-bearing intolerance     Prognosis: good    Goals  In 4 weeks, patient will:  1. Demonstrate ability to perform 20 minutes of activity without requiring seated rest break. - met  2. Demonstrate ability to maintain upright balance unsupported by UEs while reaching above shoulder height without resistance to promote stability with ADLs - met  3. Improve 30s chair stand by at least 2 stands to show improved LE strength for stability during transfers  4. Demonstrate ability to ambulate through doorway/threshold without verbal cues or change in gait quality >80% of occasions - met  5.   Demonstrate ability to roll bilaterally in <7 seconds without use of assistive equipment - met    In 4-10 weeks, patient will:  1. Demonstrate consistent carryover with HEP and walking program.  2.  Improve gait speed by at least  0.18 m/s to meet MDC value for PwPD and reduce fall risk. 3.  Improve 6MWT by at least 270ft to meet MDC value and demonstrate improvement in ability in community ambulation. 4.  Improve FGA by at least 4 points to meet [facility] value for PwPD and show improved balance. 5.  Improve ABC by at least 16% to meet reduced risk of falling and improved balance confidence. 6.  Demonstrate ability to ambulate forward holding item requiring bilateral UEs without LOB or change in gait quality. 7.  Report improvement in ability to get in/out of bed. 8. Improve MiniBest by at least 4 points to meet MCID value for PwPD and demonstrate improved safety. 9. Improve fastest gait speed by at least 0.25m/s to meet MDC value for fast pace gait. Patient Specific Functional Scale: balance 7/10, endurance 5/10, 12/20       Plan  Patient would benefit from: skilled physical therapy  Planned therapy interventions: neuromuscular re-education, manual therapy, patient education, home exercise program, therapeutic exercise, therapeutic activities and gait training  Frequency: 2x week  Duration in weeks: 12  Plan of Care beginning date: 11/1/2023  Plan of Care expiration date: 1/30/2024  Treatment plan discussed with: patient      Subjective Evaluation    History of Present Illness  Mechanism of injury: 12/5: Wants to work more on balance. Feels like he is getting in a better routine at home. No near falls balance wise. Present at PT: More excruciating burning pain due to neuropathy. Using [facility] for distances. Had episode of complete loss of balance. Lasted a day, but has not had an episode since. Does nto have burning now though. Has not been as faithful exercise wise due to life stressors or events.  Notices a decline in his balance and feels like he is on his way to recovering. Wants to work on his balance and endurance. Patient Specific Functional Scale: Balance 7/10, Endurance 5/10, /20    Patient Goals  Patient goals for therapy: improved balance, independence with ADLs/IADLs, increased strength and increased motion    Pain  Current pain ratin  At best pain ratin  At worst pain rating: 10  Location: neuopathy pain  Relieving factors: change in position    Social Support  Stairs in house: yes   Lives in: multiple-level home  Lives with: spouse    Employment status: not working    Diagnostic Tests  No diagnostic tests performed  Treatments  Previous treatment: physical therapy and speech therapy      Objective      Balance Test 2023   6 Minute Walk Test (ft): 1058ft NV    FGA:     ABC  64% 71%   MiniBest    Gait Speed (m/s): 1.03 m/s SPC 0.82m/s / no AD 0.90m/s  Fast: 1.45m/s no AD No AD: 0.94m/s  Fast: 1.41m/s no AD   30s chair stand:   14 15   5x Sit To Stand (s): 8.13 9.74 10.12   TUG / [place]: 7.18 / 8.20 50 > 32 9.15 / 10.60 50 > 41 (37 > 28 by 4's error)          MCTSIB Number of Seconds   Feet Together, Eyes Open 30   Feet Together, Eyes Closed 30   Feet Together, Eyes Open Foam 30   Feet Together, Eyes Closed Foam 30          Gait Assessment: Croched gait minimal arm swing, footflat strike - lacking proper IC, Stooped posture, inadequate knee ext in terminal swing.            Precautions: h/o tinnitus, h/o PD, h/o peripheral neuropathy,h/o HTN, B/L TKR  Re-eval Date:  2024    Date     Visit Count 6 7 8 9    FOTO See IE       Pain In See IE       Pain Out                Testing        TUG/C 9.15 / 10.60 50 > 41 (37 > 28 by 4's error)       30s chair stand 15       5xSTS 10.12       Gait speed 0.94 no AD / 0.82 SPC       ABC 71       MiniBest 21       FGA 17       6MWT 1053ft SPC       Neuro Re-Ed    MiniBest, FGA, 10mWT, 30s chair stand/5xSTS    Dynamic balance 20lb kb carry 15ft x3 laps ea bwd stepping   8lb med ball visuospatial challenge fwd 8 laps    Lat step w direction change 8lb med ball 4 laps    Agility ladder skip box fwd 6 laps, modified icky shuffle 4 laps    Static balance   SLS 20x3" ea     Tandem EC 3x10" ea (7/10 RPE)     Modified SLS 6" step 3x30" ea (5/10 RPE)     Obstacle course 8lb med ball slam low hurdles 3 laps in a row  Uneven mat, colored dots, 3 med hurdles with airex pad, 6" step narrow 20x throughout (SOLO)     Cognitive dual task        Large amplitude movement // bar 1min fast as possible x2 8' step lat // bar 1min fast as possible x2 8' lat step    8lb med ball OH toss x12 solo Large amplitude on uneven mat - step forward 10x ea (9/10 RPE)                     Ther Ex        SLR x 4        HR/TR        Mini Squats STS 20lb 2x8       Step ups        Unilat row 35lb 3x15 35lb 3x15      Upright Y BTB 3x12 BTB 3x12      Leg Press 165lb 3x12 165lb 3x12  185lb 3x10    Mod quadruped KB pull through  Mat table x10 ea direction 7.5#      KB deadlift  10lb 2x12      Ther Activity        ADL                Gait Training        Divided Attention        Head turns        Stairs        Modalities

## 2023-12-08 ENCOUNTER — OFFICE VISIT (OUTPATIENT)
Facility: CLINIC | Age: 78
End: 2023-12-08
Payer: MEDICARE

## 2023-12-08 DIAGNOSIS — G20.C PRIMARY PARKINSONISM: Primary | ICD-10-CM

## 2023-12-08 PROCEDURE — 97112 NEUROMUSCULAR REEDUCATION: CPT

## 2023-12-08 NOTE — PROGRESS NOTES
Daily Note     Today's date: 2023  Patient name: Cinthya Feliciano  : 1945  MRN: [id number]  Referring provider: Jayla Contreras MD  Dx:   Encounter Diagnosis     ICD-10-CM    1. Primary parkinsonism  G20. C           Start Time:   Stop Time: 1358  Total time in clinic (min): 41 minutes    Subjective: Pt reports nothing new. Been doing his exercises at home. Objective: See treatment diary below      Assessment:  Appears to have more crouched gait and stooped posture today. Better performance on multidirectional stepping from prior sessions. Better stance on R LE vs L LE in single limb stability. Experiences occasional excessive posterior loss of balance when stepping backward, more forward when stepping laterally. Reports of mild unsteadiness over uneven surfaces. X1 required solo harness to catch balance over uneven surfaces while lateral stepping. Small amplitude turns evident with most tasks. Patient would benefit from continued PT      Plan: Continue per plan of care. Rotational ROM + dynamic balance. Power generation.      Precautions: h/o tinnitus, h/o PD, h/o peripheral neuropathy,h/o HTN, B/L TKR  Re-eval Date:  2024    Date    Visit Count 6 7 8 9 10   FOTO See IE       Pain In See IE       Pain Out                Testing        TUG/C 9.15 / 10.60 50 > 41 (37 > 28 by 4's error)       30s chair stand 15       5xSTS 10.12       Gait speed 0.94 no AD / 0.82 SPC       ABC 71       MiniBest 21       FGA 17       6MWT 1053ft SPC       Neuro Re-Ed    MiniBest, FGA, 10mWT, 30s chair stand/5xSTS    Dynamic balance 20lb kb carry 15ft x3 laps ea bwd stepping   8lb med ball visuospatial challenge fwd 8 laps    Lat step w direction change 8lb med ball 4 laps    Agility ladder skip box fwd 6 laps, modified icky shuffle 4 laps Multidirectional stepping cw/ccw low hurdles x8 ea direction solo    Bolster OH bwd stepping solo 6 laps    Step up SLS 8' x10 ea solo    Uneven surface 15lb kb carry fwd/lat 8 laps, + perturbation 4 laps solo    Foam <> 8' step solo x8   Static balance   SLS 20x3" ea     Tandem EC 3x10" ea (7/10 RPE)     Modified SLS 6" step 3x30" ea (5/10 RPE)     Obstacle course 8lb med ball slam low hurdles 3 laps in a row  Uneven mat, colored dots, 3 med hurdles with airex pad, 6" step narrow 20x throughout (SOLO)     Cognitive dual task        Large amplitude movement // bar 1min fast as possible x2 8' step lat // bar 1min fast as possible x2 8' lat step    8lb med ball OH toss x12 solo Large amplitude on uneven mat - step forward 10x ea (9/10 RPE)  Uneven surface w rotation + lat step 5lb bar to target x12 ea direction                   Ther Ex        SLR x 4        HR/TR        Mini Squats STS 20lb 2x8       Step ups        Unilat row 35lb 3x15 35lb 3x15      Upright Y BTB 3x12 BTB 3x12      Leg Press 165lb 3x12 165lb 3x12  185lb 3x10    Mod quadruped KB pull through  Mat table x10 ea direction 7.5#      KB deadlift  10lb 2x12      Ther Activity        ADL                Gait Training        Divided Attention        Head turns        Stairs        Modalities

## 2023-12-11 ENCOUNTER — OFFICE VISIT (OUTPATIENT)
Facility: CLINIC | Age: 78
End: 2023-12-11
Payer: MEDICARE

## 2023-12-11 DIAGNOSIS — G20.C PRIMARY PARKINSONISM: Primary | ICD-10-CM

## 2023-12-11 PROCEDURE — 97112 NEUROMUSCULAR REEDUCATION: CPT

## 2023-12-11 NOTE — PROGRESS NOTES
Daily Note     Today's date: 2023  Patient name: Amanda Stevenson  : 1945  MRN: [id number]  Referring provider: Joanie Miller MD  Dx:   Encounter Diagnosis     ICD-10-CM    1. Primary parkinsonism  G20. C                      Subjective: Patient reports increased fatigue the last several days; unknown cause. Objective: See treatment diary below      Assessment: Cues for upright posture throughout but poor carry over. Challenged with SLS step ups , bilaterally, today requiring assistance from clinician. Frequent rest breaks but recovers well. Patient would benefit from continued PT      Plan: Continue per plan of care. Rotational ROM + dynamic balance. Power generation.      Precautions: h/o tinnitus, h/o PD, h/o peripheral neuropathy,h/o HTN, B/L TKR  Re-eval Date:  2024    Date    Visit Count 6 7 8 9 10 11   FOTO See IE        Pain In See IE        Pain Out                 Testing      TUG/C 9.15 / 10.60 50 > 41 (37 > 28 by 4's error)        30s chair stand 15        5xSTS 10.12        Gait speed 0.94 no AD / 0.82 SPC        ABC 71        MiniBest 21        FGA 17        6MWT 1053ft SPC        Neuro Re-Ed    MiniBest, FGA, 10mWT, 30s chair stand/5xSTS     Dynamic balance 20lb kb carry 15ft x3 laps ea bwd stepping   8lb med ball visuospatial challenge fwd 8 laps    Lat step w direction change 8lb med ball 4 laps    Agility ladder skip box fwd 6 laps, modified icky shuffle 4 laps Multidirectional stepping cw/ccw low hurdles x8 ea direction solo    Bolster OH bwd stepping solo 6 laps    Step up SLS 8' x10 ea solo    Uneven surface 15lb kb carry fwd/lat 8 laps, + perturbation 4 laps solo    Foam <> 8' step solo x8 Multidirectional stepping cw/ccw low hurdles x 8 ea direction SOLO    Bolster OH bwd stepping SOLO 6 laps    Steps up SLS  8' x 10 ea SOLO    Uneven surface 15lb kb carry fwd/lat     Foam <> 8'  Step SOLO x8         Static balance   SLS 20x3" ea     Tandem EC 3x10" ea (7/10 RPE)     Modified SLS 6" step 3x30" ea (5/10 RPE)      Obstacle course 8lb med ball slam low hurdles 3 laps in a row  Uneven mat, colored dots, 3 med hurdles with airex pad, 6" step narrow 20x throughout (SOLO)      Cognitive dual task         Large amplitude movement // bar 1min fast as possible x2 8' step lat // bar 1min fast as possible x2 8' lat step    8lb med ball OH toss x12 solo Large amplitude on uneven mat - step forward 10x ea (9/10 RPE)  Uneven surface w rotation + lat step 5lb bar to target x12 ea direction                      Ther Ex         SLR x 4         HR/TR         Mini Squats STS 20lb 2x8        Step ups         Unilat row 35lb 3x15 35lb 3x15       Upright Y BTB 3x12 BTB 3x12       Leg Press 165lb 3x12 165lb 3x12  185lb 3x10     Mod quadruped KB pull through  Mat table x10 ea direction 7.5#       KB deadlift  10lb 2x12       Ther Activity         ADL                  Gait Training         Divided Attention         Head turns         Stairs         Modalities pt refused to answer

## 2023-12-12 ENCOUNTER — APPOINTMENT (OUTPATIENT)
Facility: CLINIC | Age: 78
End: 2023-12-12
Payer: MEDICARE

## 2023-12-13 ENCOUNTER — OFFICE VISIT (OUTPATIENT)
Facility: CLINIC | Age: 78
End: 2023-12-13
Payer: MEDICARE

## 2023-12-13 DIAGNOSIS — G20.C PRIMARY PARKINSONISM: Primary | ICD-10-CM

## 2023-12-13 PROCEDURE — 97110 THERAPEUTIC EXERCISES: CPT

## 2023-12-13 PROCEDURE — 97112 NEUROMUSCULAR REEDUCATION: CPT

## 2023-12-13 NOTE — PROGRESS NOTES
Daily Note     Today's date: 2023  Patient name: Radha Bah  : 1945  MRN: [id number]  Referring provider: Sean Solano MD  Dx:   Encounter Diagnosis     ICD-10-CM    1. Primary parkinsonism  G20. C           Start Time: 1330  Stop Time: 1425  Total time in clinic (min): 55 minutes    Subjective: Patient reports nothing new since last seen. A little sore but nothing terrible. Objective: See treatment diary below      Assessment: Provided stretching HEP to improve hip flexion + ER to assist with putting shoes on, pt verbalized understanding. Challenged balance with multidirectional stepping activities, pt reports high rate of perceived stability. Note more gait and path deviations when challenged appropriately. With repetition performs better sit<>stands from low surface, does require some verbal cues to ensure appropriate anterior weight shift. Min more imbalance with cognitive task. UE and upper back fatigue more quickly than BLE. Patient would benefit from continued PT to improve stability, strength, and endurance. Plan: Continue per plan of care. Rotational ROM + dynamic balance. Power generation.      Precautions: h/o tinnitus, h/o PD, h/o peripheral neuropathy,h/o HTN, B/L TKR  Re-eval Date:  2024    Date         Visit Count 12        FOTO See IE        Pain In See IE        Pain Out                 Testing         TUG/C 9.15 / 10.60 50 > 41 (37 > 28 by 4's error)        30s chair stand 15        5xSTS 10.12        Gait speed 0.94 no AD / 0.82 SPC        ABC 71        MiniBest 21        FGA 17        6MWT 1053ft SPC        Neuro Re-Ed         Dynamic balance Multidirectional stepping cw/ccw low hurdles cog task 8min total SOLO    Bolster OH bw stepping 4 laps x2 SOLO    Red TB + 15lb kb fwd + HT RPS 8/10 8 laps        Static balance         Obstacle course         Cognitive dual task         Large amplitude movement Low mat sit<>stand x15 hands crossed    Resisted walking w perturbation training 8 laps SOLO                          Ther Ex         SLR x 4         HR/TR         Mini Squats         Step ups         Unilat row         Upright Y         Leg Press         Mod quadruped KB pull through         KB deadlift         Piriformis str seated 20" x3 ea LE reviewed for HEP        Ther Activity         ADL                  Gait Training         Divided Attention         Head turns         Stairs         Modalities

## 2023-12-15 ENCOUNTER — APPOINTMENT (OUTPATIENT)
Facility: CLINIC | Age: 78
End: 2023-12-15
Payer: MEDICARE

## 2023-12-19 ENCOUNTER — OFFICE VISIT (OUTPATIENT)
Facility: CLINIC | Age: 78
End: 2023-12-19
Payer: MEDICARE

## 2023-12-19 DIAGNOSIS — G20.C PRIMARY PARKINSONISM: Primary | ICD-10-CM

## 2023-12-19 PROCEDURE — 97112 NEUROMUSCULAR REEDUCATION: CPT

## 2023-12-19 NOTE — PROGRESS NOTES
Daily Note     Today's date: 2023  Patient name: Justen Thompson  : 1945  MRN: [id number]  Referring provider: Kye Reich MD  Dx:   Encounter Diagnosis     ICD-10-CM    1. Primary parkinsonism  G20.C           Start Time: 1142  Stop Time: 1230  Total time in clinic (min): 48 minutes    Subjective: Patient states he has been trying the stretches, they have been hit or miss some days he notices differences some days not so much. Unable to put his shoes on today.      Objective: See treatment diary below      Assessment: Challenged with uneven surface balance activity, more likely due to neuropathy. Trialed low surface sit<>stands with good carryover in weight shifting anteriorly, appropriate power to complete task. Lateral movement challenges patient's postural stability and overall balance, external cues seem to increase amplitude of movement. Single limb stability improved with repetition, finding limits of stability. Encouraged to continue with stretches to notice more long term differences, pt in agreement. Patient would benefit from continued PT to improve stability, strength, and endurance.      Plan: Continue per plan of care. Rotational ROM + dynamic balance. Power generation.     Precautions: h/o tinnitus, h/o PD, h/o peripheral neuropathy,h/o HTN, B/L TKR  Re-eval Date:  2024    Date        Visit Count 12 13       FOTO See IE        Pain In See IE        Pain Out                 Testing         TUG/C 9.15 / 10.60 50 > 41 (37 > 28 by 4's error)        30s chair stand 15        5xSTS 10.12        Gait speed 0.94 no AD / 0.82 SPC        ABC 71        MiniBest 21        FGA 17        6MWT 1053ft SPC        Neuro Re-Ed         Dynamic balance Multidirectional stepping cw/ccw low hurdles cog task 8min total SOLO    Bolster OH bw stepping 4 laps x2 SOLO    Red TB + 15lb kb fwd + HT RPS 8/10 8 laps 4' step up + SLS solo x20 ea LE    Uneven surface + lunge & rotation to target x15 ea  "side solo    Lat step low hurdles 12 laps + 8lb med ball toss solo       Static balance         Obstacle course         Cognitive dual task         Large amplitude movement Low mat sit<>stand x15 hands crossed    Resisted walking w perturbation training 8 laps SOLO Low chair + foam on ground 1x8 w 8lb med ball, 1x8 without med ball                         Ther Ex         SLR x 4         HR/TR         Mini Squats         Step ups         Unilat row         Upright Y         Leg Press         Mod quadruped KB pull through         KB deadlift         Piriformis str seated 20\" x3 ea LE reviewed for HEP        Ther Activity         ADL                  Gait Training         Divided Attention         Head turns         Stairs         Modalities                                  "

## 2023-12-22 ENCOUNTER — OFFICE VISIT (OUTPATIENT)
Facility: CLINIC | Age: 78
End: 2023-12-22
Payer: MEDICARE

## 2023-12-22 DIAGNOSIS — G20.C PRIMARY PARKINSONISM: Primary | ICD-10-CM

## 2023-12-22 PROCEDURE — 97112 NEUROMUSCULAR REEDUCATION: CPT

## 2023-12-22 NOTE — PROGRESS NOTES
"Daily Note     Today's date: 2023  Patient name: Justen Thompson  : 1945  MRN: [id number]  Referring provider: Kye Reich MD  Dx:   Encounter Diagnosis     ICD-10-CM    1. Primary parkinsonism  G20.C                      Subjective: Very fatigued following his last session but denies muscle soreness.     Objective: See treatment diary below      Assessment: Continues to express fear of falling. Extremely challenged with forward lunge combined with rotation but was able to self correct with stepping strategy today. Poor posture with poor carry over throughout session.  Patient would benefit from continued PT to improve stability, strength, and endurance.      Plan: Continue per plan of care. Rotational ROM + dynamic balance. Power generation.     Precautions: h/o tinnitus, h/o PD, h/o peripheral neuropathy,h/o HTN, B/L TKR  Re-eval Date:  2024    Date       Visit Count 12 13       FOTO See IE        Pain In See IE        Pain Out                 Testing         TUG/C 9.15 / 10.60 50 > 41 (37 > 28 by 4's error)        30s chair stand 15        5xSTS 10.12        Gait speed 0.94 no AD / 0.82 SPC        ABC 71        MiniBest 21        FGA 17        6MWT 1053ft SPC        Neuro Re-Ed         Dynamic balance Multidirectional stepping cw/ccw low hurdles cog task 8min total SOLO    Bolster OH bw stepping 4 laps x2 SOLO    Red TB + 15lb kb fwd + HT RPS 8/10 8 laps 4' step up + SLS solo x20 ea LE    Uneven surface + lunge & rotation to target x15 ea side solo    Lat step low hurdles 12 laps + 8lb med ball toss solo SOLO  4\" step up + SLS  X20 ea LE    Uneven surface + lunge & rotation to target x 8 ea side     Lat stepping low hurdles 5 laps     Lat stepping low hurdles + 8lb med ball toss 5 laps       Static balance         Obstacle course         Cognitive dual task         Large amplitude movement Low mat sit<>stand x15 hands crossed    Resisted walking w perturbation " "training 8 laps SOLO Low chair + foam on ground 1x8 w 8lb med ball, 1x8 without med ball SOLO  Low chair + foam on ground  1x10 w/ 8lb med ball ,  1x10 without med ball                         Ther Ex   12/22      SLR x 4         HR/TR         Mini Squats         Step ups         Unilat row         Upright Y         Leg Press         Mod quadruped KB pull through         KB deadlift         Piriformis str seated 20\" x3 ea LE reviewed for HEP        Ther Activity         ADL                  Gait Training         Divided Attention         Head turns         Stairs         Modalities                                  "

## 2024-01-03 ENCOUNTER — APPOINTMENT (OUTPATIENT)
Facility: CLINIC | Age: 79
End: 2024-01-03
Payer: MEDICARE

## 2024-01-05 ENCOUNTER — OFFICE VISIT (OUTPATIENT)
Facility: CLINIC | Age: 79
End: 2024-01-05
Payer: MEDICARE

## 2024-01-05 DIAGNOSIS — G20.C PRIMARY PARKINSONISM: Primary | ICD-10-CM

## 2024-01-05 PROCEDURE — 97112 NEUROMUSCULAR REEDUCATION: CPT

## 2024-01-05 NOTE — PROGRESS NOTES
Daily Note/Progress Note     Today's date: 2024  Patient name: Justen Thompson  : 1945  MRN: [id number]  Referring provider: Kye Reich MD  Dx:   Encounter Diagnosis     ICD-10-CM    1. Primary parkinsonism  G20.C             Start Time: 1257  Stop Time: 1345  Total time in clinic (min): 48 minutes    Subjective: Pt reports he feels he has declined over holiday break. More imbalance and less endurance.    Objective: See treatment diary below    In 4-10 weeks, patient will:  1.  Demonstrate consistent carryover with HEP and walking program.  2.  Improve gait speed by at least  0.18 m/s to meet MDC value for PwPD and reduce fall risk.  3.  Improve 6MWT by at least 270ft to meet MDC value and demonstrate improvement in ability in community ambulation.  4.  Improve FGA by at least 4 points to meet MDC value for PwPD and show improved balance.  5.  Improve ABC by at least 16% to meet reduced risk of falling and improved balance confidence.  6.  Demonstrate ability to ambulate forward holding item requiring bilateral UEs without LOB or change in gait quality.  7.  Report improvement in ability to get in/out of bed.  8. Improve MiniBest by at least 4 points to meet MCID value for PwPD and demonstrate improved safety.  9. Improve fastest gait speed by at least 0.25m/s to meet MDC value for fast pace gait.    Patient Specific Functional Scale: balance 7/10, endurance 5/10, 1220       Assessment:  Upon assessment patient shows similar balance testing vs last assessment. Shows less stability with perturbations, more stability during dual task trials. Does show slight decline in endurance per 6MWT test, with notable changes in overall gait speed, step length, and postural stability. Neuropathy may be playing larger role in path deviation and staggering vs PD. He is making progress toward his long term goals and reducing risk of falls. Patient would benefit from continued PT to improve stability, strength, and  "endurance.      Plan: Continue per plan of care. Rotational ROM + dynamic balance. Power generation.     Precautions: h/o tinnitus, h/o PD, h/o peripheral neuropathy,h/o HTN, B/L TKR  Re-eval Date:  1/30/2024    Date 12/13 12/19 12/22 1/5     Visit Count 12 13  15     FOTO See IE        Pain In See IE        Pain Out                 Testing   12      TUG/C 10.41 / 11.81 50 > 35        30s chair stand 15        5xSTS 10.12        Gait speed 0.94 no AD / 0.82 SPC        ABC 71        MiniBest 21        FGA 17        6MWT 974ft SPC        Neuro Re-Ed   12/22 MiniBest, FGA, 6MWT, 10mWT, TUG/TUGC     Dynamic balance Multidirectional stepping cw/ccw low hurdles cog task 8min total SOLO    [place]olster [place] bw stepping 4 laps x2 SOLO    Red TB + 15lb kb fwd + HT RPS 8/10 8 laps 4' step up + SLS solo x20 ea LE    Uneven surface + lunge & rotation to target x15 ea side solo    Lat step low hurdles 12 laps + 8lb med ball toss solo SOLO  4\" step up + SLS  X20 ea LE    Uneven surface + lunge & rotation to target x 8 ea side     Lat stepping low hurdles 5 laps     Lat stepping low hurdles + 8lb med ball toss 5 laps  Solo 3#B low hurdles 8 laps, 8 laps med height    Solo blaze pods bwd-lat step to improve turn steps 3x10 SD 35seconds     Static balance         Obstacle course         Cognitive dual task         Large amplitude movement Low mat sit<>stand x15 hands crossed    Resisted walking w perturbation training 8 laps SOLO Low chair + foam on ground 1x8 w 8lb med ball, 1x8 without med ball SOLO  Low chair + foam on ground  1x10 w/ 8lb med ball ,  1x10 without med ball                         Ther Ex   12/22      SLR x 4         HR/TR         Mini Squats         Step ups         Unilat row         Upright Y         Leg Press         Mod quadruped KB pull through         KB deadlift         Piriformis str seated 20\" x3 ea LE reviewed for HEP        Ther Activity         ADL                  Gait Training         Divided Attention      "    Head turns         Stairs         Modalities

## 2024-01-10 ENCOUNTER — OFFICE VISIT (OUTPATIENT)
Dept: FAMILY MEDICINE CLINIC | Facility: HOSPITAL | Age: 79
End: 2024-01-10
Payer: MEDICARE

## 2024-01-10 ENCOUNTER — HOSPITAL ENCOUNTER (OUTPATIENT)
Dept: RADIOLOGY | Facility: HOSPITAL | Age: 79
Discharge: HOME | End: 2024-01-10
Attending: INTERNAL MEDICINE
Payer: MEDICARE

## 2024-01-10 ENCOUNTER — LAB (OUTPATIENT)
Dept: LAB | Facility: HOSPITAL | Age: 79
End: 2024-01-10
Payer: MEDICARE

## 2024-01-10 ENCOUNTER — TELEPHONE (OUTPATIENT)
Dept: FAMILY MEDICINE CLINIC | Facility: HOSPITAL | Age: 79
End: 2024-01-10

## 2024-01-10 ENCOUNTER — APPOINTMENT (OUTPATIENT)
Facility: CLINIC | Age: 79
End: 2024-01-10
Payer: MEDICARE

## 2024-01-10 VITALS
BODY MASS INDEX: 30.88 KG/M2 | SYSTOLIC BLOOD PRESSURE: 100 MMHG | RESPIRATION RATE: 16 BRPM | OXYGEN SATURATION: 95 % | TEMPERATURE: 98.5 F | DIASTOLIC BLOOD PRESSURE: 62 MMHG | HEIGHT: 73 IN | WEIGHT: 233 LBS | HEART RATE: 87 BPM

## 2024-01-10 DIAGNOSIS — R04.2 HEMOPTYSIS: ICD-10-CM

## 2024-01-10 DIAGNOSIS — I25.10 CORONARY ARTERY DISEASE INVOLVING NATIVE CORONARY ARTERY OF NATIVE HEART WITHOUT ANGINA PECTORIS: ICD-10-CM

## 2024-01-10 DIAGNOSIS — I10 ESSENTIAL HYPERTENSION: ICD-10-CM

## 2024-01-10 DIAGNOSIS — N40.1 BENIGN PROSTATIC HYPERPLASIA WITH WEAK URINARY STREAM: ICD-10-CM

## 2024-01-10 DIAGNOSIS — G20.C PRIMARY PARKINSONISM: ICD-10-CM

## 2024-01-10 DIAGNOSIS — R39.12 BENIGN PROSTATIC HYPERPLASIA WITH WEAK URINARY STREAM: ICD-10-CM

## 2024-01-10 DIAGNOSIS — J01.00 ACUTE MAXILLARY SINUSITIS, RECURRENCE NOT SPECIFIED: Primary | ICD-10-CM

## 2024-01-10 DIAGNOSIS — R44.1 VISUAL HALLUCINATIONS: ICD-10-CM

## 2024-01-10 LAB
ALBUMIN SERPL BCP-MCNC: 4.2 G/DL (ref 3.5–5)
ALP SERPL-CCNC: 63 U/L (ref 34–104)
ALT SERPL W P-5'-P-CCNC: 7 U/L (ref 7–52)
ANION GAP SERPL CALCULATED.3IONS-SCNC: 7 MMOL/L
AST SERPL W P-5'-P-CCNC: 23 U/L (ref 13–39)
BACTERIA UR QL AUTO: ABNORMAL /HPF
BASOPHILS # BLD AUTO: 0.02 THOUSANDS/ÂΜL (ref 0–0.1)
BASOPHILS NFR BLD AUTO: 0 % (ref 0–1)
BILIRUB SERPL-MCNC: 1.01 MG/DL (ref 0.2–1)
BILIRUB UR QL STRIP: NEGATIVE
BUN SERPL-MCNC: 25 MG/DL (ref 5–25)
CALCIUM SERPL-MCNC: 9 MG/DL (ref 8.4–10.2)
CHLORIDE SERPL-SCNC: 107 MMOL/L (ref 96–108)
CLARITY UR: CLEAR
CO2 SERPL-SCNC: 27 MMOL/L (ref 21–32)
COLOR UR: YELLOW
CREAT SERPL-MCNC: 0.98 MG/DL (ref 0.6–1.3)
EOSINOPHIL # BLD AUTO: 0.04 THOUSAND/ÂΜL (ref 0–0.61)
EOSINOPHIL NFR BLD AUTO: 1 % (ref 0–6)
ERYTHROCYTE [DISTWIDTH] IN BLOOD BY AUTOMATED COUNT: 12.8 % (ref 11.6–15.1)
GFR SERPL CREATININE-BSD FRML MDRD: 73 ML/MIN/1.73SQ M
GLUCOSE SERPL-MCNC: 102 MG/DL (ref 65–140)
GLUCOSE UR STRIP-MCNC: NEGATIVE MG/DL
HCT VFR BLD AUTO: 39.7 % (ref 36.5–49.3)
HGB BLD-MCNC: 13.3 G/DL (ref 12–17)
HGB UR QL STRIP.AUTO: NEGATIVE
IMM GRANULOCYTES # BLD AUTO: 0.02 THOUSAND/UL (ref 0–0.2)
IMM GRANULOCYTES NFR BLD AUTO: 0 % (ref 0–2)
KETONES UR STRIP-MCNC: ABNORMAL MG/DL
LEUKOCYTE ESTERASE UR QL STRIP: NEGATIVE
LYMPHOCYTES # BLD AUTO: 0.8 THOUSANDS/ÂΜL (ref 0.6–4.47)
LYMPHOCYTES NFR BLD AUTO: 13 % (ref 14–44)
MCH RBC QN AUTO: 32.5 PG (ref 26.8–34.3)
MCHC RBC AUTO-ENTMCNC: 33.5 G/DL (ref 31.4–37.4)
MCV RBC AUTO: 97 FL (ref 82–98)
MONOCYTES # BLD AUTO: 0.59 THOUSAND/ÂΜL (ref 0.17–1.22)
MONOCYTES NFR BLD AUTO: 10 % (ref 4–12)
MUCOUS THREADS UR QL AUTO: ABNORMAL
NEUTROPHILS # BLD AUTO: 4.59 THOUSANDS/ÂΜL (ref 1.85–7.62)
NEUTS SEG NFR BLD AUTO: 76 % (ref 43–75)
NITRITE UR QL STRIP: NEGATIVE
NON-SQ EPI CELLS URNS QL MICRO: ABNORMAL /HPF
NRBC BLD AUTO-RTO: 0 /100 WBCS
PH UR STRIP.AUTO: 5.5 [PH]
PLATELET # BLD AUTO: 150 THOUSANDS/UL (ref 149–390)
PMV BLD AUTO: 10.1 FL (ref 8.9–12.7)
POTASSIUM SERPL-SCNC: 3.9 MMOL/L (ref 3.5–5.3)
PROT SERPL-MCNC: 6.9 G/DL (ref 6.4–8.4)
PROT UR STRIP-MCNC: ABNORMAL MG/DL
RBC # BLD AUTO: 4.09 MILLION/UL (ref 3.88–5.62)
RBC #/AREA URNS AUTO: ABNORMAL /HPF
SODIUM SERPL-SCNC: 141 MMOL/L (ref 135–147)
SP GR UR STRIP.AUTO: 1.02 (ref 1–1.03)
TSH SERPL DL<=0.05 MIU/L-ACNC: 0.73 UIU/ML (ref 0.45–4.5)
UROBILINOGEN UR STRIP-ACNC: <2 MG/DL
WBC # BLD AUTO: 6.06 THOUSAND/UL (ref 4.31–10.16)
WBC #/AREA URNS AUTO: ABNORMAL /HPF

## 2024-01-10 PROCEDURE — 85025 COMPLETE CBC W/AUTO DIFF WBC: CPT

## 2024-01-10 PROCEDURE — 84443 ASSAY THYROID STIM HORMONE: CPT

## 2024-01-10 PROCEDURE — 99214 OFFICE O/P EST MOD 30 MIN: CPT | Performed by: INTERNAL MEDICINE

## 2024-01-10 PROCEDURE — 81001 URINALYSIS AUTO W/SCOPE: CPT

## 2024-01-10 PROCEDURE — 36415 COLL VENOUS BLD VENIPUNCTURE: CPT

## 2024-01-10 PROCEDURE — 80053 COMPREHEN METABOLIC PANEL: CPT

## 2024-01-10 PROCEDURE — 71046 X-RAY EXAM CHEST 2 VIEWS: CPT

## 2024-01-10 RX ORDER — FLUTICASONE PROPIONATE 50 MCG
1 SPRAY, SUSPENSION (ML) NASAL 2 TIMES DAILY
Qty: 16 G | Refills: 0 | Status: SHIPPED | OUTPATIENT
Start: 2024-01-10

## 2024-01-10 RX ORDER — DOXYCYCLINE 100 MG/1
100 CAPSULE ORAL 2 TIMES DAILY
Qty: 14 CAPSULE | Refills: 0 | Status: SHIPPED | OUTPATIENT
Start: 2024-01-10 | End: 2024-01-17

## 2024-01-10 RX ORDER — QUETIAPINE FUMARATE 25 MG/1
25 TABLET, FILM COATED ORAL 3 TIMES DAILY
COMMUNITY
Start: 2023-12-08

## 2024-01-10 RX ORDER — BENZONATATE 100 MG/1
100 CAPSULE ORAL 3 TIMES DAILY
Qty: 30 CAPSULE | Refills: 0 | Status: SHIPPED | OUTPATIENT
Start: 2024-01-10

## 2024-01-10 NOTE — TELEPHONE ENCOUNTER
Hi, this is Lachelle Thompson. My  had his phone on silent and so we just saw the voicemail. And I just have a question about what CHS is and I would like [facility] to know that we did receive a call but not didn't get to actually speak to the nurse. So thank you. It's [phone]. Thank you.

## 2024-01-10 NOTE — ASSESSMENT & PLAN NOTE
Has history of coronary artery disease.  Denies chest pain.  I will continue aspirin for now since of sputum is only blood-tinged.    I ordered chest x-ray to look for masses, pneumonia

## 2024-01-10 NOTE — ASSESSMENT & PLAN NOTE
Patient has Parkinson's disease.  She is on Sinemet and is cared for by neurology.  He sometimes has coughing when swallowing but infrequently as per patient's wife.    Patient developed visual hallucinations, seeing small insects crawling all over in his house.  This has been going on for 6-9 months.   inspected the house and the carpets were steam cleaned without resolution of the visual hallucinations.    Patient was started on Seroquel that does not help much.    Patient has bradykinesia, shuffling gait, resting tremor of the left hand.    I will check CBC, CMP, urinalysis and TSH to look for secondary causes of his blood sugar visual hallucinations    He follows up with neurology next week

## 2024-01-10 NOTE — RESULT ENCOUNTER NOTE
Call patient: Labs are stable and show nothing that could cause visual hallucinations.  Chest x-ray shows no pneumonia or masses or CHF.

## 2024-01-10 NOTE — PROGRESS NOTES
Assessment/Plan:    Primary Parkinsonism (HCC)  Patient has Parkinson's disease.  She is on Sinemet and is cared for by neurology.  He sometimes has coughing when swallowing but infrequently as per patient's wife.    Patient developed visual hallucinations, seeing small insects crawling all over in his house.  This has been going on for 6-9 months.   inspected the house and the carpets were steam cleaned without resolution of the visual hallucinations.    Patient was started on Seroquel that does not help much.    Patient has bradykinesia, shuffling gait, resting tremor of the left hand.    I will check CBC, CMP, urinalysis and TSH to look for secondary causes of his blood sugar visual hallucinations    He follows up with neurology next week    Coronary artery disease involving native coronary artery of native heart without angina pectoris  Has history of coronary artery disease.  Denies chest pain.  I will continue aspirin for now since of sputum is only blood-tinged.    I ordered chest x-ray to look for masses, pneumonia    Essential hypertension  Blood pressure is controlled.  Continue benazepril/HCTZ    I am checking electrolytes and renal function       Diagnoses and all orders for this visit:    Acute maxillary sinusitis, recurrence not specified  -     doxycycline monohydrate (MONODOX) 100 mg capsule; Take 1 capsule (100 mg total) by mouth 2 (two) times a day for 7 days  -     fluticasone (FLONASE) 50 mcg/act nasal spray; 1 spray into each nostril 2 (two) times a day  -     benzonatate (TESSALON PERLES) 100 mg capsule; Take 1 capsule (100 mg total) by mouth 3 (three) times a day    Hemoptysis  -     XR chest pa & lateral; Future    Essential hypertension  -     CBC and differential; Future  -     Comprehensive metabolic panel; Future  -     TSH, 3rd generation with Free T4 reflex; Future  -     UA w Reflex to Microscopic w Reflex to Culture; Future    Coronary artery disease involving native  "coronary artery of native heart without angina pectoris    Visual hallucinations    Benign prostatic hyperplasia with weak urinary stream    Primary parkinsonism    Other orders  -     QUEtiapine (SEROquel) 25 mg tablet; Take 25 mg by mouth 3 (three) times a day          Patient has evidence of rhinosinusitis.  Since his symptoms have not improved for 7-10 days there is a higher likelihood of bacterial infection and I am starting patient on doxycycline besides symptomatic treatment      Subjective:      Patient ID: Justen Thompson is a 78 y.o. male.      Patient presents with his wife with a chief complaint of nasal congestion, postnasal dripping, cough with hemoptysis.  Symptoms developed 7-10 days ago and they have not been improving.  Denies fever, dyspnea, chest pain, pleurisy.  There are no blood clots in the phlegm.  I reviewed images of the sputum that show blood-tinged sputum.    Patient also has had more fatigue than usual.    Denies vomiting or diarrhea.    Cough  Pertinent negatives include no chest pain or shortness of breath.       Patient presents for follow-up of chronic conditions as detailed in the assessment and plan.      The following portions of the patient's history were reviewed and updated as appropriate: current medications, past family history, past medical history, past social history, past surgical history and problem list.    Review of Systems   Respiratory:  Positive for cough. Negative for shortness of breath.    Cardiovascular:  Negative for chest pain.   Gastrointestinal:  Negative for abdominal pain and blood in stool.   Genitourinary:  Negative for dysuria and hematuria.         Objective:    /62   Pulse 87   Temp 98.5 °F (36.9 °C)   Resp 16   Ht 6' 1\" (1.854 m)   Wt 106 kg (233 lb)   SpO2 95%   BMI 30.74 kg/m²      Physical Exam  Constitutional:       General: He is not in acute distress.     Appearance: He is not toxic-appearing.   HENT:      Head: Normocephalic.     "  Nose: Congestion and rhinorrhea present.      Mouth/Throat:      Pharynx: No oropharyngeal exudate or posterior oropharyngeal erythema.   Cardiovascular:      Rate and Rhythm: Normal rate and regular rhythm.      Heart sounds: No murmur heard.  Pulmonary:      Effort: No respiratory distress.      Breath sounds: No wheezing, rhonchi or rales.   Neurological:      Mental Status: He is alert. Mental status is at baseline.      Gait: Gait abnormal.   Psychiatric:         Mood and Affect: Mood normal.           Depression Screening and Follow-up Plan: Patient was screened for depression during today's encounter. They screened negative with a PHQ-2 score of 0.    Falls Plan of Care: balance, strength, and gait training instructions were provided. Recommended assistive device to help with gait and balance.         Kye Reich MD

## 2024-01-10 NOTE — ASSESSMENT & PLAN NOTE
Blood pressure is controlled.  Continue benazepril/HCTZ    I am checking electrolytes and renal function

## 2024-01-12 ENCOUNTER — OFFICE VISIT (OUTPATIENT)
Facility: CLINIC | Age: 79
End: 2024-01-12
Payer: MEDICARE

## 2024-01-12 DIAGNOSIS — G20.C PRIMARY PARKINSONISM: Primary | ICD-10-CM

## 2024-01-12 PROCEDURE — 97112 NEUROMUSCULAR REEDUCATION: CPT

## 2024-01-12 NOTE — PROGRESS NOTES
"Daily Note     Today's date: 2024  Patient name: Justen Thompson  : 1945  MRN: [id number]  Referring provider: Kye Reich MD  Dx:   Encounter Diagnosis     ICD-10-CM    1. Primary parkinsonism  G20.C           Start Time: 1315  Stop Time: 1400  Total time in clinic (min): 45 minutes    Subjective: Pt reports improvement in coughing blood. On antibiotics. Fatigue is improving, but still significant he feels. He will report how he feels post PT.      Objective: See treatment diary below      Assessment: Cues for least amount of steps during turn with good success and with patient performing counting good carryover. With ankle weights shows good foot clearance over hurdles and weight acceptance with anterior direction. Fair reactive balance with multiple steps typically needed to recover. Fair balance with rotational components on compliant surface. No complaints post session. Patient would benefit from continued PT to improve balance, strength, and endurance.      Plan: Continue per plan of care.  Compliant surface. Large amplitude movement. Gait speed. Reactive balance.     Precautions: h/o tinnitus, h/o PD, h/o peripheral neuropathy,h/o HTN, B/L TKR  Re-eval Date:  2024    Date     Visit Count 12 13  15 16    FOTO See IE        Pain In See IE        Pain Out                 Testing   12      TUG/C 10.41 / 11.81 50 > 35        30s chair stand 15        5xSTS 10.12        Gait speed 0.94 no AD / 0.82 SPC        ABC 71        MiniBest 21        FGA 17        6MWT 974ft SPC        Neuro Re-Ed    MiniBest, FGA, 6MWT, 10mWT, TUG/TUGC     Dynamic balance Multidirectional stepping cw/ccw low hurdles cog task 8min total SOLO    [place]olster [place] bw stepping 4 laps x2 SOLO    Red TB + 15lb kb fwd + HT RPS 8/10 8 laps 4' step up + SLS solo x20 ea LE    Uneven surface + lunge & rotation to target x15 ea side solo    Lat step low hurdles 12 laps + 8lb med ball toss solo SOLO  4\" step " "up + SLS  X20 ea LE    Uneven surface + lunge & rotation to target x 8 ea side     Lat stepping low hurdles 5 laps     Lat stepping low hurdles + 8lb med ball toss 5 laps  Solo 3#B low hurdles 8 laps, 8 laps med height    Solo blaze pods bwd-lat step to improve turn steps 3x10 WI 35seconds Solo 5#B low hurdles 12 laps fwd    Solo Cw/CCW 5 laps low hurdles     Reactive balance 5#B 8  fwd/bwd    Foam step ups + rotation 5lb bar x10 ea direction    Static balance     Firm FT EC 30\" x2  HT EO firm 30\" x1    Obstacle course         Cognitive dual task         Large amplitude movement Low mat sit<>stand x15 hands crossed    Resisted walking w perturbation training 8 laps SOLO Low chair + foam on ground 1x8 w 8lb med ball, 1x8 without med ball SOLO  Low chair + foam on ground  1x10 w/ 8lb med ball ,  1x10 without med ball                         Ther Ex   12/22      SLR x 4         HR/TR         Mini Squats         Step ups         Unilat row         Upright Y         Leg Press         Mod quadruped KB pull through         KB deadlift         Piriformis str seated 20\" x3 ea LE reviewed for HEP        Ther Activity         ADL                  Gait Training         Divided Attention         Head turns         Stairs         Modalities                                    "

## 2024-01-17 ENCOUNTER — OFFICE VISIT (OUTPATIENT)
Facility: CLINIC | Age: 79
End: 2024-01-17
Payer: MEDICARE

## 2024-01-17 DIAGNOSIS — G20.C PRIMARY PARKINSONISM: Primary | ICD-10-CM

## 2024-01-17 PROCEDURE — 97110 THERAPEUTIC EXERCISES: CPT

## 2024-01-17 PROCEDURE — 97112 NEUROMUSCULAR REEDUCATION: CPT

## 2024-01-17 PROCEDURE — 97116 GAIT TRAINING THERAPY: CPT

## 2024-01-17 NOTE — PROGRESS NOTES
"Daily Note     Today's date: 2024  Patient name: Justen Thompson  : 1945  MRN: [id number]  Referring provider: Kye Reich MD  Dx:   Encounter Diagnosis     ICD-10-CM    1. Primary parkinsonism  G20.C             Start Time: 1138  Stop Time: 1232  Total time in clinic (min): 54 minutes    Subjective: Pt states he is feeling better recovering from his illness. Been practicing his stretching with small progress noted.      Objective: See treatment diary below      Assessment: Challenged with backward stepping over hurdles, requires visual cues to clear and slowed weight shift to step backward. Improved clearance over hurdles with minimal errors this session. Mild difficulty with dual task with changing gait speeds and occasional path deviation and recorrective step. Seems to have improved step length with metronome in conjunction of min verbal cues to \"kick ball\". Similar effect overground, with building fatigue less success in step length and slower gait speed. Instructed on L/s flexion for HEP to assist in shoe donning. Patient would benefit from continued PT to improve balance, strength, and endurance.      Plan: Continue per plan of care.  Compliant surface. Large amplitude movement. Gait speed. Reactive balance.     Precautions: h/o tinnitus, h/o PD, h/o peripheral neuropathy,h/o HTN, B/L TKR  Re-eval Date:  2024    Date    Visit Count 12 13  15 16 17   FOTO See IE        Pain In See IE        Pain Out                 Testing   12      TUG/C 10.41 / 11.81 50 > 35        30s chair stand 15        5xSTS 10.12        Gait speed 0.94 no AD / 0.82 SPC        ABC 71        MiniBest 21        FGA 17        6MWT 974ft SPC        Neuro Re-Ed    MiniBest, FGA, 6MWT, 10mWT, TUG/TUGC     Dynamic balance Multidirectional stepping cw/ccw low hurdles cog task 8min total SOLO    [place] bw stepping 4 laps x2 SOLO    Red TB + 15lb kb fwd + HT RPS /10 8 laps 4' step up + " "SLS solo x20 ea LE    Uneven surface + lunge & rotation to target x15 ea side solo    Lat step low hurdles 12 laps + 8lb med ball toss solo SOLO  4\" step up + SLS  X20 ea LE    Uneven surface + lunge & rotation to target x 8 ea side     Lat stepping low hurdles 5 laps     Lat stepping low hurdles + 8lb med ball toss 5 laps  Solo 3#B low hurdles 8 laps, 8 laps med height    Solo blaze pods bwd-lat step to improve turn steps 3x10 SC 35seconds Solo 5#B low hurdles 12 laps fwd    Solo Cw/CCW 5 laps low hurdles     Reactive balance 5#B 8  fwd/bwd    Foam step ups + rotation 5lb bar x10 ea direction CGA 5#B low hurdles 12 laps fwd    Lat step L/R cues med hurdles 5#B CGA x10 ea direction    Water cup transfer 20ft x6 laps cues for turns    TM 1.4mph UE support metronome 104bpm 2min x3, trialed bean bag reactive    Backward stepping hurdles 2 laps 5#B   Static balance     Firm FT EC 30\" x2  HT EO firm 30\" x1    Obstacle course         Cognitive dual task         Large amplitude movement Low mat sit<>stand x15 hands crossed    Resisted walking w perturbation training 8 laps SOLO Low chair + foam on ground 1x8 w 8lb med ball, 1x8 without med ball SOLO  Low chair + foam on ground  1x10 w/ 8lb med ball ,  1x10 without med ball                         Ther Ex   12/22      SLR x 4         HR/TR         Mini Squats         Step ups         Unilat row         Upright Y         Leg Press         Mod quadruped KB pull through         KB deadlift         Piriformis str seated 20\" x3 ea LE reviewed for HEP     20\" x3 ea LE    L/s flexion seated      20\" x3 ea instruction for HEP   Ther Activity         ADL                  Gait Training         Overground      300ft x1 auditory cues 5#B   Head turns         Stairs         Modalities                                    "

## 2024-01-19 ENCOUNTER — APPOINTMENT (OUTPATIENT)
Facility: CLINIC | Age: 79
End: 2024-01-19
Payer: MEDICARE

## 2024-01-23 DIAGNOSIS — J01.00 ACUTE MAXILLARY SINUSITIS, RECURRENCE NOT SPECIFIED: ICD-10-CM

## 2024-01-23 DIAGNOSIS — G60.9 IDIOPATHIC PERIPHERAL NEUROPATHY: ICD-10-CM

## 2024-01-23 RX ORDER — FLUTICASONE PROPIONATE 50 MCG
SPRAY, SUSPENSION (ML) NASAL
Qty: 16 ML | Refills: 1 | Status: SHIPPED | OUTPATIENT
Start: 2024-01-23

## 2024-01-23 RX ORDER — GABAPENTIN 100 MG/1
300 CAPSULE ORAL
Qty: 90 CAPSULE | Refills: 3 | Status: SHIPPED | OUTPATIENT
Start: 2024-01-23

## 2024-01-24 ENCOUNTER — OFFICE VISIT (OUTPATIENT)
Facility: CLINIC | Age: 79
End: 2024-01-24
Payer: MEDICARE

## 2024-01-24 DIAGNOSIS — G20.C PRIMARY PARKINSONISM: Primary | ICD-10-CM

## 2024-01-24 PROCEDURE — 97112 NEUROMUSCULAR REEDUCATION: CPT

## 2024-01-24 NOTE — PROGRESS NOTES
Daily Note     Today's date: 2024  Patient name: Justen Thompson  : 1945  MRN: [id number]  Referring provider: Kye Reich MD  Dx:   Encounter Diagnosis     ICD-10-CM    1. Primary parkinsonism  G20.C               Start Time: 1146  Stop Time: 1230  Total time in clinic (min): 44 minutes    Subjective: Pt states he is feeling better recovering from his illness. Been practicing his stretching with small progress noted. Was able to get shoes on today by himself which has been a while, has been practicing his stretches consistently which he feel helped achieve this.      Objective: See treatment diary below      Assessment: Circuit style session to inc HR between balance exercises. When focused on turns only, better alvin + less steps noted. However, given dual tasking more unstable and festinated turns present. With L stance and R tap better stability vs opposite, with blaze pod task, able to increase pace of completion with more taps completed and still maintaining larger amplitude turns. With repetition shows better foot clearance over hurdles, often catching toe with trail limb. Patient would benefit from continued PT to improve balance, strength, and endurance.      Plan: Continue per plan of care.  Compliant surface. Large amplitude movement. Gait speed. Reactive balance.     Precautions: h/o tinnitus, h/o PD, h/o peripheral neuropathy,h/o HTN, B/L TKR  Re-eval Date:  2024    Date         Visit Count 18        FOTO See IE        Pain In See IE        Pain Out                 Testing         TUG/C 10.41 / 11.81 50 > 35        30s chair stand 15        5xSTS 10.12        Gait speed 0.94 no AD / 0.82 SPC        ABC 71        MiniBest 21        FGA 17        6MWT 974ft SPC        Neuro Re-Ed         Dynamic balance Blaze pods L/R color taps 55s x4 FL 12 taps    Low hurdles + 4'step up 2 laps hurdles x5 step ups 3 cycles    Tossing reaction triangle static x5, amb 10ft x3        Static  balance         Obstacle course         Cognitive dual task Cone switches + direction change walking ortho side 8min total        Large amplitude movement                           Ther Ex         SLR x 4         HR/TR         Mini Squats         Step ups         Unilat row         Upright Y         Leg Press         Mod quadruped KB pull through         KB deadlift         Piriformis str seated         L/s flexion seated         Ther Activity         ADL                  Gait Training         Overground         Head turns         Stairs         Modalities

## 2024-01-26 ENCOUNTER — OFFICE VISIT (OUTPATIENT)
Facility: CLINIC | Age: 79
End: 2024-01-26
Payer: MEDICARE

## 2024-01-26 DIAGNOSIS — G20.C PRIMARY PARKINSONISM: Primary | ICD-10-CM

## 2024-01-26 PROCEDURE — 97110 THERAPEUTIC EXERCISES: CPT

## 2024-01-26 PROCEDURE — 97112 NEUROMUSCULAR REEDUCATION: CPT

## 2024-01-26 NOTE — PROGRESS NOTES
Daily Note     Today's date: 2024  Patient name: Justen Thompson  : 1945  MRN: [id number]  Referring provider: Kye Reich MD  Dx:   Encounter Diagnosis     ICD-10-CM    1. Primary parkinsonism  G20.C               Start Time: 1115  Stop Time: 1200  Total time in clinic (min): 45 minutes    Subjective: Pt reports felt good after last session. No issues, no falls recently.      Objective: See treatment diary below      Assessment: With cues for form and more knee flexion better christy performance observed. Challenged with power generation from lower surface. Better single limb stance when stepping up, able to control back to floor vs placing back onto step, inconsistent performance though. Difficulty on compliant surfaces, still responds well to # of steps during turns to improve stability. Good power generation from low surface, occasional fall back into chair. Patient would benefit from continued PT to improve balance, strength, and endurance.      Plan: Continue per plan of care.  Compliant surface. Large amplitude movement. Gait speed. Reactive balance.     Precautions: h/o tinnitus, h/o PD, h/o peripheral neuropathy,h/o HTN, B/L TKR  Re-eval Date:  2024    Date        Visit Count 18 19       FOTO See IE        Pain In See IE        Pain Out                 Testing         TUG/C 10.41 / 11.81 50 > 35        30s chair stand 15        5xSTS 10.12        Gait speed 0.94 no AD / 0.82 SPC        ABC 71        MiniBest 21        FGA 17        6MWT 974ft SPC        Neuro Re-Ed         Dynamic balance Blaze pods L/R color taps 55s x4 UT 12 taps    Low hurdles + 4'step up 2 laps hurdles x5 step ups 3 cycles    Tossing reaction triangle static x5, amb 10ft x3 5#BLE 10 laps low hurdles fwd, lat step 6 laps    Bwd step 5lb OH 5#BLE    Step through on foam fwd/bwd x6 ea direction, fwd w turns over christy x6    Step up 6' w SLS x20 5#BLE         Static balance         Obstacle course        "  Cognitive dual task Cone switches + direction change walking ortho side 8min total        Large amplitude movement                           Ther Ex         SLR x 4         HR/TR         Mini Squats  STS from low surface 2x10       Step ups         Unilat row         Upright Y         Leg Press         Mod quadruped KB pull through         KB deadlift         Piriformis str seated         L/s flexion seated         Hip flexor stretch  Standing 30\" x2 ea       Ther Activity         ADL                  Gait Training         Overground         Head turns         Stairs         Modalities                                    "

## 2024-01-31 ENCOUNTER — OFFICE VISIT (OUTPATIENT)
Facility: CLINIC | Age: 79
End: 2024-01-31
Payer: MEDICARE

## 2024-01-31 DIAGNOSIS — G20.C PRIMARY PARKINSONISM: Primary | ICD-10-CM

## 2024-01-31 PROCEDURE — 97112 NEUROMUSCULAR REEDUCATION: CPT

## 2024-01-31 NOTE — PROGRESS NOTES
PT Re-Evaluation /Progress note    Today's date: 2024  Patient name: Justen Thompson  : 1945  MRN: [id number]  Referring provider: Kye Reich MD  Dx:   Encounter Diagnosis     ICD-10-CM    1. Primary parkinsonism  G20.C             Start Time: 1335  Stop Time: 1415  Total time in clinic (min): 40 minutes    Assessment  Assessment details: Patient is a 78 y.o. year old male presenting to [facility] s/p diagnosis of Parkinsons disease and peripheral neuropathy. Justen shows deficits in his reactive and anticipatory balance per MiniBest. FGA reveals deviations and instability with walking tasks. Improvement of +1 on each of these tests. Both of these measures place him at risk for falls for persons with PD. From an endurance standpoint, 6MWT shows reduced time however, Justen reports he could have continued walking laps. Previous tests pt shows more severe gait deficits at near similar distances. Neuropathy continues to play role in his instability and proprioceptive deficts as well as postural instability from PD. He requires longer duration of therapy to progress toward his goals. He will continue to benefit from skilled PT to address his balance.     Impairments: abnormal coordination, abnormal gait, abnormal muscle tone, abnormal or restricted ROM, abnormal movement, activity intolerance, impaired balance, impaired physical strength, lacks appropriate home exercise program, safety issue and weight-bearing intolerance     Prognosis: good    Goals  In 4 weeks, patient will:  1.  Demonstrate ability to perform 20 minutes of activity without requiring seated rest break. - met  2.  Demonstrate ability to maintain upright balance unsupported by UEs while reaching above shoulder height without resistance to promote stability with ADLs - met  3.  Improve 30s chair stand by at least 2 stands to show improved LE strength for stability during transfers  4.  Demonstrate ability to ambulate through doorway/threshold  without verbal cues or change in gait quality >80% of occasions - met  5.  Demonstrate ability to roll bilaterally in <7 seconds without use of assistive equipment - met    In 4-10 weeks, patient will: - PROGRESSING  1.  Demonstrate consistent carryover with HEP and walking program.  2.  Improve gait speed by at least  0.18 m/s to meet MDC value for PwPD and reduce fall risk.  3.  Improve 6MWT by at least 270ft to meet MDC value and demonstrate improvement in ability in community ambulation.  4.  Improve FGA by at least 4 points to meet MDC value for PwPD and show improved balance.  5.  Improve ABC by at least 16% to meet reduced risk of falling and improved balance confidence.  6.  Demonstrate ability to ambulate forward holding item requiring bilateral UEs without LOB or change in gait quality.  7.  Report improvement in ability to get in/out of bed.  8. Improve MiniBest by at least 4 points to meet MCID value for PwPD and demonstrate improved safety.  9. Improve fastest gait speed by at least 0.25m/s to meet MDC value for fast pace gait.    Patient Specific Functional Scale: balance 7/10, endurance 6/10, 13/20       Plan  Patient would benefit from: skilled physical therapy  Planned therapy interventions: neuromuscular re-education, manual therapy, patient education, home exercise program, therapeutic exercise, therapeutic activities and gait training  Frequency: 2x week  Duration in weeks: 12  Plan of Care beginning date: 1/31/2024  Plan of Care expiration date: 4/30/2024  Treatment plan discussed with: patient        Subjective Evaluation    History of Present Illness  Mechanism of injury: 1/31: Feels like endurance is better. Balance is about the same. Wants to continue to work on balance.    12/5: Wants to work more on balance. Feels like he is getting in a better routine at home. No near falls balance wise.     Present at PT: More excruciating burning pain due to neuropathy. Using SPC for distances. Had  episode of complete loss of balance. Lasted a day, but has not had an episode since. Does nto have burning now though. Has not been as faithful exercise wise due to life stressors or events. Notices a decline in his balance and feels like he is on his way to recovering. Wants to work on his balance and endurance.     Patient Specific Functional Scale: Balance 7/10, Endurance 5/10, /20    Patient Goals  Patient goals for therapy: improved balance, independence with ADLs/IADLs, increased strength and increased motion    Pain  Current pain ratin  At best pain ratin  At worst pain rating: 10  Location: neuopathy pain  Relieving factors: change in position    Social Support  Stairs in house: yes   Lives in: multiple-level home  Lives with: spouse    Employment status: not working    Diagnostic Tests  No diagnostic tests performed  Treatments  Previous treatment: physical therapy and speech therapy        Objective      Balance Test 2023   6 Minute Walk Test (ft): 1058ft NV   900ft SPC (could have gone 3-4 more laps)   FGA:     ABC  64% 71%  70%   MiniBest    Gait Speed (m/s): 1.03 m/s SPC 0.82m/s / no AD 0.90m/s  Fast: 1.45m/s no AD No AD: 0.94m/s  Fast: 1.41m/s no AD  SSGS: no AD 0.81 m/s / SPC 0.85 m/s  Fast: 1.34 m/s    30s chair stand:   14 15     5x Sit To Stand (s): 8.13 9.74 10.12     TUG / TUGC: 7.18 / 8.20 50 > 32 9.15 / 10.60 50 > 41 (37 > 28 by 4's error)   09.30 / 10.88 50 > 29         MCTSIB Number of Seconds   Feet Together, Eyes Open 30   Feet Together, Eyes Closed 30   Feet Together, Eyes Open Foam 30   Feet Together, Eyes Closed Foam 30          Gait Assessment: Croched gait minimal arm swing, footflat strike - lacking proper IC, Stooped posture, inadequate knee ext in terminal swing.           Precautions: h/o tinnitus, h/o PD, h/o peripheral neuropathy,h/o HTN, B/L TKR  Re-eval Date:    EPOC: 2024    Date   "1/26 1/31      Visit Count 18 19 20      FOTO See IE        Pain In See IE        Pain Out                 Testing         TUG/C 10.41 / 11.81 50 > 35        30s chair stand 15        5xSTS 10.12        Gait speed 0.81 no AD / 0.85 SPC        ABC 70        MiniBest 22        FGA 18        6MWT 974ft SPC        Neuro Re-Ed   6MWT, 10mWT, TUG/C, MiniBest, FGA      Dynamic balance Blaze pods L/R color taps 55s x4 ME 12 taps    Low hurdles + 4'step up 2 laps hurdles x5 step ups 3 cycles    Tossing reaction triangle static x5, amb 10ft x3 5#BLE 10 laps low hurdles fwd, lat step 6 laps    Bwd step 5lb OH 5#BLE    Step through on foam fwd/bwd x6 ea direction, fwd w turns over christy x6    Step up 6' w SLS x20 5#BLE         Static balance         Obstacle course         Cognitive dual task Cone switches + direction change walking ortho side 8min total        Large amplitude movement         Confined space balance                  Ther Ex         SLR x 4         HR/TR         Mini Squats  STS from low surface 2x10       Step ups         Unilat row         Upright Y         Leg Press         Mod quadruped KB pull through         KB deadlift         Piriformis str seated         L/s flexion seated         Hip flexor stretch  Standing 30\" x2 ea       Ther Activity         ADL                  Gait Training         Overground         Head turns         Stairs         Modalities                                    "

## 2024-02-05 DIAGNOSIS — I25.10 CORONARY ARTERY DISEASE INVOLVING NATIVE CORONARY ARTERY OF NATIVE HEART WITHOUT ANGINA PECTORIS: ICD-10-CM

## 2024-02-05 RX ORDER — ATORVASTATIN CALCIUM 20 MG/1
TABLET, FILM COATED ORAL
Qty: 90 TABLET | Refills: 3 | Status: SHIPPED | OUTPATIENT
Start: 2024-02-05

## 2024-02-07 ENCOUNTER — OFFICE VISIT (OUTPATIENT)
Facility: CLINIC | Age: 79
End: 2024-02-07
Payer: MEDICARE

## 2024-02-07 DIAGNOSIS — G20.C PRIMARY PARKINSONISM: Primary | ICD-10-CM

## 2024-02-07 PROCEDURE — 97112 NEUROMUSCULAR REEDUCATION: CPT

## 2024-02-07 NOTE — PROGRESS NOTES
Daily Note     Today's date: 2024  Patient name: Justen Thompson  : 1945  MRN: [id number]  Referring provider: Kye Reich MD  Dx:   Encounter Diagnosis     ICD-10-CM    1. Primary parkinsonism  G20.C           Start Time: 1100  Stop Time: 1145  Total time in clinic (min): 45 minutes    Subjective: Pt ready to work on balance, nothing new to report. Feels like flexibility exercises are slowly helping.      Objective: See treatment diary below      Assessment:  Sufficient power generation with cues during sit<>stand + step ups, difficulty with single limb stance. Pt with significant gait changes when approaching objects, however, with cues shows better response in balance and step length width as well as posture. Heavier ankle weights used over hurdles to increase intensity of session with good response. Pt reports fatigue post session. Patient would benefit from continued PT      Plan: Continue per plan of care. Large amplitude movement. Busy env tasks, confined spaces.     Precautions: h/o tinnitus, h/o PD, h/o peripheral neuropathy,h/o HTN, B/L TKR  Re-eval Date:    EPOC: 2024    Date      Visit Count 18 19 20 21     FOTO See IE        Pain In See IE        Pain Out                 Testing         TUG/C 10.41 / 11.81 50 > 35        30s chair stand 15        5xSTS 10.12        Gait speed 0.81 no AD / 0.85 SPC        ABC 70        MiniBest 22        FGA 18        6MWT 974ft SPC        Neuro Re-Ed   6MWT, 10mWT, TUG/C, MiniBest, FGA      Dynamic balance Blaze pods L/R color taps 55s x4 SC 12 taps    Low hurdles + 4'step up 2 laps hurdles x5 step ups 3 cycles    Tossing reaction triangle static x5, amb 10ft x3 5#BLE 10 laps low hurdles fwd, lat step 6 laps    Bwd step 5lb OH 5#BLE    Step through on foam fwd/bwd x6 ea direction, fwd w turns over christy x6    Step up 6' w SLS x20 5#BLE    Blaze pods 4 square - avoiding obstacles 1min x6 SC 13 taps SOLO    Low hurdles 10  "laps 5#BLE, 7.5#BLE 6 laps fwd, 6 laps lat    6' step up 1 trek poles ipsilateral forced use x15 ea LE     Static balance         Obstacle course         Cognitive dual task Cone switches + direction change walking ortho side 8min total        Large amplitude movement    X12 lobby chair     Confined space balance                  Ther Ex         SLR x 4         HR/TR         Mini Squats  STS from low surface 2x10       Step ups         Unilat row         Upright Y         Leg Press         Mod quadruped KB pull through         KB deadlift         Piriformis str seated         L/s flexion seated         Hip flexor stretch  Standing 30\" x2 ea       Ther Activity         ADL                  Gait Training         Overground         Head turns         Stairs         Modalities                                    "

## 2024-02-09 ENCOUNTER — OFFICE VISIT (OUTPATIENT)
Facility: CLINIC | Age: 79
End: 2024-02-09
Payer: MEDICARE

## 2024-02-09 DIAGNOSIS — G20.C PRIMARY PARKINSONISM: Primary | ICD-10-CM

## 2024-02-09 PROCEDURE — 97112 NEUROMUSCULAR REEDUCATION: CPT

## 2024-02-09 NOTE — PROGRESS NOTES
Daily Note     Today's date: 2024  Patient name: Justen Thompson  : 1945  MRN: [id number]  Referring provider: Kye Reich MD  Dx:   Encounter Diagnosis     ICD-10-CM    1. Primary parkinsonism  G20.C           Start Time: 1315  Stop Time: 1400  Total time in clinic (min): 45 minutes    Subjective: Pt reports nothing new since last seen.      Objective: See treatment diary below      Assessment:  Counting steps makes Justen more successful maintaining larger amplitude movement even confined areas. With heavier ankle weights, slightly more errors in foot clearance at the beginning, but improved with repetition. External cues continue to benefit patient the most in fighting bradykinesia and low amplitude movement. Added boxing to include footwork and large amplitude UE movement with fair response in both, requires verbal cues for best performance. Patient would benefit from continued PT      Plan: Continue per plan of care. Large amplitude movement. Busy env tasks, confined spaces.     Precautions: h/o tinnitus, h/o PD, h/o peripheral neuropathy,h/o HTN, B/L TKR  Re-eval Date:    EPOC: 2024    Date     Visit Count 18 19 20 21 22    FOTO See IE        Pain In See IE        Pain Out                 Testing         TUG/C 10.41 / 11.81 50 > 35        30s chair stand 15        5xSTS 10.12        Gait speed 0.81 no AD / 0.85 SPC        ABC 70        MiniBest 22        FGA 18        6MWT 974ft SPC        Neuro Re-Ed   6MWT, 10mWT, TUG/C, MiniBest, FGA      Dynamic balance Blaze pods L/R color taps 55s x4 TX 12 taps    Low hurdles + 4'step up 2 laps hurdles x5 step ups 3 cycles    Tossing reaction triangle static x5, amb 10ft x3 5#BLE 10 laps low hurdles fwd, lat step 6 laps    Bwd step 5lb OH 5#BLE    Step through on foam fwd/bwd x6 ea direction, fwd w turns over christy x6    Step up 6' w SLS x20 5#BLE    Blaze pods 4 square - avoiding obstacles 1min x6 TX 13 taps SOLO    Low  "hurdles 10 laps 5#BLE, 7.5#BLE 6 laps fwd, 6 laps lat    6' step up 1 trek poles ipsilateral forced use x15 ea LE Boxing 4 square counting steps 8min total    Low hurdles 7.5#BLE 12 laps fwd, 8 laps lat    8lb med ball carry 7.5#BLE walking march 20ft x8 SOLO    Static balance         Obstacle course         Cognitive dual task Cone switches + direction change walking ortho side 8min total        Large amplitude movement    X12 lobby chair 20ft x8 laps SOLO resisted ambulation counting steps    Confined space balance     Blaze pods 5 pods, avoiding obstacles [id number] x5 IL 16 tapsSOLO             Ther Ex         SLR x 4         HR/TR         Mini Squats  STS from low surface 2x10       Step ups         Unilat row         Upright Y         Leg Press         Mod quadruped KB pull through         KB deadlift         Piriformis str seated         L/s flexion seated         Hip flexor stretch  Standing 30\" x2 ea       Ther Activity         ADL                  Gait Training         Overground         Head turns         Stairs         Modalities                                    "

## 2024-02-14 ENCOUNTER — OFFICE VISIT (OUTPATIENT)
Facility: CLINIC | Age: 79
End: 2024-02-14
Payer: MEDICARE

## 2024-02-14 DIAGNOSIS — G20.C PRIMARY PARKINSONISM: Primary | ICD-10-CM

## 2024-02-14 PROCEDURE — 97112 NEUROMUSCULAR REEDUCATION: CPT

## 2024-02-14 NOTE — PROGRESS NOTES
Daily Note     Today's date: 2024  Patient name: Justen Thompson  : 1945  MRN: [id number]  Referring provider: Kye Reich MD  Dx:   Encounter Diagnosis     ICD-10-CM    1. Primary parkinsonism  G20.C           Start Time: 1150  Stop Time: 1230  Total time in clinic (min): 40 minutes    Subjective: Pt reports fatigue slightly more than usual as well as more imbalance.      Objective: See treatment diary below      Assessment:  During beginning of session, more imbalance and instability. Observed during confined space balance training, smaller movements, which progressively became larger. Hypokinesia present with repeated movement consistently near end of hurdles, more so laterally vs forward. With counting steps during timed trials, better control during turns. Pt reports fatigue post session. Patient would benefit from continued PT      Plan: Continue per plan of care. Large amplitude movement. Busy env tasks, confined spaces.     Precautions: h/o tinnitus, h/o PD, h/o peripheral neuropathy,h/o HTN, B/L TKR  Re-eval Date:    EPOC: 2024    Date    Visit Count 18 19 20 21 22 23   FOTO See IE        Pain In See IE        Pain Out                 Testing         TUG/C 10.41 / 11.81 50 > 35        30s chair stand 15        5xSTS 10.12        Gait speed 0.81 no AD / 0.85 SPC        ABC 70        MiniBest 22        FGA 18        6MWT 974ft SPC        Neuro Re-Ed   6MWT, 10mWT, TUG/C, MiniBest, FGA      Dynamic balance Blaze pods L/R color taps 55s x4 NC 12 taps    Low hurdles + 4'step up 2 laps hurdles x5 step ups 3 cycles    Tossing reaction triangle static x5, amb 10ft x3 5#BLE 10 laps low hurdles fwd, lat step 6 laps    Bwd step 5lb OH 5#BLE    Step through on foam fwd/bwd x6 ea direction, fwd w turns over christy x6    Step up 6' w SLS x20 5#BLE    Blaze pods 4 square - avoiding obstacles 1min x6 NC 13 taps SOLO    Low hurdles 10 laps 5#BLE, 7.5#BLE 6 laps fwd, 6  "laps lat    6' step up 1 trek poles ipsilateral forced use x15 ea LE Boxing 4 square counting steps 8min total    Low hurdles 7.5#BLE 12 laps fwd, 8 laps lat    8lb med ball carry 7.5#BLE walking march 20ft x8 SOLO 7.5#BLE low hurdles 12 laps fwd, 12 laps lat step   Static balance         Obstacle course         Cognitive dual task Cone switches + direction change walking ortho side 8min total        Large amplitude movement    X12 lobby chair 20ft x8 laps SOLO resisted ambulation counting steps Timed trials 20ft x6 NM 11.27 4-5 step turns   Confined space balance     Blaze pods 5 pods, avoiding obstacles [id number] x5 NM 16 tapsSOLO Blaze pods 5 pods, avoiding obstacles [id number] x5 NM 16 tapsSOLO            Ther Ex         SLR x 4         HR/TR         Mini Squats  STS from low surface 2x10       Step ups         Unilat row         Upright Y         Leg Press         Mod quadruped KB pull through         KB deadlift         Piriformis str seated         L/s flexion seated         Hip flexor stretch  Standing 30\" x2 ea       Ther Activity         ADL                  Gait Training         Overground         Head turns         Stairs         Modalities                                    "

## 2024-02-16 ENCOUNTER — OFFICE VISIT (OUTPATIENT)
Facility: CLINIC | Age: 79
End: 2024-02-16
Payer: MEDICARE

## 2024-02-16 DIAGNOSIS — G20.C PRIMARY PARKINSONISM: Primary | ICD-10-CM

## 2024-02-16 PROCEDURE — 97116 GAIT TRAINING THERAPY: CPT

## 2024-02-16 PROCEDURE — 97112 NEUROMUSCULAR REEDUCATION: CPT

## 2024-02-16 NOTE — PROGRESS NOTES
Daily Note     Today's date: 2024  Patient name: Justen Thompson  : 1945  MRN: [id number]  Referring provider: Kye Reich MD  Dx:   Encounter Diagnosis     ICD-10-CM    1. Primary parkinsonism  G20.C           Start Time: 1315  Stop Time: 1400  Total time in clinic (min): 45 minutes    Subjective: Pt reports fatigue has been on the good side which he is happy about. Nothing new otherwise.      Objective: See treatment diary below      Assessment:  Increased difficulty of obstacle course, to include higher set obstacle to negotiate. Pt shows improved speed and obstacle negotiation with better turn amplitude + stepping strategy through obstacles. Occasional freezing observed during turns in narrow space. Hurdles more difficult near end of session due to LE fatigue and hypokinesia, with cues during lateral steps to take least amt of steps, better response to hypokinesia. Backward stepping with counting as external cue makes improved step length. Challenged with inclined treadmill, note gait and posture changes with fatigue. Patient would benefit from continued PT      Plan: Continue per plan of care. Large amplitude movement. Busy env tasks, confined spaces.     Precautions: h/o tinnitus, h/o PD, h/o peripheral neuropathy,h/o HTN, B/L TKR  Re-eval Date:    EPOC: 2024    Date         Visit Count 24        FOTO See IE        Pain In See IE        Pain Out                 Testing         TUG/C 10.41 / 11.81 50 > 35        30s chair stand 15        5xSTS 10.12        Gait speed 0.81 no AD / 0.85 SPC        ABC 70        MiniBest 22        FGA 18        6MWT 974ft SPC        Neuro Re-Ed         Dynamic balance Med hurdles SOLO 7.5#BLE fwd 6 laps - low hurdles 8 laps, 12 laps lat step    Bwd stepping 7.5#BLE SOLO 6 laps         Static balance         Obstacle course         Cognitive dual task         Large amplitude movement         Confined space balance Blaze pods 5 pods, avoiding  obstacles [place] 18 1min 30s x5 SOLO                 Ther Ex         SLR x 4         HR/TR         Mini Squats         Step ups         Unilat row         Upright Y         Leg Press         Mod quadruped KB pull through         KB deadlift         Piriformis str seated         L/s flexion seated         Hip flexor stretch         Ther Activity         ADL                  Gait Training         Overground         Head turns         Stairs         TM 1.2mph 13% [id number] x2 BUE SOLO        Modalities

## 2024-02-21 ENCOUNTER — OFFICE VISIT (OUTPATIENT)
Facility: CLINIC | Age: 79
End: 2024-02-21
Payer: MEDICARE

## 2024-02-21 DIAGNOSIS — G20.C PRIMARY PARKINSONISM: Primary | ICD-10-CM

## 2024-02-21 PROCEDURE — 97112 NEUROMUSCULAR REEDUCATION: CPT

## 2024-02-21 NOTE — PROGRESS NOTES
Daily Note     Today's date: 2024  Patient name: Justen Thompson  : 1945  MRN: [id number]  Referring provider: Kye Reich MD  Dx:   Encounter Diagnosis     ICD-10-CM    1. Primary parkinsonism  G20.C           Start Time: 1145  Stop Time: 1230  Total time in clinic (min): 45 minutes    Subjective: Pt states fatigue has still been better, today is an exception. Otherwise he feels PT is helping his balance at home.      Objective: See treatment diary below      Assessment:  Patient increasing pace through obstacle course with good carryover in consistency and pace. Better clearance with low hurdles today vs prior visit, especially with lateral stepping. Forward still presents with hypokinesia near end of obstacles, and more frequently catching L foot. Backward stepping with small step length and increased fear of falling, more lateral path deviation as well. Patient would benefit from continued PT      Plan: Continue per plan of care. Large amplitude movement. Busy env tasks, confined spaces.     Precautions: h/o tinnitus, h/o PD, h/o peripheral neuropathy,h/o HTN, B/L TKR  Re-eval Date:    EPOC: 2024    Date        Visit Count 24 25       FOTO See IE        Pain In See IE        Pain Out                 Testing         TUG/C 10.41 / 11.81 50 > 35        30s chair stand 15        5xSTS 10.12        Gait speed 0.81 no AD / 0.85 SPC        ABC 70        MiniBest 22        FGA 18        6MWT 974ft SPC        Neuro Re-Ed         Dynamic balance Med hurdles SOLO 7.5#BLE fwd 6 laps - low hurdles 8 laps, 12 laps lat step    Bwd stepping 7.5#BLE SOLO 6 laps  Low hurdles SOLO 7.5#BLE fwd 14 laps, 8 laps lat step    Bolster hold 7.5#BLE bwd stepping SOLO 6 laps       Static balance         Obstacle course         Cognitive dual task         Large amplitude movement         Confined space balance Blaze pods 5 pods, avoiding obstacles MT 18 1min 30s x5 SOLO Blaze pods 5 pods, avoiding  obstacles VT 19 1min 30s x5 SOLO                Ther Ex         SLR x 4         HR/TR         Mini Squats         Step ups         Unilat row         Upright Y         Leg Press         Mod quadruped KB pull through         KB deadlift         Piriformis str seated         L/s flexion seated         Hip flexor stretch         Ther Activity         ADL                  Gait Training         Overground         Head turns         Stairs         TM 1.2mph 13% [id number] x2 BUE SOLO        Modalities

## 2024-02-23 ENCOUNTER — OFFICE VISIT (OUTPATIENT)
Facility: CLINIC | Age: 79
End: 2024-02-23
Payer: MEDICARE

## 2024-02-23 DIAGNOSIS — G20.C PRIMARY PARKINSONISM: Primary | ICD-10-CM

## 2024-02-23 PROCEDURE — 97112 NEUROMUSCULAR REEDUCATION: CPT

## 2024-02-23 PROCEDURE — 97110 THERAPEUTIC EXERCISES: CPT

## 2024-02-23 NOTE — PROGRESS NOTES
Daily Note     Today's date: 2024  Patient name: Justen Thompson  : 1945  MRN: [id number]  Referring provider: Kye Reich MD  Dx:   Encounter Diagnosis     ICD-10-CM    1. Primary parkinsonism  G20.C             Start Time: 1315  Stop Time: 1400  Total time in clinic (min): 45 minutes    Subjective: Pt states he is very tired today, upper back is bothering him. Improvements in his dressing.      Objective: See treatment diary below      Assessment:  Slower pace with obstacle course with notable changes in freezing, turns, and upright posture. This all effects step length and overall completion of task. Instructed on thoracic extension activities, various ways to perform safely and effectively, how many repetitions for HEP and when to discontinue exercise. After session good gains in upright posture while standing and walking, pt reports less discomfort in back. Patient would benefit from continued PT      Plan: Continue per plan of care. Large amplitude movement. Busy env tasks, confined spaces.     Precautions: h/o tinnitus, h/o PD, h/o peripheral neuropathy,h/o HTN, B/L TKR  Re-eval Date:    EPOC: 2024    Date       Visit Count 24 25 26      FOTO See IE        Pain In See IE        Pain Out                 Testing         TUG/C 10.41 / 11.81 50 > 35        30s chair stand 15        5xSTS 10.12        Gait speed 0.81 no AD / 0.85 SPC        ABC 70        MiniBest 22        FGA 18        6MWT 974ft SPC        Neuro Re-Ed         Dynamic balance Med hurdles SOLO 7.5#BLE fwd 6 laps - low hurdles 8 laps, 12 laps lat step    Bwd stepping 7.5#BLE SOLO 6 laps  Low hurdles SOLO 7.5#BLE fwd 14 laps, 8 laps lat step    Bolster hold 7.5#BLE bwd stepping SOLO 6 laps       Static balance         Obstacle course         Cognitive dual task         Large amplitude movement         Confined space balance Blaze pods 5 pods, avoiding obstacles IN 18 1min 30s x5 SOLO Blaze pods 5 pods,  avoiding obstacles IN 19 1min 30s x5 SOLO Blaze pods 5 pods, avoiding obstacles IN 13 1min 30s x5 SOLO               Ther Ex         SLR x 4         HR/TR         Mini Squats         Step ups         Unilat row         Upright Y         Leg Press         Mod quadruped KB pull through         KB deadlift         Piriformis str seated         L/s flexion seated         Hip flexor stretch         Thoracic extension   Seated w OP x20    Supine arms OH x8     AF HEP discussion      Ther Activity         ADL                  Gait Training         Overground         Head turns         Stairs         TM 1.2mph 13% [id number] x2 BUE SOLO        Modalities

## 2024-02-28 ENCOUNTER — OFFICE VISIT (OUTPATIENT)
Facility: CLINIC | Age: 79
End: 2024-02-28
Payer: MEDICARE

## 2024-02-28 DIAGNOSIS — G20.C PRIMARY PARKINSONISM: Primary | ICD-10-CM

## 2024-02-28 PROCEDURE — 97112 NEUROMUSCULAR REEDUCATION: CPT

## 2024-02-28 NOTE — PROGRESS NOTES
Daily Note     Today's date: 2024  Patient name: Justen Thompson  : 1945  MRN: [id number]  Referring provider: Kye Reich MD  Dx:   Encounter Diagnosis     ICD-10-CM    1. Primary parkinsonism  G20.C             Start Time: 1145  Stop Time: 1230  Total time in clinic (min): 45 minutes    Subjective: Pt reports nothing new. Put his shoes on himself today. Lowback is off/on painful today.      Objective: See treatment diary below      Assessment:  Improved clearance and success with forward and lateral stepping over hurdles with ankle weights, initially less successful due to needing to warm up. Backward stepping nearly festinating, cued for counting steps which helps step length, just passed step to pattern, UE fatigue present with overhead holding to improve thoracic extension. Contralateral hold with trek pole provides more stability for successful single limb stance, vs ipsilateral hold. Patient would benefit from continued PT      Plan: Continue per plan of care. Large amplitude movement. Busy env tasks, confined spaces.     Precautions: h/o tinnitus, h/o PD, h/o peripheral neuropathy,h/o HTN, B/L TKR  Re-eval Date:    EPOC: 2024    Date      Visit Count 24 25 26 27     FOTO See IE        Pain In See IE        Pain Out                 Testing         TUG/C 10.41 / 11.81 50 > 35        30s chair stand 15        5xSTS 10.12        Gait speed 0.81 no AD / 0.85 SPC        ABC 70        MiniBest 22        FGA 18        6MWT 974ft SPC        Neuro Re-Ed         Dynamic balance Med hurdles SOLO 7.5#BLE fwd 6 laps - low hurdles 8 laps, 12 laps lat step    Bwd stepping 7.5#BLE SOLO 6 laps  Low hurdles SOLO 7.5#BLE fwd 14 laps, 8 laps lat step    Bolster hold 7.5#BLE bwd stepping SOLO 6 laps  Low hurdles SOLO 7.5#BLE 10 laps fwd, 10 laps lat step    SOLO bwd stepping 7.5#BLE OH press 20ft x4 laps    Lat step cues for step length and pace 10ft x8    1 trek pole runner step up  6' x20 ea LE     Static balance         Obstacle course         Cognitive dual task         Large amplitude movement         Confined space balance Blaze pods 5 pods, avoiding obstacles [place] 18 1min 30s x5 SOLO Blaze pods 5 pods, avoiding obstacles [place] 19 1min 30s x5 SOLO Blaze pods 5 pods, avoiding obstacles [place] 13 1min 30s x5 SOLO               Ther Ex         SLR x 4         HR/TR         Mini Squats         Step ups         Unilat row         Upright Y         Leg Press         Mod quadruped KB pull through         KB deadlift         Piriformis str seated         L/s flexion seated         Hip flexor stretch         Thoracic extension   Seated w OP x20    Supine arms OH x8     AF HEP discussion      Ther Activity         ADL                  Gait Training         Overground         Head turns         Stairs         TM 1.2mph 13% [id number] x2 BUE SOLO        Modalities

## 2024-03-01 ENCOUNTER — OFFICE VISIT (OUTPATIENT)
Facility: CLINIC | Age: 79
End: 2024-03-01
Payer: MEDICARE

## 2024-03-01 DIAGNOSIS — G20.C PRIMARY PARKINSONISM: Primary | ICD-10-CM

## 2024-03-01 PROCEDURE — 97112 NEUROMUSCULAR REEDUCATION: CPT

## 2024-03-01 NOTE — PROGRESS NOTES
PT Re-Evaluation /Progress note    Today's date: 3/1/2024  Patient name: Justen Thompson  : 1945  MRN: [id number]  Referring provider: Kye Reich MD  Dx:   Encounter Diagnosis     ICD-10-CM    1. Primary parkinsonism  G20.C               Start Time: 1315  Stop Time: 1400  Total time in clinic (min): 45 minutes    Assessment  Assessment details: Patient is a 78 y.o. year old male presenting to OPPT s/p diagnosis of Parkinsons disease and peripheral neuropathy. Justen shows deficits in his reactive and anticipatory balance per MiniBest. Justen does report improvement in his overall flexibility which has resulted in more independence at home with his dressing, it has drastically reduced the amount of time it takes to get dressed. He is now addressing postural deficits as well which may factor into instability. His balance and endurance does not show change at today's visit, however, I suspect his ongoing fatigue is affecting his performance today. Due to his history of peripheral neuropathy he needs more time to achieve his goals of improving his balance and stability. Justen feels about the same on the PSFS with his personal goals for therapy. We discussed including the treadmill back into his daily routine to improve his endurance as he reports he is no longer performing this daily. Continue per plan of care.    Impairments: abnormal coordination, abnormal gait, abnormal muscle tone, abnormal or restricted ROM, abnormal movement, activity intolerance, impaired balance, impaired physical strength, lacks appropriate home exercise program, safety issue and weight-bearing intolerance     Prognosis: good    Goals  In 4 weeks, patient will:  1.  Demonstrate ability to perform 20 minutes of activity without requiring seated rest break. - met  2.  Demonstrate ability to maintain upright balance unsupported by UEs while reaching above shoulder height without resistance to promote stability with ADLs - met  3.  Improve  30s chair stand by at least 2 stands to show improved LE strength for stability during transfers  4.  Demonstrate ability to ambulate through doorway/threshold without verbal cues or change in gait quality >80% of occasions - met  5.  Demonstrate ability to roll bilaterally in <7 seconds without use of assistive equipment - met    In 4-10 weeks, patient will: - PROGRESSING  1.  Demonstrate consistent carryover with HEP and walking program.  2.  Improve gait speed by at least  0.18 m/s to meet MDC value for PwPD and reduce fall risk.  3.  Improve 6MWT by at least 270ft to meet MDC value and demonstrate improvement in ability in community ambulation.  4.  Improve FGA by at least 4 points to meet MDC value for PwPD and show improved balance.  5.  Improve ABC by at least 16% to meet reduced risk of falling and improved balance confidence.  6.  Demonstrate ability to ambulate forward holding item requiring bilateral UEs without LOB or change in gait quality.  7.  Report improvement in ability to get in/out of bed.  8. Improve MiniBest by at least 4 points to meet MCID value for PwPD and demonstrate improved safety.  9. Improve fastest gait speed by at least 0.25m/s to meet MDC value for fast pace gait.    Patient Specific Functional Scale: balance 7/10, endurance 6/10, 13/20       Plan  Patient would benefit from: skilled physical therapy  Planned therapy interventions: neuromuscular re-education, manual therapy, patient education, home exercise program, therapeutic exercise, therapeutic activities and gait training  Frequency: 2x week  Duration in weeks: 12  Plan of Care beginning date: 1/31/2024  Plan of Care expiration date: 4/30/2024  Treatment plan discussed with: patient      Subjective Evaluation    History of Present Illness  Mechanism of injury: 3/1: Did not sleep well last night, very fatigued today. He feels there is some improvmeent in balance, however, endurance and fatigue. Sleep has been bad recently,  reports insects around house that affects his sleep. Wife thinks it is mental.    : Feels like endurance is better. Balance is about the same. Wants to continue to work on balance.    : Wants to work more on balance. Feels like he is getting in a better routine at home. No near falls balance wise.     Present at PT: More excruciating burning pain due to neuropathy. Using SPC for distances. Had episode of complete loss of balance. Lasted a day, but has not had an episode since. Does nto have burning now though. Has not been as faithful exercise wise due to life stressors or events. Notices a decline in his balance and feels like he is on his way to recovering. Wants to work on his balance and endurance.     Patient Specific Functional Scale: Balance 7/10, Endurance 5/10, /20    Patient Goals  Patient goals for therapy: improved balance, independence with ADLs/IADLs, increased strength and increased motion    Pain  Current pain ratin  At best pain ratin  At worst pain rating: 10  Location: neuopathy pain  Relieving factors: change in position    Social Support  Stairs in house: yes   Lives in: multiple-level home  Lives with: spouse    Employment status: not working    Diagnostic Tests  No diagnostic tests performed  Treatments  Previous treatment: physical therapy and speech therapy      Objective      Balance Test 2023 2023 12/5 1/5 1/30 3/1   6 Minute Walk Test (ft): 1058ft NV   900ft SPC (could have gone 3-4 more laps) 780ft 1:01 left no AD   FGA:     ABC  64% 71%  70%    MiniBest    Gait Speed (m/s): 1.03 m/s SPC 0.82m/s / no AD 0.90m/s  Fast: 1.45m/s no AD No AD: 0.94m/s  Fast: 1.41m/s no AD  SSGS: no AD 0.81 m/s / SPC 0.85 m/s  Fast: 1.34 m/s  SSGS: no AD 0.81m/s  Fast: 1.28m/s   30s chair stand:   14 15      5x Sit To Stand (s): 8.13 9.74 10.12      TUG / TUGC: 7.18 / 8.20 50 > 32 9.15 / 10.60 50 > 41 (37 > 28 by 4's error)   09.30  / 10.88 50 > 29          MCTSIB Number of Seconds   Feet Together, Eyes Open 30   Feet Together, Eyes Closed 30   Feet Together, Eyes Open Foam 30   Feet Together, Eyes Closed Foam 30          Gait Assessment: Croched gait minimal arm swing, footflat strike - lacking proper IC, Stooped posture, inadequate knee ext in terminal swing.           Precautions: h/o tinnitus, h/o PD, h/o peripheral neuropathy,h/o HTN, B/L TKR  Re-eval Date:  2/31/2024  EPOC: 4/30/2024    Date 2/16 2/21 2/23 2/28 3/1    Visit Count 24 25 26 27 28    FOTO See IE        Pain In See IE        Pain Out                 Testing         TUG/C 10.41 / 11.81 50 > 35        30s chair stand 15        5xSTS 10.12        Gait speed 0.81 no AD / 0.85 SPC        ABC 70        MiniBest 21        FGA 18        6MWT 780ft no AD 1:01 left        Neuro Re-Ed     6MWT, 10mWT, MiniBest, FGA    Dynamic balance Med hurdles SOLO 7.5#BLE fwd 6 laps - low hurdles 8 laps, 12 laps lat step    Bwd stepping 7.5#BLE SOLO 6 laps  Low hurdles SOLO 7.5#BLE fwd 14 laps, 8 laps lat step    Bolster hold 7.5#BLE bwd stepping SOLO 6 laps  Low hurdles SOLO 7.5#BLE 10 laps fwd, 10 laps lat step    SOLO bwd stepping 7.5#BLE OH press 20ft x4 laps    Lat step cues for step length and pace 10ft x8    1 trek pole runner step up 6' x20 ea LE     Static balance         Obstacle course         Cognitive dual task         Large amplitude movement         Confined space balance Blaze pods 5 pods, avoiding obstacles SC 18 1min 30s x5 SOLO Blaze pods 5 pods, avoiding obstacles SC 19 1min 30s x5 SOLO Blaze pods 5 pods, avoiding obstacles SC 13 1min 30s x5 SOLO               Ther Ex         SLR x 4         HR/TR         Mini Squats         Step ups         Unilat row         Upright Y         Leg Press         Mod quadruped KB pull through         KB deadlift         Piriformis str seated         L/s flexion seated         Hip flexor stretch         Thoracic extension   Seated w OP x20    Supine  arms OH x8     AF HEP discussion      Ther Activity         ADL                  Gait Training         Overground         Head turns         Stairs         TM 1.2mph 13% [id number] x2 BUE SOLO        Modalities

## 2024-03-03 DIAGNOSIS — J01.00 ACUTE MAXILLARY SINUSITIS, RECURRENCE NOT SPECIFIED: ICD-10-CM

## 2024-03-03 DIAGNOSIS — I10 ESSENTIAL HYPERTENSION: ICD-10-CM

## 2024-03-04 ENCOUNTER — RA CDI HCC (OUTPATIENT)
Dept: OTHER | Facility: HOSPITAL | Age: 79
End: 2024-03-04

## 2024-03-04 RX ORDER — FLUTICASONE PROPIONATE 50 MCG
SPRAY, SUSPENSION (ML) NASAL
Qty: 48 ML | Refills: 1 | Status: SHIPPED | OUTPATIENT
Start: 2024-03-04

## 2024-03-06 ENCOUNTER — OFFICE VISIT (OUTPATIENT)
Facility: CLINIC | Age: 79
End: 2024-03-06
Payer: MEDICARE

## 2024-03-06 DIAGNOSIS — G20.C PRIMARY PARKINSONISM: Primary | ICD-10-CM

## 2024-03-06 PROCEDURE — 97116 GAIT TRAINING THERAPY: CPT

## 2024-03-06 PROCEDURE — 97112 NEUROMUSCULAR REEDUCATION: CPT

## 2024-03-06 NOTE — PROGRESS NOTES
Daily Note     Today's date: 3/6/2024  Patient name: Justen Thompson  : 1945  MRN: [id number]  Referring provider: Kye Reich MD  Dx:   Encounter Diagnosis     ICD-10-CM    1. Primary parkinsonism  G20.C                      Subjective: Patient states that he hasn't been sleeping well lately therefore is experiencing increased fatigue. Scheduled to see MD next week to discuss.       Objective: See treatment diary below      Assessment: Tolerated treatment well. Static and dynamic balance more challenging today. Increased difficulty clearing left foot over hurdles today. Overall required more frequent and longer rest breaks due to levels of fatigue. Patient demonstrated fatigue post treatment, exhibited good technique with therapeutic exercises, and would benefit from continued PT      Plan: Continue per plan of care.      Precautions: h/o tinnitus, h/o PD, h/o peripheral neuropathy,h/o HTN, B/L TKR  Re-eval Date:    EPOC: 2024    Date 2/16 2/21 2/23 2/28 3/1 3/6   Visit Count 24 25 26 27 28 29   FOTO See IE        Pain In See IE        Pain Out                 Testing      3/6   TUG/C 10.41 / 11.81 50 > 35        30s chair stand 15        5xSTS 10.12        Gait speed 0.81 no AD / 0.85 SPC        ABC 70        MiniBest 21        FGA 18        6MWT 780ft no AD 1:01 left        Neuro Re-Ed     6MWT, 10mWT, MiniBest, FGA 3/6   Dynamic balance Med hurdles SOLO 7.5#BLE fwd 6 laps - low hurdles 8 laps, 12 laps lat step    Bwd stepping 7.5#BLE SOLO 6 laps  Low hurdles SOLO 7.5#BLE fwd 14 laps, 8 laps lat step    Bolster hold 7.5#BLE bwd stepping SOLO 6 laps  Low hurdles SOLO 7.5#BLE 10 laps fwd, 10 laps lat step    SOLO bwd stepping 7.5#BLE OH press 20ft x4 laps    Lat step cues for step length and pace 10ft x8    1 trek pole runner step up 6' x20 ea LE  Low hurdles SOLO  7.5# BLE   6 laps fwd,  4 laps lat   Static balance         Obstacle course         Cognitive dual task         Large amplitude  movement         Confined space balance Blaze pods 5 pods, avoiding obstacles AK 18 1min 30s x5 SOLO Blaze pods 5 pods, avoiding obstacles AK 19 1min 30s x5 SOLO Blaze pods 5 pods, avoiding obstacles AK 13 1min 30s x5 SOLO               Ther Ex      3/6   SLR x 4         HR/TR         Mini Squats         Step ups         Unilat row         Upright Y         Leg Press         Mod quadruped KB pull through         KB deadlift         Piriformis str seated         L/s flexion seated         Hip flexor stretch         Thoracic extension   Seated w OP x20    Supine arms OH x8     AF HEP discussion      Ther Activity         ADL                  Gait Training      3/6   Overground         Head turns         Stairs         TM 1.2mph 13% [id number] x2 BUE SOLO     SOLO BUE  1.2mph 5% incline   4 mins;    Rest break    3 mins  RPE: 8/10     Modalities

## 2024-03-08 ENCOUNTER — OFFICE VISIT (OUTPATIENT)
Facility: CLINIC | Age: 79
End: 2024-03-08
Payer: MEDICARE

## 2024-03-08 DIAGNOSIS — G20.C PRIMARY PARKINSONISM: Primary | ICD-10-CM

## 2024-03-08 PROCEDURE — 97112 NEUROMUSCULAR REEDUCATION: CPT

## 2024-03-08 NOTE — PROGRESS NOTES
Daily Note     Today's date: 3/8/2024  Patient name: Justen Thompson  : 1945  MRN: [id number]  Referring provider: Kye Reich MD  Dx:   Encounter Diagnosis     ICD-10-CM    1. Primary parkinsonism  G20.C           Start Time: 1145  Stop Time: 1230  Total time in clinic (min): 45 minutes    Subjective: Pt reports they changed his medications around. Has slept well the past two nights. Not as fatigued. Has not started back on the TM yet, but he will this weekend.      Objective: See treatment diary below      Assessment: Presents with slightly more stooped posturing today. With ankle weights pt exhibits more instability vs without. Even despite fatigue build up by end of session better motor performance without weights. Increasing power generation with STS with med ball slams. Blaze pods shows slowed performance, but better than more recent trials where freezing and bradykinesia was more evident. Patient demonstrated fatigue post treatment, exhibited good technique with therapeutic exercises, and would benefit from continued PT      Plan: Continue per plan of care.      Precautions: h/o tinnitus, h/o PD, h/o peripheral neuropathy,h/o HTN, B/L TKR  Re-eval Date:    EPOC: 2024    Date 3/8        Visit Count 30        FOTO See IE        Pain In See IE        Pain Out                 Testing         TUG/C 10.41 / 11.81 50 > 35        30s chair stand 15        5xSTS 10.12        Gait speed 0.81 no AD / 0.85 SPC        ABC 70        MiniBest 21        FGA 18        6MWT 780ft no AD 1:01 left        Neuro Re-Ed         Dynamic balance 4#BLE fwd 8 laps, lat step 8 laps        Static balance         Obstacle course         Cognitive dual task         Large amplitude movement STS 8lb med ball slam 2x11        Confined space balance Blaze pods [id number] x3 MO 15 avoiding obstacles                 Ther Ex         SLR x 4         HR/TR         Mini Squats         Step ups         Unilat row         Upright Y          Leg Press         Mod quadruped KB pull through         KB deadlift         Piriformis str seated         L/s flexion seated         Hip flexor stretch         Thoracic extension         Ther Activity         ADL                  Gait Training         Overground         Head turns         Stairs         TM         Modalities

## 2024-03-09 ENCOUNTER — NURSE TRIAGE (OUTPATIENT)
Dept: OTHER | Facility: OTHER | Age: 79
End: 2024-03-09

## 2024-03-09 NOTE — TELEPHONE ENCOUNTER
"Regarding:  took wrong pills  ----- Message from Chiquis Dumas sent at 3/9/2024 11:42 AM EST -----  \"My  accidentally took my pills this morning instead of his own. Im not sure what I should do, if I should give him one of his medications or not.\"    "

## 2024-03-09 NOTE — TELEPHONE ENCOUNTER
"Reason for Disposition   Took another person's prescription drug    Answer Assessment - Initial Assessment Questions  1. NAME of MEDICATION: \"What medicine are you calling about?\"      Took these medications by accident which are his wifes morning meds.       Metoprolol 25 mg / tab      Flecainide 100 mg / tab      Enalapril 10 mg / tab      Eliquis 5 mg          His own medications that pt took this AM was Carvidopa -levodopa and Olanzapine.     2. QUESTION: \"What is your question?\" (e.g., medication refill, side effect)      Accidentally took his wifes medications and unsure what to do?    3.  SYMPTOMS: \"Do you have any symptoms?\"      Is very tired but did not sleep well last night. /73  and HR 83. No other symptoms.    Protocols used: Medication Question Call-ADULT-AH      Per on call PCP- would have pt hold his baby aspirin and benazapril/HCTZ for today. Concerned about flecainide and Zyprexa being taken together since they can cause increased QTC- would consider EKG in ED. Otherwise, should monitor for s/s of bleeding although not overly concerned about Eliquis since it was one tablet.     Tried calling wife to give recommendation but no answer. Will try again in a few minutes.       Spoke with pts wife who said she could not get a hold of anyone from poison control.  PCP's recommendation given to pts wife who verbalized understanding and said she thinks they will continue to monitor Justen at home. Says a family friend who is a NP came and briefly assessed pt. Rationale for EKG discussed w/ wife who said if anything should change she will bring pt to ED but prefers to monitor him at home.       "

## 2024-03-12 ENCOUNTER — OFFICE VISIT (OUTPATIENT)
Dept: FAMILY MEDICINE CLINIC | Facility: HOSPITAL | Age: 79
End: 2024-03-12
Payer: MEDICARE

## 2024-03-12 VITALS
HEIGHT: 73 IN | WEIGHT: 225 LBS | DIASTOLIC BLOOD PRESSURE: 58 MMHG | HEART RATE: 90 BPM | OXYGEN SATURATION: 96 % | BODY MASS INDEX: 29.82 KG/M2 | SYSTOLIC BLOOD PRESSURE: 90 MMHG | RESPIRATION RATE: 16 BRPM

## 2024-03-12 DIAGNOSIS — I10 ESSENTIAL HYPERTENSION: ICD-10-CM

## 2024-03-12 DIAGNOSIS — S90.112A CONTUSION OF LEFT GREAT TOE WITHOUT DAMAGE TO NAIL, INITIAL ENCOUNTER: ICD-10-CM

## 2024-03-12 DIAGNOSIS — G20.C PRIMARY PARKINSONISM: Primary | ICD-10-CM

## 2024-03-12 PROCEDURE — 99213 OFFICE O/P EST LOW 20 MIN: CPT | Performed by: INTERNAL MEDICINE

## 2024-03-12 PROCEDURE — G2211 COMPLEX E/M VISIT ADD ON: HCPCS | Performed by: INTERNAL MEDICINE

## 2024-03-12 RX ORDER — OLANZAPINE 5 MG/1
TABLET ORAL
COMMUNITY
Start: 2024-03-05

## 2024-03-12 NOTE — ASSESSMENT & PLAN NOTE
Pt has PD with hallucinations  He has difficulty dressing, sitting up in bed, getting in and out of car.  It is very difficult for pt to leave the house due to bradykinesia, shuffling gait  Home OT is medically necessary to help with ADLs.  Family requests options for home health aid, will refer to case management  Neurology ordered ct heat for 3/26/24

## 2024-03-12 NOTE — PROGRESS NOTES
"Assessment/Plan:    Primary Parkinsonism (HCC)  Pt has PD with hallucinations  He has difficulty dressing, sitting up in bed, getting in and out of car.  It is very difficult for pt to leave the house due to bradykinesia, shuffling gait  Home OT is medically necessary to help with ADLs.  Family requests options for home health aid, will refer to case management  Neurology ordered ct heat for 3/26/24    Essential hypertension  BP is low today, pt's asymptomatic  Electrolytes and renal fxn were normal/stable in January  Pt denies falls    Discontinue benazepril/hctz to avoid falls from hypotension  Wife will monitor pt's BP at home and will notify me of readings    Continue toprol for now    Contusion of left great toe without damage to nail    Patient dropped a chair on the left big toe, denies pain.   He had no deformity, no tenderness. Suspect contusion, will monitor him         Diagnoses and all orders for this visit:    Primary parkinsonism  -     Referral to [facility] Health- [facility]; Future  -     Ambulatory Referral to Social Work Care Management Program; Future    Essential hypertension    Contusion of left great toe without damage to nail, initial encounter    Other orders  -     OLANZapine (ZyPREXA) 5 mg tablet; 1 bid          Subjective:      Patient ID: Justen Thompson is a 79 y.o. male.      HPI    Patient presents for follow-up of chronic conditions as detailed in the assessment and plan.      The following portions of the patient's history were reviewed and updated as appropriate: current medications, past family history, past medical history, past social history, past surgical history and problem list.    Review of Systems      Objective:    BP 90/58   Pulse 90   Resp 16   Ht 6' 1\" (1.854 m)   Wt 102 kg (225 lb)   SpO2 96%   BMI 29.69 kg/m²      Physical Exam  Constitutional:       General: He is not in acute distress.     Appearance: He is not toxic-appearing.   HENT:      Nose: No congestion " or rhinorrhea.      Mouth/Throat:      Pharynx: No oropharyngeal exudate or posterior oropharyngeal erythema.   Eyes:      Conjunctiva/sclera: Conjunctivae normal.   Cardiovascular:      Rate and Rhythm: Normal rate and regular rhythm.      Heart sounds: No murmur heard.  Pulmonary:      Effort: Pulmonary effort is normal. No respiratory distress.      Breath sounds: No wheezing, rhonchi or rales.   Neurological:      Mental Status: He is alert.      Comments: Bradykinesia, shuffling gait             Kye Reich MD

## 2024-03-12 NOTE — PATIENT INSTRUCTIONS
Take your blood pressure once a day at various times of the day after resting in a sitting position for 5-10 minutes. Keep a log of your blood pressure and heart rate readings and send me the recordings in about 2-3 weeks!

## 2024-03-12 NOTE — ASSESSMENT & PLAN NOTE
BP is low today, pt's asymptomatic  Electrolytes and renal fxn were normal/stable in January  Pt denies falls    Discontinue benazepril/hctz to avoid falls from hypotension  Wife will monitor pt's BP at home and will notify me of readings    Continue toprol for now

## 2024-03-12 NOTE — ASSESSMENT & PLAN NOTE
Patient dropped a chair on the left big toe, denies pain.   He had no deformity, no tenderness. Suspect contusion, will monitor him

## 2024-03-13 ENCOUNTER — OFFICE VISIT (OUTPATIENT)
Facility: CLINIC | Age: 79
End: 2024-03-13
Payer: MEDICARE

## 2024-03-13 ENCOUNTER — PATIENT OUTREACH (OUTPATIENT)
Dept: FAMILY MEDICINE CLINIC | Facility: HOSPITAL | Age: 79
End: 2024-03-13

## 2024-03-13 DIAGNOSIS — G20.C PRIMARY PARKINSONISM: Primary | ICD-10-CM

## 2024-03-13 PROCEDURE — 97112 NEUROMUSCULAR REEDUCATION: CPT

## 2024-03-13 PROCEDURE — 97116 GAIT TRAINING THERAPY: CPT

## 2024-03-13 NOTE — PROGRESS NOTES
JONI OLSON received a referral from patient's PCP. JONI OLSON reviewed patient's chart and called patient's wife Lachelle Thompson who JONI OLSON confirmed is on patient's communication consent from 2022 ([phone]). Lachelle answered and stated that they were looking for patient to have some OT as well as in home assistance from an aid.     Patient has been going to OP PT, he is able to go outside the home. JONI OLSON discussed OP OT. Patient is not a  and does not qualify for the waiver program due to being over income. However, Lachelle stated that they do have a LTC insurance policy. JONI OLSON encouraged Lachelle to contact LTC policy. Lachelle to do so and had no other questions, concerns or needs. Please re consult JONI OLSON as needed.

## 2024-03-13 NOTE — PROGRESS NOTES
Daily Note     Today's date: 3/13/2024  Patient name: Justen Thompson  : 1945  MRN: [id number]  Referring provider: Kye Reich MD  Dx:   Encounter Diagnosis     ICD-10-CM    1. Primary parkinsonism  G20.C                      Subjective: Patient feels medication is helping his sleep. Resumed TM but limited to about 6 mins versus the 20 he was previously doing. Patient reports recent injury to his left hallux ( contusion ) but denies any pain upon arrival.       Objective: See treatment diary below      Assessment: Resumed TM to coincide with home workout. Significant fatigue and SOB but recovered well and vitals WNL. Enjoys the challenge of blaze pods.Patient demonstrated fatigue post treatment, exhibited good technique with therapeutic exercises, and would benefit from continued PT      Plan: Continue per plan of care.      Precautions: h/o tinnitus, h/o PD, h/o peripheral neuropathy,h/o HTN, B/L TKR  Re-eval Date:    EPOC: 2024    Date 3/8 3/13       Visit Count 30 31       FOTO See IE        Pain In See IE        Pain Out                 Testing         TUG/C 10.41 / 11.81 50 > 35        30s chair stand 15        5xSTS 10.12        Gait speed 0.81 no AD / 0.85 SPC        ABC 70        MiniBest 21        FGA 18        6MWT 780ft no AD 1:01 left        Neuro Re-Ed  3/13       Dynamic balance 4#BLE fwd 8 laps, lat step 8 laps 4# BLE retro 4 laps       Static balance         Obstacle course         Cognitive dual task         Large amplitude movement STS 8lb med ball slam 2x11        Confined space balance Blaze pods [id number] x3 CT 15 avoiding obstacles Blaze pods  :55 x 3   CT 6  Avoiding obstacles  4# AW                Ther Ex  3/13       SLR x 4         HR/TR         Mini Squats         Step ups         Unilat row         Upright Y         Leg Press         Mod quadruped KB pull through         KB deadlift         Piriformis str seated         L/s flexion seated         Hip flexor stretch          Thoracic extension         Ther Activity         ADL                  Gait Training  3/13       Overground         Head turns         Stairs         TM  SOLO  1.5mph  0%  5:37   HR:99  O2%: 98       Modalities

## 2024-03-14 ENCOUNTER — TELEPHONE (OUTPATIENT)
Dept: FAMILY MEDICINE CLINIC | Facility: HOSPITAL | Age: 79
End: 2024-03-14

## 2024-03-14 NOTE — TELEPHONE ENCOUNTER
MESS TO WIFE.  SHE SAID THAT  HAD CONTACTED HER AND THEY DO NOT QUALIFY FOR ANYTHING DUE TO HAVING LONG TERM CARE INS AND THEY WOULD HAVE TO USE THAT FIRST.  IT WOULD BE PAY OUT OF POCKET.    I DID TELL HER THAT [facility] MIGHT TRY CALLING HER SO SHE WAS AWARE.      ----- Message from Kye Reich MD sent at 3/12/2024  6:21 PM EDT -----  [facility] is not able to see him, [facility] a [facility], please email wife the referral

## 2024-03-15 ENCOUNTER — OFFICE VISIT (OUTPATIENT)
Facility: CLINIC | Age: 79
End: 2024-03-15
Payer: MEDICARE

## 2024-03-15 DIAGNOSIS — G20.C PRIMARY PARKINSONISM: Primary | ICD-10-CM

## 2024-03-15 PROCEDURE — 97530 THERAPEUTIC ACTIVITIES: CPT

## 2024-03-15 NOTE — PROGRESS NOTES
Daily Note     Today's date: 3/15/2024  Patient name: Justen Thompson  : 1945  MRN: [id number]  Referring provider: Kye Reich MD  Dx:   Encounter Diagnosis     ICD-10-CM    1. Primary parkinsonism  G20.C           Start Time: 1230  Stop Time: 1315  Total time in clinic (min): 45 minutes    Subjective: Patient arrives with wife and dtr in law. Wants to work on transfer training and car training.      Objective: See treatment diary below      Assessment: More focus on ADLs and bed mobility with transfer training with wife. We discussed proper handling techniques when going from sidelying to sitting and to facilitate Justen helping as much as he can during this. Car transfers are difficult upon entering, if he has better foot placement he shows better success and pacing to get in vs if his foot is further back. L foot placement inhibits his ability to get out of car as it can get stuck between the door and floor. We discussed the use of momentum and visual cues to create larger amplitude movement to assist. Patient demonstrated fatigue post treatment, exhibited good technique with therapeutic exercises, and would benefit from continued PT      Plan: Continue per plan of care.      Precautions: h/o tinnitus, h/o PD, h/o peripheral neuropathy,h/o HTN, B/L TKR  Re-eval Date:    EPOC: 2024    Date 3/8 3/13 3/15      Visit Count 30 31 32      FOTO See IE        Pain In See IE        Pain Out                 Testing         TUG/C 10.41 / 11.81 50 > 35        30s chair stand 15        5xSTS 10.12        Gait speed 0.81 no AD / 0.85 SPC        ABC 70        MiniBest 21        FGA 18        6MWT 780ft no AD 1:01 left        Neuro Re-Ed  3/13       Dynamic balance 4#BLE fwd 8 laps, lat step 8 laps 4# BLE retro 4 laps       Static balance         Obstacle course         Cognitive dual task         Large amplitude movement STS 8lb med ball slam 2x11        Confined space balance Blaze pods [id number] x3 NC 15  avoiding obstacles Blaze pods  :55 x 3   MT 6  Avoiding obstacles  4# AW                Ther Ex  3/13       SLR x 4         HR/TR         Mini Squats         Step ups         Unilat row         Upright Y         Leg Press         Mod quadruped KB pull through         KB deadlift         Piriformis str seated         L/s flexion seated         Hip flexor stretch         Thoracic extension         Ther Activity         ADL   Bed mobility and transfer training 30min    10min car transfers               Gait Training  3/13       Overground         Head turns         Stairs         TM  SOLO  1.5mph  0%  5:37   HR:99  O2%: 98       Modalities

## 2024-03-20 ENCOUNTER — OFFICE VISIT (OUTPATIENT)
Facility: CLINIC | Age: 79
End: 2024-03-20
Payer: MEDICARE

## 2024-03-20 DIAGNOSIS — G20.C PRIMARY PARKINSONISM: Primary | ICD-10-CM

## 2024-03-20 PROCEDURE — 97530 THERAPEUTIC ACTIVITIES: CPT

## 2024-03-20 NOTE — PROGRESS NOTES
Daily Note     Today's date: 3/20/2024  Patient name: Justen Thompson  : 1945  MRN: [id number]  Referring provider: Kye Reich MD  Dx:   Encounter Diagnosis     ICD-10-CM    1. Primary parkinsonism  G20.C           Start Time: 1145  Stop Time: 1230  Total time in clinic (min): 45 minutes    Subjective: Patient arrives with wife and dtr in law. Pt had fall the other day coming back from the bathroom in the middle of the night. Was able to get up by himself and using the bed to push up from.       Objective: See treatment diary below      Assessment: Using momentum shows more independent sitting to sidelying as well as rolling. Requires min-mod verbal cues and demonstration for best task performance. External cues assist in amplitude of movement. Trialed laying on pillows to increase difficulty with good pt reported increase in intensity. Patient demonstrated fatigue post treatment, exhibited good technique with therapeutic exercises, and would benefit from continued PT      Plan: Continue per plan of care. Floor<>stand transfer.     Precautions: h/o tinnitus, h/o PD, h/o peripheral neuropathy,h/o HTN, B/L TKR  Re-eval Date:    EPOC: 2024    Date 3/8 3/13 3/15 3/20     Visit Count 30 31 32 33     FOTO See IE        Pain In See IE        Pain Out                 Testing         TUG/C 10.41 / 11.81 50 > 35        30s chair stand 15        5xSTS 10.12        Gait speed 0.81 no AD / 0.85 SPC        ABC 70        MiniBest 21        FGA 18        6MWT 780ft no AD 1:01 left        Neuro Re-Ed  3/13       Dynamic balance 4#BLE fwd 8 laps, lat step 8 laps 4# BLE retro 4 laps       Static balance         Obstacle course         Cognitive dual task         Large amplitude movement STS 8lb med ball slam 2x11        Confined space balance Blaze pods [id number] x3 MS 15 avoiding obstacles Blaze pods  :55 x 3   MS 6  Avoiding obstacles  4# AW                Ther Ex  3/13       SLR x 4         HR/TR         Mini  Squats         Step ups         Unilat row         Upright Y         Leg Press         Mod quadruped KB pull through         KB deadlift         Piriformis str seated         L/s flexion seated         Hip flexor stretch         Thoracic extension         Ther Activity         ADL   Bed mobility and transfer training 30min    10min car transfers Bed mobility sidelying<>supine, rolling x5              Gait Training  3/13       Overground         Head turns         Stairs         TM  SOLO  1.5mph  0%  5:37   HR:99  O2%: 98       Modalities

## 2024-03-22 ENCOUNTER — OFFICE VISIT (OUTPATIENT)
Facility: CLINIC | Age: 79
End: 2024-03-22
Payer: MEDICARE

## 2024-03-22 DIAGNOSIS — G20.C PRIMARY PARKINSONISM: Primary | ICD-10-CM

## 2024-03-22 PROCEDURE — 97530 THERAPEUTIC ACTIVITIES: CPT

## 2024-03-22 NOTE — PROGRESS NOTES
Daily Note     Today's date: 3/22/2024  Patient name: Justen Thompson  : 1945  MRN: [id number]  Referring provider: Kye Reich MD  Dx:   Encounter Diagnosis     ICD-10-CM    1. Primary parkinsonism  G20.C           Start Time: 1230  Stop Time: 1330  Total time in clinic (min): 60 minutes    Subjective: Patient arrives with son and dtr in law. Feel it may be beneficial to work on getting up from the ground.      Objective: See treatment diary below      Assessment: Trialed floor<>stand transfers. Pt able to independently get to the floor via hands on mat and achieving 1/2 kneel, subsequently going to quadruped. Once to the floor he was able to use momentum to roll bilaterally. He did not min a x1 to assist with scooting due to the mat surface. Unable to achieve quadruped from floor and needed max a x 1-2 to bump to step and achieve standing from there. Patient demonstrated fatigue post treatment, exhibited good technique with therapeutic exercises, and would benefit from continued PT      Plan: Continue per plan of care. Floor<>stand transfer.     Precautions: h/o tinnitus, h/o PD, h/o peripheral neuropathy,h/o HTN, B/L TKR  Re-eval Date:    EPOC: 2024    Date 3/8 3/13 3/15 3/20 3/22    Visit Count 30 31 32 33 34    FOTO See IE        Pain In See IE        Pain Out                 Testing         TUG/C 10.41 / 11.81 50 > 35        30s chair stand 15        5xSTS 10.12        Gait speed 0.81 no AD / 0.85 SPC        ABC 70        MiniBest 21        FGA 18        6MWT 780ft no AD 1:01 left        Neuro Re-Ed  3/13       Dynamic balance 4#BLE fwd 8 laps, lat step 8 laps 4# BLE retro 4 laps       Static balance         Obstacle course         Cognitive dual task         Large amplitude movement STS 8lb med ball slam 2x11        Confined space balance Blaze pods [id number] x3 AK 15 avoiding obstacles Blaze pods  :55 x 3   AK 6  Avoiding obstacles  4# AW                Ther Ex  3/13       SLR x 4          HR/TR         Mini Squats         Step ups         Unilat row         Upright Y         Leg Press         Mod quadruped KB pull through         KB deadlift         Piriformis str seated         L/s flexion seated         Hip flexor stretch         Thoracic extension         Ther Activity         ADL   Bed mobility and transfer training 30min    10min car transfers Bed mobility sidelying<>supine, rolling x5 Floor<>stand transfer trialed, max a x2 to return to stand             Gait Training  3/13       Overground         Head turns         Stairs         TM  SOLO  1.5mph  0%  5:37   HR:99  O2%: 98       Modalities

## 2024-03-26 ENCOUNTER — HOSPITAL ENCOUNTER (OUTPATIENT)
Dept: CT IMAGING | Facility: HOSPITAL | Age: 79
Discharge: HOME | End: 2024-03-26
Payer: MEDICARE

## 2024-03-26 DIAGNOSIS — R44.3 HALLUCINATIONS, UNSPECIFIED: ICD-10-CM

## 2024-03-26 PROCEDURE — 70450 CT HEAD/BRAIN W/O DYE: CPT

## 2024-03-27 ENCOUNTER — OFFICE VISIT (OUTPATIENT)
Facility: CLINIC | Age: 79
End: 2024-03-27
Payer: MEDICARE

## 2024-03-27 DIAGNOSIS — G20.C PRIMARY PARKINSONISM: Primary | ICD-10-CM

## 2024-03-27 PROCEDURE — 97530 THERAPEUTIC ACTIVITIES: CPT

## 2024-03-27 PROCEDURE — 97112 NEUROMUSCULAR REEDUCATION: CPT

## 2024-03-27 NOTE — PROGRESS NOTES
Daily Note     Today's date: 3/27/2024  Patient name: Mao Thompson  : 1945  MRN: [id number]  Referring provider: Kye Reich MD  Dx:   Encounter Diagnosis     ICD-10-CM    1. Primary parkinsonism  G20.C           Start Time: 1145  Stop Time: 1230  Total time in clinic (min): 45 minutes    Subjective: Patient arrives with son, wife and dtr in law. Has had a few falls since last seen. Unable to get up, had to call son/dtr to help in getting up. No injuries. Did get CT recently. Have bed rail now, installing today. Will call neuro about medication changes.       Objective: See treatment diary below      Assessment: More focus on RW use, LE strength, and safety awareness. Requires min-mod verbal cues for safety when going to sit, needed, additional cues for seat to sit in. Slow, shuffled gait with RW but able to negotiate obstacles safely without issue with depth perception. Shows difficulty with completing sit<>stand without UE support, needs a few attempts to achieve standing. Skin inspection of LLE revealed increased ankle and foot swelling independent of toe injury earlier this month. Instructed wife and dtr in law to monitor L ankle swelling. Discussed potential double vision that mao is experiencing and how that may affect his balance. Discussed safety while performing transfers. Patient demonstrated fatigue post treatment, exhibited good technique with therapeutic exercises, and would benefit from continued PT      Plan: Continue per plan of care.      Precautions: h/o tinnitus, h/o PD, h/o peripheral neuropathy,h/o HTN, B/L TKR  Re-eval Date:    EPOC: 2024    Date 3/8 3/13 3/15 3/20 3/22 3/27   Visit Count 30 31 32 33 34 35   FOTO See IE        Pain In See IE        Pain Out                 Testing         TUG/C 10.41 / 11.81 50 > 35        30s chair stand 15        5xSTS 10.12        Gait speed 0.81 no AD / 0.85 SPC        ABC 70        MiniBest 21        FGA 18        6MWT 780ft no  AD 1:01 left        Neuro Re-Ed  3/13       Dynamic balance 4#BLE fwd 8 laps, lat step 8 laps 4# BLE retro 4 laps    RW sit<>stand circuit no UE during stand x8 laps   Static balance         Obstacle course         Cognitive dual task         Large amplitude movement STS 8lb med ball slam 2x11        Confined space balance Blaze pods [id number] x3 AK 15 avoiding obstacles Blaze pods  :55 x 3   AK 6  Avoiding obstacles  4# AW    RW ortho side doorways, small spaces, random directions 15min            Ther Ex  3/13       SLR x 4         HR/TR         Mini Squats         Step ups         Unilat row         Upright Y         Leg Press         Mod quadruped KB pull through         KB deadlift         Piriformis str seated         L/s flexion seated         Hip flexor stretch         Thoracic extension         Ther Activity         ADL   Bed mobility and transfer training 30min    10min car transfers Bed mobility sidelying<>supine, rolling x5 Floor<>stand transfer trialed, max a x2 to return to stand    Skin inspection      AF   Gait Training  3/13       Overground         Head turns         Stairs         TM  SOLO  1.5mph  0%  5:37   HR:99  O2%: 98       Modalities

## 2024-03-29 ENCOUNTER — OFFICE VISIT (OUTPATIENT)
Facility: CLINIC | Age: 79
End: 2024-03-29
Payer: MEDICARE

## 2024-03-29 DIAGNOSIS — G20.C PRIMARY PARKINSONISM: Primary | ICD-10-CM

## 2024-03-29 PROCEDURE — 97530 THERAPEUTIC ACTIVITIES: CPT

## 2024-03-29 PROCEDURE — 97112 NEUROMUSCULAR REEDUCATION: CPT

## 2024-03-29 NOTE — PROGRESS NOTES
Daily Note     Today's date: 3/29/2024  Patient name: Justen Thompson  : 1945  MRN: [id number]  Referring provider: Kye Reich MD  Dx:   Encounter Diagnosis     ICD-10-CM    1. Primary parkinsonism  G20.C           Start Time: 1234  Stop Time: 1325  Total time in clinic (min): 51 minutes    Subjective: Patient's wife and dtr in law report neurologist reduced new medication and they notice a difference in his motor ability. They do feel he is hallucinating more.      Objective: See treatment diary below      Assessment:  Pt experiences difficulty generating power with sidelying>sit from L side, as this is not the usual side he gets up from. Needed mod-max verbal cues to complete bed mobility. Overall reduction in power generation noted, however, greatly improved from previous session. Re-introduced balance exercises with use of solo step, shows more instability since beginning of month with near fall due to freezing while turning. Patient demonstrated fatigue post treatment, exhibited good technique with therapeutic exercises, and would benefit from continued PT      Plan: Continue per plan of care. Re-assessment nv.     Precautions: h/o tinnitus, h/o PD, h/o peripheral neuropathy,h/o HTN, B/L TKR  Re-eval Date:    EPOC: 2024    Date 3/29        Visit Count 36        FOTO See IE        Pain In See IE        Pain Out                 Testing         TUG/C 10.41 / 11.81 50 > 35        30s chair stand 15        5xSTS 10.12        Gait speed 0.81 no AD / 0.85 SPC        ABC 70        MiniBest 21        FGA 18        6MWT 780ft no AD 1:01 left        Neuro Re-Ed         Dynamic balance SOLO fwd step to low hurdles 6 laps, 8 laps step through        Static balance         Obstacle course         Cognitive dual task         Large amplitude movement         Confined space balance                  Ther Ex         SLR x 4         HR/TR         Mini Squats 2x10 STS 8lb med ball hold    1x10 STS 8lb med  ball OH press        Step ups         Unilat row         Upright Y         Leg Press         Mod quadruped KB pull through         KB deadlift         Piriformis str seated         L/s flexion seated         Hip flexor stretch         Thoracic extension         Ther Activity         ADL Bed mobility on pillows 20min total rolling, sidelying<>sit        Skin inspection         Gait Training         Overground         Head turns         Stairs         TM         Modalities

## 2024-04-03 ENCOUNTER — EVALUATION (OUTPATIENT)
Facility: CLINIC | Age: 79
End: 2024-04-03
Payer: MEDICARE

## 2024-04-03 DIAGNOSIS — G20.C PRIMARY PARKINSONISM: Primary | ICD-10-CM

## 2024-04-03 PROCEDURE — 97112 NEUROMUSCULAR REEDUCATION: CPT

## 2024-04-03 NOTE — PROGRESS NOTES
PT Re-Evaluation     Today's date: 4/3/2024  Patient name: Justen Thompson  : 1945  MRN: [id number]  Referring provider: Kye Reich MD  Dx:   Encounter Diagnosis     ICD-10-CM    1. Primary parkinsonism  G20.C                 Start Time: 1418  Stop Time: 1500  Total time in clinic (min): 42 minutes    Assessment  Assessment details: Patient is a 78 y.o. year old male presenting to OPPT s/p diagnosis of Parkinsons disease and peripheral neuropathy. Justen' reassessment is secondary to recent decline in function while in therapy. Recent medication changes have been made which appear to have potentially impacted his functional mobility. Since modifying medication, Justen and his wife report slight improvement in his mobility. He continues to experience more difficulty with his balance and overall strength. He was trialing using a RW 50% of the time, SPC more consistently now and experiencing more falls. His outcome measures reflect this decline. However, compared to recent weeks, he shows improved balance, posture, and strength. His MiniBest and FGA place him at high fall risk,  &  respectively. TUGC reveals more difficulty with turns and dual tasking. We discussed continuance of exercises at home and how to perform safely, starting with sit to stands and stairs. During 6MWT observable reduction in gait speed and quality, also shows reduced endurance against last re-assessment. Achieving 600ft vs 780ft. Due to his recent decline in function, Justen will benefit from skilled OPPT to continue to work on his strength, balance, and endurance. Goals modified to reflect updated plan of care.  Impairments: abnormal coordination, abnormal gait, abnormal muscle tone, abnormal or restricted ROM, abnormal movement, activity intolerance, impaired balance, impaired physical strength, lacks appropriate home exercise program, safety issue and weight-bearing intolerance     Prognosis: good    Goals  In 4 weeks, patient  will:  1.  Demonstrate ability to perform 10 minutes of activity without requiring seated rest break.  2.  Improve 5xSTS by at least 2 seconds to show improved LE strength for stability during transfers  4.  Improve TUG to at least 11.5s to reduce fall risk for PwPD  5.  Demonstrate ability to roll bilaterally in <7 seconds without use of assistive equipment    In 4-10 weeks, patient will:  1.  Demonstrate consistent carryover with HEP and walking program.  2.  Improve gait speed by at least  0.18 m/s to meet MDC value for PwPD and reduce fall risk.  3.  Improve 6MWT by at least 270t to meet MDC value and demonstrate improvement in ability in community ambulation.  4.  Improve FGA by at least 4 points to meet MDC value for PwPD and show improved balance.  5.  Improve ABC by at least 16% to meet reduced risk of falling and improved balance confidence.  6.  Report improvement in ability to get in/out of bed.  7. Improve MiniBest by at least 4 points to meet MCID value for PwPD and demonstrate improved safety.  8. Improve fastest gait speed by at least 0.25m/s to meet MDC value for fast pace gait.            Plan  Patient would benefit from: skilled physical therapy  Planned therapy interventions: neuromuscular re-education, manual therapy, patient education, home exercise program, therapeutic exercise, therapeutic activities and gait training  Frequency: 2x week  Duration in weeks: 8  Plan of Care beginning date: 4/3/2024  Plan of Care expiration date: 6/2/2024  Treatment plan discussed with: patient        Subjective Evaluation    History of Present Illness  Mechanism of injury: 4/3: falls out of bed. 50% use of rollator. Difficulty getting in/out of car. Struggle with bed mobility. Wants to keep working on his strength, endurance, several solid weeks of training. Wants to feel more empowered and be indep, especially getting up from floor. Can walk with the cane. Endurance comes and goes. Since med changes feeling  better but not all the way. Doing stairs 3x day.     Patient Specific Functional Scale: Balance 10, Endurance 7/10, /20    3/1: Did not sleep well last night, very fatigued today. He feels there is some improvmeent in balance, however, endurance and fatigue. Sleep has been bad recently, reports insects around house that affects his sleep. Wife thinks it is mental.    : Feels like endurance is better. Balance is about the same. Wants to continue to work on balance.    : Wants to work more on balance. Feels like he is getting in a better routine at home. No near falls balance wise.     Present at PT: More excruciating burning pain due to neuropathy. Using SPC for distances. Had episode of complete loss of balance. Lasted a day, but has not had an episode since. Does nto have burning now though. Has not been as faithful exercise wise due to life stressors or events. Notices a decline in his balance and feels like he is on his way to recovering. Wants to work on his balance and endurance.         Patient Goals  Patient goals for therapy: improved balance, independence with ADLs/IADLs, increased strength and increased motion    Pain  Current pain ratin  At best pain ratin  At worst pain rating: 10  Location: neuopathy pain  Relieving factors: change in position    Social Support  Stairs in house: yes   Lives in: multiple-level home  Lives with: spouse    Employment status: not working    Diagnostic Tests  No diagnostic tests performed  Treatments  Previous treatment: physical therapy and speech therapy        Objective      Balance Test 2023 2023 12/5 1/5 1/30 3/1 4/3   6 Minute Walk Test (ft): 1058ft NV   900ft SPC (could have gone 3-4 more laps) 780ft 1:01 left no AD 601ft full 6MWT no AD   FGA:     ABC  64% 71%  70%     MiniBest    Gait Speed (m/s): 1.03 m/s SPC 0.82m/s / no AD 0.90m/s  Fast: 1.45m/s no AD No AD:  0.94m/s  Fast: 1.41m/s no AD  SSGS: no AD 0.81 m/s / SPC 0.85 m/s  Fast: 1.34 m/s  SSGS: no AD 0.81m/s  Fast: 1.28m/s SSGS: 0.75m/s no AD  Fast: 1.00   30s chair stand:   14 15       5x Sit To Stand (s): 8.13 9.74 10.12    10.04   TUG / TUGC: 7.18 / 8.20 50 > 32 9.15 / 10.60 50 > 41 (37 > 28 by 4's error)   09.30 / 10.88 50 > 29  16.30s / 23.85s 50 > 28 correctly (by 3's)         MCTSIB Number of Seconds   Feet Together, Eyes Open 30   Feet Together, Eyes Closed 30   Feet Together, Eyes Open Foam 30   Feet Together, Eyes Closed Foam 0          Gait Assessment: Croched gait minimal arm swing, footflat strike - lacking proper IC, Stooped posture, inadequate knee ext in terminal swing.           Precautions: h/o tinnitus, h/o PD, h/o peripheral neuropathy,h/o HTN, B/L TKR  Re-eval Date:  2/31/2024  EPOC: 4/30/2024    Date 3/29 4/3       Visit Count 36 37       FOTO See IE        Pain In See IE        Pain Out                 Testing         TUG/C 16.30s / 23.85s 50 > 28 correctly (by 3's)        30s chair stand 15        5xSTS 10.04        Gait speed 0.75m/s        ABC 70        MiniBest 13        FGA 13        6MWT 601ft no AD        Neuro Re-Ed  6MWT, FGA, MiniBest, 5xSTS       Dynamic balance SOLO fwd step to low hurdles 6 laps, 8 laps step through        Static balance         Obstacle course         Cognitive dual task         Large amplitude movement         Confined space balance                  Ther Ex         SLR x 4         HR/TR         Mini Squats 2x10 STS 8lb med ball hold    1x10 STS 8lb med ball OH press        Step ups         Unilat row         Upright Y         Leg Press         Mod quadruped KB pull through         KB deadlift         Piriformis str seated         L/s flexion seated         Hip flexor stretch         Thoracic extension         Ther Activity         ADL Bed mobility on pillows 20min total rolling, sidelying<>sit        Skin inspection         Gait Training         Overground          Head turns         Stairs         TM         Modalities

## 2024-04-05 ENCOUNTER — APPOINTMENT (OUTPATIENT)
Facility: CLINIC | Age: 79
End: 2024-04-05
Payer: MEDICARE

## 2024-04-10 ENCOUNTER — OFFICE VISIT (OUTPATIENT)
Facility: CLINIC | Age: 79
End: 2024-04-10
Payer: MEDICARE

## 2024-04-10 DIAGNOSIS — G20.C PRIMARY PARKINSONISM: Primary | ICD-10-CM

## 2024-04-10 PROCEDURE — 97112 NEUROMUSCULAR REEDUCATION: CPT

## 2024-04-10 NOTE — PROGRESS NOTES
Daily Note     Today's date: 4/10/2024  Patient name: Justen Thompson  : 1945  MRN: [id number]  Referring provider: Kye Reich MD  Dx:   Encounter Diagnosis     ICD-10-CM    1. Primary parkinsonism  G20.C           Start Time: 1100  Stop Time: 1145  Total time in clinic (min): 45 minutes    Subjective: Doing stairs daily. Has not been intentionally doing sit<>stands. Looking into assisted living now.      Objective: See treatment diary below      Assessment: Pt shows hypokinesia, however, with verbal cues or external targets shows improvement in amplitude. Crouched gait limits overall step length and clearance over hurdles. Occasional instability with lateral stepping to recover balance. Shows appropriate power generation during sit<>stands when cued to perform without UE support.  Patient would benefit from continued PT to improve strength, endurance, and balance.      Plan: Continue per plan of care.      Precautions: h/o tinnitus, h/o PD, h/o peripheral neuropathy,h/o HTN, B/L TKR  Re-eval Date:    EPOC: 2024    Date 3/29 4/3 4/10      Visit Count 36 37 38      FOTO See IE        Pain In See IE        Pain Out                 Testing         TUG/C 16.30s / 23.85s 50 > 28 correctly (by 3's)        30s chair stand 15        5xSTS 10.04        Gait speed 0.75m/s        ABC 70        MiniBest 13        FGA 13        6MWT 601ft no AD        Neuro Re-Ed  6MWT, FGA, MiniBest, 5xSTS       Dynamic balance SOLO fwd step to low hurdles 6 laps, 8 laps step through  Hurdles 2 laps x5 SOLO      Static balance         Obstacle course         Cognitive dual task         Large amplitude movement   Med ball slam 8lb 5x4 SOLO    Resisted amb PT resistance 10 laps x1, 4 laps x1 SOLO      Confined space balance                  Ther Ex         SLR x 4         HR/TR         Mini Squats 2x10 STS 8lb med ball hold    1x10 STS 8lb med ball OH press  STS 2x8      Step ups         Unilat row         Upright Y          Leg Press         Mod quadruped KB pull through         KB deadlift         Piriformis str seated         L/s flexion seated         Hip flexor stretch         Thoracic extension         Ther Activity         ADL Bed mobility on pillows 20min total rolling, sidelying<>sit        Skin inspection         Gait Training         Overground         Head turns         Stairs         TM         Modalities

## 2024-04-11 ENCOUNTER — OFFICE VISIT (OUTPATIENT)
Dept: FAMILY MEDICINE CLINIC | Facility: HOSPITAL | Age: 79
End: 2024-04-11
Payer: MEDICARE

## 2024-04-11 VITALS
WEIGHT: 222 LBS | OXYGEN SATURATION: 96 % | HEIGHT: 73 IN | SYSTOLIC BLOOD PRESSURE: 110 MMHG | DIASTOLIC BLOOD PRESSURE: 68 MMHG | HEART RATE: 68 BPM | TEMPERATURE: 98 F | RESPIRATION RATE: 16 BRPM | BODY MASS INDEX: 29.42 KG/M2

## 2024-04-11 DIAGNOSIS — I25.10 CORONARY ARTERY DISEASE INVOLVING NATIVE CORONARY ARTERY OF NATIVE HEART WITHOUT ANGINA PECTORIS: ICD-10-CM

## 2024-04-11 DIAGNOSIS — G60.9 IDIOPATHIC PERIPHERAL NEUROPATHY: ICD-10-CM

## 2024-04-11 DIAGNOSIS — G20.C PRIMARY PARKINSONISM: Primary | ICD-10-CM

## 2024-04-11 DIAGNOSIS — R44.1 VISUAL HALLUCINATIONS: ICD-10-CM

## 2024-04-11 DIAGNOSIS — I10 ESSENTIAL HYPERTENSION: ICD-10-CM

## 2024-04-11 PROBLEM — I95.1 ORTHOSTATIC HYPOTENSION: Status: RESOLVED | Noted: 2022-09-12 | Resolved: 2024-04-11

## 2024-04-11 PROCEDURE — G2211 COMPLEX E/M VISIT ADD ON: HCPCS | Performed by: INTERNAL MEDICINE

## 2024-04-11 PROCEDURE — 99213 OFFICE O/P EST LOW 20 MIN: CPT | Performed by: INTERNAL MEDICINE

## 2024-04-11 NOTE — ASSESSMENT & PLAN NOTE
Patient has primary parkinsonism with visual hallucinations.  He still sees insects that are in his food, all over his environment and bite him.  His neurologist discontinued Seroquel and started him on Zyprexa.  There is no much change in the visual hallucinations    Patient has gait dysfunction with falls.  I asked patient to use his walker at all times.  Family is told bed railings to prevent him from falling out of the bed.  Patient works with physical therapy

## 2024-04-11 NOTE — ASSESSMENT & PLAN NOTE
Patient presents with wife and daughter-in-law for follow-up.  Patient was hypotensive last month and I discontinued benazepril/hctz.    His systolic blood pressure at home runs between 110-140    He is not hypotensive in the office today.  Blood pressure is acceptable.  Continue metoprolol only

## 2024-04-11 NOTE — ASSESSMENT & PLAN NOTE
Patient has coronary artery disease.  Denies chest pain.  Continue aspirin, atorvastatin, metoprolol

## 2024-04-11 NOTE — PROGRESS NOTES
"Assessment/Plan:    Essential hypertension  Patient presents with wife and daughter-in-law for follow-up.  Patient was hypotensive last month and I discontinued benazepril/hctz.    His systolic blood pressure at home runs between 110-140    He is not hypotensive in the office today.  Blood pressure is acceptable.  Continue metoprolol only    Coronary artery disease involving native coronary artery of native heart without angina pectoris  Patient has coronary artery disease.  Denies chest pain.  Continue aspirin, atorvastatin, metoprolol    Primary Parkinsonism (HCC)  Patient has primary parkinsonism with visual hallucinations.  He still sees insects that are in his food, all over his environment and bite him.  His neurologist discontinued Seroquel and started him on Zyprexa.  There is no much change in the visual hallucinations    Patient has gait dysfunction with falls.  I asked patient to use his walker at all times.  Family is told bed railings to prevent him from falling out of the bed.  Patient works with physical therapy     Diagnoses and all orders for this visit:    Primary parkinsonism    Essential hypertension    Coronary artery disease involving native coronary artery of native heart without angina pectoris    Visual hallucinations    Idiopathic peripheral neuropathy          Subjective:      Patient ID: Justen Thompson is a 79 y.o. male.      HPI    Patient presents for follow-up of chronic conditions as detailed in the assessment and plan.      The following portions of the patient's history were reviewed and updated as appropriate: current medications, past family history, past medical history, past social history, past surgical history and problem list.    Review of Systems      Objective:    /68 (BP Location: Left arm, Patient Position: Standing)   Pulse 68   Temp 98 °F (36.7 °C) (Tympanic)   Resp 16   Ht 6' 1\" (1.854 m)   Wt 101 kg (222 lb)   SpO2 96%   BMI 29.29 kg/m²      Physical " Exam  Constitutional:       General: He is not in acute distress.     Appearance: He is not toxic-appearing.   HENT:      Head: Normocephalic.   Cardiovascular:      Rate and Rhythm: Normal rate and regular rhythm.      Heart sounds: No murmur heard.  Pulmonary:      Effort: No respiratory distress.      Breath sounds: No wheezing or rales.   Abdominal:      General: Bowel sounds are normal.      Palpations: Abdomen is soft.      Tenderness: There is no abdominal tenderness.   Neurological:      Mental Status: He is alert.      Comments: Bradykinesia, shuffling gait, left hand resting tremor are present             Kye Reich MD

## 2024-04-12 ENCOUNTER — OFFICE VISIT (OUTPATIENT)
Facility: CLINIC | Age: 79
End: 2024-04-12
Payer: MEDICARE

## 2024-04-12 ENCOUNTER — TELEPHONE (OUTPATIENT)
Dept: FAMILY MEDICINE CLINIC | Facility: HOSPITAL | Age: 79
End: 2024-04-12

## 2024-04-12 DIAGNOSIS — G20.C PRIMARY PARKINSONISM: Primary | ICD-10-CM

## 2024-04-12 PROCEDURE — 97112 NEUROMUSCULAR REEDUCATION: CPT

## 2024-04-12 NOTE — PROGRESS NOTES
Daily Note     Today's date: 2024  Patient name: Justen Thompson  : 1945  MRN: [id number]  Referring provider: Kye Reich MD  Dx:   Encounter Diagnosis     ICD-10-CM    1. Primary parkinsonism  G20.C             Start Time: 1100  Stop Time: 1145  Total time in clinic (min): 45 minutes    Subjective: Had PCP appt. Nothing new from medical standpoint. No new falls.      Objective: See treatment diary below      Assessment:  Circuit style training to use variable practice and to increase intensity of session. Shows good success with repeated sit<>stand and hurdles. Better performance with hurdles when cued to reach foot further vs stepping over christy. Pt reports fatigue in upper back with added OH throws with weighted ball, fair balance with need for few steps to recover balance. With fatigue note more stooped posturing. Patient would benefit from continued PT to improve strength, endurance, and balance.      Plan: Continue per plan of care.      Precautions: h/o tinnitus, h/o PD, h/o peripheral neuropathy,h/o HTN, B/L TKR  Re-eval Date:    EPOC: 2024    Date 3/29 4/3 4/10 4/12     Visit Count 36 37 38 39     FOTO See IE        Pain In See IE        Pain Out                 Testing         TUG/C 16.30s / 23.85s 50 > 28 correctly (by 3's)        30s chair stand 15        5xSTS 10.04        Gait speed 0.75m/s        ABC 70        MiniBest 13        FGA 13        6MWT 601ft no AD        Neuro Re-Ed  6MWT, FGA, MiniBest, 5xSTS       Dynamic balance SOLO fwd step to low hurdles 6 laps, 8 laps step through  Hurdles 2 laps x5 SOLO Fwd/bwd stepping 7.5# on bolster SOLO 4 laps    Resisted stepping fwd/bwd SOLO 4 laps    Reactive stepping avoiding bean bags over ground 4 laps SOLO    4lb med ball toss OH x30 SOLO     Static balance         Obstacle course    5 STS + low hurdles 3 lap circuit SOLO     Cognitive dual task         Large amplitude movement   Med ball slam 8lb 5x4 SOLO    Resisted amb  PT resistance 10 laps x1, 4 laps x1 SOLO      Confined space balance                  Ther Ex         SLR x 4         HR/TR         Mini Squats 2x10 STS 8lb med ball hold    1x10 STS 8lb med ball OH press  STS 2x8      Step ups         Unilat row         Upright Y         Leg Press         Mod quadruped KB pull through         KB deadlift         Piriformis str seated         L/s flexion seated         Hip flexor stretch         Thoracic extension         Ther Activity         ADL Bed mobility on pillows 20min total rolling, sidelying<>sit        Skin inspection         Gait Training         Overground         Head turns         Stairs         TM         Modalities

## 2024-04-12 NOTE — TELEPHONE ENCOUNTER
Mess to wife that copy ready for .  She also asked for labs done on 1/10/24 also.  Printed and had cb sign them as well. Put up front for  on mon 415/24.

## 2024-04-12 NOTE — TELEPHONE ENCOUNTER
VM---pt's spouse left  asking for review of X-Ray results. Also would like them printed out and signed by Dr. Reich. PCB

## 2024-04-17 ENCOUNTER — OFFICE VISIT (OUTPATIENT)
Facility: CLINIC | Age: 79
End: 2024-04-17
Payer: MEDICARE

## 2024-04-17 DIAGNOSIS — G20.C PRIMARY PARKINSONISM: Primary | ICD-10-CM

## 2024-04-17 PROCEDURE — 97112 NEUROMUSCULAR REEDUCATION: CPT

## 2024-04-17 NOTE — PROGRESS NOTES
Daily Note     Today's date: 2024  Patient name: Justen Thompson  : 1945  MRN: [id number]  Referring provider: Kye Reich MD  Dx:   Encounter Diagnosis     ICD-10-CM    1. Primary parkinsonism  G20.C             Start Time: 1015  Stop Time: 1100  Total time in clinic (min): 45 minutes    Subjective: Justen reports nothing new. His wife reports he got out of bed on her side of the bed, to avoid the guard rail just placed. He stood up and got the remote without his RW.      Objective: See treatment diary below      Assessment:  Circuit style training with walking tasks and rotational med ball throws. When cued to perform tasks quicker, better response to hypokinesia and bradykinesia. Fast paced walking to catch scarf with consistently fast gait, however, shows instability when attempting to stop. No reliance on PT or harness for balance. Better stopping of gait & initiation when performing stop/go activity. Patient would benefit from continued PT to improve strength, endurance, and balance.      Plan: Continue per plan of care.      Precautions: h/o tinnitus, h/o PD, h/o peripheral neuropathy,h/o HTN, B/L TKR  Re-eval Date:    EPOC: 2024    Date 3/29 4/3 4/10 4/12 4/17    Visit Count 36 37 38 39 40    FOTO See IE        Pain In See IE        Pain Out                 Testing         TUG/C 16.30s / 23.85s 50 > 28 correctly (by 3's)        30s chair stand 15        5xSTS 10.04        Gait speed 0.75m/s        ABC 70        MiniBest 13        FGA 13        6MWT 601ft no AD        Neuro Re-Ed  6MWT, FGA, MiniBest, 5xSTS       Dynamic balance SOLO fwd step to low hurdles 6 laps, 8 laps step through  Hurdles 2 laps x5 SOLO Fwd/bwd stepping 7.5# on bolster SOLO 4 laps    Resisted stepping fwd/bwd SOLO 4 laps    Reactive stepping avoiding bean bags over ground 4 laps SOLO    4lb med ball toss OH x30 SOLO 5lb OH press fwd walking 6 laps x2 SOLO    Jog to catch scarf x8, x6 catch particular color  SOLO    Stop/Go visual cues SOLO cues for fast as possible x10 laps    Static balance         Obstacle course    5 STS + low hurdles 3 lap circuit SOLO     Cognitive dual task         Large amplitude movement   Med ball slam 8lb 5x4 SOLO    Resisted amb PT resistance 10 laps x1, 4 laps x1 SOLO  8lb med ball rotation throws 2x6 ea side SOLO    X10 med ball deadlift > OH press    Modified push up on mat, push to standing x20 SOLO    Confined space balance                  Ther Ex         SLR x 4         HR/TR         Mini Squats 2x10 STS 8lb med ball hold    1x10 STS 8lb med ball OH press  STS 2x8      Step ups         Unilat row         Upright Y         Leg Press         Mod quadruped KB pull through         KB deadlift         Piriformis str seated         L/s flexion seated         Hip flexor stretch         Thoracic extension         Ther Activity         ADL Bed mobility on pillows 20min total rolling, sidelying<>sit        Skin inspection         Gait Training         Overground         Head turns         Stairs         TM         Modalities

## 2024-04-19 ENCOUNTER — OFFICE VISIT (OUTPATIENT)
Facility: CLINIC | Age: 79
End: 2024-04-19
Payer: MEDICARE

## 2024-04-19 ENCOUNTER — EVALUATION (OUTPATIENT)
Facility: CLINIC | Age: 79
End: 2024-04-19
Payer: MEDICARE

## 2024-04-19 DIAGNOSIS — R41.841 COGNITIVE COMMUNICATION DEFICIT: ICD-10-CM

## 2024-04-19 DIAGNOSIS — R49.0 DYSPHONIA: ICD-10-CM

## 2024-04-19 DIAGNOSIS — G20.C PRIMARY PARKINSONISM: Primary | ICD-10-CM

## 2024-04-19 DIAGNOSIS — R13.12 OROPHARYNGEAL DYSPHAGIA: ICD-10-CM

## 2024-04-19 PROCEDURE — 97112 NEUROMUSCULAR REEDUCATION: CPT

## 2024-04-19 PROCEDURE — 97167 OT EVAL HIGH COMPLEX 60 MIN: CPT

## 2024-04-19 NOTE — PROGRESS NOTES
OCCUPATIONAL THERAPY INITIAL EVALUATION        24  Justen Hullski  1945  [id number]  Kye Reich MD   Diagnosis ICD-10-CM Associated Orders   1. Primary parkinsonism  G20.C Ambulatory Referral to Occupational Therapy      2. Cognitive communication deficit  R41.841 Ambulatory Referral to Occupational Therapy      3. Dysphonia  R49.0 Ambulatory Referral to Occupational Therapy      4. Oropharyngeal dysphagia  R13.12 Ambulatory Referral to Occupational Therapy            Assessment/Plan      SKILLED ANALYSIS:    Pt is a 79 y.o. male referred to Occupational Therapy s/p Primary parkinsonism [G20.C].  Pt participated in skilled OT evaluation and, following formalized testing as well as clinical observation, pt presents with the following areas of deficit: FMC/FMS, GMC/GMS, hand to target accuracy, in hand manipulation/dexterity, binocular teaming, pursuits, saccades, and convergence impacting ability to independently and safely complete ADL/IADL and leisure tasks.  Pt does demo the need for skilled Occupational Therapy services 2x/week for 12 weeks with focus on ADL re-training, IADL re-training, UE NMR, visual perceptual training, visual scanning, UE strengthening, UE endurance, FMC/GMC, and family training/education to address the goals as listed below. Pt in agreement with POC, POC to  24.          Short Term Goals (to be achieved within 4 weeks):    Pt will increase oculomotor control for improved saccades, pursuits, con/divergent tasks for improved reading, board to table tasks x 80% accuracy  with minimal increase in symptoms  Pt will increase B UE rate of manipulation for all FM tests for improved functional performance/independence with functional tasks x 25%       Long Term Goals (to be achieved within 12 weeks):  Pt will improve handwriting to G+ legibility, sustaining G spacing and sizing throughout entire handwriting task, to improve written communications   Pt will increase FMC AEB  "ability to manipulate eating utensils and clothing fasteners with min to no difficulty.   Pt will increase oculomotor control for improved dynamic activities with head turns, board/screen to table tasks symptom free with 90% accuracy for improved life roles   Pt with improve oculomotor control for improved convergence/ divergence tasks AEB decrease in diplopia to under 6\" from nose.   Pt will be I with HEP                    SUBJECTIVE      SUBJECTIVE: \"my handwriting is worse than it has ever been\"    PATIENT GOAL: \"I think handwriting, manipulation of eating tools, tying shoes, working with the extremities\"        HISTORY OF PRESENT ILLNESS:     Pt is a 79 y.o. male who was referred to Occupational Therapy s/p  Primary parkinsonism [G20.C].  Pt with h/o of PD, visual hallucinations, HTN, and peripheral neuropathy. Pt presenting with decline in GMC/FMC, difficulty with completion of ADLs and visual impairments. Pt now coming to OPOT to address deficits and regain level of function/ independence.       PMH:   Past Medical History:   Diagnosis Date    Arthritis     Benign familial tremor     Cardiac disease     two cardiac stents    Chest pain     Coronary artery disease     HL (hearing loss) 01/01/2022    Hyperlipidemia     Hypertension     Kidney disease     Kidney stone     Malignant melanoma of skin of chest (HCC)     Removed 2 years ago.    Meniscal injury     Nephrolithiasis     Parkinson's disease     Peripheral neuropathy     PONV (postoperative nausea and vomiting)     Tinnitus     [id number] RESOLVED       Past Surgical Hx:   Past Surgical History:   Procedure Laterality Date    AMPUTATION Left     AMPUTATION OF FINGER WITH NEURECTOMY(EACH) DISTAL LONG  RING AND SMALL FINGER    CARPAL TUNNEL RELEASE Right     CATARACT EXTRACTION      CHOLECYSTECTOMY      CORONARY STENT PLACEMENT      two cardiac stents    EYE SURGERY      FINGER AMPUTATION      AMPUTATION OF MIDDLE FINGER WITH NEURECTOMY(EACH)    HAND " SURGERY Left     Traumatic amputation of fingers left hand.    JOINT REPLACEMENT Bilateral     knees    KNEE ARTHROPLASTY      TOTAL    MALIGNANT SKIN LESION EXCISION      ANTERIOR CHEST FOR MELAMONIA    ND CYSTO/URETERO W/LITHOTRIPSY &INDWELL STENT INSRT Left 07/27/2017    Procedure: CYSTOSCOPY URETEROSCOPY , RETROGRADE PYELOGRAM AND INSERTION STENT URETERAL;  Surgeon: Geovanny Doyle MD;  Location: [facility];  Service: Urology    ND LAPAROSCOPY SURG CHOLECYSTECTOMY N/A 02/28/2018    Procedure: CHOLECYSTECTOMY LAPAROSCOPIC;  Surgeon: Amilcar Ireland MD;  Location: [facility];  Service: General    ND LITHOLAPAXY SMPL/SM <2.5 CM N/A 09/15/2017    Procedure: CYSTOLITHOLOPAXY HOLMIUM LASER;  Surgeon: Paulo Ghotra MD;  Location: [facility];  Service: Urology    ND TRURL ELECTROSURG RESCJ PROSTATE BLEED COMPLETE N/A 09/15/2017    Procedure: BIPOLAR TRANSURETHRAL RESECTION OF PROSTATE (TURP);  Surgeon: Paulo Ghotra MD;  Location: [facility];  Service: Urology    TONSILLECTOMY      TRIGGER FINGER RELEASE Right     TUMOR REMOVAL Left     Left foot 20 years ago.    WISDOM TOOTH EXTRACTION Bilateral         HOME SETUP/ CLOF: lives with wife (Lachelle), 2 SH with basement; bed and bathroom on 1st floor; walk in shower    ADLs: I with most ADLs; pt reports being able to don socks/ shoes but his wife assists for ease and timing; reports increased time to complete; reports min difficulty with manipulating eating utensils and buttons     IADLs: pt's wife completes     Functional Mobility: S with RW; S/CG with transfers     DME: has shower chair but doesn't use it; shower and toilet grab bars; sock aide; reacher    Falls: pt reports 3 falls in the last year; no injuries     Pain Levels: none            OBJECTIVE         PHYSICAL ASSESSMENT    Right handed     L side tremors     UE ROM LUE AROM  RUE AROM COMMENTS   Shoulder Flex WFL  WFL    Shoulder Ext WFL  WFL    Shoulder ABD WFL  WFL    Horizontal ABD WFL  WFL    Horizontal  ADD WFL  WFL    Shoulder IR  WFL  WFL    Shoulder ER WFL WFL    Elbow Flex WFL WFL    Elbow Ext  WFL WFL    Supination  WFL WFL    Pronation  WFL WFL    Wrist Flex WFL WFL    Wrist Ext WFL WFL    Finger Flex WFL WFL    Finger Ext WFL WFL    Opposition  WFL WFL                               MMT  LUE RUE   Comments   Shoulder Flex/Ext 5/5 5/5    Shoulder Abd 5/5 5/5    Shoulder Add 5/5 5/5    Shoulder ER 5/5 5/5    Shoulder IR 5/5 5/5    Elbow Flex 5/5 5/5    Elbow Ext 5/5 5/5    Wrist Flex 5/5 5/5    Wrist Ext 5/5 5/5    Gross Grasp 5/5 5/5                      /Pinch Strength  LUE  RUE    Comments   Dynamometer      - Gross Grasp 50 lbs 45 lbs    Pinch Meter       - PINCER 14 lbs 16 lbs     - 3 JAW RAFA 11 lbs 15 lbs     - LATERAL 17 lbs  20 lbs      SENSATION LUE RUE Comments   Sharp Dull  Intact Intact    Proprioception Intact Intact    Pain Intact Intact    Hot/Cold Temp Intact Intact      COORDINATION LUE RUE    9 Hole Peg Test 43 seconds 32 seconds Below average for age    Keyhole Peg Test  NT NT    O'Ousmane Finger Dexterity Test  NT NT    Handwriting (Print)  G legibility  Decreased sizing as letters advance across paper; micro graphia   Handwriting (script)   G- legibility         MODIFIED JOANNE SCALE (TONE) LUE RUE    No increase in muscle tone (0) 0 0    Slight Increase in muscle tone with catch and release or min resist at end range (1)      Slight Increase in muscle tone with catch and release, followed by min resistance through remainder of range (1+)      Increased muscle tone through full range, able to be moved easily (2)      Considerable increase in tone, difficult to move (3)      Rigid in Flexion/Extension (4)                  COGNITIVE ASSESSMENT  Not formally tested; [facility] for functional conversation and general recall         VISUAL ASSESSMENT      Comments: (+) bifocals; reporting depth perception is off lately     Vision Screen       ROM Intact    Tracking Intact with slow moving  "target; mod difficulty with gaze fixation with faster moving target    Saccades Overshooting noted with saccadic eye movement from L periphery to midline     Convergence/ Divergence Impaired convergence/ divergence; diplopia reported from nose to 44\" from nose    Cover/Uncover test  With R eye covered, (+) R eye exophoria; with L eye covered, R eye fixated on target and L eye is positioned midline and upward and does not move to target when uncovered               PLANNED THERAPY INTERVENTIONS:  Therapeutic activity  Therapeutic exercise  Neuromuscular re-education   Visual re-training   ADL re-training   IADL re-training  Oculomotor control:  saccades, con/divergence  FMC/prehension  Timed Trials  Manual tx  Hand to target  WBearing strategies   Closed chain activities  Open chain activities       INTERVENTION COMMENTS:  Diagnosis: Primary parkinsonism [G20.C]  Precautions: visual hallucinations, HTN  Insurance: Payor: MEDICARE / Plan: MEDICARE A AND B / Product Type: Medicare A & B Fee for Service /   Visits: 1   PN due 5/23/24                 "

## 2024-04-19 NOTE — PROGRESS NOTES
"Daily Note     Today's date: 2024  Patient name: Justen Thompson  : 1945  MRN: [id number]  Referring provider: Kye Reich MD  Dx:   Encounter Diagnosis     ICD-10-CM    1. Primary parkinsonism  G20.C           Start Time: 845  Stop Time: 929  Total time in clinic (min): 44 minutes    Subjective: Pt reported of nothing new since last visit.       Objective: See treatment diary below      Assessment: Agility ladder was attempted for larger steps, but it was progressed to colored dots to increase step length and Pt responded well as he was able to jog through. Pt had a misstep on the 8\" step and Pt needed contact guard to stand upright due to fear of falling. With timed trials, Pt was able to have a faster gait speed and had increasing improvements with speed. Pt needed VC to jump higher and he responded fairly as he would tend to gallop than jump with both LE.       Plan: Continue per plan of care.      Precautions: h/o tinnitus, h/o PD, h/o peripheral neuropathy,h/o HTN, B/L TKR  Re-eval Date:    EPOC: 2024    Date 3/29 4/3 4/10 4/12 4/17 4/19   Visit Count 36 37 38 39 40 41   FOTO See IE        Pain In See IE        Pain Out                 Testing         TUG/C 16.30s / 23.85s 50 > 28 correctly (by 3's)        30s chair stand 15        5xSTS 10.04        Gait speed 0.75m/s        ABC 70        MiniBest 13        FGA 13        6MWT 601ft no AD        Neuro Re-Ed  6MWT, FGA, MiniBest, 5xSTS       Dynamic balance SOLO fwd step to low hurdles 6 laps, 8 laps step through  Hurdles 2 laps x5 SOLO Fwd/bwd stepping 7.5# on bolster SOLO 4 laps    Resisted stepping fwd/bwd SOLO 4 laps    Reactive stepping avoiding bean bags over ground 4 laps SOLO    4lb med ball toss OH x30 SOLO 5lb OH press fwd walking 6 laps x2 SOLO    Jog to catch scarf x8, x6 catch particular color SOLO    Stop/Go visual cues SOLO cues for fast as possible x10 laps 8\" step up with mat and wedge on side with punching bag " with OH bolster with 5# AW on bolster 6 laps SOLO    Stop+go with slapstick fast paced 8 laps SOLO   Static balance         Obstacle course    5 STS + low hurdles 3 lap circuit SOLO     Cognitive dual task         Large amplitude movement   Med ball slam 8lb 5x4 SOLO    Resisted amb PT resistance 10 laps x1, 4 laps x1 SOLO  8lb med ball rotation throws 2x6 ea side SOLO    X10 med ball deadlift > OH press    Modified push up on mat, push to standing x20 SOLO Colored dots speed trial (fastest 2.36 seconds) 10 laps SOLO    Jumping movements each square of agility ladder with 10 reps of OH with 8lb med ball at the end 6 laps SOLO   Confined space balance                  Ther Ex         SLR x 4         HR/TR         Mini Squats 2x10 STS 8lb med ball hold    1x10 STS 8lb med ball OH press  STS 2x8      Step ups         Unilat row         Upright Y         Leg Press         Mod quadruped KB pull through         KB deadlift         Piriformis str seated         L/s flexion seated         Hip flexor stretch         Thoracic extension         Ther Activity         ADL Bed mobility on pillows 20min total rolling, sidelying<>sit        Skin inspection         Gait Training         Overground         Head turns         Stairs         TM         Modalities

## 2024-04-30 DIAGNOSIS — I10 ESSENTIAL HYPERTENSION: ICD-10-CM

## 2024-05-01 ENCOUNTER — OFFICE VISIT (OUTPATIENT)
Facility: CLINIC | Age: 79
End: 2024-05-01
Payer: MEDICARE

## 2024-05-01 ENCOUNTER — EVALUATION (OUTPATIENT)
Dept: SPEECH THERAPY | Facility: CLINIC | Age: 79
End: 2024-05-01
Payer: MEDICARE

## 2024-05-01 DIAGNOSIS — G20.C PRIMARY PARKINSONISM: Primary | ICD-10-CM

## 2024-05-01 DIAGNOSIS — R13.12 DYSPHAGIA, OROPHARYNGEAL PHASE: ICD-10-CM

## 2024-05-01 DIAGNOSIS — R49.0 DYSPHONIA: Primary | ICD-10-CM

## 2024-05-01 PROCEDURE — 92524 BEHAVRAL QUALIT ANALYS VOICE: CPT

## 2024-05-01 PROCEDURE — 97116 GAIT TRAINING THERAPY: CPT

## 2024-05-01 PROCEDURE — 92610 EVALUATE SWALLOWING FUNCTION: CPT

## 2024-05-01 PROCEDURE — 92526 ORAL FUNCTION THERAPY: CPT

## 2024-05-01 RX ORDER — METOPROLOL SUCCINATE 50 MG/1
TABLET, EXTENDED RELEASE ORAL
Qty: 90 TABLET | Refills: 1 | Status: SHIPPED | OUTPATIENT
Start: 2024-05-01

## 2024-05-01 NOTE — PROGRESS NOTES
"Speech-Language Pathology Initial Evaluation    Today's date: 2024   Patient’s name: Justen Thompson  : 1945  MRN: [id number]  Safety measures:   Referring provider: Kye Reich MD    Encounter Diagnosis     ICD-10-CM    1. Dysphonia  R49.0 SPEECH Plan of Care Cert/Re-Cert      2. Dysphagia, oropharyngeal phase  R13.12 SPEECH Plan of Care Cert/Re-Cert            Assessment:  Patient presents with moderate dysphonia as a result of Parkinsonism.  Patient was previously seen by this SLP last summer into 2023 and worked on Speech, Swallowing and Cognition and did very well and was participating in daily exercises for each area.  Unfortunately, patient has had a functional decline and noted to be speaking much softer, noted increased memory deficits per wife report and feels as if he has extra saliva and food feeling \"stuck\" at times.  Patient recently with increased falls and it has been decided by patient, wife and dr for a placement in long term care. Patient was stimulable to speaking with INTENT & able to follow directions. Recommend: Restarting Speech Services 2 times / weekly for 6-8 weeks.    SWALLOWING SAFETY PRECAUTIONS:  -Recommended solids: Regular    -Recommended liquids: Thin    -Recommended medication form: with thin liquid as able    -Frequent/thorough oral care     -Aspiration precautions   *Monitor for signs/symptoms concerning for aspiration (e.g., low grade fever, increase in WBC, change in chest x-ray, increased congestion, increased coughing with P.O. intake)    -Reflux precautions     -Strategies: Small sips and bites when eating    -Positioning: Upright position during meals    -Compensatory strategies: Effortful swallow    -Supervision: Independent     -Referrals: None                 Short-term goals:       Patient will restablish regular, daily SPEAK OUT exercises into home practice.     Patient will elicit SPEAK OUT exercises in the proper way using his personal SPEAK OUT " "workbook.    Patient will utilize intent into conversational speech 80% of trials during initial part of session.    Restart IOPI evaluation and treatment.    Restart EMST-75 and home practice.    Consider Cognitive Assessment.    Patient will utilize effortful swallow into daily practice of swallowing and during sessions.    Take \"before\" video.    Complete \"EAT-10\" survey    Long-term goals:    -Maximize using INTENT in speech.    Maximize swallow function.    Maximize cognition.      Plan:  Patient would benefit from outpatient skilled Speech Therapy services: Speech-language therapy    Frequency: 2x weekly  Duration: 6-8 weeks    Intervention certification from: 5/1/2024  Intervention certification to: 6/26/2024      Subjective:  History of present illness: Patient is a 79 y.o. male who was referred to outpatient skilled Speech Therapy services for a voice evaluation.  Patient with Parkinson's Disease and previously seen by this SLP for Speech, Swallowing and Cognition. Patient and wife have noted changes & decline since last seen.    Patient's goal(s): Control saliva and improve speech.    Hearing: WFL  Vision: with glasses, but noting blurred vision    Home environment/lifestyle: currently living with wife in home but enrolled to transfer to long term care facility per patient report  Highest level of education: Undergrad [employer], Masters Degree at [employer], Doctorate from [employer]  Vocational status: Retired      Objective (testing):    Dysphagia Evaluation:    -Reason for referral: Signs/symptoms of dysphagia and Change in health status    -Subjective report of swallowing difficulty: Poor secretion management  and Coughing    -Eating Assessment Tool (EAT-10) Swallowing Screening Tool is a patient self-assessment tool that rates the percieved impairment a swallowing disorder has on the Functional, Physical, and Emotional aspects of one's life.  EAT-10 score: TBD/40    -Difficulty swallowing: Solids and " "Liquids    -Current diet (solids): Regular  -Current diet (liquids): Thin  -Current pill intake method: with water  -Alternative Feeding Method?: No    -Facial appearance Symmetrical   -Mandible function Adequate ROM   -Dentition Adequate   -Labial function WFL   -Lingual function Decreased ROM (bilateral) and Decreased strength (bilateral)   -Velar function Unable to visualize   -Oral apraxia? Absent   -Vocal quality Weak and Hoarse   -Volitional cough Weak   -Respiration Shallow   -Drooling? Yes, not during session but patient notes increased saliva   -Tremor/involuntary movement? Noted noted during evaluation     LIQUID CONSISTENCY TESTING:   Item(s) tested: (Thin) - water Administered by: Self-fed        Clinical findings:  -Oral phase impairments: suspected premature spillage  -Pharyngeal phase impairments: delayed swallow initiation    Other notes: N/A    Strategies, attempts, and responses: small sips      SOLID CONSISTENCY TESTING:  Item(s) tested: (Regular) -   Administered by: Self-fed    Clinical findings:  -Oral phase impairments: prolonged mastication, prolonged anterior-posterior transit time, and oral residue after the swallow  -Pharyngeal phase impairments: delayed swallow initiation  Other notes: n/a    Strategies, attempts, and responses: small bites, small sips, and effortful swallow, Use of extra hard swallow effective and extra dry swallow and liquid wash effective in clearing most of oral residual on lingual surface.      Factors affecting performance: Endurance and Other Cognition    -Safety concerns: Risk for aspiration    -Risk factors: Impaired cognitive status/ dementia, Progressive neurological disease, and Complex medical history        VOICE EVALUATION:    Interview:   -Chief complaint: salivating, extra saliva   -Onset: Gradual (beginnin-6 years ago)    -Water intake: \"don't thirst that much\" drinks 3 bottles of liquid daily  -Caffeine intake: 0  -Alcohol intake: very little, " "bottle beer twice month  -Smoking history: quit after undergrad  -Laryngeal demand (work, home, socially): socially  -Throat clearing: yes  -Coughing: yes, throughout the day, intermittent  -Lozenges (mentholated?): no  -Exposure to environmental triggers (e.g., temperature changes, smoke, chemicals, allergens): no  -History: Head/neck injury, Post nasal drip, Cough, and Neurological , Dysphagia  -Dyspnea: no  -Dysphagia: \"Toast feels like its \"hung up\" lower part of throat\". Happens \"every other meal\"  -Allergy testing: n/a  -Nasal symptoms: some post nasal drip  -GERD/LPR symptoms: n/a  -Recurrent URI: n/a  -Previous voice therapy: yes, participated in speak out and cognitive therapy earlier last year      Subjective Observations:  -Patient Self-Ratings:  --The Eating Assessment Tool (EAT-10) is a self-administered, symptom-specific outcome instrument for dysphagia. It consists of ten statements that a patient rates on a scale of 0-4, with 0=no problem to 4=severe problem. A score of 3 or more is abnormal. Patient obtained a score of TBD/40.       Objective Measurements/Voice Parameters:  RESPIRATORY EFFICIENCY:   -S:Z Ratio Task: Patient was instructed to sustain the sounds /s/ and /z/ to examine the coordination and efficiency of respiration and voice production. Normative data suggests that adults can prolong these sounds for 20-25 seconds. Ratios of 1.4 and above are consistent with laryngeal inefficiency, and ratios of 2.0 and above are suggestive of vocal fold pathology. Patient's S:Z ratio was .64 (9.12 sec : 14.23 sec).     -Maximum Phonation Time: Patient was instructed to sustain /a/ to measure respiratory and laryngeal coordination and efficiency. Adults are typically able to prolong vowels sound for 15-20 seconds. Reduced MPT may suggest poor respiratory support, or poor medial glottal closure. Patient's MPT was 14 seconds, poor vocal quality.          -Maximal Phonational Frequency Range: Patient was " instructed to voice from lowest to highest notes.     Task Min-Max Range (Hz) Normative Data:   Gliding poor-poor vocal quality 77 Hz-576 Hz     Recording of db & vocal quality:    Conversation: 64-65 Db with loudness decay    Sustained /a/: at 73 Db & 14 seconds   , with cueing was able to produce improved /a/ at 10.49 at 77 dB    Patient read sentences at 78 Db able to improve with cues/INTENT to 83 Db and improved vocal quality.    Patient was educated on plan of care and recommendations. Patient was agreeable.       Treatment:  Provided education of recommendations to patient and wife.      Visit Tracking:  POC   Expires Auth Expiration Date ST Visit Limit   6/26/24 12/31/24 BOMN          Visit/Unit Tracking:  Auth Status Date 5/1/24   BOMN Used 1    Remaining 9 for certification period       Intervention Comments:

## 2024-05-01 NOTE — PROGRESS NOTES
Daily Note     Today's date: 2024  Patient name: Justen Thompson  : 1945  MRN: [id number]  Referring provider: Kye Reich MD  Dx:   Encounter Diagnosis     ICD-10-CM    1. Primary parkinsonism  G20.C           Start Time: 930  Stop Time: 1015  Total time in clinic (min): 45 minutes    Subjective: Justen reports having 3 falls since last seen. Unable to get up on his own. Has meeting at [facility] later today for LTAC.      Objective: See treatment diary below  Direct 1:1 - [id number] - 15ktc    Assessment:  Worked primarily on RW use and navigating areas while maintaining appropriate safety sitting and RW use. He displays some difficulty managing turns with knowing which direction to turn and proper placement of RW when going to sit, moderate verbal cues provided to assist with management. More time needed to figure out sitting while more obstacles are present. This seems to be more of a cognitive processing issue vs motor planning. Discussed with Dtr in law, pt, and wife about proper RW use and to use RW at all times while ambulating. Patient would benefit from continued PT      Plan: Continue per plan of care.      Precautions: h/o tinnitus, h/o PD, h/o peripheral neuropathy,h/o HTN, B/L TKR  Re-eval Date:  2024  EPOC: 2024    Date         Visit Count 42        FOTO See IE        Pain In See IE        Pain Out                 Testing         TUG/C 16.30s / 23.85s 50 > 28 correctly (by 3's)        30s chair stand 15        5xSTS 10.04        Gait speed 0.75m/s        ABC 70        MiniBest 13        FGA 13        6MWT 601ft no AD        Neuro Re-Ed         Dynamic balance         Static balance         Obstacle course         Cognitive dual task         Large amplitude movement STS x8 from low surfaces        Confined space balance                  Ther Ex         SLR x 4         HR/TR         Mini Squats         Step ups         Unilat row         Upright Y         Leg Press         Mod  quadruped KB pull through         KB deadlift         Piriformis str seated         L/s flexion seated         Hip flexor stretch         Thoracic extension         Ther Activity         ADL         Skin inspection         Gait Training         Overground RW use small areas, navigating obstacles mod verbal cues and demonstration 30 min        Head turns         Stairs         TM         Modalities

## 2024-05-03 ENCOUNTER — OFFICE VISIT (OUTPATIENT)
Facility: CLINIC | Age: 79
End: 2024-05-03
Payer: MEDICARE

## 2024-05-03 DIAGNOSIS — G20.C PRIMARY PARKINSONISM: Primary | ICD-10-CM

## 2024-05-03 DIAGNOSIS — R41.841 COGNITIVE COMMUNICATION DEFICIT: ICD-10-CM

## 2024-05-03 PROCEDURE — 97112 NEUROMUSCULAR REEDUCATION: CPT

## 2024-05-03 NOTE — PROGRESS NOTES
Occupational Therapy Daily Note:    Today's date: 5/3/2024  Patient name: Justen Thompson  : 1945  MRN: [id number]  Referring provider: Kye Reich MD  Dx:   Encounter Diagnoses   Name Primary?    Primary parkinsonism Yes    Cognitive communication deficit        Subjective: no complaints     Objective: Pt engaged in skilled OT treatment session with focus on UE NMR, visual perceptual training, visual scanning, UE strengthening, UE endurance, and FMC/GMC to increase engagement, endurance, tolerance, and independence with daily ADL and IADL tasks.     CPT Code Minutes                                           Task Details        Therapeutic Activity               Neuro Re-Ed  Visual re-training and NMRE to improve GMC/FMC.    Multi-matrix for visual scanning, tracking, B eye teaming, B eye strengthening, eye-hand coordination and FMC.     -Mod difficulty keeping track of spot in task; mod/max cues for recall of task directions; min cues for visual scanning; increased time to complete.   -min difficulty with eye-hand coordination and target accuracy when transferring small cubes to target cubes.          Item transfer/dexterity:  -B hand coordination to  and rotate small cubes in fingertips to place number side up at target. Completed with min difficulty with dexterity; G prehension patterns; G target accuracy.         Therapeutic Exercise               Manual          Self Care           Assessment: Tolerated treatment well. Pt with difficulty with cognitive portion of multi-matrix, requiring increased cues for directions. Was able to complete with min difficulty with visual scanning, increased time. Pt with min difficulty with B hand coordination and in-hand dexterity. Patient would benefit from continued skilled OT.    Plan: Continued skilled OT per POC    INTERVENTION COMMENTS:  Diagnosis: Primary parkinsonism [G20.C]  Precautions: visual hallucinations, HTN  Insurance: Payor: MEDICARE / Plan:  MEDICARE A AND B / Product Type: Medicare A & B Fee for Service /   Visits: 2   PN due 5/23/24

## 2024-05-03 NOTE — PROGRESS NOTES
Daily Note     Today's date: 5/3/2024  Patient name: Justen Thompson  : 1945  MRN: [id number]  Referring provider: Kye Reich MD  Dx:   Encounter Diagnosis     ICD-10-CM    1. Primary parkinsonism  G20.C             Start Time: 845  Stop Time: 930  Total time in clinic (min): 45 minutes    Subjective: Pt reports that he is trying to figure out moving to a LTC facility.       Objective: See treatment diary below    Assessment: Pt had trouble with sitting down with walker as he would not turn around fully to sit into the chair. He needed VC to continue to turn and he responded fairly as he still needed VC to continue turning. Pt had moments of festination during navigation through tight spaces in the obstacle course and he was not able to break out of the festination without VC.       Plan: Continue per plan of care.      Precautions: h/o tinnitus, h/o PD, h/o peripheral neuropathy,h/o HTN, B/L TKR  Re-eval Date:  2024  EPOC: 2024    Date 5/1 5/3       Visit Count 42 43       FOTO See IE        Pain In See IE        Pain Out                 Testing         TUG/C 16.30s / 23.85s 50 > 28 correctly (by 3's)        30s chair stand 15        5xSTS 10.04        Gait speed 0.75m/s        ABC 70        MiniBest 13        FGA 13        6MWT 601ft no AD        Neuro Re-Ed         Dynamic balance  RW SOLO through tight obstacle course 15 min       Static balance         Obstacle course         Cognitive dual task         Large amplitude movement STS x8 from low surfaces STS x10    Amb over colored dots 10 laps        Confined space balance                  Ther Ex         SLR x 4         HR/TR         Mini Squats         Step ups         Unilat row         Upright Y         Leg Press         Mod quadruped KB pull through         KB deadlift         Piriformis str seated         L/s flexion seated         Hip flexor stretch         Thoracic extension         Ther Activity         ADL         Skin inspection          Gait Training         Overground RW use small areas, navigating obstacles mod verbal cues and demonstration 30 min        Head turns         Stairs         TM         Modalities

## 2024-05-06 ENCOUNTER — OFFICE VISIT (OUTPATIENT)
Dept: SPEECH THERAPY | Facility: CLINIC | Age: 79
End: 2024-05-06
Payer: MEDICARE

## 2024-05-06 ENCOUNTER — OFFICE VISIT (OUTPATIENT)
Facility: CLINIC | Age: 79
End: 2024-05-06
Payer: MEDICARE

## 2024-05-06 DIAGNOSIS — R13.12 DYSPHAGIA, OROPHARYNGEAL PHASE: ICD-10-CM

## 2024-05-06 DIAGNOSIS — G20.C PRIMARY PARKINSONISM: Primary | ICD-10-CM

## 2024-05-06 DIAGNOSIS — R49.0 DYSPHONIA: Primary | ICD-10-CM

## 2024-05-06 PROCEDURE — 92507 TX SP LANG VOICE COMM INDIV: CPT

## 2024-05-06 PROCEDURE — 97112 NEUROMUSCULAR REEDUCATION: CPT

## 2024-05-06 NOTE — PROGRESS NOTES
PT Re-Evaluation     Today's date: 2024  Patient name: Justen Thompson  : 1945  MRN: [id number]  Referring provider: Kye Reich MD  Dx:   Encounter Diagnosis     ICD-10-CM    1. Primary parkinsonism  G20.C                 Start Time: 08  Stop Time: 930  Total time in clinic (min): 45 minutes    Assessment  Assessment details: Patient is a 78 y.o. year old male presenting to OPPT s/p diagnosis of Parkinsons disease and peripheral neuropathy. He has experienced a few more falls since last evaluation. Justen shows improvement with his use of the RW, which his wife also reports at home. Since RW training he has experienced less falls. 6MWT now performed with RW, which shows some preservation of endurance nearly achieving similar distance. MiniBest and FGA continue to be indicative of falls risk,  and 10/30 respectively. Provided with visual cue on RW to see if it assists with potential freezing and festination episodes. Discussed with Justen about continuing therapy and RW training another month and potential discharge as he may be moving to [facility]. Justen is in agreement with plan. Goals are adjusted to reflect updated plan of care.  Impairments: abnormal coordination, abnormal gait, abnormal muscle tone, abnormal or restricted ROM, abnormal movement, activity intolerance, impaired balance, impaired physical strength, lacks appropriate home exercise program, safety issue and weight-bearing intolerance     Prognosis: good    Goals  In 4 weeks, patient will:  1.  Justen will demonstrate independent use of RW while managing obstacles around the home.  2.  Improve 5xSTS by at least 2 seconds to show improved LE strength to assist with transfers.  3.  Improve 6MWT by at least 80ft to show improved community endurance with RW.            Plan  Patient would benefit from: skilled physical therapy  Planned therapy interventions: neuromuscular re-education, manual therapy, patient education, home exercise program,  therapeutic exercise, therapeutic activities and gait training  Frequency: 2x week  Duration in weeks: 4  Plan of Care beginning date: 5/6/2024  Plan of Care expiration date: 6/5/2024  Treatment plan discussed with: patient        Subjective Evaluation    History of Present Illness  Mechanism of injury: 5/6: Felt like he had a relapse from a fall, and feels like he is recovering. Not feeling 100% secure even when using the walker. Feels like he freezing more frequently. Has been doing the stairs at home, but feels like he has not been omving much over the weekend. Feeling more comfortable with using his RW and using it more frequently at home.     4/3: falls out of bed. 50% use of rollator. Difficulty getting in/out of car. Struggle with bed mobility. Wants to keep working on his strength, endurance, several solid weeks of training. Wants to feel more empowered and be indep, especially getting up from floor. Can walk with the cane. Endurance comes and goes. Since med changes feeling better but not all the way. Doing stairs 3x day.     Patient Specific Functional Scale: Balance 7/10, Endurance 7/10, /20    3/1: Did not sleep well last night, very fatigued today. He feels there is some improvmeent in balance, however, endurance and fatigue. Sleep has been bad recently, reports insects around house that affects his sleep. Wife thinks it is mental.    1/31: Feels like endurance is better. Balance is about the same. Wants to continue to work on balance.    12/5: Wants to work more on balance. Feels like he is getting in a better routine at home. No near falls balance wise.     Present at PT: More excruciating burning pain due to neuropathy. Using SPC for distances. Had episode of complete loss of balance. Lasted a day, but has not had an episode since. Does nto have burning now though. Has not been as faithful exercise wise due to life stressors or events. Notices a decline in his balance and feels like he is on his way  to recovering. Wants to work on his balance and endurance.         Patient Goals  Patient goals for therapy: improved balance, independence with ADLs/IADLs, increased strength and increased motion    Pain  Current pain ratin  At best pain ratin  At worst pain rating: 10  Location: neuopathy pain  Relieving factors: change in position    Social Support  Stairs in house: yes   Lives in: multiple-level home  Lives with: spouse    Employment status: not working    Diagnostic Tests  No diagnostic tests performed  Treatments  Previous treatment: physical therapy and speech therapy        Objective      Balance Test 2023 2023 12/5 1/5 1/30 3/1 4/3 5/6   6 Minute Walk Test (ft): 1058ft NV   900ft SPC (could have gone 3-4 more laps) 780ft 1:01 left no AD 601ft full 6MWT no AD 576ft RW 8/10 RPE   FGA:  16/30 17/30  18/30 18/30 13/30 10/30   ABC  64% 71%  70%      MiniBest    Gait Speed (m/s): 1.03 m/s SPC 0.82m/s / no AD 0.90m/s  Fast: 1.45m/s no AD No AD: 0.94m/s  Fast: 1.41m/s no AD  SSGS: no AD 0.81 m/s / SPC 0.85 m/s  Fast: 1.34 m/s  SSGS: no AD 0.81m/s  Fast: 1.28m/s SSGS: 0.75m/s no AD  Fast: 1.00 SSGS: 0.64m/s RW   30s chair stand:   14 15     11   5x Sit To Stand (s): 8.13 9.74 10.12    10.04 13.92   TUG / TUGC: 7.18 / 8.20 50 > 32 9.15 / 10.60 50 > 41 (37 > 28 by 4's error)   09.30 / 10.88 50 > 29  16.30s / 23.85s 50 > 28 correctly (by 3's) 19.71 RW / 21.49 50 > 32 correctly by 3's         MCTSIB Number of Seconds   Feet Together, Eyes Open 30   Feet Together, Eyes Closed 30   Feet Together, Eyes Open Foam 30   Feet Together, Eyes Closed Foam 0          Gait Assessment: Croched gait minimal arm swing, footflat strike - lacking proper IC, Stooped posture, inadequate knee ext in terminal swing.           Precautions: h/o tinnitus, h/o PD, h/o peripheral neuropathy,h/o HTN, B/L TKR  EPOC: 2024    Date 5/1 5/3 5/6      Visit Count 42 43 44      FOTO See IE         Pain In See IE        Pain Out                 Testing         TUG/C 19.71 RW / 21.49 50 > 32 correctly by 3's        30s chair stand 11        5xSTS 13.92        Gait speed 0.64m/s RW        ABC 70        MiniBest 13        FGA 10        6MWT 576ft RW        Neuro Re-Ed   MiniBest, 30s chair stand, 5xSTS, FGA, 6MWT, 10mWT      Dynamic balance  RW SOLO through tight obstacle course 15 min       Static balance         Obstacle course         Cognitive dual task         Large amplitude movement STS x8 from low surfaces STS x10    Amb over colored dots 10 laps        Confined space balance                  Ther Ex         SLR x 4         HR/TR         Mini Squats         Step ups         Unilat row         Upright Y         Leg Press         Mod quadruped KB pull through         KB deadlift         Piriformis str seated         L/s flexion seated         Hip flexor stretch         Thoracic extension         Ther Activity         ADL         Skin inspection         Gait Training         Overground RW use small areas, navigating obstacles mod verbal cues and demonstration 30 min        Head turns         Stairs         TM         Modalities

## 2024-05-06 NOTE — PROGRESS NOTES
"Daily Speech Treatment Note    Today's date: 2024   Patient’s name: Justen Thompson  : 1945  MRN: [id number]  Safety measures:   Referring provider: Kye Reich MD    Encounter Diagnosis     ICD-10-CM    1. Dysphonia  R49.0       2. Dysphagia, oropharyngeal phase  R13.12           Visit Tracking:  POC   Expires Auth Expiration Date ST Visit Limit   24 BOMN              Visit/Unit Tracking:  Auth Status Date 24   BOMN Used 1 2     Remaining 9 for certification period 8       Subjective/Behavioral:  -Pleasant/Cooperative, patient noting issues with excessive drooling    Objective/Assessment:  Completed structured activities with patient to target goals below.  Results as stated below.         Short-term goals:         Patient will restablish regular, daily SPEAK OUT exercises into home practice.  : Started Session 1: workbook, reestablished home care exercise plan.  70% at 72 dB conversation, improved /a/  and vocal glides with modeling, continuous cues needed to use \"INTENT.\"        Patient will elicit SPEAK OUT exercises in the proper way using his personal SPEAK OUT workbook.     Patient will utilize intent into conversational speech 80% of trials during initial part of session.     Restart IOPI evaluation and treatment.     Restart EMST-75 and home practice.     Consider Cognitive Assessment.     Patient will utilize effortful swallow into daily practice of swallowing and during sessions.     Take \"before\" video.     Complete \"EAT-10\" survey     Long-term goals:     -Maximize using INTENT in speech.     Maximize swallow function.     Maximize cognition.    Plan:  -Continue with current plan of care.   "

## 2024-05-07 ENCOUNTER — RA CDI HCC (OUTPATIENT)
Dept: OTHER | Facility: HOSPITAL | Age: 79
End: 2024-05-07

## 2024-05-08 ENCOUNTER — OFFICE VISIT (OUTPATIENT)
Facility: CLINIC | Age: 79
End: 2024-05-08
Payer: MEDICARE

## 2024-05-08 ENCOUNTER — OFFICE VISIT (OUTPATIENT)
Dept: SPEECH THERAPY | Facility: CLINIC | Age: 79
End: 2024-05-08
Payer: MEDICARE

## 2024-05-08 DIAGNOSIS — G20.C PRIMARY PARKINSONISM: Primary | ICD-10-CM

## 2024-05-08 DIAGNOSIS — R49.0 DYSPHONIA: Primary | ICD-10-CM

## 2024-05-08 DIAGNOSIS — G20.C PRIMARY PARKINSONISM: ICD-10-CM

## 2024-05-08 DIAGNOSIS — R13.12 DYSPHAGIA, OROPHARYNGEAL PHASE: ICD-10-CM

## 2024-05-08 DIAGNOSIS — G20.A1 PARKINSON'S DISEASE, UNSPECIFIED WHETHER DYSKINESIA PRESENT, UNSPECIFIED WHETHER MANIFESTATIONS FLUCTUATE: Primary | ICD-10-CM

## 2024-05-08 PROCEDURE — 92507 TX SP LANG VOICE COMM INDIV: CPT

## 2024-05-08 PROCEDURE — 97116 GAIT TRAINING THERAPY: CPT

## 2024-05-08 PROCEDURE — 97530 THERAPEUTIC ACTIVITIES: CPT

## 2024-05-08 PROCEDURE — 97112 NEUROMUSCULAR REEDUCATION: CPT

## 2024-05-08 NOTE — PROGRESS NOTES
Occupational Therapy Daily Note:    Today's date: 2024  Patient name: Justen Thompson  : 1945  MRN: [id number]  Referring provider: Kye Reich MD  Dx:   Encounter Diagnosis   Name Primary?    Primary parkinsonism Yes         Subjective:  pt reporting he is moving to assisted living in the beginning of .     Objective: Pt engaged in skilled OT treatment session with focus on UE NMR, visual perceptual training, visual scanning, UE strengthening, UE endurance, and FMC/GMC to increase engagement, endurance, tolerance, and independence with daily ADL and IADL tasks.     CPT Code Minutes                                           Task Details        Therapeutic Activity  Focus of session on development of handwriting skills:    Pre-handwriting drills to improve eye-hand coordination, manipulation of writing utensil and legibility of handwriting.   -Tracing straight and curving lines: F+/ G- accuracy; with G/G- visual tracking    Handwriting:   Print: initially poor legibility; thru practice and with verbal cues, improved to G legibility with G sizing and spacing              Neuro Re-Ed                       Therapeutic Exercise               Manual          Self Care           Assessment: Tolerated treatment well;  G participation; required increased time for task completion. Demo G prehension patterns when manipulating marker and pen; fair eye-hand coordination and fine motor control during tracing and writing exercises. Patient would benefit from continued skilled OT.    Plan: Continued skilled OT per POC    INTERVENTION COMMENTS:  Diagnosis: Primary parkinsonism [G20.C]  Precautions: visual hallucinations, HTN  Insurance: Payor: MEDICARE / Plan: MEDICARE A AND B / Product Type: Medicare A & B Fee for Service /   Visits: 3  PN due 24

## 2024-05-08 NOTE — PROGRESS NOTES
"Daily Note     Today's date: 2024  Patient name: Justen Thompson  : 1945  MRN: [id number]  Referring provider: Kye Reich MD  Dx:   Encounter Diagnosis     ICD-10-CM    1. Parkinson's disease, unspecified whether dyskinesia present, unspecified whether manifestations fluctuate  G20.A1           Start Time: 1241  Stop Time: 1315  Total time in clinic (min): 34 minutes    Subjective: Pt would like to continue working with his walker, \"I'm going to have it the rest of the way.\"      Objective: See treatment diary below      Assessment:  In more complex situations Justen would need verbal cues on direction of turn or RW setup prior to sitting. In more simple situations, better performance with no cueing. Needed BUE support to stand from lower support surface, cues for looking over PT head to make posture more erect. Needed multiple verbal cues for directions of obstacle course but once achieved, no verbal cues needed. Patient would benefit from continued PT      Plan: Continue per plan of care. RW management.     Precautions: h/o tinnitus, h/o PD, h/o peripheral neuropathy,h/o HTN, B/L TKR  EPOC: 2024    Date 5/1 5/3 5/6 5/8     Visit Count 42 43 44 45     FOTO See IE        Pain In See IE        Pain Out                 Testing         TUG/C 19.71 RW / 21.49 50 > 32 correctly by 3's        30s chair stand 11        5xSTS 13.92        Gait speed 0.64m/s RW        ABC 70        MiniBest 13        FGA 10        6MWT 576ft RW        Neuro Re-Ed   MiniBest, 30s chair stand, 5xSTS, FGA, 6MWT, 10mWT      Dynamic balance  RW SOLO through tight obstacle course 15 min       Static balance         Obstacle course         Cognitive dual task         Large amplitude movement STS x8 from low surfaces STS x10    Amb over colored dots 10 laps   STS x10 4' step under feet     Confined space balance    RW cone weaving + STS with RW management 6 laps              Ther Ex         SLR x 4         HR/TR         Mini Squats  "        Step ups         Unilat row         Upright Y         Leg Press         Mod quadruped KB pull through         KB deadlift         Piriformis str seated         L/s flexion seated         Hip flexor stretch         Thoracic extension         Ther Activity         ADL         Skin inspection         Gait Training         Overground RW use small areas, navigating obstacles mod verbal cues and demonstration 30 min   RW management approach to chairs and narrow spaces ortho side verbal cues for safety 20min total     Head turns         Stairs         TM         Modalities

## 2024-05-08 NOTE — PROGRESS NOTES
"Daily Speech Treatment Note    Today's date: 2024   Patient’s name: Justen hTompson  : 1945  MRN: [id number]  Safety measures:   Referring provider: Kye Reich MD    Encounter Diagnosis     ICD-10-CM    1. Dysphonia  R49.0       2. Dysphagia, oropharyngeal phase  R13.12           Visit Tracking:  POC   Expires Auth Expiration Date ST Visit Limit   24 BOMN              Visit/Unit Tracking:  Auth Status Date 24   BOMN Used 1 2 3     Remaining 9 for certification period 8 7       Subjective/Behavioral:  -Pleasant/Cooperative    Objective/Assessment:  Completed structured activities with patient to target goals below.  Results as stated below.         Short-term goals:         Patient will restablish regular, daily SPEAK OUT exercises into home practice.  : Started Session 1: workbook, reestablished home care exercise plan.  70% at 72 dB conversation, improved /a/  and vocal glides with modeling, continuous cues needed to use \"INTENT.\"  : Completed Session 2: Completed Lesson 2 in Speak OUT! Workbook, at least 72 dB, 70% of the time, fading cues with using INTENT during conversation today.  Increased time for fluency naming with ball activity.  Completed education on home work plan/exercises until next Session next week, plan to complete  Lesson # 7 next week.       Patient will elicit SPEAK OUT exercises in the proper way using his personal SPEAK OUT workbook.  : Completing with min-mod cues.     Patient will utilize intent into conversational speech 80% of trials during initial part of session.     Restart IOPI evaluation and treatment.     Restart EMST-75 and home practice.     Consider Cognitive Assessment.     Patient will utilize effortful swallow into daily practice of swallowing and during sessions.     Take \"before\" video.     Complete \"EAT-10\" survey     Long-term goals:     -Maximize using INTENT in speech.     Maximize swallow function.     Maximize " cognition.    Plan:  -Continue with current plan of care.

## 2024-05-14 ENCOUNTER — OFFICE VISIT (OUTPATIENT)
Dept: SPEECH THERAPY | Facility: CLINIC | Age: 79
End: 2024-05-14
Payer: MEDICARE

## 2024-05-14 ENCOUNTER — OFFICE VISIT (OUTPATIENT)
Facility: CLINIC | Age: 79
End: 2024-05-14
Payer: MEDICARE

## 2024-05-14 DIAGNOSIS — G20.A1 PARKINSON'S DISEASE, UNSPECIFIED WHETHER DYSKINESIA PRESENT, UNSPECIFIED WHETHER MANIFESTATIONS FLUCTUATE: Primary | ICD-10-CM

## 2024-05-14 DIAGNOSIS — R49.0 DYSPHONIA: Primary | ICD-10-CM

## 2024-05-14 DIAGNOSIS — R13.12 DYSPHAGIA, OROPHARYNGEAL PHASE: ICD-10-CM

## 2024-05-14 PROCEDURE — 92507 TX SP LANG VOICE COMM INDIV: CPT

## 2024-05-14 PROCEDURE — 97116 GAIT TRAINING THERAPY: CPT

## 2024-05-14 PROCEDURE — 97112 NEUROMUSCULAR REEDUCATION: CPT

## 2024-05-14 NOTE — PROGRESS NOTES
"Daily Speech Treatment Note    Today's date: 2024   Patient’s name: Justen Thompson  : 1945  MRN: [id number]  Safety measures:   Referring provider: Kye Reich MD    Encounter Diagnosis     ICD-10-CM    1. Dysphonia  R49.0       2. Dysphagia, oropharyngeal phase  R13.12               Visit Tracking:  POC   Expires Auth Expiration Date ST Visit Limit   24 BOMN              Visit/Unit Tracking:  Auth Status Date 24   BOMN Used 1 2 3 4     Remaining 9 for certification period 8 7 6       Subjective/Behavioral:  -Pleasant/Cooperative    Objective/Assessment:  Completed structured activities with patient to target goals below.  Results as stated below.         Short-term goals:         Patient will restablish regular, daily SPEAK OUT exercises into home practice.  : Started Session 1: workbook, reestablished home care exercise plan.  70% at 72 dB conversation, improved /a/  and vocal glides with modeling, continuous cues needed to use \"INTENT.\"  : Completed Session 2: Completed Lesson 2 in Speak OUT! Workbook, at least 72 dB, 70% of the time, fading cues with using INTENT during conversation today.  Increased time for fluency naming with ball activity.  Completed education on home work plan/exercises until next Session next week, plan to complete  Lesson # 7 next week. : Completed Lesson 7: 60% use of INTENT in conversational piece, moderate cues for /a/, min for gliding and moderate cues for numbers for connecting speech and min-moderate cues for reading sentences. Will complete Lesson 8 or 9 next session with patient.       Patient will elicit SPEAK OUT exercises in the proper way using his personal SPEAK OUT workbook.  : Completing with min-mod cues.     Patient will utilize intent into conversational speech 80% of trials during initial part of session.  : 60% currently.     Restart IOPI evaluation and treatment.     Restart EMST-75 and home " "practice.     Consider Cognitive Assessment.     Patient will utilize effortful swallow into daily practice of swallowing and during sessions.     Take \"before\" video.     Complete \"EAT-10\" survey     Long-term goals:     -Maximize using INTENT in speech.     Maximize swallow function.     Maximize cognition.    Plan:  -Continue with current plan of care.   "

## 2024-05-14 NOTE — PROGRESS NOTES
Daily Note     Today's date: 2024  Patient name: Justen Thompson  : 1945  MRN: [id number]  Referring provider: Kye Reich MD  Dx:   Encounter Diagnosis     ICD-10-CM    1. Parkinson's disease, unspecified whether dyskinesia present, unspecified whether manifestations fluctuate  G20.A1             Start Time: 828  Stop Time: 915  Total time in clinic (min): 47 minutes    Subjective: Pt and wife report a fall at 3a in the bathroom. Slipped on tile, was walking with socks. No issues, mild R shoulder pain only when putting weight through it. He was able to get up with himself and his wife.      Objective: See treatment diary below      Assessment:  More festinating evident through session with and without RW, more frequently without RW. Shows good power generation with sit to stands, less so with step ups or push ups. With external cues better response to step length and speed. Still requires verbal cues for more complex situations as he rotates long way or approaches at poor angles. Patient would benefit from continued PT      Plan: Continue per plan of care. RW management.     Precautions: h/o tinnitus, h/o PD, h/o peripheral neuropathy,h/o HTN, B/L TKR  EPOC: 2024    Date 5/1 5/3 5/6 5/8 5/14    Visit Count 42 43 44 45 46    FOTO See IE        Pain In See IE        Pain Out                 Testing         TUG/C 19.71 RW / 21.49 50 > 32 correctly by 3's        30s chair stand 11        5xSTS 13.92        Gait speed 0.64m/s RW        ABC 70        MiniBest 13        FGA 10        6MWT 576ft RW        Neuro Re-Ed   MiniBest, 30s chair stand, 5xSTS, FGA, 6MWT, 10mWT      Dynamic balance  RW SOLO through tight obstacle course 15 min       Static balance         Obstacle course         Cognitive dual task         Large amplitude movement STS x8 from low surfaces STS x10    Amb over colored dots 10 laps   STS x10 4' step under feet Explosive push up on mat 2x15 weighted vest    STS weighted vest + oh  press 8lb med ball 3x10    Resisted amb SOLO counting steps LA 17 steps 20ft x4    Confined space balance    RW cone weaving + STS with RW management 6 laps              Ther Ex         SLR x 4         HR/TR         Mini Squats         Step ups         Unilat row         Upright Y         Leg Press         Mod quadruped KB pull through         KB deadlift         Piriformis str seated         L/s flexion seated         Hip flexor stretch         Thoracic extension         Ther Activity         ADL         Skin inspection         Gait Training         Overground RW use small areas, navigating obstacles mod verbal cues and demonstration 30 min   RW management approach to chairs and narrow spaces ortho side verbal cues for safety 20min total RW managemetn approach to chairs and narrow spaces 10min ortho side min verbal cues weighted vest    Head turns         Stairs         TM         Modalities

## 2024-05-15 ENCOUNTER — OFFICE VISIT (OUTPATIENT)
Dept: FAMILY MEDICINE CLINIC | Facility: HOSPITAL | Age: 79
End: 2024-05-15
Payer: MEDICARE

## 2024-05-15 VITALS
SYSTOLIC BLOOD PRESSURE: 122 MMHG | RESPIRATION RATE: 16 BRPM | HEART RATE: 78 BPM | HEIGHT: 73 IN | TEMPERATURE: 97.4 F | DIASTOLIC BLOOD PRESSURE: 68 MMHG | WEIGHT: 219 LBS | OXYGEN SATURATION: 98 % | BODY MASS INDEX: 29.03 KG/M2

## 2024-05-15 DIAGNOSIS — I25.10 CORONARY ARTERY DISEASE INVOLVING NATIVE CORONARY ARTERY OF NATIVE HEART WITHOUT ANGINA PECTORIS: Primary | ICD-10-CM

## 2024-05-15 DIAGNOSIS — G60.9 IDIOPATHIC PERIPHERAL NEUROPATHY: ICD-10-CM

## 2024-05-15 DIAGNOSIS — K59.09 OTHER CONSTIPATION: ICD-10-CM

## 2024-05-15 DIAGNOSIS — R26.9 NEUROLOGIC GAIT DYSFUNCTION: ICD-10-CM

## 2024-05-15 DIAGNOSIS — I10 ESSENTIAL HYPERTENSION: ICD-10-CM

## 2024-05-15 DIAGNOSIS — G20.C PRIMARY PARKINSONISM: ICD-10-CM

## 2024-05-15 PROBLEM — S90.112A CONTUSION OF LEFT GREAT TOE WITHOUT DAMAGE TO NAIL: Status: RESOLVED | Noted: 2024-03-12 | Resolved: 2024-05-15

## 2024-05-15 PROCEDURE — 99214 OFFICE O/P EST MOD 30 MIN: CPT | Performed by: INTERNAL MEDICINE

## 2024-05-15 PROCEDURE — G2211 COMPLEX E/M VISIT ADD ON: HCPCS | Performed by: INTERNAL MEDICINE

## 2024-05-15 RX ORDER — SENNOSIDES A AND B 8.6 MG/1
1 TABLET, FILM COATED ORAL
Qty: 30 TABLET | Refills: 1 | Status: SHIPPED | OUTPATIENT
Start: 2024-05-15

## 2024-05-15 NOTE — ASSESSMENT & PLAN NOTE
He has neurologic gait dysfunction due to Parkinson's disease, neuropathy of the feet.  I requested physical and Occupational Therapy evaluation and treatment.  Continue using a walker

## 2024-05-15 NOTE — ASSESSMENT & PLAN NOTE
Patient presents with wife for DME examination before moving into assisted living facility in June.    He has Parkinson's disease with gait dysfunctions, falls, chronic constipation, salivation, visual hallucinations    He uses a walker to walk with.  I requested physical therapy, Occupational Therapy and speech therapy evaluations and treatments in the assisted living facility    Patient uses a walker at all times.    He will continue carbidopa levodopa    I added Senokot to Colace to help with constipation

## 2024-05-15 NOTE — PROGRESS NOTES
Assessment/Plan:    Primary Parkinsonism (HCC)  Patient presents with wife for DME examination before moving into assisted living facility in June.    He has Parkinson's disease with gait dysfunctions, falls, chronic constipation, salivation, visual hallucinations    He uses a walker to walk with.  I requested physical therapy, Occupational Therapy and speech therapy evaluations and treatments in the assisted living facility    Patient uses a walker at all times.    He will continue carbidopa levodopa    I added Senokot to Colace to help with constipation    Coronary artery disease involving native coronary artery of native heart without angina pectoris  He has coronary artery disease.  Denies chest pains or shortness of breath.  Continue aspirin and atorvastatin    Essential hypertension  He has hypertension.  Blood pressure is controlled today.   His electrolytes, thyroid function and blood counts were normal on January 10.   Continue Toprol    Idiopathic peripheral neuropathy  He has idiopathic neuropathy of the feet.  Continue gabapentin    Neurologic gait dysfunction  He has neurologic gait dysfunction due to Parkinson's disease, neuropathy of the feet.  I requested physical and Occupational Therapy evaluation and treatment.  Continue using a walker    Other constipation  He has chronic constipation which is worse since starting Zyprexa for his visual hallucinations.  Continue Colace and I added Senokot every other night as needed for constipation     Diagnoses and all orders for this visit:    Coronary artery disease involving native coronary artery of native heart without angina pectoris    Essential hypertension    Primary parkinsonism  -     senna (SENOKOT) 8.6 MG tablet; Take 1 tablet (8.6 mg total) by mouth every other day as needed for constipation  -     Ambulatory Referral to Physical Therapy; Future  -     Ambulatory Referral to Occupational Therapy; Future  -     Ambulatory Referral to Speech  "Therapy; Future    Idiopathic peripheral neuropathy    Neurologic gait dysfunction  -     Ambulatory Referral to Physical Therapy; Future  -     Ambulatory Referral to Occupational Therapy; Future    Other constipation          Subjective:      Patient ID: Justen Thompson is a 79 y.o. male.      HPI    Patient presents for follow-up of chronic conditions as detailed in the assessment and plan.      The following portions of the patient's history were reviewed and updated as appropriate: current medications, past family history, past medical history, past social history, past surgical history and problem list.    Review of Systems      Objective:    /68   Pulse 78   Temp (!) 97.4 °F (36.3 °C) (Tympanic)   Resp 16   Ht 6' 1\" (1.854 m)   Wt 99.3 kg (219 lb)   SpO2 98%   BMI 28.89 kg/m²      Physical Exam  Constitutional:       General: He is not in acute distress.     Appearance: He is not toxic-appearing.   HENT:      Head: Normocephalic.   Eyes:      Conjunctiva/sclera: Conjunctivae normal.   Cardiovascular:      Rate and Rhythm: Normal rate and regular rhythm.      Heart sounds: No murmur heard.  Pulmonary:      Effort: No respiratory distress.      Breath sounds: No wheezing or rales.   Abdominal:      General: Bowel sounds are normal. There is no distension.      Palpations: Abdomen is soft.      Tenderness: There is no abdominal tenderness.   Skin:     General: Skin is warm and dry.   Neurological:      Mental Status: He is alert.      Gait: Gait abnormal.      Comments: Patient has motionless face, stooped posture, bradykinesia, shuffling gait   Psychiatric:         Mood and Affect: Mood normal.             Kye Reich MD  "

## 2024-05-15 NOTE — ASSESSMENT & PLAN NOTE
He has chronic constipation which is worse since starting Zyprexa for his visual hallucinations.  Continue Colace and I added Senokot every other night as needed for constipation

## 2024-05-15 NOTE — ASSESSMENT & PLAN NOTE
He has coronary artery disease.  Denies chest pains or shortness of breath.  Continue aspirin and atorvastatin

## 2024-05-15 NOTE — ASSESSMENT & PLAN NOTE
He has hypertension.  Blood pressure is controlled today.   His electrolytes, thyroid function and blood counts were normal on January 10.   Continue Toprol

## 2024-05-16 ENCOUNTER — OFFICE VISIT (OUTPATIENT)
Dept: SPEECH THERAPY | Facility: CLINIC | Age: 79
End: 2024-05-16
Payer: MEDICARE

## 2024-05-16 ENCOUNTER — OFFICE VISIT (OUTPATIENT)
Facility: CLINIC | Age: 79
End: 2024-05-16
Payer: MEDICARE

## 2024-05-16 DIAGNOSIS — R13.12 DYSPHAGIA, OROPHARYNGEAL PHASE: ICD-10-CM

## 2024-05-16 DIAGNOSIS — G20.B1 PARKINSON'S DISEASE WITH DYSKINESIA, UNSPECIFIED WHETHER MANIFESTATIONS FLUCTUATE: ICD-10-CM

## 2024-05-16 DIAGNOSIS — R49.0 DYSPHONIA: Primary | ICD-10-CM

## 2024-05-16 DIAGNOSIS — G20.A1 PARKINSON'S DISEASE, UNSPECIFIED WHETHER DYSKINESIA PRESENT, UNSPECIFIED WHETHER MANIFESTATIONS FLUCTUATE: Primary | ICD-10-CM

## 2024-05-16 PROCEDURE — 97112 NEUROMUSCULAR REEDUCATION: CPT

## 2024-05-16 PROCEDURE — 97116 GAIT TRAINING THERAPY: CPT

## 2024-05-16 PROCEDURE — 92507 TX SP LANG VOICE COMM INDIV: CPT

## 2024-05-16 NOTE — PROGRESS NOTES
Daily Note     Today's date: 2024  Patient name: Justen Thompson  : 1945  MRN: [id number]  Referring provider: Kye Reich MD  Dx:   Encounter Diagnosis     ICD-10-CM    1. Parkinson's disease, unspecified whether dyskinesia present, unspecified whether manifestations fluctuate  G20.A1             Start Time: 1030  Stop Time: 1115  Total time in clinic (min): 45 minutes    Subjective: Pt and wife report no falls since last seen in PT. Wearing grippy socks to bathroom now.      Objective: See treatment diary below      Assessment:  Able to reduce festination through doorways and rehab gym with counting steps. Shows good power generation with foam added to chair. Progressed step up to higher height and slowly reducing UE support with good tolerance. Still requires some cues for RW management when approaching chairs or around obstacles, which creates more festination. Patient would benefit from continued PT      Plan: Continue per plan of care. RW management.     Precautions: h/o tinnitus, h/o PD, h/o peripheral neuropathy,h/o HTN, B/L TKR  EPOC: 2024    Date 5/1 5/3 5/6 5/8 5/14 5/16   Visit Count 42 43 44 45 46 47   FOTO See IE        Pain In See IE        Pain Out                 Testing         TUG/C 19.71 RW / 21.49 50 > 32 correctly by 3's        30s chair stand 11        5xSTS 13.92        Gait speed 0.64m/s RW        ABC 70        MiniBest 13        FGA 10        6MWT 576ft RW        Neuro Re-Ed   MiniBest, 30s chair stand, 5xSTS, FGA, 6MWT, 10mWT      Dynamic balance  RW SOLO through tight obstacle course 15 min    X20 8' step on folded mat CGA   Static balance         Obstacle course         Cognitive dual task         Large amplitude movement STS x8 from low surfaces STS x10    Amb over colored dots 10 laps   STS x10 4' step under feet Explosive push up on mat 2x15 weighted vest    STS weighted vest + oh press 8lb med ball 3x10    Resisted amb SOLO counting steps MT 17 steps 20ft x4 8lb  med ball STS + OH ball slam 3x10    Explosive push up on mat x20 cues for depth   Confined space balance    RW cone weaving + STS with RW management 6 laps              Ther Ex         SLR x 4         HR/TR         Mini Squats         Step ups         Unilat row         Upright Y         Leg Press         Mod quadruped KB pull through         KB deadlift         Piriformis str seated         L/s flexion seated         Hip flexor stretch         Thoracic extension         Ther Activity         ADL         Skin inspection         Gait Training         Overground RW use small areas, navigating obstacles mod verbal cues and demonstration 30 min   RW management approach to chairs and narrow spaces ortho side verbal cues for safety 20min total RW managemetn approach to chairs and narrow spaces 10min ortho side min verbal cues weighted vest RW management approach to chairs, narrow spaces counting steps through doorways min verbal cues    Head turns         Stairs         TM         Modalities

## 2024-05-16 NOTE — PROGRESS NOTES
"Daily Speech Treatment Note    Today's date: 2024   Patient’s name: Justen Thompson  : 1945  MRN: [id number]  Safety measures:   Referring provider: Kye Reich MD    Encounter Diagnosis     ICD-10-CM    1. Dysphonia  R49.0       2. Dysphagia, oropharyngeal phase  R13.12       3. Parkinson's disease with dyskinesia, unspecified whether manifestations fluctuate  G20.B1               Visit Tracking:  POC   Expires Auth Expiration Date ST Visit Limit   24 BOMN              Visit/Unit Tracking:  Auth Status Date 24   BOMN Used 1 2 3 4 5     Remaining 9 for certification period 8 7 6 4       Subjective/Behavioral:  -Pleasant/Cooperative    Objective/Assessment:  Completed structured activities with patient to target goals below.  Results as stated below.         Short-term goals:         Patient will restablish regular, daily SPEAK OUT exercises into home practice.  : Started Session 1: workbook, reestablished home care exercise plan.  70% at 72 dB conversation, improved /a/  and vocal glides with modeling, continuous cues needed to use \"INTENT.\"  : Completed Session 2: Completed Lesson 2 in Speak OUT! Workbook, at least 72 dB, 70% of the time, fading cues with using INTENT during conversation today.  Increased time for fluency naming with ball activity.  Completed education on home work plan/exercises until next Session next week, plan to complete  Lesson # 7 next week. : Completed Lesson 7: 60% use of INTENT in conversational piece, moderate cues for /a/, min for gliding and moderate cues for numbers for connecting speech and min-moderate cues for reading sentences. Will complete Lesson 8 or 9 next session with patient. : Completing Lesson 10- \"SPEAK OUT/ INTENT:\" Conversation: 78 dB, max cues for modeling /a/ and with duration with good quality, reading at 77 dB, reading certain sentences with intonation:       Patient will elicit SPEAK OUT " "exercises in the proper way using his personal SPEAK OUT workbook.  5/8: Completing with min-mod cues.     Patient will utilize intent into conversational speech 80% of trials during initial part of session.  5/14: 60% currently.     Restart IOPI evaluation and treatment.     Restart EMST-75 and home practice.     Consider Cognitive Assessment.     Patient will utilize effortful swallow into daily practice of swallowing and during sessions.     Take \"before\" video.     Complete \"EAT-10\" survey     Long-term goals:     -Maximize using INTENT in speech.     Maximize swallow function.     Maximize cognition.    Plan:  -Continue with current plan of care.   Order new workbook  "

## 2024-05-21 ENCOUNTER — OFFICE VISIT (OUTPATIENT)
Facility: CLINIC | Age: 79
End: 2024-05-21
Payer: MEDICARE

## 2024-05-21 ENCOUNTER — OFFICE VISIT (OUTPATIENT)
Dept: SPEECH THERAPY | Facility: CLINIC | Age: 79
End: 2024-05-21
Payer: MEDICARE

## 2024-05-21 DIAGNOSIS — G20.B1 PARKINSON'S DISEASE WITH DYSKINESIA, UNSPECIFIED WHETHER MANIFESTATIONS FLUCTUATE: ICD-10-CM

## 2024-05-21 DIAGNOSIS — G20.C PRIMARY PARKINSONISM: ICD-10-CM

## 2024-05-21 DIAGNOSIS — R49.0 DYSPHONIA: Primary | ICD-10-CM

## 2024-05-21 DIAGNOSIS — R13.12 DYSPHAGIA, OROPHARYNGEAL PHASE: ICD-10-CM

## 2024-05-21 DIAGNOSIS — G20.A1 PARKINSON'S DISEASE, UNSPECIFIED WHETHER DYSKINESIA PRESENT, UNSPECIFIED WHETHER MANIFESTATIONS FLUCTUATE: Primary | ICD-10-CM

## 2024-05-21 PROCEDURE — 97112 NEUROMUSCULAR REEDUCATION: CPT

## 2024-05-21 PROCEDURE — 97116 GAIT TRAINING THERAPY: CPT

## 2024-05-21 PROCEDURE — 92507 TX SP LANG VOICE COMM INDIV: CPT

## 2024-05-21 NOTE — PROGRESS NOTES
"Daily Speech Treatment Note    Today's date: 2024   Patient’s name: Justen Thompson  : 1945  MRN: [id number]  Safety measures:   Referring provider: Kye Reich MD    Encounter Diagnosis     ICD-10-CM    1. Dysphonia  R49.0       2. Dysphagia, oropharyngeal phase  R13.12       3. Parkinson's disease with dyskinesia, unspecified whether manifestations fluctuate  G20.B1               Visit Tracking:  POC   Expires Auth Expiration Date ST Visit Limit   24 BOMN              Visit/Unit Tracking:  Auth Status Date 24   BOMN Used 1 2 3 4 5     Remaining 9 for certification period 8 7 6 4       Subjective/Behavioral:  -Pleasant/Cooperative    Objective/Assessment:  Completed structured activities with patient to target goals below.  Results as stated below.         Short-term goals:         Patient will restablish regular, daily SPEAK OUT exercises into home practice.  : Started Session 1: workbook, reestablished home care exercise plan.  70% at 72 dB conversation, improved /a/  and vocal glides with modeling, continuous cues needed to use \"INTENT.\"  : Completed Session 2: Completed Lesson 2 in Speak OUT! Workbook, at least 72 dB, 70% of the time, fading cues with using INTENT during conversation today.  Increased time for fluency naming with ball activity.  Completed education on home work plan/exercises until next Session next week, plan to complete  Lesson # 7 next week. : Completed Lesson 7: 60% use of INTENT in conversational piece, moderate cues for /a/, min for gliding and moderate cues for numbers for connecting speech and min-moderate cues for reading sentences. Will complete Lesson 8 or 9 next session with patient. : Completing Lesson 10- \"SPEAK OUT/ INTENT:\" Conversation: 78 dB, max cues for modeling /a/ and with duration with good quality, reading at 77 dB, reading certain sentences with intonation: : Completed Lesson 10 in SPEAK " "OUT Book.  Completed 70% use of conversation at least 72 dB, improved use of better quality noted in all tasks, encouraged regular home practice.       Patient will elicit SPEAK OUT exercises in the proper way using his personal SPEAK OUT workbook.  5/8: Completing with min-mod cues.     Patient will utilize intent into conversational speech 80% of trials during initial part of session.  5/14: 60% currently.     Restart IOPI evaluation and treatment.     Restart EMST-75 and home practice.     Consider Cognitive Assessment.     Patient will utilize effortful swallow into daily practice of swallowing and during sessions.     Take \"before\" video.     Complete \"EAT-10\" survey     Long-term goals:     -Maximize using INTENT in speech.     Maximize swallow function.     Maximize cognition.    Plan:  -Continue with current plan of care.   Order new workbook  "

## 2024-05-21 NOTE — PROGRESS NOTES
Daily Note     Today's date: 2024  Patient name: Justen Thompson  : 1945  MRN: [id number]  Referring provider: Kye Reich MD  Dx:   Encounter Diagnosis     ICD-10-CM    1. Parkinson's disease, unspecified whether dyskinesia present, unspecified whether manifestations fluctuate  G20.A1       2. Primary parkinsonism  G20.C               Start Time: 845  Stop Time: 930  Total time in clinic (min): 45 minutes    Subjective: Pt and wife report a fall when standing up from recliner, did not have walker right in front of him and lost his stability. Got up by kneeling and half kneeling and pushing up onto his couch. Uninjured.      Objective: See treatment diary below  Direct 1: 0885 - 5503     Assessment:  Justen continues to show deficits at times with proper RW management, with min-mod verbal cues he is able to correct. Overall, shows less power generation today but with external cues he is able to combat some bradykinesia and hypokinesia. Counting steps through doorways or when approaching chairs helps with festination. Patient would benefit from continued PT      Plan: Continue per plan of care. RW management.     Precautions: h/o tinnitus, h/o PD, h/o peripheral neuropathy,h/o HTN, B/L TKR  EPOC: 2024    Date         Visit Count 48        FOTO See IE        Pain In See IE        Pain Out                 Testing         TUG/C 19.71 RW / 21.49 50 > 32 correctly by 3's        30s chair stand 11        5xSTS 13.92        Gait speed 0.64m/s RW        ABC 70        MiniBest 13        FGA 10        6MWT 576ft RW        Neuro Re-Ed         Dynamic balance         Static balance         Obstacle course         Cognitive dual task         Large amplitude movement STS 8lb med ball + OH ball slam 3x10 foam on chair    Fwd folded mat + 8' step 2x10 BUE support // bar        Confined space balance                  Ther Ex         SLR x 4         HR/TR         Mini Squats         Step ups         Unilat  row         Upright Y         Leg Press         Mod quadruped KB pull through         KB deadlift         Piriformis str seated         L/s flexion seated         Hip flexor stretch         Thoracic extension         Ther Activity         ADL         Skin inspection         Gait Training         Overground RW management approach to chairs, narrow spaces counting steps through doorways min verbal cues 23min        Head turns         Stairs         TM         Modalities

## 2024-05-23 ENCOUNTER — TELEPHONE (OUTPATIENT)
Age: 79
End: 2024-05-23

## 2024-05-23 ENCOUNTER — OFFICE VISIT (OUTPATIENT)
Dept: SPEECH THERAPY | Facility: CLINIC | Age: 79
End: 2024-05-23
Payer: MEDICARE

## 2024-05-23 ENCOUNTER — OFFICE VISIT (OUTPATIENT)
Facility: CLINIC | Age: 79
End: 2024-05-23
Payer: MEDICARE

## 2024-05-23 DIAGNOSIS — G20.A1 PARKINSON'S DISEASE, UNSPECIFIED WHETHER DYSKINESIA PRESENT, UNSPECIFIED WHETHER MANIFESTATIONS FLUCTUATE: Primary | ICD-10-CM

## 2024-05-23 DIAGNOSIS — R13.12 DYSPHAGIA, OROPHARYNGEAL PHASE: ICD-10-CM

## 2024-05-23 DIAGNOSIS — G20.C PRIMARY PARKINSONISM: ICD-10-CM

## 2024-05-23 DIAGNOSIS — R13.11 DYSPHAGIA, ORAL PHASE: ICD-10-CM

## 2024-05-23 DIAGNOSIS — I25.10 CORONARY ARTERY DISEASE INVOLVING NATIVE CORONARY ARTERY OF NATIVE HEART WITHOUT ANGINA PECTORIS: Primary | ICD-10-CM

## 2024-05-23 DIAGNOSIS — G20.B1 PARKINSON'S DISEASE WITH DYSKINESIA, UNSPECIFIED WHETHER MANIFESTATIONS FLUCTUATE: ICD-10-CM

## 2024-05-23 DIAGNOSIS — R49.0 DYSPHONIA: Primary | ICD-10-CM

## 2024-05-23 PROCEDURE — 92507 TX SP LANG VOICE COMM INDIV: CPT

## 2024-05-23 PROCEDURE — 97112 NEUROMUSCULAR REEDUCATION: CPT

## 2024-05-23 PROCEDURE — 97116 GAIT TRAINING THERAPY: CPT

## 2024-05-23 RX ORDER — ASPIRIN 81 MG/1
81 TABLET ORAL DAILY
Qty: 30 TABLET | Refills: 5 | Status: SHIPPED | OUTPATIENT
Start: 2024-05-23

## 2024-05-23 RX ORDER — ACETAMINOPHEN 500 MG
500 TABLET ORAL EVERY 6 HOURS PRN
Qty: 120 TABLET | Refills: 5 | Status: SHIPPED | OUTPATIENT
Start: 2024-05-23

## 2024-05-23 RX ORDER — CINCHONA OFFICINALIS BARK, ACONITUM NAPELLUS, PSEUDOGNAPHALIUM OBTUSIFOLIUM, LEDUM PALUSTRE TWIG, MAGNESIUM PHOSPHATE, DIBASIC TRIHYDRATE, TOXICODENDRON PUBESCENS LEAF, AND VISCUM ALBUM FRUITING TOP 3; 6; 3; 6; 6; 6; 3 [HP_X]/1; [HP_X]/1; [HP_X]/1; [HP_X]/1; [HP_X]/1; [HP_X]/1; [HP_X]/1
1 TABLET, SOLUBLE ORAL DAILY
Qty: 30 TABLET | Refills: 5 | Status: SHIPPED | OUTPATIENT
Start: 2024-05-23

## 2024-05-23 NOTE — TELEPHONE ENCOUNTER
Patient's wife stopped in to the office. They just need prescriptions sent to the pharmacy for his OTC medications. This is being handled in a separate encounter.

## 2024-05-23 NOTE — TELEPHONE ENCOUNTER
Patient needs written scripts for his OTC meds faxed to [facility].     acetaminophen (TYLENOL) 500 mg tablet     aspirin 81 MG tablet     Homeopathic Products (LEG CRAMPS PO)

## 2024-05-23 NOTE — PROGRESS NOTES
"Daily Speech Treatment Note    Today's date: 2024   Patient’s name: Justen Thompson  : 1945  MRN: [id number]  Safety measures:   Referring provider: Kye Reich MD    Encounter Diagnosis     ICD-10-CM    1. Dysphonia  R49.0       2. Dysphagia, oropharyngeal phase  R13.12       3. Parkinson's disease with dyskinesia, unspecified whether manifestations fluctuate  G20.B1               Visit Tracking:  POC   Expires Auth Expiration Date ST Visit Limit   24 BOMN              Visit/Unit Tracking:  Auth Status Date 24   BOMN Used 1 2 3 4 5 6     Remaining 9 for certification period 8 7 6 4 3       Subjective/Behavioral:  -Patient with decreased focus/attention, required extra assistance with completing SPEAK OUT Tasks and transferring and walking. Patient at increased fall risk. Patient for move to Assisted living / SNF facility soon. ? If patient will continue OP therapy or receive therapy at [facility].    Objective/Assessment:  Completed structured activities with patient to target goals below.  Results as stated below.         Short-term goals:         Patient will restablish regular, daily SPEAK OUT exercises into home practice.  : Started Session 1: workbook, reestablished home care exercise plan.  70% at 72 dB conversation, improved /a/  and vocal glides with modeling, continuous cues needed to use \"INTENT.\"  : Completed Session 2: Completed Lesson 2 in Speak OUT! Workbook, at least 72 dB, 70% of the time, fading cues with using INTENT during conversation today.  Increased time for fluency naming with ball activity.  Completed education on home work plan/exercises until next Session next week, plan to complete  Lesson # 7 next week. : Completed Lesson 7: 60% use of INTENT in conversational piece, moderate cues for /a/, min for gliding and moderate cues for numbers for connecting speech and min-moderate cues for reading sentences. Will " "complete Lesson 8 or 9 next session with patient. 5/16: Completing Lesson 10- \"SPEAK OUT/ INTENT:\" Conversation: 78 dB, max cues for modeling /a/ and with duration with good quality, reading at 77 dB, reading certain sentences with intonation: 5/21: Completed Lesson 10 in SPEAK OUT Book.  Completed 70% use of conversation at least 72 dB, improved use of better quality noted in all tasks, encouraged regular home practice. 5/23: SPEAK OUT/INTENT: Completed Lesson 11.  80% using SPEAK OUT with at least 72 dB.  Moderate - max cues - max cues to elicit INTENT with tasks today and frequently cued patient to use INTENT, appears cognition negatively impacting performance.       Patient will elicit SPEAK OUT exercises in the proper way using his personal SPEAK OUT workbook.  5/8: Completing with min-mod cues.     Patient will utilize intent into conversational speech 80% of trials during initial part of session.  5/14: 60% currently.     Restart IOPI evaluation and treatment.     Restart EMST-75 and home practice.     Consider Cognitive Assessment.     Patient will utilize effortful swallow into daily practice of swallowing and during sessions.     Take \"before\" video.     Complete \"EAT-10\" survey     Long-term goals:     -Maximize using INTENT in speech.     Maximize swallow function.     Maximize cognition.    Plan:  -Continue with current plan of care.   Order new workbook  "

## 2024-05-23 NOTE — TELEPHONE ENCOUNTER
Patient needs prescriptions for his OTC medicine, patient cannot have these at the facility that he is moving into without a prescription.

## 2024-05-23 NOTE — PROGRESS NOTES
"Daily Note     Today's date: 2024  Patient name: Justen Thompson  : 1945  MRN: [id number]  Referring provider: Kye Reich MD  Dx:   Encounter Diagnosis     ICD-10-CM    1. Parkinson's disease, unspecified whether dyskinesia present, unspecified whether manifestations fluctuate  G20.A1       2. Primary parkinsonism  G20.C                 Start Time: 1500  Stop Time: 1545  Total time in clinic (min): 45 minutes    Subjective: Justen reports having more difficulty moving today and that he is tired. \"I'm having a bad day\"      Objective: See treatment diary below      Assessment:  Significant hypokinesia and bradykinesia. Festinating frequently upon approaching chair or thresholds/doorways. External cues not as successful as usual. Shows fair balance without UE support, but he is able to achieve taller posture. Less carryover with RW management throughout session. Justen reports feeling more fatigued from the session but not overly fatigued. Patient would benefit from continued PT to improve RW management.      Plan: Continue per plan of care. RW management.     Precautions: h/o tinnitus, h/o PD, h/o peripheral neuropathy,h/o HTN, B/L TKR  EPOC: 2024    Date        Visit Count 48 49       FOTO See IE        Pain In See IE        Pain Out                 Testing         TUG/C 19.71 RW / 21.49 50 > 32 correctly by 3's        30s chair stand 11        5xSTS 13.92        Gait speed 0.64m/s RW        ABC 70        MiniBest 13        FGA 10        6MWT 576ft RW        Neuro Re-Ed         Dynamic balance         Static balance         Obstacle course         Cognitive dual task         Large amplitude movement STS 8lb med ball + OH ball slam 3x10 foam on chair    Fwd folded mat + 8' step 2x10 BUE support // bar Fwd folded mat + 8' step x10, folded mat + 6' step x10, 8' step 2x10 all with OH lift    STS + OH vc for looking up 3x10       Confined space balance                  Ther Ex         SLR x 4     "     HR/TR         Mini Squats         Step ups         Unilat row         Upright Y         Leg Press         Mod quadruped KB pull through         KB deadlift         Piriformis str seated         L/s flexion seated         Hip flexor stretch         Thoracic extension         Ther Activity         ADL         Skin inspection         Gait Training         Overground RW management approach to chairs, narrow spaces counting steps through doorways min verbal cues 23min RW management thresholds, changing surfaces, approaching chairs 10min       Head turns         Stairs         TM         Modalities

## 2024-05-23 NOTE — TELEPHONE ENCOUNTER
Pt's wife will be coming in to have a form that Dr Reich filled out to be corrected. She also has some questions about pt's baby aspirine and leg cramp pills.

## 2024-05-24 ENCOUNTER — OFFICE VISIT (OUTPATIENT)
Facility: CLINIC | Age: 79
End: 2024-05-24
Payer: MEDICARE

## 2024-05-24 DIAGNOSIS — G20.C PRIMARY PARKINSONISM: Primary | ICD-10-CM

## 2024-05-24 PROCEDURE — 97112 NEUROMUSCULAR REEDUCATION: CPT

## 2024-05-24 PROCEDURE — 97110 THERAPEUTIC EXERCISES: CPT

## 2024-05-24 NOTE — PROGRESS NOTES
Occupational Therapy Daily Note:    Today's date: 2024  Patient name: Justen Thompson  : 1945  MRN: [id number]  Referring provider: Kye Reich MD  Dx:   Encounter Diagnosis   Name Primary?    Primary parkinsonism Yes         Subjective:  no complaints   Pt moving to [facility] on [address]    Objective: Pt engaged in skilled OT treatment session with focus on UE NMR, visual perceptual training, visual scanning, UE strengthening, UE endurance, and FMC/GMC to increase engagement, endurance, tolerance, and independence with daily ADL and IADL tasks.     CPT Code Minutes                                           Task Details        Therapeutic Activity  Pre-handwriting drills to improve eye-hand coordination, manipulation of writing utensil and legibility of handwriting.   -Tracing straight and curving lines: G+ accuracy with straight lines; F/F+ with curved lines; G visual tracking      Visual re-training and handwriting:   -Drawing a connecting line between matching letters of the alphabet; min difficulty with manipulating pen to write on paper; able to use pencil without difficulty; min/mod difficulty with visual scanning to locate letters on L and R side of paper; min difficulty with visual tracking while drawing a straight connecting line between letters                    Neuro Re-Ed  Visual re-training/ FMC:   -locating letters of the alphabet amid a gridded background; use of pincer to retrieve small pom poms and place on specified letter; min cues for visual scanning; G functional reach and use of pincer; G- target accuracy                      Therapeutic Exercise  Yellow powerweb R hand: 10 reps each  Finger flex  Finger Ext  Finger Abd  FingerAdd  Thumb flex/adduction                Manual          Self Care           Assessment: Tolerated treatment well; G engagement in tasks; G prehension patterns to manipulate small items; min difficulty with manipulation of writing utensils and visual tracking when  writing.  Patient would benefit from continued skilled OT.    Plan: Continued skilled OT per POC    INTERVENTION COMMENTS:  Diagnosis: Primary parkinsonism [G20.C]  Precautions: visual hallucinations, HTN  Insurance: Payor: MEDICARE / Plan: MEDICARE A AND B / Product Type: Medicare A & B Fee for Service /   Visits: 4  PN due 6/13/24

## 2024-05-29 ENCOUNTER — OFFICE VISIT (OUTPATIENT)
Facility: CLINIC | Age: 79
End: 2024-05-29
Payer: MEDICARE

## 2024-05-29 ENCOUNTER — OFFICE VISIT (OUTPATIENT)
Dept: SPEECH THERAPY | Facility: CLINIC | Age: 79
End: 2024-05-29
Payer: MEDICARE

## 2024-05-29 DIAGNOSIS — G20.A1 PARKINSON'S DISEASE, UNSPECIFIED WHETHER DYSKINESIA PRESENT, UNSPECIFIED WHETHER MANIFESTATIONS FLUCTUATE: Primary | ICD-10-CM

## 2024-05-29 DIAGNOSIS — R49.0 DYSPHONIA: Primary | ICD-10-CM

## 2024-05-29 DIAGNOSIS — R13.12 DYSPHAGIA, OROPHARYNGEAL PHASE: ICD-10-CM

## 2024-05-29 DIAGNOSIS — G20.B1 PARKINSON'S DISEASE WITH DYSKINESIA, UNSPECIFIED WHETHER MANIFESTATIONS FLUCTUATE: ICD-10-CM

## 2024-05-29 PROCEDURE — 92507 TX SP LANG VOICE COMM INDIV: CPT

## 2024-05-29 PROCEDURE — 97112 NEUROMUSCULAR REEDUCATION: CPT

## 2024-05-29 PROCEDURE — 97116 GAIT TRAINING THERAPY: CPT

## 2024-05-29 NOTE — PROGRESS NOTES
Daily Note/ Unplanned discharge     Today's date: 2024  Patient name: Justen Thompson  : 1945  MRN: [id number]  Referring provider: Kye Reich MD  Dx:   Encounter Diagnosis     ICD-10-CM    1. Parkinson's disease, unspecified whether dyskinesia present, unspecified whether manifestations fluctuate  G20.A1                   Start Time: 1413  Stop Time: 1459  Total time in clinic (min): 46 minutes    Subjective: Justen reports he caught something, feels like he has a bit of a cold. Would like to take today easier. Moving to assisted living this weekend, will likely be getting PT there.      Objective: See treatment diary below      Assessment:  Pt requires more verbal cues for activity of exercise vs RW management. However, does show over rotation and occasional under rotation when going to safely sit. Shows fair power generation at higher step with BUE support. Needs BUE support to perform sit<>stands today. With cues to stand tall shows better posture when static, degrades when ambulating. Justen is relocating to assisted living and may begin PT at this location.       Plan: Pt to be discharged from skilled PT at this time due to moving to assisted living.     Precautions: h/o tinnitus, h/o PD, h/o peripheral neuropathy,h/o HTN, B/L TKR  EPOC: 2024    Date       Visit Count 48 49 50      FOTO See IE        Pain In See IE        Pain Out                 Testing         TUG/C 19.71 RW / 21.49 50 > 32 correctly by 3's        30s chair stand 11        5xSTS 13.92        Gait speed 0.64m/s RW        ABC 70        MiniBest 13        FGA 10        6MWT 576ft RW        Neuro Re-Ed         Dynamic balance         Static balance         Obstacle course         Cognitive dual task         Large amplitude movement STS 8lb med ball + OH ball slam 3x10 foam on chair    Fwd folded mat + 8' step 2x10 BUE support // bar Fwd folded mat + 8' step x10, folded mat + 6' step x10, 8' step 2x10 all with OH  lift    STS + OH vc for looking up 3x10 STS + OH vc for looking up BUE support 2x15, 1x10    Fwd folded mat + 8' step 2x15 ea LE      Confined space balance                  Ther Ex         SLR x 4         HR/TR         Mini Squats         Step ups         Unilat row         Upright Y         Leg Press         Mod quadruped KB pull through         KB deadlift         Piriformis str seated         L/s flexion seated         Hip flexor stretch         Thoracic extension         Ther Activity         ADL         Skin inspection         Gait Training         Overground RW management approach to chairs, narrow spaces counting steps through doorways min verbal cues 23min RW management thresholds, changing surfaces, approaching chairs 10min RW management musical chairs cog task for order of chairs 15min total min-mod vc for form      Head turns         Stairs         TM         Modalities

## 2024-05-29 NOTE — PROGRESS NOTES
"Discharge Report    Today's date: 2024   Patient’s name: Justen Thompson  : 1945  MRN: [id number]  Safety measures:   Referring provider: Kye Reich MD    Encounter Diagnosis     ICD-10-CM    1. Dysphonia  R49.0       2. Dysphagia, oropharyngeal phase  R13.12       3. Parkinson's disease with dyskinesia, unspecified whether manifestations fluctuate  G20.B1               Visit Tracking:  POC   Expires Auth Expiration Date ST Visit Limit   24 BOMN              Visit/Unit Tracking:  Auth Status Date 24   BOMN Used 1 2 3 4 5 6 7     Remaining 9 for certification period 8 7 6 4 3 2       Subjective/Behavioral:  -Patient with \"cold\" seems more than Parkinson's hoarseness.      Objective/Assessment:  Completed structured activities with patient to target goals below.  Results as stated below.         Short-term goals:         Patient will restablish regular, daily SPEAK OUT exercises into home practice.  : Started Session 1: workbook, reestablished home care exercise plan.  70% at 72 dB conversation, improved /a/  and vocal glides with modeling, continuous cues needed to use \"INTENT.\"  : Completed Session 2: Completed Lesson 2 in Speak OUT! Workbook, at least 72 dB, 70% of the time, fading cues with using INTENT during conversation today.  Increased time for fluency naming with ball activity.  Completed education on home work plan/exercises until next Session next week, plan to complete  Lesson # 7 next week. : Completed Lesson 7: 60% use of INTENT in conversational piece, moderate cues for /a/, min for gliding and moderate cues for numbers for connecting speech and min-moderate cues for reading sentences. Will complete Lesson 8 or 9 next session with patient. : Completing Lesson 10- \"SPEAK OUT/ INTENT:\" Conversation: 78 dB, max cues for modeling /a/ and with duration with good quality, reading at 77 dB, reading certain sentences " "with intonation: 5/21: Completed Lesson 10 in SPEAK OUT Book.  Completed 70% use of conversation at least 72 dB, improved use of better quality noted in all tasks, encouraged regular home practice. 5/23: SPEAK OUT/INTENT: Completed Lesson 11.  80% using SPEAK OUT with at least 72 dB.  Moderate - max cues - max cues to elicit INTENT with tasks today and frequently cued patient to use INTENT, appears cognition negatively impacting performance.   5/29: Completed Session 12 in session, but patient does not seem to be doing exercises at home, encouraged to complete at new place of residence, [facility]. CONTINUE AT [facility]      Patient will elicit SPEAK OUT exercises in the proper way using his personal SPEAK OUT workbook.  5/8: Completing with min-mod cues. CONTINUE AT SNF     Patient will utilize intent into conversational speech 80% of trials during initial part of session.  5/14: 60% currently.  5/29: 75 dB today in conversation. CONTINUE     Restart IOPI evaluation and treatment. DID NOT ADDRESS, LACK OF TIME     Restart EMST-75 and home practice. N/A     Consider Cognitive Assessment. N/A     Patient will utilize effortful swallow into daily practice of swallowing and during sessions. N/A     Take \"before\" video. N/A     Complete \"EAT-10\" survey N/A     Long-term goals:     -Maximize using INTENT in speech. CONTINUE IN SNF     Maximize swallow function. CONTINUE IN SNF     Maximize cognition. CONTINUE IN SNF    Plan:  Continue therapy services at SNF, Discharge OP at this time.  "

## 2024-05-31 ENCOUNTER — OFFICE VISIT (OUTPATIENT)
Facility: CLINIC | Age: 79
End: 2024-05-31
Payer: MEDICARE

## 2024-05-31 DIAGNOSIS — G20.C PRIMARY PARKINSONISM: Primary | ICD-10-CM

## 2024-05-31 DIAGNOSIS — R41.841 COGNITIVE COMMUNICATION DEFICIT: ICD-10-CM

## 2024-05-31 PROCEDURE — 97530 THERAPEUTIC ACTIVITIES: CPT

## 2024-05-31 NOTE — PROGRESS NOTES
Occupational Therapy Daily Note and Discharge:    Today's date: 2024  Patient name: Justen Thompson  : 1945  MRN: [id number]  Referring provider: Kye Reich MD  Dx:   Encounter Diagnoses   Name Primary?    Primary parkinsonism Yes    Cognitive communication deficit          Subjective:  no complaints   Pt moving to [facility] on [address]    Objective: Pt engaged in skilled OT treatment session with focus on UE NMR, visual perceptual training, visual scanning, UE strengthening, UE endurance, and FMC/GMC to increase engagement, endurance, tolerance, and independence with daily ADL and IADL tasks.     CPT Code Minutes                                           Task Details        Therapeutic Activity  Handwriting: began printing alphabet on extra wide lined paper.   -Able to write letters A-G with G legibility, sizing and spacing. After letter G, pt with difficulty following sequence of alphabet and difficulty with processing directions of task.   -letters H-M: F+/G- legibility, G- sizing, P spatial awareness while printing capital letters, difficulty staying on lines of paper      Tracing capital letters of the alphabet: completed with max difficulty; difficulty following task directions, even with visual and verbal cues; increased time to complete      Writing words:   Script: G legibility, poor sizing, micrographia    Print: G legibility G sizing and fair spacing                Neuro Re-Ed                   Therapeutic Exercise                 Manual          Self Care           Assessment: Tolerated treatment well; pt with difficulty with eye-hand coordination and visual tracking when writing. (+) micrographia; with practice, able to improve with verbal and visual cues. Pt only seen for 5 visits of OT, however being dc at this time, as pt moving to [facility] and will have follow-up therapy at the facility. Recommend pt does cont OT at [facility] to cont to address goals. DC outpatient OT.        Plan: Discharge OT  services    INTERVENTION COMMENTS:  Diagnosis: Primary parkinsonism [G20.C]  Precautions: visual hallucinations, HTN  Insurance: Payor: MEDICARE / Plan: MEDICARE A AND B / Product Type: Medicare A & B Fee for Service /   Visits: 5

## 2024-06-04 ENCOUNTER — TELEPHONE (OUTPATIENT)
Age: 79
End: 2024-06-04

## 2024-06-04 NOTE — TELEPHONE ENCOUNTER
Valerie from [facility] in [place] called to follow up on Pt's medication list that she faxed to the office. Valerie states that she needs the medication list signed by the PCP today and sent back.     She is also requesting that the following medications be reviewed and revised if needed:    Gummy Vitamins (brought by Wife)  Med list says Tablets, please change to gummy.  Calcium with Vitamin D AND Vitamin D3   Both listed on Med list. Should the Pt continue taking both?    Please advise. Thank you    Valerie tel # [phone]

## 2024-06-04 NOTE — TELEPHONE ENCOUNTER
Patient's wife Lachelle called back, stated that [facility] in [place] (where patient is staying) was able to administer his prescribed OLANZapine (ZyPREXA) this morning. They will monitor his response today and symptoms tonight. No change in prescription needed at this time. Lachelle states they have discussed potentially adding a medication to help the patient fall asleep, but want to see how this change does first.     Contact information for nurses at facility:  Phone: [phone]  Fax: [phone]    Advised Lachelle to keep us updated on how the patient is doing and not to hesitate to reach back out to the office if we can do anything to help. Nothing further needed at this time.

## 2024-06-04 NOTE — TELEPHONE ENCOUNTER
Patient Wife calling requesting Rx: Olanzapine script for patient to be changed to be taken in the morning instead of at night. Requesting script to faxed to [facility] . Patient was not able to sleep last night.    Please review and advise.  Thank You.

## 2024-06-05 NOTE — TELEPHONE ENCOUNTER
Valerie from [facility] in [place] called the office back to follow-up on forms that were faxed for provider review/signature. Valerie states provider will need to sign on bottom of page and where requested change to vitamin gummy (provided by wife) is listed. Valerie requesting these forms be returned today via fax to [phone].     Valerie also wanted to inform provider that patient had a fall this morning. No injuries or pain reported.     Please review and call Valerie at [phone] when form is sent.

## 2024-06-06 NOTE — TELEPHONE ENCOUNTER
Patient's wife called (Lachelle) she said patient fell Tuesday around 3 AM and Wednesday around 3 AM again.  Patient did not injure himself. Lachelle asked if Ambien can be prescribed to help patient sleep.    If medication if prescribed patient uses [facility].    Thank you

## 2024-06-10 ENCOUNTER — TELEPHONE (OUTPATIENT)
Age: 79
End: 2024-06-10

## 2024-06-10 NOTE — TELEPHONE ENCOUNTER
Luzmaria with [facility] called. Pt is new admit there. Requesting order for Psych today if possible. Faxed request on Friday to office. Please advise if not received.     Fax# [phone]  Tel #[phone] Luzmaria

## 2024-06-17 DIAGNOSIS — F41.9 ANXIETY: Primary | ICD-10-CM

## 2024-06-17 RX ORDER — BUSPIRONE HYDROCHLORIDE 5 MG/1
5 TABLET ORAL 3 TIMES DAILY
Qty: 90 TABLET | Refills: 5 | Status: SHIPPED | OUTPATIENT
Start: 2024-06-17

## 2024-07-21 ENCOUNTER — APPOINTMENT (EMERGENCY)
Dept: CT IMAGING | Facility: HOSPITAL | Age: 79
End: 2024-07-21
Payer: MEDICARE

## 2024-07-21 ENCOUNTER — HOSPITAL ENCOUNTER (EMERGENCY)
Facility: HOSPITAL | Age: 79
Discharge: HOME | End: 2024-07-21
Attending: EMERGENCY MEDICINE
Payer: MEDICARE

## 2024-07-21 VITALS
BODY MASS INDEX: 25.62 KG/M2 | SYSTOLIC BLOOD PRESSURE: 132 MMHG | OXYGEN SATURATION: 98 % | WEIGHT: 194.22 LBS | DIASTOLIC BLOOD PRESSURE: 69 MMHG | HEART RATE: 55 BPM | TEMPERATURE: 97.9 F | RESPIRATION RATE: 14 BRPM

## 2024-07-21 DIAGNOSIS — R93.0 ABNORMAL CT OF THE HEAD: ICD-10-CM
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
## 2024-07-21 PROCEDURE — 93005 ELECTROCARDIOGRAM TRACING: CPT

## 2024-07-21 PROCEDURE — 99284 EMERGENCY DEPT VISIT MOD MDM: CPT | Performed by: EMERGENCY MEDICINE

## 2024-07-21 PROCEDURE — 72125 CT NECK SPINE W/O DYE: CPT

## 2024-07-21 PROCEDURE — 70450 CT HEAD/BRAIN W/O DYE: CPT

## 2024-07-21 PROCEDURE — 99284 EMERGENCY DEPT VISIT MOD MDM: CPT

## 2024-07-21 NOTE — INCIDENTAL FINDINGS
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    Follow up required: yes   Follow up should be done within 1 week(s)    Please notify the following clinician to assist with the follow up:   Dr. Reich, PCP    Incidental finding results were discussed with the Patient and Patient's POA by Kaitlyn Mon DO on 07/21/24.   They expressed understanding and all questions answered.

## 2024-07-21 NOTE — ED PROVIDER NOTES
Emergency Department Trauma Note  Justen Thompson 79 y.o. male MRN: [id number]  Unit/Bed#: ED 02/ED 02 Encounter: [id number]      Trauma Alert: Trauma Acuity: Trauma Evaluation  Model of Arrival: Mode of Arrival: BLS via    Trauma Team: Current Providers  Attending Provider: Kaitlyn Mon DO  Registered Nurse: Zaina Bee RN  Consultants:     None      History of Present Illness     Chief Complaint:   Chief Complaint   Patient presents with    Fall     Unwitness fall at [facility] that occurred this morning. Denies pain     HPI:  Justen Thompson is a 79 y.o. male who takes daily aspirin who is brought in by ambulance due to a fall.  Patient states that he was trying to transfer himself and slipped resulting in falling.  He did strike his head, but denies loss of consciousness.  Denies any complaints or pain at this time.  Patient's wife is at bedside and states that he is at his baseline mentation..  Mechanism:Details of Incident: pt had unwitness fall Injury Date: 07/21/24 Injury Time: 0700 Injury Occurence Location - Specify County: [place]    HPI  Review of Systems   Unable to perform ROS: Dementia   Eyes:  Negative for photophobia and visual disturbance.   Respiratory:  Negative for shortness of breath.    Cardiovascular:  Negative for chest pain.   Gastrointestinal:  Negative for nausea and vomiting.   Musculoskeletal:  Negative for back pain and neck pain.   Skin:  Negative for wound.   Neurological:  Negative for dizziness, light-headedness and headaches.       Historical Information     Immunizations:   Immunization History   Administered Date(s) Administered    COVID-19 MODERNA VACC 0.5 ML IM 01/21/2021, 02/19/2021, 10/25/2021    COVID-19 Moderna mRNA Vaccine 12 Yr+ 50 mcg/0.5 mL (Spikevax) 10/18/2023    COVID-19 Pfizer Vac BIVALENT Duane-sucrose 12 Yr+ IM 09/15/2022    INFLUENZA 10/28/2022    Influenza Split High Dose Preservative Free IM 09/28/2015, 09/14/2016, 10/19/2017    Influenza, high dose seasonal  0.7 mL 09/05/2019, 10/05/2020, 10/13/2021, 09/13/2023    Influenza, seasonal, injectable 10/03/2013    Pneumococcal Conjugate 13-Valent 10/08/2015    Pneumococcal Conjugate PCV 7 10/08/2015    Pneumococcal Polysaccharide PPV23 08/13/2012    Respiratory Syncytial Virus Vaccine (Recombinant, Adjuvanted) 10/10/2023       Past Medical History:   Diagnosis Date    Arthritis     Benign familial tremor     Cardiac disease     two cardiac stents    Chest pain     Coronary artery disease     HL (hearing loss) 01/01/2022    Hyperlipidemia     Hypertension     Kidney disease     Kidney stone     Malignant melanoma of skin of chest (HCC)     Removed 2 years ago.    Meniscal injury     Nephrolithiasis     Parkinson's disease     Peripheral neuropathy     PONV (postoperative nausea and vomiting)     Tinnitus     [id number] RESOLVED       Family History   Problem Relation Age of Onset    Heart disease Mother     Hypertension Mother     Stroke Mother     Breast cancer Mother     Heart disease Father     Hypertension Father     Stroke Father     Colon cancer Father     Diabetes Sister     Heart disease Brother     Other Family         BACK PAIN    Breast cancer Family     Heart disease Family     Hypertension Family     Stroke Family     Arthritis Family     Mental illness Neg Hx     Substance Abuse Neg Hx      Past Surgical History:   Procedure Laterality Date    AMPUTATION Left     AMPUTATION OF FINGER WITH NEURECTOMY(EACH) DISTAL LONG  RING AND SMALL FINGER    CARPAL TUNNEL RELEASE Right     CATARACT EXTRACTION      CHOLECYSTECTOMY      CORONARY STENT PLACEMENT      two cardiac stents    EYE SURGERY      FINGER AMPUTATION      AMPUTATION OF MIDDLE FINGER WITH NEURECTOMY(EACH)    HAND SURGERY Left     Traumatic amputation of fingers left hand.    JOINT REPLACEMENT Bilateral     knees    KNEE ARTHROPLASTY      TOTAL    MALIGNANT SKIN LESION EXCISION      ANTERIOR CHEST FOR MELAMONIA    CT CYSTO/URETERO W/LITHOTRIPSY &INDWELL STENT  INSRT Left 2017    Procedure: CYSTOSCOPY URETEROSCOPY , RETROGRADE PYELOGRAM AND INSERTION STENT URETERAL;  Surgeon: Geovanny Doyle MD;  Location: [facility];  Service: Urology    MD LAPAROSCOPY SURG CHOLECYSTECTOMY N/A 2018    Procedure: CHOLECYSTECTOMY LAPAROSCOPIC;  Surgeon: Amilcar Ireland MD;  Location: [facility];  Service: General    MD LITHOLAPAXY SMPL/SM <2.5 CM N/A 09/15/2017    Procedure: CYSTOLITHOLOPAXY HOLMIUM LASER;  Surgeon: Paulo Ghotra MD;  [facility];  Service: Urology    MD TRURL ELECTROSURG RESCJ PROSTATE BLEED COMPLETE N/A 09/15/2017    Procedure: BIPOLAR TRANSURETHRAL RESECTION OF PROSTATE (TURP);  Surgeon: Paulo Ghotra MD;  [facility];  Service: Urology    TONSILLECTOMY      TRIGGER FINGER RELEASE Right     TUMOR REMOVAL Left     Left foot 20 years ago.    WISDOM TOOTH EXTRACTION Bilateral      Social History     Tobacco Use    Smoking status: Former     Types: Cigarettes    Smokeless tobacco: Never    Tobacco comments:     back in college in    Vaping Use    Vaping status: Never Used   Substance Use Topics    Alcohol use: Yes     Alcohol/week: 4.0 standard drinks of alcohol     Types: 4 Cans of beer per week    Drug use: No     E-Cigarette/Vaping    E-Cigarette Use Never User      E-Cigarette/Vaping Substances    Nicotine No     THC No     CBD No     Flavoring No     Other No     Unknown No        Family History: non-contributory    Meds/Allergies   Prior to Admission Medications   Prescriptions Last Dose Informant Patient Reported? Taking?   Calcium Carbonate-Vitamin D 600-400 MG-UNIT per tablet  Self Yes No   Sig: ([place])   Cholecalciferol (VITAMIN D-3) 1000 UNITS CAPS  Self Yes No   Si daily   Homeopathic Products (Leg Cramps) TABS   No No   Sig: Take 1 tablet by mouth in the morning 1 daily      ([facility])   Multiple Vitamin (MULTIVITAMINS PO)  Self Yes No   Sig: Multivitamins Oral once a day Capsule   Refills: 0      , M.D.; Active    OLANZapine (ZyPREXA) 5 mg tablet   Yes No   Si at hs   acetaminophen (TYLENOL) 500 mg tablet   No No   Sig: Take 1 tablet (500 mg total) by mouth every 6 (six) hours as needed for mild pain   allopurinol (ZYLOPRIM) 300 mg tablet   No No   Sig: TAKE 1 TABLET BY MOUTH EVERY DAY   aspirin (ECOTRIN LOW STRENGTH) 81 mg EC tablet   No No   Sig: Take 1 tablet (81 mg total) by mouth daily   atorvastatin (LIPITOR) 20 mg tablet   No No   Sig: TAKE 1 TABLET BY MOUTH EVERY DAY   busPIRone (BUSPAR) 5 mg tablet   No No   Sig: Take 1 tablet (5 mg total) by mouth 3 (three) times a day   carbidopa-levodopa (SINEMET CR)  mg per tablet   Yes No   Sig: Take 1 tablet by mouth daily at bedtime   carbidopa-levodopa (SINEMET)  mg per tablet  Self Yes No   Si tabs in the AM, 2 at lunch & 2 at dinner.   (May also take 1 extra tab if needed)   docusate sodium (COLACE) 100 mg capsule  Self Yes No   Sig: Take by mouth 1-2 daily prn   fluticasone (FLONASE) 50 mcg/act nasal spray   No No   Sig: SPRAY 1 SPRAY INTO EACH NOSTRIL TWICE A DAY   gabapentin (NEURONTIN) 100 mg capsule   No No   Sig: TAKE 3 CAPSULES (300 MG TOTAL) BY MOUTH DAILY AT BEDTIME   metoprolol succinate (TOPROL-XL) 50 mg 24 hr tablet   No No   Sig: TAKE 1 TABLET BY MOUTH EVERY DAY   nitroglycerin (NITROSTAT) 0.4 mg SL tablet  Self Yes No   Sig: Place under the tongue   Patient not taking: Reported on 2022   senna (SENOKOT) 8.6 MG tablet   No No   Sig: Take 1 tablet (8.6 mg total) by mouth every other day as needed for constipation      Facility-Administered Medications: None       No Known Allergies    PHYSICAL EXAM    PE limited by: N/A    Objective   Vitals:   First set: Temperature: 97.9 °F (36.6 °C) (24 1046)  Pulse: 68 (24 1046)  Respirations: 18 (24 1046)  Blood Pressure: 137/78 (24 1046)  SpO2: 98 % (24 1046)    Primary Survey:   (A) Airway: intact  (B) Breathing: CTABL  (C) Circulation: Pulses:   normal  (D)  Disabliity:  GCS Total:  14  (E) Expose:  Completed    Secondary Survey: (Click on Physical Exam tab above)  Physical Exam  Vitals and nursing note reviewed.   Constitutional:       General: He is not in acute distress.     Appearance: Normal appearance. He is not ill-appearing, toxic-appearing or diaphoretic.   HENT:      Head: Normocephalic and atraumatic.      Mouth/Throat:      Mouth: Mucous membranes are moist.   Eyes:      Extraocular Movements: Extraocular movements intact.      Conjunctiva/sclera: Conjunctivae normal.      Pupils: Pupils are equal, round, and reactive to light.   Cardiovascular:      Rate and Rhythm: Normal rate and regular rhythm.      Pulses: Normal pulses.      Heart sounds: Normal heart sounds. No murmur heard.  Pulmonary:      Effort: Pulmonary effort is normal. No respiratory distress.      Breath sounds: Normal breath sounds. No stridor. No wheezing, rhonchi or rales.   Chest:      Chest wall: No tenderness.   Abdominal:      General: Bowel sounds are normal. There is no distension.      Palpations: Abdomen is soft.      Tenderness: There is no abdominal tenderness. There is no guarding or rebound.   Musculoskeletal:      Comments: No midline C, T, L spine tenderness, step-offs, deformities  Pelvis stable   Skin:     General: Skin is warm and dry.   Neurological:      General: No focal deficit present.      Mental Status: He is alert. Mental status is at baseline. He is disoriented.      Comments: Baseline(family at bedside confirm)           Cervical spine cleared by clinical criteria? No (imaging required)      Invasive Devices       Peripheral Intravenous Line  Duration             Peripheral IV 04/12/23 Right Antecubital 466 days                    Lab Results:   Results Reviewed       None                   Imaging Studies:   Direct to CT: Yes  TRAUMA - CT head wo contrast   Final Result by Rex Irwin MD (07/21 6490)      1. No acute intracranial abnormality.   2. Chronic  microangiopathic changes and old lacunar infarcts.         The study was marked in EPIC for immediate notification as per trauma protocol.         Workstation performed: [id number]         TRAUMA - CT spine cervical wo contrast   Final Result by Rex Irwin MD (07/21 1130)      1. No cervical spine fracture or traumatic malalignment.            The study was marked in EPIC for immediate notification as per trauma protocol.      Workstation performed: [id number]               Procedures  Procedures         ED Course           Medical Decision Making  Assessment and Plan:  CT head and cervical spine to assess for ICH and fracture.  Patient asymptomatic.  If imaging negative, will discharge.    Amount and/or Complexity of Data Reviewed  Radiology: ordered.                Disposition  [facility] Transfer: No  Final diagnoses:   Fall, initial encounter   Abnormal CT of the head     Time reflects when diagnosis was documented in both MDM as applicable and the Disposition within this note       Time User Action Codes Description Comment    7/21/2024 11:54 AM Kaitlyn Mon Add [W19.XXXA] Fall, initial encounter     7/21/2024 11:54 AM Kaitlyn Mon Add [R93.0] Abnormal CT of the head           ED Disposition       ED Disposition   Discharge    Condition   Stable    Date/Time   Sun Jul 21, 2024 11:54 AM    Comment   Justen Thompson discharge to home/self care.                   Follow-up Information       Follow up With Specialties Details Why Contact Info Additional Information    Kye Reich MD Internal Medicine Schedule an appointment as soon as possible for a visit in 3 days for re-evaluation [address] [place] [id number] [phone]        [facility] Emergency Department Emergency Medicine Go to  As needed, If symptoms worsen, for re-evaluation [address] [facility] [id number] [phone] [facility] Emergency  Department, [address] [place] [id number]          Patient's Medications   Discharge Prescriptions    No medications on file     No discharge procedures on file.    PDMP Review         Value Time User    PDMP Reviewed  Yes 5/18/2023  1:48 PM Kye Reich MD            ED Provider  Electronically Signed by           Kaitlyn Mon DO  07/21/24 [id number]

## 2024-07-22 LAB
ATRIAL RATE: 69 BPM
P AXIS: 69 DEGREES
PR INTERVAL: 170 MS
QRS AXIS: 1 DEGREES
QRSD INTERVAL: 82 MS
QT INTERVAL: 400 MS
QTC INTERVAL: 428 MS
T WAVE AXIS: 33 DEGREES
VENTRICULAR RATE: 69 BPM

## 2024-07-22 PROCEDURE — 93010 ELECTROCARDIOGRAM REPORT: CPT | Performed by: INTERNAL MEDICINE

## 2024-09-17 ENCOUNTER — TELEPHONE (OUTPATIENT)
Dept: FAMILY MEDICINE CLINIC | Facility: HOSPITAL | Age: 79
End: 2024-09-17

## 2024-09-17 PROBLEM — G20.A1 MODERATE DEMENTIA DUE TO PARKINSON'S DISEASE, WITH AGITATION (HCC): Status: ACTIVE | Noted: 2024-09-17

## 2024-09-17 PROBLEM — F02.B11 MODERATE DEMENTIA DUE TO PARKINSON'S DISEASE, WITH AGITATION (HCC): Status: ACTIVE | Noted: 2024-09-17

## 2024-09-17 NOTE — TELEPHONE ENCOUNTER
Patient is now in a long term skilled nursing facility. Per his wife he is now seeing the onsite the doctor. Please remove Dr Reich as his pcp.

## 2024-09-18 ENCOUNTER — RA CDI HCC (OUTPATIENT)
Dept: OTHER | Facility: HOSPITAL | Age: 79
End: 2024-09-18

## 2024-09-27 NOTE — TELEPHONE ENCOUNTER
09/27/24 2:48 AM        The office's request has been received, reviewed, and the patient chart updated. The PCP has successfully been removed with a patient attribution note. This message will now be completed.        Thank you  Greyson Ball

## 2025-03-12 ENCOUNTER — OFFICE VISIT (OUTPATIENT)
Dept: PODIATRY | Facility: CLINIC | Age: 80
End: 2025-03-12
Payer: MEDICARE

## 2025-03-12 DIAGNOSIS — L60.0 INGROWING NAIL: ICD-10-CM

## 2025-03-12 DIAGNOSIS — L97.511 ULCER OF TOE OF RIGHT FOOT, LIMITED TO BREAKDOWN OF SKIN (HCC): Primary | ICD-10-CM

## 2025-03-12 DIAGNOSIS — G62.9 POLYNEUROPATHY: ICD-10-CM

## 2025-03-12 PROCEDURE — 99204 OFFICE O/P NEW MOD 45 MIN: CPT | Performed by: PODIATRIST

## 2025-03-12 NOTE — PROGRESS NOTES
PATIENT:  Justen Thompson  1945       ASSESSMENT:     1. Ulcer of toe of right foot, limited to breakdown of skin (HCC)  Post Op Shoe      2. Polyneuropathy        3. Ingrowing nail                  PLAN:  1. Reviewed medical records.  Some of history were taken from his wife.  Reviewed the notes from [facility].  Reviewed arterial study.  Patient was counseled and educated on the condition and the diagnosis.    2. The diagnosis, treatment options and prognosis were discussed with the patient.    3. He has wound in right 2nd toe which is stable with eschar.  Use betadine and dry dressing.  Rx: post-op shoe for off-loading.  4. No immediate concern for arterial status with palpable PTA and last arterial study.    5. Slanted back lateral nail border of left great toe and removed surrounding keratosis.  Instructed local care.    6. Wound care order written for [facility].    7. Patient will return in 4 weeks for re-evaluation.       Imaging: I have personally reviewed pertinent films in PACS  Labs, pathology, and Other Studies: I have personally reviewed pertinent reports.        Subjective:       HPI  The patient presents with chief complaint of wound in right 2nd toe and left great toe ingrown nail.  He presents with his wife today.  He has dementia and Parkinson's dx and history was limited.  He had it for a few weeks.  No drainage or pain.  He has numbness in his feet with neuropathy.  No history of diabetes.  No acute illness.        The following portions of the patient's history were reviewed and updated as appropriate: allergies, current medications, past family history, past medical history, past social history, past surgical history and problem list.  All pertinent labs and images were reviewed.      Past Medical History  Past Medical History:   Diagnosis Date    Arthritis     Benign familial tremor     Cardiac disease     two cardiac stents    Chest pain     Coronary artery disease     HL  (hearing loss) 01/01/2022    Hyperlipidemia     Hypertension     Kidney disease     Kidney stone     Malignant melanoma of skin of chest (HCC)     Removed 2 years ago.    Meniscal injury     Nephrolithiasis     Parkinson's disease (HCC)     Peripheral neuropathy     PONV (postoperative nausea and vomiting)     Tinnitus     [id number] RESOLVED       Past Surgical History  Past Surgical History:   Procedure Laterality Date    AMPUTATION Left     AMPUTATION OF FINGER WITH NEURECTOMY(EACH) DISTAL LONG  RING AND SMALL FINGER    CARPAL TUNNEL RELEASE Right     CATARACT EXTRACTION      CHOLECYSTECTOMY      CORONARY STENT PLACEMENT      two cardiac stents    EYE SURGERY      FINGER AMPUTATION      AMPUTATION OF MIDDLE FINGER WITH NEURECTOMY(EACH)    HAND SURGERY Left     Traumatic amputation of fingers left hand.    JOINT REPLACEMENT Bilateral     knees    KNEE ARTHROPLASTY      TOTAL    MALIGNANT SKIN LESION EXCISION      ANTERIOR CHEST FOR MELAMONIA    IA CYSTO/URETERO W/LITHOTRIPSY &INDWELL STENT INSRT Left 07/27/2017    Procedure: CYSTOSCOPY URETEROSCOPY , RETROGRADE PYELOGRAM AND INSERTION STENT URETERAL;  Surgeon: Geovanny Doyle MD;  [facility];  Service: Urology    IA LAPAROSCOPY SURG CHOLECYSTECTOMY N/A 02/28/2018    Procedure: CHOLECYSTECTOMY LAPAROSCOPIC;  Surgeon: Amilcar Ireladn MD;  [facility];  Service: General    IA LITHOLAPAXY SMPL/SM <2.5 CM N/A 09/15/2017    Procedure: CYSTOLITHOLOPAXY HOLMIUM LASER;  Surgeon: Paulo Ghotra MD;  [facility];  Service: Urology    IA TRURL ELECTROSURG RESCJ PROSTATE BLEED COMPLETE N/A 09/15/2017    Procedure: BIPOLAR TRANSURETHRAL RESECTION OF PROSTATE (TURP);  Surgeon: Paulo Ghotra MD;  [facility];  Service: Urology    TONSILLECTOMY      TRIGGER FINGER RELEASE Right     TUMOR REMOVAL Left     Left foot 20 years ago.    WISDOM TOOTH EXTRACTION Bilateral         Allergies:  Patient has no known allergies.    Medications:  Current  Outpatient Medications   Medication Sig Dispense Refill    acetaminophen (TYLENOL) 500 mg tablet Take 1 tablet (500 mg total) by mouth every 6 (six) hours as needed for mild pain 120 tablet 5    allopurinol (ZYLOPRIM) 300 mg tablet TAKE 1 TABLET BY MOUTH EVERY DAY 90 tablet 3    aspirin (ECOTRIN LOW STRENGTH) 81 mg EC tablet Take 1 tablet (81 mg total) by mouth daily 30 tablet 5    atorvastatin (LIPITOR) 20 mg tablet TAKE 1 TABLET BY MOUTH EVERY DAY 90 tablet 3    Calcium Carbonate-Vitamin D 600-400 MG-UNIT per tablet ([place]wy)      carbidopa-levodopa (SINEMET CR)  mg per tablet Take 1 tablet by mouth daily at bedtime      carbidopa-levodopa (SINEMET)  mg per tablet 2 tabs in the AM, 2 at lunch & 2 at dinner.   (May also take 1 extra tab if needed)      Cholecalciferol (VITAMIN D-3) 1000 UNITS CAPS 1 daily      docusate sodium (COLACE) 100 mg capsule Take by mouth 1-2 daily prn      fluticasone (FLONASE) 50 mcg/act nasal spray SPRAY 1 SPRAY INTO EACH NOSTRIL TWICE A DAY 48 mL 1    gabapentin (NEURONTIN) 100 mg capsule TAKE 3 CAPSULES (300 MG TOTAL) BY MOUTH DAILY AT BEDTIME 90 capsule 3    Homeopathic Products (Leg Cramps) TABS Take 1 tablet by mouth in the morning 1 daily      ([facility]) 30 tablet 5    metoprolol succinate (TOPROL-XL) 50 mg 24 hr tablet TAKE 1 TABLET BY MOUTH EVERY DAY 90 tablet 1    Multiple Vitamin (MULTIVITAMINS PO) Multivitamins Oral once a day Capsule   Refills: 0      , M.D.; Active      OLANZapine (ZyPREXA) 5 mg tablet 1 at hs      senna (SENOKOT) 8.6 MG tablet Take 1 tablet (8.6 mg total) by mouth every other day as needed for constipation 30 tablet 1    busPIRone (BUSPAR) 5 mg tablet Take 1 tablet (5 mg total) by mouth 3 (three) times a day (Patient not taking: Reported on 3/12/2025) 90 tablet 5    nitroglycerin (NITROSTAT) 0.4 mg SL tablet Place under the tongue (Patient not taking: Reported on 9/12/2022)       No current facility-administered medications for this visit.        Social History:  Social History     Socioeconomic History    Marital status: /Civil Union     Spouse name: None    Number of children: None    Years of education: None    Highest education level: None   Occupational History    None   Tobacco Use    Smoking status: Former     Types: Cigarettes    Smokeless tobacco: Never    Tobacco comments:     back in college in 1966   Vaping Use    Vaping status: Never Used   Substance and Sexual Activity    Alcohol use: Yes     Alcohol/week: 4.0 standard drinks of alcohol     Types: 4 Cans of beer per week    Drug use: No    Sexual activity: Not Currently     Partners: Female   Other Topics Concern    None   Social History Narrative    Lives with wife.    Sees dentist reg.    Has living will.    Feels safe at home.    No mental or sub in self.     ACTIVE ADVANCE DIRECTIVE    CAREGIVER:  SPOUSE    EXERCISE HABITS  -  DAILY    GOOD DENTAL HYGIENE     Social Drivers of Health     Financial Resource Strain: Low Risk  (5/18/2023)    Overall Financial Resource Strain (CARDIA)     Difficulty of Paying Living Expenses: Not very hard   Food Insecurity: No Food Insecurity (5/14/2021)    Hunger Vital Sign     Worried About Running Out of Food in the Last Year: Never true     Ran Out of Food in the Last Year: Never true   Transportation Needs: No Transportation Needs (5/18/2023)    PRAPARE - Transportation     Lack of Transportation (Medical): No     Lack of Transportation (Non-Medical): No   Physical Activity: Inactive (4/2/2021)    Exercise Vital Sign     Days of Exercise per Week: 0 days     Minutes of Exercise per Session: 0 min   Stress: No Stress Concern Present (4/2/2021)    [employer] - Occupational Stress Questionnaire     Feeling of Stress : Not at all   Social Connections: Not on file   Intimate Partner Violence: Not At Risk (4/2/2021)    Humiliation, Afraid, Rape, and Kick questionnaire     Fear of Current or Ex-Partner: No      Emotionally Abused: No     Physically Abused: No     Sexually Abused: No   Housing Stability: Not on file          Review of Systems   Constitutional:  Negative for chills and fever.   Respiratory:  Negative for cough and shortness of breath.    Cardiovascular:  Negative for chest pain.   Gastrointestinal:  Negative for nausea and vomiting.   Musculoskeletal:  Positive for arthralgias.   Skin:  Positive for wound.   Neurological:  Positive for numbness. Negative for weakness.         Objective:      There were no vitals taken for this visit.         Physical Exam  Vitals reviewed.   Constitutional:       General: He is not in acute distress.     Appearance: He is not toxic-appearing or diaphoretic.   HENT:      Head: Normocephalic and atraumatic.   Eyes:      Extraocular Movements: Extraocular movements intact.   Cardiovascular:      Rate and Rhythm: Normal rate and regular rhythm.      Pulses:           Dorsalis pedis pulses are 0 on the right side and 0 on the left side.        Posterior tibial pulses are 2+ on the right side and 2+ on the left side.   Pulmonary:      Effort: Pulmonary effort is normal. No respiratory distress.   Musculoskeletal:         General: Deformity present. No signs of injury.      Cervical back: Normal range of motion and neck supple.      Right foot: No Charcot foot or foot drop.      Left foot: No Charcot foot or foot drop.   Feet:      Right foot:      Protective Sensation: 10 sites tested.  2 sites sensed.      Left foot:      Protective Sensation: 10 sites tested.  2 sites sensed.   Skin:     General: Skin is warm.      Capillary Refill: Capillary refill takes less than 2 seconds.      Coloration: Skin is not cyanotic or mottled.      Findings: No abscess.      Nails: There is no clubbing.      Comments: Dry eschar at dorsal DIPJ of right 2nd toe.  Mild incurvation of lateral nail border / callus left great toe.  No paronychia.     Neurological:      General: No focal deficit  present.      Mental Status: He is alert and oriented to person, place, and time.      Cranial Nerves: No cranial nerve deficit.      Sensory: Sensory deficit present.      Motor: No weakness.      Coordination: Coordination normal.   Psychiatric:         Mood and Affect: Mood normal.         Behavior: Behavior normal.

## 2025-04-09 ENCOUNTER — OFFICE VISIT (OUTPATIENT)
Dept: PODIATRY | Facility: CLINIC | Age: 80
End: 2025-04-09
Payer: MEDICARE

## 2025-04-09 VITALS — HEIGHT: 73 IN | BODY MASS INDEX: 25.62 KG/M2

## 2025-04-09 DIAGNOSIS — L97.511 ULCER OF TOE OF RIGHT FOOT, LIMITED TO BREAKDOWN OF SKIN (HCC): Primary | ICD-10-CM

## 2025-04-09 DIAGNOSIS — G62.9 POLYNEUROPATHY: ICD-10-CM

## 2025-04-09 DIAGNOSIS — B35.1 ONYCHOMYCOSIS: ICD-10-CM

## 2025-04-09 PROCEDURE — 99213 OFFICE O/P EST LOW 20 MIN: CPT | Performed by: PODIATRIST

## 2025-04-09 PROCEDURE — 11721 DEBRIDE NAIL 6 OR MORE: CPT | Performed by: PODIATRIST

## 2025-04-09 RX ORDER — PIMAVANSERIN TARTRATE 34 MG/1
CAPSULE ORAL
COMMUNITY
Start: 2024-11-01

## 2025-04-09 RX ORDER — METOPROLOL TARTRATE 25 MG/1
TABLET, FILM COATED ORAL
COMMUNITY
Start: 2025-03-01

## 2025-04-09 RX ORDER — MIRTAZAPINE 15 MG/1
TABLET, FILM COATED ORAL
COMMUNITY
Start: 2025-03-01

## 2025-04-09 NOTE — PROGRESS NOTES
PATIENT:  Justen Thompson  1945       ASSESSMENT:     1. Ulcer of toe of right foot, limited to breakdown of skin (HCC)        2. Polyneuropathy        3. Onychomycosis                    PLAN:  1. Reviewed medical records. Patient was counseled and educated on the condition and the diagnosis.    2. The diagnosis, treatment options and prognosis were discussed with the patient.    3. Wound is covered with superficial eschar.  Instructed protective dressing in the next 3 weeks.  Okay to wear sneakers and monitor the progress.  Pt to call the office if his condition re-occurs.  4.  Order written for local care.      Imaging: I have personally reviewed pertinent films in PACS  Labs, pathology, and Other Studies: I have personally reviewed pertinent reports.        Procedure: All mycotic toenails were reduced and debrided in length, width, and girth using a nail nipper.  Patient tolerated procedure(s) well without complications.      Subjective:       HPI  The patient presents with his wife for follow-up on wound in right 2nd toe.  He feels well with no pain.  Wound looks healed.  He was requesting trimming nails.    The following portions of the patient's history were reviewed and updated as appropriate: allergies, current medications, past family history, past medical history, past social history, past surgical history and problem list.  All pertinent labs and images were reviewed.      Past Medical History  Past Medical History:   Diagnosis Date    Arthritis     Benign familial tremor     Cardiac disease     two cardiac stents    Chest pain     Coronary artery disease     HL (hearing loss) 01/01/2022    Hyperlipidemia     Hypertension     Kidney disease     Kidney stone     Malignant melanoma of skin of chest (HCC)     Removed 2 years ago.    Meniscal injury     Nephrolithiasis     Parkinson's disease (HCC)     Peripheral neuropathy     PONV (postoperative nausea and vomiting)     Tinnitus      [id number] RESOLVED       Past Surgical History  Past Surgical History:   Procedure Laterality Date    AMPUTATION Left     AMPUTATION OF FINGER WITH NEURECTOMY(EACH) DISTAL LONG  RING AND SMALL FINGER    CARPAL TUNNEL RELEASE Right     CATARACT EXTRACTION      CHOLECYSTECTOMY      CORONARY STENT PLACEMENT      two cardiac stents    EYE SURGERY      FINGER AMPUTATION      AMPUTATION OF MIDDLE FINGER WITH NEURECTOMY(EACH)    HAND SURGERY Left     Traumatic amputation of fingers left hand.    JOINT REPLACEMENT Bilateral     knees    KNEE ARTHROPLASTY      TOTAL    MALIGNANT SKIN LESION EXCISION      ANTERIOR CHEST FOR MELAMONIA    SD CYSTO/URETERO W/LITHOTRIPSY &INDWELL STENT INSRT Left 07/27/2017    Procedure: CYSTOSCOPY URETEROSCOPY , RETROGRADE PYELOGRAM AND INSERTION STENT URETERAL;  Surgeon: Geovanny Doyle MD;  [facility];  Service: Urology    SD LAPAROSCOPY SURG CHOLECYSTECTOMY N/A 02/28/2018    Procedure: CHOLECYSTECTOMY LAPAROSCOPIC;  Surgeon: Amilcar Ireland MD;  [facility];  Service: General    SD LITHOLAPAXY SMPL/SM <2.5 CM N/A 09/15/2017    Procedure: CYSTOLITHOLOPAXY HOLMIUM LASER;  Surgeon: Paulo Ghotra MD;  [facility];  Service: Urology    SD TRURL ELECTROSURG RESCJ PROSTATE BLEED COMPLETE N/A 09/15/2017    Procedure: BIPOLAR TRANSURETHRAL RESECTION OF PROSTATE (TURP);  Surgeon: Paulo Ghotra MD;  [facility];  Service: Urology    TONSILLECTOMY      TRIGGER FINGER RELEASE Right     TUMOR REMOVAL Left     Left foot 20 years ago.    WISDOM TOOTH EXTRACTION Bilateral         Allergies:  Patient has no known allergies.    Medications:  Current Outpatient Medications   Medication Sig Dispense Refill    acetaminophen (TYLENOL) 500 mg tablet Take 1 tablet (500 mg total) by mouth every 6 (six) hours as needed for mild pain 120 tablet 5    allopurinol (ZYLOPRIM) 300 mg tablet TAKE 1 TABLET BY MOUTH EVERY DAY 90 tablet 3    aspirin (ECOTRIN LOW STRENGTH) 81 mg EC tablet  Take 1 tablet (81 mg total) by mouth daily 30 tablet 5    atorvastatin (LIPITOR) 20 mg tablet TAKE 1 TABLET BY MOUTH EVERY DAY 90 tablet 3    Calcium Carbonate-Vitamin D 600-400 MG-UNIT per tablet ([place])      carbidopa-levodopa (SINEMET CR)  mg per tablet Take 1 tablet by mouth daily at bedtime      carbidopa-levodopa (SINEMET)  mg per tablet 2 tabs in the AM, 2 at lunch & 2 at dinner.   (May also take 1 extra tab if needed)      Cholecalciferol (VITAMIN D-3) 1000 UNITS CAPS 1 daily      docusate sodium (COLACE) 100 mg capsule Take by mouth 1-2 daily prn      fluticasone (FLONASE) 50 mcg/act nasal spray SPRAY 1 SPRAY INTO EACH NOSTRIL TWICE A DAY 48 mL 1    gabapentin (NEURONTIN) 100 mg capsule TAKE 3 CAPSULES (300 MG TOTAL) BY MOUTH DAILY AT BEDTIME 90 capsule 3    Homeopathic Products (Leg Cramps) TABS Take 1 tablet by mouth in the morning 1 daily      ([facility]) 30 tablet 5    metoprolol succinate (TOPROL-XL) 50 mg 24 hr tablet TAKE 1 TABLET BY MOUTH EVERY DAY 90 tablet 1    metoprolol tartrate (LOPRESSOR) 25 mg tablet       mirtazapine (REMERON) 15 mg tablet       Multiple Vitamin (MULTIVITAMINS PO) Multivitamins Oral once a day Capsule   Refills: 0      , M.D.; Active      Nuplazid 34 MG CAPS       OLANZapine (ZyPREXA) 5 mg tablet 1 at hs      senna (SENOKOT) 8.6 MG tablet Take 1 tablet (8.6 mg total) by mouth every other day as needed for constipation 30 tablet 1    busPIRone (BUSPAR) 5 mg tablet Take 1 tablet (5 mg total) by mouth 3 (three) times a day (Patient not taking: Reported on 3/12/2025) 90 tablet 5    nitroglycerin (NITROSTAT) 0.4 mg SL tablet Place under the tongue (Patient not taking: Reported on 9/12/2022)       No current facility-administered medications for this visit.       Social History:  Social History     Socioeconomic History    Marital status: /Civil Union     Spouse name: None    Number of children: None    Years of education: None    Highest education level:  None   Occupational History    None   Tobacco Use    Smoking status: Former     Types: Cigarettes     Passive exposure: Past    Smokeless tobacco: Never    Tobacco comments:     back in college in 1966   Vaping Use    Vaping status: Never Used   Substance and Sexual Activity    Alcohol use: Yes     Alcohol/week: 4.0 standard drinks of alcohol     Types: 4 Cans of beer per week    Drug use: No    Sexual activity: Not Currently     Partners: Female   Other Topics Concern    None   Social History Narrative    Lives with wife.    Sees dentist reg.    Has living will.    Feels safe at home.    No mental or sub in self.     ACTIVE ADVANCE DIRECTIVE    CAREGIVER:  SPOUSE    EXERCISE HABITS  -  DAILY    GOOD DENTAL HYGIENE     Social Drivers of Health     Financial Resource Strain: Low Risk  (5/18/2023)    Overall Financial Resource Strain (CARDIA)     Difficulty of Paying Living Expenses: Not very hard   Food Insecurity: No Food Insecurity (5/14/2021)    Hunger Vital Sign     Worried About Running Out of Food in the Last Year: Never true     Ran Out of Food in the Last Year: Never true   Transportation Needs: No Transportation Needs (5/18/2023)    PRAPARE - Transportation     Lack of Transportation (Medical): No     Lack of Transportation (Non-Medical): No   Physical Activity: Inactive (4/2/2021)    Exercise Vital Sign     Days of Exercise per Week: 0 days     Minutes of Exercise per Session: 0 min   Stress: No Stress Concern Present (4/2/2021)    [employer] - Occupational Stress Questionnaire     Feeling of Stress : Not at all   Social Connections: Not on file   Intimate Partner Violence: Not At Risk (4/2/2021)    Humiliation, Afraid, Rape, and Kick questionnaire     Fear of Current or Ex-Partner: No     Emotionally Abused: No     Physically Abused: No     Sexually Abused: No   Housing Stability: Not on file          Review of Systems   Constitutional:  Negative for chills and fever.  "  Respiratory:  Negative for cough and shortness of breath.    Cardiovascular:  Negative for chest pain.   Gastrointestinal:  Negative for nausea and vomiting.   Musculoskeletal:  Positive for arthralgias.   Neurological:  Positive for numbness.         Objective:      Ht 6' 1\" (1.854 m) Comment: stated  BMI 25.62 kg/m²          Physical Exam  Vitals reviewed.   Constitutional:       General: He is not in acute distress.     Appearance: He is not toxic-appearing or diaphoretic.   Cardiovascular:      Rate and Rhythm: Normal rate and regular rhythm.      Pulses:           Dorsalis pedis pulses are 0 on the right side and 0 on the left side.        Posterior tibial pulses are 2+ on the right side and 2+ on the left side.   Pulmonary:      Effort: Pulmonary effort is normal. No respiratory distress.   Musculoskeletal:         General: Deformity present. No signs of injury.      Right foot: No Charcot foot or foot drop.      Left foot: No Charcot foot or foot drop.   Feet:      Right foot:      Protective Sensation: 10 sites tested.  2 sites sensed.      Left foot:      Protective Sensation: 10 sites tested.  2 sites sensed.   Skin:     General: Skin is warm.      Capillary Refill: Capillary refill takes less than 2 seconds.      Coloration: Skin is not cyanotic or mottled.      Findings: No abscess.      Nails: There is no clubbing.      Comments: Wound closed with superficial eschar DIPJ of right dorsal 2nd toe.  No drainage.  No redness or swelling.  Thick, mycotic nails presents with onycholysis and discoloration.  He has class findings with dry skin, lack of pedal hairs, and brittle nails.   Neurological:      General: No focal deficit present.      Mental Status: He is alert and oriented to person, place, and time.      Cranial Nerves: No cranial nerve deficit.      Sensory: Sensory deficit present.      Motor: No weakness.      Coordination: Coordination normal.   Psychiatric:         Mood and Affect: Mood " normal.         Behavior: Behavior normal.

## (undated) DEVICE — LIGAMAX 5 MM ENDOSCOPIC MULTIPLE CLIP APPLIER: Brand: LIGAMAX

## (undated) DEVICE — 3M™ STERI-STRIP™ COMPOUND BENZOIN TINCTURE 40 BAGS/CARTON 4 CARTONS/CASE C1544: Brand: 3M™ STERI-STRIP™

## (undated) DEVICE — SPONGE STICK WITH PVP-I: Brand: KENDALL

## (undated) DEVICE — CATH FOLEY COUNCIL 18FR 5ML 2 WAY LUBRICATH

## (undated) DEVICE — GLOVE SRG BIOGEL ORTHOPEDIC 7.5

## (undated) DEVICE — TUBING SUCTION 5MM X 12 FT

## (undated) DEVICE — BAG URINE DRAINAGE 4000ML CONTINUOUS IRR

## (undated) DEVICE — GLOVE SRG BIOGEL 7.5

## (undated) DEVICE — SUT MONOCRYL 4-0 PS-2 27 IN Y426H

## (undated) DEVICE — ALLENTOWN LAP CHOLE APP PACK: Brand: CARDINAL HEALTH

## (undated) DEVICE — ENDOPOUCH RETRIEVER SPECIMEN RETRIEVAL BAGS: Brand: ENDOPOUCH RETRIEVER

## (undated) DEVICE — GLOVE SRG BIOGEL ECLIPSE 7.5

## (undated) DEVICE — GUIDEWIRE STRGHT TIP 0.035 IN  SOLO PLUS

## (undated) DEVICE — GLOVE INDICATOR PI UNDERGLOVE SZ 7.5 BLUE

## (undated) DEVICE — GAUZE SPONGES,16 PLY: Brand: CURITY

## (undated) DEVICE — ENDOPATH 5MM CURVED SCISSORS WITH MONOPOLAR CAUTERY: Brand: ENDOPATH

## (undated) DEVICE — SCD SEQUENTIAL COMPRESSION COMFORT SLEEVE MEDIUM KNEE LENGTH: Brand: KENDALL SCD

## (undated) DEVICE — SPECIMEN CONTAINER STERILE PEEL PACK

## (undated) DEVICE — INTENDED FOR TISSUE SEPARATION, AND OTHER PROCEDURES THAT REQUIRE A SHARP SURGICAL BLADE TO PUNCTURE OR CUT.: Brand: BARD-PARKER SAFETY BLADES SIZE 11, STERILE

## (undated) DEVICE — SYRINGE 10ML LL

## (undated) DEVICE — ENDOPATH XCEL UNIVERSAL TROCAR STABLILITY SLEEVES: Brand: ENDOPATH XCEL

## (undated) DEVICE — SYRINGE CATH TIP 50ML

## (undated) DEVICE — UROCATCH BAG

## (undated) DEVICE — SYRINGE 30ML LL

## (undated) DEVICE — BLUE HEAT SCOPE WARMER

## (undated) DEVICE — GLOVE SRG BIOGEL ECLIPSE 8

## (undated) DEVICE — GLOVE SRG BIOGEL 6.5

## (undated) DEVICE — GLOVE SRG BIOGEL 7

## (undated) DEVICE — CYSTOSCOPY PACK: Brand: CONVERTORS

## (undated) DEVICE — LASER HOLMUIUM FIBER 1000 MIC

## (undated) DEVICE — CYSTO TUBING TUR Y IRRIGATION

## (undated) DEVICE — SHEATH URETERAL ACCESS 12/14FR 35CM PROXIS

## (undated) DEVICE — SURGICAL GOWN, XL SMARTSLEEVE: Brand: CONVERTORS

## (undated) DEVICE — EVACUATOR BLADDER ELLIK DISP STRL

## (undated) DEVICE — VIAL DECANTER

## (undated) DEVICE — ENDOPATH XCEL BLUNT TIP TROCARS WITH SMOOTH SLEEVES: Brand: ENDOPATH XCEL

## (undated) DEVICE — CATH FOLEY COUDE 18FR 5ML 2 WAY TIEMANN LUBRICATH

## (undated) DEVICE — 2000CC GUARDIAN II: Brand: GUARDIAN

## (undated) DEVICE — STANDARD SURGICAL GOWN, L: Brand: CONVERTORS

## (undated) DEVICE — SUT VICRYL 0 UR-6 27 IN J603H

## (undated) DEVICE — HF-RESECTION ELECTRODE PLASMALOOP LOOP, MEDIUM, 24 FR., 12°-30°, ESG TURIS: Brand: OLYMPUS

## (undated) DEVICE — ADHESIVE SKN CLSR HISTOACRYL FLEX 0.5ML LF

## (undated) DEVICE — 3M™ TEGADERM™ TRANSPARENT FILM DRESSING FRAME STYLE, 1624W, 2-3/8 IN X 2-3/4 IN (6 CM X 7 CM), 100/CT 4CT/CASE: Brand: 3M™ TEGADERM™

## (undated) DEVICE — REM POLYHESIVE ADULT PATIENT RETURN ELECTRODE: Brand: VALLEYLAB

## (undated) DEVICE — SNAP KOVER: Brand: UNBRANDED

## (undated) DEVICE — SKIN MARKER DUAL TIP WITH RULER CAP, FLEXIBLE RULER AND LABELS: Brand: DEVON

## (undated) DEVICE — CATH URETERAL 5FR X 70 CM FLEX TIP POLYUR BARD

## (undated) DEVICE — TUBING SMOKE EVAC W/FILTRATION DEVICE PLUMEPORT ACTIV

## (undated) DEVICE — ELECTRODE LAP L WIRE E-Z CLEAN 33CM -0100

## (undated) DEVICE — GLOVE INDICATOR PI UNDERGLOVE SZ 6.5 BLUE

## (undated) DEVICE — GLOVE INDICATOR PI UNDERGLOVE SZ 8 BLUE

## (undated) DEVICE — IRRIG ENDO FLO TUBING

## (undated) DEVICE — CATH FOLEY COUDE HEMATURIA 24FR 30ML 3 WAY LUBRICATH

## (undated) DEVICE — ENDOPATH XCEL BLADELESS TROCARS WITH STABILITY SLEEVES: Brand: ENDOPATH XCEL

## (undated) DEVICE — GLOVE SRG BIOGEL 8

## (undated) DEVICE — CATH FOLEY 12FR 5ML 2 WAY UNCOATED SILICONE

## (undated) DEVICE — CHLORAPREP HI-LITE 26ML ORANGE